# Patient Record
Sex: MALE | Race: WHITE | NOT HISPANIC OR LATINO | URBAN - METROPOLITAN AREA
[De-identification: names, ages, dates, MRNs, and addresses within clinical notes are randomized per-mention and may not be internally consistent; named-entity substitution may affect disease eponyms.]

---

## 2023-11-06 ENCOUNTER — PHARMACY VISIT (OUTPATIENT)
Dept: PHARMACY | Facility: CLINIC | Age: 73
End: 2023-11-06
Payer: COMMERCIAL

## 2023-11-06 ENCOUNTER — HOSPITAL ENCOUNTER (EMERGENCY)
Facility: HOSPITAL | Age: 73
Discharge: HOME | End: 2023-11-06
Attending: EMERGENCY MEDICINE
Payer: MEDICARE

## 2023-11-06 ENCOUNTER — APPOINTMENT (OUTPATIENT)
Dept: RADIOLOGY | Facility: HOSPITAL | Age: 73
End: 2023-11-06
Payer: MEDICARE

## 2023-11-06 VITALS
OXYGEN SATURATION: 98 % | HEIGHT: 66 IN | WEIGHT: 210.76 LBS | SYSTOLIC BLOOD PRESSURE: 147 MMHG | HEART RATE: 65 BPM | BODY MASS INDEX: 33.87 KG/M2 | RESPIRATION RATE: 18 BRPM | TEMPERATURE: 98.4 F | DIASTOLIC BLOOD PRESSURE: 74 MMHG

## 2023-11-06 DIAGNOSIS — R06.02 SHORTNESS OF BREATH: Primary | ICD-10-CM

## 2023-11-06 DIAGNOSIS — I10 HYPERTENSION, UNSPECIFIED TYPE: ICD-10-CM

## 2023-11-06 DIAGNOSIS — I50.9 CONGESTIVE HEART FAILURE, UNSPECIFIED HF CHRONICITY, UNSPECIFIED HEART FAILURE TYPE (MULTI): ICD-10-CM

## 2023-11-06 DIAGNOSIS — M79.672 LEFT FOOT PAIN: ICD-10-CM

## 2023-11-06 DIAGNOSIS — J44.1 COPD EXACERBATION (MULTI): ICD-10-CM

## 2023-11-06 LAB
ALBUMIN SERPL-MCNC: 4.3 G/DL (ref 3.5–5)
ALP BLD-CCNC: 103 U/L (ref 35–125)
ALT SERPL-CCNC: 11 U/L (ref 5–40)
ANION GAP SERPL CALC-SCNC: 11 MMOL/L
AST SERPL-CCNC: 18 U/L (ref 5–40)
BASOPHILS # BLD AUTO: 0.02 X10*3/UL (ref 0–0.1)
BASOPHILS NFR BLD AUTO: 0.3 %
BILIRUB SERPL-MCNC: 1 MG/DL (ref 0.1–1.2)
BUN SERPL-MCNC: 29 MG/DL (ref 8–25)
CALCIUM SERPL-MCNC: 9.4 MG/DL (ref 8.5–10.4)
CHLORIDE SERPL-SCNC: 104 MMOL/L (ref 97–107)
CO2 SERPL-SCNC: 23 MMOL/L (ref 24–31)
CREAT SERPL-MCNC: 1.4 MG/DL (ref 0.4–1.6)
EOSINOPHIL # BLD AUTO: 0.4 X10*3/UL (ref 0–0.4)
EOSINOPHIL NFR BLD AUTO: 6.7 %
ERYTHROCYTE [DISTWIDTH] IN BLOOD BY AUTOMATED COUNT: 13.2 % (ref 11.5–14.5)
GFR SERPL CREATININE-BSD FRML MDRD: 53 ML/MIN/1.73M*2
GLUCOSE SERPL-MCNC: 115 MG/DL (ref 65–99)
HCT VFR BLD AUTO: 42.3 % (ref 41–52)
HGB BLD-MCNC: 14.5 G/DL (ref 13.5–17.5)
IMM GRANULOCYTES # BLD AUTO: 0.03 X10*3/UL (ref 0–0.5)
IMM GRANULOCYTES NFR BLD AUTO: 0.5 % (ref 0–0.9)
LYMPHOCYTES # BLD AUTO: 1.13 X10*3/UL (ref 0.8–3)
LYMPHOCYTES NFR BLD AUTO: 19 %
MCH RBC QN AUTO: 29.5 PG (ref 26–34)
MCHC RBC AUTO-ENTMCNC: 34.3 G/DL (ref 32–36)
MCV RBC AUTO: 86 FL (ref 80–100)
MONOCYTES # BLD AUTO: 0.48 X10*3/UL (ref 0.05–0.8)
MONOCYTES NFR BLD AUTO: 8.1 %
NEUTROPHILS # BLD AUTO: 3.88 X10*3/UL (ref 1.6–5.5)
NEUTROPHILS NFR BLD AUTO: 65.4 %
NRBC BLD-RTO: 0 /100 WBCS (ref 0–0)
NT-PROBNP SERPL-MCNC: 924 PG/ML (ref 0–229)
PLATELET # BLD AUTO: 125 X10*3/UL (ref 150–450)
POTASSIUM SERPL-SCNC: 4.5 MMOL/L (ref 3.4–5.1)
PROT SERPL-MCNC: 7.5 G/DL (ref 5.9–7.9)
RBC # BLD AUTO: 4.91 X10*6/UL (ref 4.5–5.9)
SARS-COV-2 RNA RESP QL NAA+PROBE: NOT DETECTED
SODIUM SERPL-SCNC: 138 MMOL/L (ref 133–145)
TROPONIN T SERPL-MCNC: 28 NG/L
TROPONIN T SERPL-MCNC: 29 NG/L
WBC # BLD AUTO: 5.9 X10*3/UL (ref 4.4–11.3)

## 2023-11-06 PROCEDURE — 99285 EMERGENCY DEPT VISIT HI MDM: CPT | Mod: 25 | Performed by: EMERGENCY MEDICINE

## 2023-11-06 PROCEDURE — 73630 X-RAY EXAM OF FOOT: CPT | Mod: LT,FY

## 2023-11-06 PROCEDURE — 84484 ASSAY OF TROPONIN QUANT: CPT | Performed by: EMERGENCY MEDICINE

## 2023-11-06 PROCEDURE — 71045 X-RAY EXAM CHEST 1 VIEW: CPT | Mod: FY

## 2023-11-06 PROCEDURE — 36415 COLL VENOUS BLD VENIPUNCTURE: CPT | Performed by: EMERGENCY MEDICINE

## 2023-11-06 PROCEDURE — 99284 EMERGENCY DEPT VISIT MOD MDM: CPT | Mod: 25

## 2023-11-06 PROCEDURE — 83880 ASSAY OF NATRIURETIC PEPTIDE: CPT | Performed by: EMERGENCY MEDICINE

## 2023-11-06 PROCEDURE — 85025 COMPLETE CBC W/AUTO DIFF WBC: CPT | Performed by: EMERGENCY MEDICINE

## 2023-11-06 PROCEDURE — 96374 THER/PROPH/DIAG INJ IV PUSH: CPT

## 2023-11-06 PROCEDURE — 2500000002 HC RX 250 W HCPCS SELF ADMINISTERED DRUGS (ALT 637 FOR MEDICARE OP, ALT 636 FOR OP/ED): Performed by: EMERGENCY MEDICINE

## 2023-11-06 PROCEDURE — 84075 ASSAY ALKALINE PHOSPHATASE: CPT | Performed by: EMERGENCY MEDICINE

## 2023-11-06 PROCEDURE — 2500000004 HC RX 250 GENERAL PHARMACY W/ HCPCS (ALT 636 FOR OP/ED): Performed by: EMERGENCY MEDICINE

## 2023-11-06 PROCEDURE — 2500000001 HC RX 250 WO HCPCS SELF ADMINISTERED DRUGS (ALT 637 FOR MEDICARE OP): Performed by: EMERGENCY MEDICINE

## 2023-11-06 PROCEDURE — 87635 SARS-COV-2 COVID-19 AMP PRB: CPT | Performed by: EMERGENCY MEDICINE

## 2023-11-06 PROCEDURE — RXMED WILLOW AMBULATORY MEDICATION CHARGE

## 2023-11-06 PROCEDURE — 84484 ASSAY OF TROPONIN QUANT: CPT | Mod: 59 | Performed by: EMERGENCY MEDICINE

## 2023-11-06 RX ORDER — IPRATROPIUM BROMIDE AND ALBUTEROL SULFATE 2.5; .5 MG/3ML; MG/3ML
3 SOLUTION RESPIRATORY (INHALATION) ONCE
Status: COMPLETED | OUTPATIENT
Start: 2023-11-06 | End: 2023-11-06

## 2023-11-06 RX ORDER — ASPIRIN 325 MG
325 TABLET ORAL ONCE
Status: COMPLETED | OUTPATIENT
Start: 2023-11-06 | End: 2023-11-06

## 2023-11-06 RX ORDER — ALBUTEROL SULFATE 0.83 MG/ML
2.5 SOLUTION RESPIRATORY (INHALATION) EVERY 4 HOURS PRN
Qty: 90 ML | Refills: 0 | Status: SHIPPED | OUTPATIENT
Start: 2023-11-06 | End: 2023-11-14 | Stop reason: WASHOUT

## 2023-11-06 RX ORDER — ALBUTEROL SULFATE 90 UG/1
AEROSOL, METERED RESPIRATORY (INHALATION)
Qty: 6.7 G | Refills: 0 | OUTPATIENT
Start: 2023-11-06 | End: 2023-11-06

## 2023-11-06 RX ORDER — METHYLPREDNISOLONE 4 MG/1
TABLET ORAL
Qty: 21 TABLET | Refills: 0 | Status: SHIPPED | OUTPATIENT
Start: 2023-11-06 | End: 2023-11-14 | Stop reason: WASHOUT

## 2023-11-06 RX ORDER — ALBUTEROL SULFATE 90 UG/1
2 AEROSOL, METERED RESPIRATORY (INHALATION) EVERY 4 HOURS PRN
Qty: 18 G | Refills: 0 | Status: SHIPPED | OUTPATIENT
Start: 2023-11-06 | End: 2023-11-14 | Stop reason: WASHOUT

## 2023-11-06 RX ORDER — ALBUTEROL SULFATE 90 UG/1
2 AEROSOL, METERED RESPIRATORY (INHALATION) EVERY 4 HOURS PRN
Qty: 18 G | Refills: 0 | Status: SHIPPED | OUTPATIENT
Start: 2023-11-06 | End: 2023-11-06 | Stop reason: SDUPTHER

## 2023-11-06 RX ORDER — METHYLPREDNISOLONE 4 MG/1
TABLET ORAL
Qty: 21 TABLET | Refills: 0 | Status: SHIPPED | OUTPATIENT
Start: 2023-11-06 | End: 2023-11-06 | Stop reason: SDUPTHER

## 2023-11-06 RX ADMIN — ASPIRIN 325 MG: 325 TABLET ORAL at 11:09

## 2023-11-06 RX ADMIN — METHYLPREDNISOLONE SODIUM SUCCINATE 125 MG: 125 INJECTION, POWDER, FOR SOLUTION INTRAMUSCULAR; INTRAVENOUS at 11:09

## 2023-11-06 RX ADMIN — IPRATROPIUM BROMIDE AND ALBUTEROL SULFATE 3 ML: 2.5; .5 SOLUTION RESPIRATORY (INHALATION) at 11:09

## 2023-11-06 ASSESSMENT — PAIN DESCRIPTION - FREQUENCY: FREQUENCY: CONSTANT/CONTINUOUS

## 2023-11-06 ASSESSMENT — PAIN DESCRIPTION - DESCRIPTORS: DESCRIPTORS: ACHING

## 2023-11-06 ASSESSMENT — PAIN DESCRIPTION - LOCATION: LOCATION: FOOT

## 2023-11-06 ASSESSMENT — PAIN DESCRIPTION - ORIENTATION: ORIENTATION: RIGHT

## 2023-11-06 ASSESSMENT — COLUMBIA-SUICIDE SEVERITY RATING SCALE - C-SSRS
6. HAVE YOU EVER DONE ANYTHING, STARTED TO DO ANYTHING, OR PREPARED TO DO ANYTHING TO END YOUR LIFE?: NO
1. IN THE PAST MONTH, HAVE YOU WISHED YOU WERE DEAD OR WISHED YOU COULD GO TO SLEEP AND NOT WAKE UP?: NO
2. HAVE YOU ACTUALLY HAD ANY THOUGHTS OF KILLING YOURSELF?: NO

## 2023-11-06 ASSESSMENT — PAIN DESCRIPTION - ONSET: ONSET: GRADUAL

## 2023-11-06 ASSESSMENT — PAIN DESCRIPTION - PROGRESSION: CLINICAL_PROGRESSION: GRADUALLY WORSENING

## 2023-11-06 ASSESSMENT — PAIN DESCRIPTION - PAIN TYPE: TYPE: ACUTE PAIN

## 2023-11-06 ASSESSMENT — PAIN - FUNCTIONAL ASSESSMENT: PAIN_FUNCTIONAL_ASSESSMENT: 0-10

## 2023-11-06 ASSESSMENT — PAIN SCALES - GENERAL: PAINLEVEL_OUTOF10: 7

## 2023-11-06 NOTE — CARE PLAN
Received a call from [facility] ED concerning this pt.  Per ED Physician, pt cannot afford his meds.  This Care Coordinator said that he cannot afford his inhaler and nebulizer med. Informed pt that [facility] can fill his scripts there at a lower cost and he can also have a PCP there. Phoned pts pulmonologist, Dr. Ray Montgomery at [phone] and confirmed that this pt sees Dr. Montgomery. Per Dr. Montgomery's office, this pt has an albuteral inhaler   Phoned back the ED; spoke with the ED physician and told her; she called the pharmacy and the inhaler with Medicaid cost $4.00; ED Doc will also ask the cost of the nebulizer medication for this pt.  Care Coordinator asked the ED Physician to please give him the address and phone number for [facility] in [place]; already confirmed with the pt that he can get to [facility] in [place] for his care.

## 2023-11-06 NOTE — DISCHARGE INSTRUCTIONS
Follow-up with your primary care physician within 1 to 2 days for further management of your current symptoms repeat check of your blood pressure.      Follow-up with podiatry as scheduled for further management of your foot pain      Return to the emergency department sooner with worsening of symptoms or onset of new symptoms

## 2023-11-06 NOTE — PROGRESS NOTES
Attestation note/supervisory note for GABRIEL Pastor      The patient is a 73-year-old male presenting to the emergency department for evaluation of nonproductive cough, shortness of breath, dyspnea on exertion, and pain in his left heel.  He states he stepped on something about a week ago when he feels like he has something in the heel of his left foot since that.  He does not know what he stepped on.  He states he has chronic shortness of breath and dyspnea on exertion but he has not been taking his medications for COPD and CHF because of the cost of the medications.  He states his insurance company has not been willing to work with him to get the medications.  He denies any headache or visual changes.  No chest pain.  No palpitations.  No diaphoresis.  No fever or chills.  No abdominal pain.  No nausea vomiting.  No diarrhea or constipation but no urinary complaints.  No sick contacts or recent travel.  He states he stopped smoking several years ago.  He does not use supplemental oxygen at home.  All pertinent positives and negatives are recorded above.  All other systems reviewed and otherwise negative.  Vital signs with hypertension but otherwise within normal limits.  Physical exam with a well-nourished well-developed male with obesity but no evidence of acute distress.  HEENT exam with dry mucous membranes but otherwise unremarkable.  He does have diffuse wheezing on lung exam but has no other evidence of airway compromise or respiratory distress.  Abdominal exam is benign.  He does have bilateral lower extremity pitting edema.  There is no laceration or evidence of foreign body of the left heel.      EKG with sinus rhythm at 71 bpm, occasional PACs, normal axis, normal voltage, normal ST segment, normal T waves      Oral aspirin, DuoNeb and IV Solu-Medrol ordered.      Diagnostic labs with mild thrombocytopenia, mild electrolyte imbalance, mildly elevated Troponin T, elevated BNP but otherwise  unremarkable.      Initial Troponin T 28. Repeat trop T 29. Delta trop T 1       COVID-19 testing negative      XR chest 1 view   Final Result   No acute cardiopulmonary process.        MACRO:   None        Signed by: Clay Tsering 11/6/2023 12:38 PM   Dictation workstation:   [id number]      XR foot left 3+ views   Final Result   1. No acute fracture or dislocation.   2. Tiny linear radiopaque foreign body measuring 6 mm within the   distal soft tissues of the 5th digit.   3. No radiopaque foreign body plantar heel tissues.        MACRO:   None.        Signed by: Chang Cagle 11/6/2023 12:38 PM   Dictation workstation:   [id number]             The patient does not have any evidence of fracture or dislocation on foot x-ray.  He does have a tiny linear radiopaque foreign body on the x-ray but it does not correlate with the site of his pain.  He does not have any evidence of ischemia on EKG. no events on telemetry.  His Troponin T results were borderline but they were flat.  His symptoms did improve with treatments in the emergency department.  He was able to pass a trial of ambulation with pulse ox of greater than 98% at all times on room air.      The  was contacted to assist the patient with getting his medications refilled.      Critical care time of 31 minutes billed for management of COPD exacerbation with initiation of DuoNebs and IV Solu-Medrol, monitoring of the patient on telemetry, monitoring of the patient's respiratory status, consultation with the patient regarding his results, consultation with case management.  This time excludes time for billable procedures.      critical care time billed for by me is non concurrent with time billed for by GABRIEL Baumann      The patient was released in good condition with a prescription for albuterol inhaler and Medrol Dosepak.  He was instructed to follow-up with his primary care physician within 1 to 2 days for further management of his current  symptoms, repeat check of his blood pressure, and to discuss further refills on his other medications.  He was also given a referral to podiatry for further management of his foot pain.  He will return to the emergency department sooner with worsening of symptoms or onset of new symptoms.        I personally saw the patient and performed a substantive portion of the visit including all aspects of the medical decision making.      I reviewed the results of the diagnostic labs and diagnostic imaging.  Formal radiology reading was completed by the radiologist      Naina Valladares MD

## 2023-11-06 NOTE — ED NOTES
Ambulated the pt.   Did not drop below 97%.  Pt was talking the whole walk without difficultly     Nagi Dick RN  11/06/23 2460

## 2023-11-06 NOTE — ED PROVIDER NOTES
Department of Emergency Medicine   ED  Provider Note  Admit Date/RoomTime: 11/6/2023 10:36 AM  ED Room: AC04/AC04        History of Present Illness:  Chief Complaint   Patient presents with    Shortness of Breath    Foot Injury     SOB x 1 week hx of asthma- wheezing, congestion cough, left heal is painful after stepping on something- walks with limp - , no nausea, vomit or diarrhea          Duane Scott is a 73 y.o. male presenting to the ED for Shortness of breath cough and left heel pain onset of symptoms 1 week ago patient's had cough congestion wheezing has significant history of asthma.  Shortness of breath is worse with exertion.  Patient states he is not on any inhalers because his insurance company is not willing to work with him on his medications.  He denies any fever or chills.  No abdominal pain no nausea no vomiting.  Does complain of increased weight gain but he is unsure of how much.  He is also noted swelling in both legs.  7 days ago did step on something outside and has had left heel pain since then no redness or warmth no drainage no bruising no increase in swelling.  It hurts to ambulate on the left heel.,      Review of Systems:   Pertinent positives and negatives are stated within HPI, all other systems reviewed and are negative.        --------------------------------------------- PAST HISTORY ---------------------------------------------  Past Medical History:  has no past medical history on file.  Past Surgical History:  has no past surgical history on file.  Social History:    Family History: family history is not on file.. Unless otherwise noted, family history is non contributory  The patient’s home medications have been reviewed.  Allergies: Latex and Penicillins        ---------------------------------------------------PHYSICAL EXAM--------------------------------------    GENERAL APPEARANCE: Awake and alert.   VITAL SIGNS: As per the nurses' triage record. Hypertension  HEENT:  "Normocephalic, atraumatic. Extraocular muscles are intact. Pupils equal round and reactive to light. Conjunctiva are pink. Negative scleral icterus. Mucous membranes are moist. Tongue in the midline. Pharynx was without erythema or exudates, uvula midline  NECK: Soft Nontender and supple, full gross ROM, no meningeal signs.  CHEST: Nontender to palpation. Clear to auscultation bilaterally. No rhonchi or rales noted.  Wheezing scattered throughout.  Mild tachypnea noted with talking comes with rest.  HEART: S1, S2. Regular rate and rhythm. No murmurs, gallops or rubs.  Strong and equal pulses in the extremities.   ABDOMEN: Soft, nontender, nondistended, positive bowel sounds, no palpable masses.  MUSCULCSKELETAL: The calves are nontender to palpation. Full gross active range of motion. Ambulating on own with no acute difficulties+2 pitting edema bilateral lower extremities.Left heel no redness or warmth no tenderness to palpation no bruising no edema no obvious foreign body  NEUROLOGICAL: Awake, alert and oriented x 3. Power intact in the upper and lower extremities. Sensation is intact to light touch in the upper and lower extremities.   IMMUNOLOGICAL: No lymphatic streaking noted   DERM: No petechiae, rashes, or ecchymoses.          ------------------------- NURSING NOTES AND VITALS REVIEWED ---------------------------  The nursing notes within the ED encounter and vital signs as below have been reviewed by myself  BP (!) 165/99 (BP Location: Left arm, Patient Position: Lying)   Pulse 68   Temp 36.9 °C (98.4 °F) (Oral)   Resp 18   Ht 1.676 m (5' 6\")   Wt 95.6 kg (210 lb 12.2 oz)   SpO2 95%   BMI 34.02 kg/m²     Oxygen Saturation Interpretation: Normal    The cardiac monitor revealed sinus rhythm with a heart rate in the 60s s as interpreted by me. The cardiac monitor was ordered secondary to the patient's heart rate and to monitor the patient for dysrhythmia.       The patient’s available past medical " records and past encounters were reviewed.          -----------------------DIAGNOSTIC RESULTS------------------------  LABS:    Labs Reviewed   N-TERMINAL PROBNP - Abnormal       Result Value    PROBNP 924 (*)     Narrative:     Reference ranges are based on clinical submission data. These ranges represent the 95th percentile of normal cut-off points. As NT Pro- BNP values approach 1000 pg/ml, clinical symptoms are more likely associated with CHF.   CBC WITH AUTO DIFFERENTIAL - Abnormal    WBC 5.9      nRBC 0.0      RBC 4.91      Hemoglobin 14.5      Hematocrit 42.3      MCV 86      MCH 29.5      MCHC 34.3      RDW 13.2      Platelets 125 (*)     Neutrophils % 65.4      Immature Granulocytes %, Automated 0.5      Lymphocytes % 19.0      Monocytes % 8.1      Eosinophils % 6.7      Basophils % 0.3      Neutrophils Absolute 3.88      Immature Granulocytes Absolute, Automated 0.03      Lymphocytes Absolute 1.13      Monocytes Absolute 0.48      Eosinophils Absolute 0.40      Basophils Absolute 0.02     COMPREHENSIVE METABOLIC PANEL - Abnormal    Glucose 115 (*)     Sodium 138      Potassium 4.5      Chloride 104      Bicarbonate 23 (*)     Urea Nitrogen 29 (*)     Creatinine 1.40      eGFR 53 (*)     Calcium 9.4      Albumin 4.3      Alkaline Phosphatase 103      Total Protein 7.5      AST 18      Bilirubin, Total 1.0      ALT 11      Anion Gap 11     SERIAL TROPONIN, INITIAL ([facility]) - Abnormal    Troponin T, High Sensitivity 28 (*)    SERIAL TROPONIN,  2 HOUR ([facility]) - Abnormal    Troponin T, High Sensitivity 29 (*)    SARS-COV-2 PCR, SYMPTOMATIC - Normal    Coronavirus 2019, PCR Not Detected      Narrative:     This assay has received FDA Emergency Use Authorization (EUA) and is only authorized for the duration of time that circumstances exist to justify the authorization of the emergency use of in vitro diagnostic tests for the detection of SARS-CoV-2 virus and/or diagnosis of COVID-19 infection under section 564(b)(1)  of the Act, 21 U.S.C. 360bbb-3(b)(1). This assay is an in vitro diagnostic nucleic acid amplification test for the qualitative detection of SARS-CoV-2 from nasopharyngeal specimens and has been validated for use at [facility]. Negative results do not preclude COVID-19 infections and should not be used as the sole basis for diagnosis, treatment, or other management decisions.     TROPONIN T SERIES, HIGH SENSITIVITY (0, 2 HR, 6 HR)    Narrative:     The following orders were created for panel order Troponin T Series, High Sensitivity (0, 2HR, 6HR).  Procedure                               Abnormality         Status                     ---------                               -----------         ------                     Serial Troponin, Initial...[[id number]]  Abnormal            Final result               Serial Troponin, 2 Hour ...[[id number]]  Abnormal            Final result               Serial Troponin, 6 Hour ...[[id number]]                                                   Please view results for these tests on the individual orders.   SERIAL TROPONIN, 6 HOUR ([facility])       As interpreted by me, the above displayed labs are abnormal. All other labs obtained during this visit were within normal range or not returned as of this dictation.      EKG Interpretation  EKG with sinus rhythm at 71 bpm, occasional PACs, normal axis, normal voltage, normal ST segment, normal T waves  per attending Note Blaine Medina         XR chest 1 view   Final Result   No acute cardiopulmonary process.        MACRO:   None        Signed by: Chang Cagle 11/6/2023 12:38 PM   Dictation workstation:   [id number]      XR foot left 3+ views   Final Result   1. No acute fracture or dislocation.   2. Tiny linear radiopaque foreign body measuring 6 mm within the   distal soft tissues of the 5th digit.   3. No radiopaque foreign body plantar heel tissues.        MACRO:   None.        Signed by: Chang Cagle 11/6/2023  12:38 PM   Dictation workstation:   [id number]              XR chest 1 view   Final Result   No acute cardiopulmonary process.        MACRO:   None        Signed by: Chang Tsering 11/6/2023 12:38 PM   Dictation workstation:   [id number]      XR foot left 3+ views   Final Result   1. No acute fracture or dislocation.   2. Tiny linear radiopaque foreign body measuring 6 mm within the   distal soft tissues of the 5th digit.   3. No radiopaque foreign body plantar heel tissues.        MACRO:   None.        Signed by: Chang Cagle 11/6/2023 12:38 PM   Dictation workstation:   [id number]              ------------------------------ ED COURSE/MEDICAL DECISION MAKING----------------------  Medical Decision Making:   Exam: A medically appropriate exam performed, outlined above, given the known history and presentation.    History obtained from: patient review of medical record       Social Determinants of Health considered during this visit: lives at home       PAST MEDICAL HISTORY/Chronic Conditions Affecting Care     has no past medical history on file.       CC/HPI Summary, Social Determinants of health, Records Reviewed, DDx, testing done/not done, ED Course, Reassessment, disposition considerations/shared decision making with patient, consults, disposition:   EKG  CBC  CMP  COVID  Chest x-ray-No acute cardiopulmonary process.   Left foot x-ray-1. No acute fracture or dislocation.  2. Tiny linear radiopaque foreign body measuring 6 mm within the  distal soft tissues of the 5th digit.  3. No radiopaque foreign body plantar heel tissues.      Aspirin  DuoNeb  Solu-Medrol  proBNP  Troponin    Medical Decision Making/Differential Diagnosis:  Differentials include but not limited to asthma exacerbation versus COPD versus pneumonia versus COVID versus flu versus electrode abnormality versus acute coronary syndrome versus congestive heart failure versus plantar fasciitis versus foreign body contusion  Review:    Troponin 29 repeat troponin 28  Glucose 115  Bicarb 23  BUN 29  Creatinine 1.4  LFTs within normal limits  Electrolytes within normal limits  proBNP 924  COVID-negative  White blood cell count 5.9  Hemoglobin 14.5  Platelets 125 baseline 133  Thrombocytopenia history of the same no signs of bleeding.  LFTs within  Lites within normal limits negative COVID.  Patient does not have an elevated white blood cell count.  He is not anemic.  Normal renal function.  Troponin was mildly elevated at 29 repeat troponin 28.  No complaints of chest pain.  EKG per attending note Occasional PVCs normal sinus rhythm no ST elevation proBNP is also elevated edema bilateral lower extremities.  Patient has history of asthma.  Having trouble getting his inhalers due to insurance complications.  Spoke with pharmacy here today which will refill his albuterol inhaler as well as Medrol Dosepak.  Also involved case management to help with his other medications.  Patient reports he is feeling better.  Lung sounds reassessed clear ambulatory with no difficulty 100% on room air.  Chest x-ray showed no acute cardiopulmonary process.  Patient also complaining of heel pain no radiopaque foreign body noted at the heel tissues.  However had a tiny foreign body at the distal fifth digit.  He is having no pain at this area.  Signs of infection.  Impression was COPD exacerbation.  Improvement.  Congestive heart failure  Hypertension  Left foot pain  Patient seen evaluated with attending physician Dr. Valladares  Parts of this chart  have been completed using voice recognition software.  Please excuse any errors of transcription.  Despite the medical decision-making time stamp above medical decision making has taken place during the patient's entire visit.  Patient    PROCEDURES  Unless otherwise noted below, none      CONSULTS:   None      ED Course as of 11/06/23 1522   Mon Nov 06, 2023   1521 Patient's lung sounds reassessed no longer wheezing.  Able to  talk in complete sentence.  Is ambulatory with no difficulty.  Patient expressed that he is having concerns he is not able to get his medications.  Discussed case with case management who is working with him on getting his medications I also spoke with pharmacy who will fill his albuterol and steroids here today. [TB]      ED Course User Index  [TB] ABDULAZIZ Giraldo         Diagnoses as of 11/06/23 1522   Shortness of breath   COPD exacerbation (CMS/HCC)   Congestive heart failure, unspecified HF chronicity, unspecified heart failure type (CMS/HCC)   Hypertension, unspecified type   Left foot pain         This patient has remained hemodynamically stable during their ED course.      Critical Care: none        Counseling:  The emergency provider has spoken with the patient   and discussed today’s results, in addition to providing specific details for the plan of care and counseling regarding the diagnosis and prognosis.  Questions are answered at this time and they are agreeable with the plan.         --------------------------------- IMPRESSION AND DISPOSITION ---------------------------------    IMPRESSION  1. Shortness of breath    2. COPD exacerbation (CMS/HCC)    3. Congestive heart failure, unspecified HF chronicity, unspecified heart failure type (CMS/HCC)    4. Hypertension, unspecified type    5. Left foot pain        DISPOSITION  Disposition: Home  Patient condition is stable improved        NOTE: This report was transcribed using voice recognition software. Every effort was made to ensure accuracy; however, inadvertent computerized transcription errors may be present      ABDULAZIZ Giraldo  11/06/23 1526

## 2023-11-14 PROBLEM — G47.9 SLEEP DISORDER: Status: ACTIVE | Noted: 2023-11-14

## 2023-11-14 PROBLEM — I10 ESSENTIAL HYPERTENSION: Status: ACTIVE | Noted: 2018-11-08

## 2023-11-14 PROBLEM — K80.10 CALCULUS OF GALLBLADDER WITH CHOLECYSTITIS: Status: ACTIVE | Noted: 2023-11-14

## 2023-11-14 PROBLEM — E78.2 MIXED HYPERLIPIDEMIA: Status: ACTIVE | Noted: 2018-11-08

## 2023-11-14 PROBLEM — F41.9 ANXIETY: Status: ACTIVE | Noted: 2023-11-14

## 2023-11-14 PROBLEM — N39.0 URINARY TRACT INFECTION WITHOUT HEMATURIA: Status: ACTIVE | Noted: 2023-11-14

## 2023-11-14 PROBLEM — N40.0 ENLARGED PROSTATE: Status: ACTIVE | Noted: 2020-04-02

## 2023-11-14 PROBLEM — Z95.5 STATUS POST CORONARY ARTERY STENT PLACEMENT: Status: ACTIVE | Noted: 2023-11-14

## 2023-11-14 PROBLEM — N17.9 ACUTE RENAL FAILURE SYNDROME (CMS-HCC): Status: ACTIVE | Noted: 2023-11-14

## 2023-11-14 PROBLEM — J45.50 SEVERE PERSISTENT ASTHMA WITHOUT COMPLICATION (MULTI): Status: ACTIVE | Noted: 2023-11-14

## 2023-11-14 PROBLEM — I25.10 CORONARY ARTERY DISEASE INVOLVING NATIVE CORONARY ARTERY OF NATIVE HEART WITHOUT ANGINA PECTORIS: Status: ACTIVE | Noted: 2023-11-14

## 2023-11-14 PROBLEM — K40.30 INCARCERATED RIGHT INGUINAL HERNIA: Status: ACTIVE | Noted: 2023-11-14

## 2023-11-14 RX ORDER — IPRATROPIUM BROMIDE AND ALBUTEROL SULFATE 2.5; .5 MG/3ML; MG/3ML
3 SOLUTION RESPIRATORY (INHALATION) EVERY 4 HOURS PRN
COMMUNITY
Start: 2019-09-24 | End: 2023-12-07 | Stop reason: ALTCHOICE

## 2023-11-14 RX ORDER — ALBUTEROL SULFATE 90 UG/1
1 AEROSOL, METERED RESPIRATORY (INHALATION) EVERY 4 HOURS PRN
COMMUNITY

## 2023-11-14 RX ORDER — CARVEDILOL 25 MG/1
25 TABLET ORAL
COMMUNITY
Start: 2019-09-09 | End: 2024-01-24 | Stop reason: SDUPTHER

## 2023-11-14 RX ORDER — FUROSEMIDE 20 MG/1
20 TABLET ORAL 2 TIMES DAILY
COMMUNITY
Start: 2019-09-24

## 2023-11-14 RX ORDER — ATORVASTATIN CALCIUM 80 MG/1
80 TABLET, FILM COATED ORAL DAILY
COMMUNITY
End: 2024-01-24 | Stop reason: SDUPTHER

## 2023-11-14 RX ORDER — ASPIRIN 81 MG/1
81 TABLET ORAL DAILY
COMMUNITY
Start: 2019-09-24

## 2023-11-14 RX ORDER — ALPRAZOLAM 0.25 MG/1
0.25 TABLET ORAL DAILY PRN
COMMUNITY
Start: 2022-06-28

## 2023-11-15 ENCOUNTER — TELEPHONE (OUTPATIENT)
Dept: PRIMARY CARE | Facility: CLINIC | Age: 73
End: 2023-11-15
Payer: MEDICARE

## 2023-11-15 NOTE — TELEPHONE ENCOUNTER
Phoned Danii Scott/ wife in follow up, confirmed 11/16/23 [facility] visit, COVID screening negative.

## 2023-11-15 NOTE — TELEPHONE ENCOUNTER
"Message left on office voicemail, \"yes, patient available for visit tomorrow\" call if you need any more information [phone], Danii Scott   "

## 2023-11-16 ENCOUNTER — OFFICE VISIT (OUTPATIENT)
Dept: PRIMARY CARE | Facility: CLINIC | Age: 73
End: 2023-11-16
Payer: MEDICARE

## 2023-11-16 VITALS
HEART RATE: 98 BPM | TEMPERATURE: 97.1 F | SYSTOLIC BLOOD PRESSURE: 158 MMHG | DIASTOLIC BLOOD PRESSURE: 84 MMHG | WEIGHT: 210 LBS | OXYGEN SATURATION: 97 % | BODY MASS INDEX: 33.75 KG/M2 | RESPIRATION RATE: 16 BRPM | HEIGHT: 66 IN

## 2023-11-16 DIAGNOSIS — I10 ESSENTIAL HYPERTENSION: Primary | ICD-10-CM

## 2023-11-16 DIAGNOSIS — J45.50 SEVERE PERSISTENT ASTHMA WITHOUT COMPLICATION (MULTI): ICD-10-CM

## 2023-11-16 DIAGNOSIS — M79.672 LEFT FOOT PAIN: ICD-10-CM

## 2023-11-16 DIAGNOSIS — I50.32 CHRONIC DIASTOLIC HEART FAILURE (MULTI): ICD-10-CM

## 2023-11-16 PROCEDURE — 1160F RVW MEDS BY RX/DR IN RCRD: CPT | Performed by: NURSE PRACTITIONER

## 2023-11-16 PROCEDURE — 1036F TOBACCO NON-USER: CPT | Performed by: NURSE PRACTITIONER

## 2023-11-16 PROCEDURE — 1159F MED LIST DOCD IN RCRD: CPT | Performed by: NURSE PRACTITIONER

## 2023-11-16 PROCEDURE — 1125F AMNT PAIN NOTED PAIN PRSNT: CPT | Performed by: NURSE PRACTITIONER

## 2023-11-16 PROCEDURE — 3079F DIAST BP 80-89 MM HG: CPT | Performed by: NURSE PRACTITIONER

## 2023-11-16 PROCEDURE — 99349 HOME/RES VST EST MOD MDM 40: CPT | Performed by: NURSE PRACTITIONER

## 2023-11-16 PROCEDURE — 3077F SYST BP >= 140 MM HG: CPT | Performed by: NURSE PRACTITIONER

## 2023-11-16 ASSESSMENT — ENCOUNTER SYMPTOMS
BACK PAIN: 1
ARTHRALGIAS: 1
DIZZINESS: 0
PALPITATIONS: 0
CONSTIPATION: 0
DIARRHEA: 0
CHILLS: 0
SHORTNESS OF BREATH: 1
LIGHT-HEADEDNESS: 0
NAUSEA: 0
VOMITING: 0
APPETITE CHANGE: 0
DIFFICULTY URINATING: 0
ABDOMINAL PAIN: 0
COUGH: 1
BRUISES/BLEEDS EASILY: 0
TROUBLE SWALLOWING: 0
FEVER: 0
WHEEZING: 1
NERVOUS/ANXIOUS: 1

## 2023-11-16 ASSESSMENT — PAIN SCALES - GENERAL: PAINLEVEL: 4

## 2023-11-16 NOTE — PROGRESS NOTES
"Subjective   Patient ID: Duane Scott is a 73 y.o. male who presents for Follow-up (Chronic medical conditions, recent evaluation in ED for COPD).    Visit for 74 y/o male seen today in private home, alone for ED follow up. Visit took place outside on patients front patio. He will not allow this provider in his home. States \"I have a water leak in my house that I am dealing with\". Patient is alert, oriented, able to answer simple questions regarding his health. He lives with his spouse and family. He does not drive or have a vehicle. He has limited resources making it difficult to get out for medical appointments. His PCP is local to where he lives and he reports that he has walked to her office in the past. He also walks to [facility] which is local. He is able to manage his own medications but declines to allow this provider to see his pill bottles. He is uncertain when he took his medications last and states \"I took the medicine from the emergency room but that's all that's important right now\". He is able to perform all ADLs. Does have chronic back and leg pain which limits his mobility. He is active with [employer]. Receives meals on wheels M-F and is able to prepare simple meals for himself. He denies appetite changes. Admits to weight gain. Denies abdominal pain, nausea, vomiting. Denies bowel or bladder concerns. Denies difficulty sleeping.     Patient presented to the ED on 11/6 with shortness of breath, left foot pain. Per ED H/P: His symptoms started 1 week prior with cough, congestion, wheezing and history of asthma, COPD. Shortness of breath worse with exertion. Pt reported that he was not on any inhalers because his insurance company is not willing to work with him on his medications. He denied any fever or chills. No abdominal pain, nausea or vomiting. He complained of increased weight gain but was unsure of how much. He has noted swelling in both legs. He reported stepping on something " outside and has had left heel pain since then with no redness or warmth. No drainage, no bruising, no increase in swelling. He had CXR that was negative for acute cardiopulmonary process. Left foot x-ray showing 1. no acute fracture or dislocation, 2. Tiny linear radiopaque foreign body measuring 6 mm within the distal soft tissues of the 5th digit. 3. No radiopaque foreign body plantar heel tissues. He was given referral was podiatry and given Rx for Albuterol inhaler and medrol dose vishnu which he reports taking but does have a printed prescription for both medications with his discharge paperwork.     COPD- pulmonologist is Dr. Montgomery. Patient does not have a follow up appt scheduled. He reports getting mad and walking out of his last appt because the doctor was in [place] instead of [place]. He denies fever, chills. Denies chest pain, palpitations. Has IVORY but denies feeling short of breath at rest.     Home Visit:          Medically necessary due to: patient has limited support systems to help the patient attend office visits         Current Outpatient Medications:     albuterol (Ventolin HFA) 90 mcg/actuation inhaler, Inhale 1 puff every 4 hours if needed., Disp: , Rfl:     ALPRAZolam (Xanax) 0.25 mg tablet, Take 1 tablet (0.25 mg) by mouth once daily as needed for anxiety., Disp: , Rfl:     aspirin 81 mg EC tablet, Take 1 tablet (81 mg) by mouth once daily., Disp: , Rfl:     atorvastatin (Lipitor) 80 mg tablet, Take 1 tablet (80 mg) by mouth once daily., Disp: , Rfl:     carvedilol (Coreg) 25 mg tablet, Take 1 tablet (25 mg) by mouth 2 times a day with meals., Disp: , Rfl:     furosemide (Lasix) 20 mg tablet, Take 1 tablet (20 mg) by mouth 2 times a day., Disp: , Rfl:     ipratropium-albuteroL (Duo-Neb) 0.5-2.5 mg/3 mL nebulizer solution, Take 3 mL by nebulization every 4 hours if needed., Disp: , Rfl:     ticagrelor (Brilinta) 90 mg tablet, Take 1 tablet (90 mg) by mouth 2 times a day., Disp: , Rfl:   "    Review of Systems   Constitutional:  Negative for appetite change, chills and fever.   HENT:  Negative for trouble swallowing.    Respiratory:  Positive for cough, shortness of breath (with exertion) and wheezing (intermittent).    Cardiovascular:  Negative for chest pain and palpitations.   Gastrointestinal:  Negative for abdominal pain, constipation, diarrhea, nausea and vomiting.   Genitourinary:  Negative for difficulty urinating.   Musculoskeletal:  Positive for arthralgias, back pain and gait problem.   Neurological:  Negative for dizziness and light-headedness.   Hematological:  Does not bruise/bleed easily.   Psychiatric/Behavioral:  The patient is nervous/anxious.      Objective   /84 (BP Location: Left arm, Patient Position: Sitting, BP Cuff Size: Adult)   Pulse 98   Temp 36.2 °C (97.1 °F) (Temporal)   Resp 16   Ht 1.676 m (5' 6\")   Wt 95.3 kg (210 lb)   SpO2 97%   BMI 33.89 kg/m²     Physical Exam  Constitutional:       General: He is not in acute distress.     Comments: Standing outside on patio   HENT:      Head: Normocephalic and atraumatic.      Nose: Nose normal.      Mouth/Throat:      Mouth: Mucous membranes are moist.      Pharynx: Oropharynx is clear.   Eyes:      Pupils: Pupils are equal, round, and reactive to light.   Cardiovascular:      Rate and Rhythm: Normal rate and regular rhythm.   Pulmonary:      Effort: No respiratory distress.      Breath sounds: Decreased air movement present.      Comments: Mild exp wheezing noted. No rhonchi or rales. No cough on exam. Room air  Abdominal:      General: Bowel sounds are normal. There is no distension.      Palpations: Abdomen is soft.      Tenderness: There is no abdominal tenderness.   Musculoskeletal:      Cervical back: Neck supple.      Right lower le+ Pitting Edema present.      Left lower le+ Pitting Edema present.   Skin:     General: Skin is warm and dry.   Neurological:      Mental Status: He is alert and oriented " to person, place, and time.      Gait: Gait abnormal.   Psychiatric:         Mood and Affect: Mood normal.         Behavior: Behavior normal.       Assessment/Plan   Diagnoses and all orders for this visit:  Essential hypertension  Comments:  chronic, BP elevated on exam. Pt non compliant with his medications.He declines medication adjustments.  Chronic diastolic heart failure (CMS/HCC)  Comments:  chronic, LE edema noted. He should continue Lasix as ordered  Severe persistent asthma without complication  Comments:  s/p evaluation in the ED. CXR negative. Respiratory status stable today.  Left foot pain  Comments:  acute, patient to follow up with podiatry as recommended    Patient stable. Unfortunately I am unable to determine if patient is taking his medications consistently or even has the medications in his home as he declines to allow this provider into his home today or to look at his medication bottles. I advised pt that his BP is high and that he likely needs a medication adjustment or additional medications and he declines. He has worked with [employer] social workers in the past due to financial concerns. Advised pt that if he feels he is in any distress he is to return to the ED for further evaluation but he is currently stable on exam.     Radha Johnson, APRN-CNP

## 2023-11-17 PROBLEM — M10.9 GOUT: Status: ACTIVE | Noted: 2018-06-28

## 2023-11-17 PROBLEM — M54.16 LUMBAR BACK PAIN WITH RADICULOPATHY AFFECTING LOWER EXTREMITY: Status: ACTIVE | Noted: 2023-07-13

## 2023-11-17 PROBLEM — I50.30 DIASTOLIC HEART FAILURE (MULTI): Status: ACTIVE | Noted: 2023-11-17

## 2023-11-17 PROBLEM — M75.120 FULL THICKNESS ROTATOR CUFF TEAR: Status: ACTIVE | Noted: 2020-11-17

## 2023-11-17 PROBLEM — L40.9 PSORIASIS: Status: ACTIVE | Noted: 2023-01-30

## 2023-11-17 PROBLEM — I25.2 STATUS POST MYOCARDIAL INFARCTION: Status: ACTIVE | Noted: 2019-09-24

## 2023-11-17 PROBLEM — R00.2 PALPITATIONS: Status: ACTIVE | Noted: 2023-11-17

## 2023-11-17 PROBLEM — I20.9 ANGINA PECTORIS (CMS-HCC): Status: ACTIVE | Noted: 2023-11-17

## 2023-11-17 PROBLEM — S39.91XA GROIN INJURY: Status: ACTIVE | Noted: 2023-11-17

## 2023-11-17 PROBLEM — N17.9 AKI (ACUTE KIDNEY INJURY) (CMS-HCC): Status: ACTIVE | Noted: 2019-09-24

## 2023-11-17 PROBLEM — M25.552 PAIN IN LEFT HIP: Status: ACTIVE | Noted: 2023-03-30

## 2023-11-17 PROBLEM — I46.9 CARDIAC ARREST (MULTI): Status: ACTIVE | Noted: 2023-11-17

## 2023-11-17 PROBLEM — N20.0 KIDNEY STONE: Status: ACTIVE | Noted: 2023-11-17

## 2023-11-17 PROBLEM — D69.6 DISORDER INVOLVING THROMBOCYTOPENIA (CMS-HCC): Status: ACTIVE | Noted: 2019-11-12

## 2023-11-17 PROBLEM — K21.9 GASTROESOPHAGEAL REFLUX DISEASE: Status: ACTIVE | Noted: 2023-11-17

## 2023-11-17 PROBLEM — J44.9: Status: ACTIVE | Noted: 2023-11-17

## 2023-11-17 PROBLEM — M79.642 LEFT HAND PAIN: Status: ACTIVE | Noted: 2020-09-22

## 2023-11-17 PROBLEM — F41.0 PANIC ATTACKS: Status: ACTIVE | Noted: 2020-03-23

## 2023-11-17 PROBLEM — G89.29 CHRONIC PAIN: Status: ACTIVE | Noted: 2023-11-17

## 2023-11-17 PROBLEM — R73.01 IMPAIRED FASTING GLUCOSE: Status: ACTIVE | Noted: 2019-09-24

## 2023-11-17 PROBLEM — E11.9 DIABETES MELLITUS, TYPE 2 (MULTI): Status: ACTIVE | Noted: 2018-11-08

## 2023-11-17 PROBLEM — R21 RASH: Status: ACTIVE | Noted: 2023-11-17
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
## 2023-11-17 PROBLEM — L03.90 CELLULITIS: Status: ACTIVE | Noted: 2023-01-30

## 2023-11-17 PROBLEM — M54.2 NECK PAIN: Status: ACTIVE | Noted: 2023-07-13

## 2023-11-17 PROBLEM — M79.18 MUSCULOSKELETAL PAIN: Status: ACTIVE | Noted: 2023-11-17

## 2023-11-17 PROBLEM — J10.1 INFLUENZA DUE TO INFLUENZA VIRUS, TYPE B: Status: ACTIVE | Noted: 2023-11-17

## 2023-11-17 PROBLEM — B17.9 ACUTE HEPATITIS: Status: ACTIVE | Noted: 2023-11-17

## 2023-11-17 PROBLEM — R07.89 CHEST DISCOMFORT: Status: ACTIVE | Noted: 2023-11-17

## 2023-11-19 ENCOUNTER — HOSPITAL ENCOUNTER (OUTPATIENT)
Dept: CARDIOLOGY | Facility: HOSPITAL | Age: 73
Discharge: HOME | End: 2023-11-19
Payer: MEDICARE

## 2023-11-19 LAB
ATRIAL RATE: 71 BPM
P AXIS: 55 DEGREES
P OFFSET: 202 MS
P ONSET: 152 MS
PR INTERVAL: 134 MS
Q ONSET: 219 MS
QRS COUNT: 12 BEATS
QRS DURATION: 94 MS
QT INTERVAL: 418 MS
QTC CALCULATION(BAZETT): 454 MS
QTC FREDERICIA: 442 MS
R AXIS: -18 DEGREES
T AXIS: 22 DEGREES
T OFFSET: 428 MS
VENTRICULAR RATE: 71 BPM

## 2023-11-19 PROCEDURE — 93005 ELECTROCARDIOGRAM TRACING: CPT

## 2023-12-07 ENCOUNTER — OFFICE VISIT (OUTPATIENT)
Dept: PRIMARY CARE | Facility: CLINIC | Age: 73
End: 2023-12-07
Payer: MEDICARE

## 2023-12-07 VITALS
BODY MASS INDEX: 33.75 KG/M2 | HEART RATE: 79 BPM | TEMPERATURE: 98.1 F | DIASTOLIC BLOOD PRESSURE: 82 MMHG | SYSTOLIC BLOOD PRESSURE: 160 MMHG | OXYGEN SATURATION: 96 % | HEIGHT: 66 IN | WEIGHT: 210 LBS

## 2023-12-07 DIAGNOSIS — J44.9 CHRONIC OBSTRUCTIVE PULMONARY DISEASE REQUIRING DRUG THERAPY (MULTI): ICD-10-CM

## 2023-12-07 DIAGNOSIS — J40 BRONCHITIS: Primary | ICD-10-CM

## 2023-12-07 DIAGNOSIS — Z23 ENCOUNTER FOR IMMUNIZATION: ICD-10-CM

## 2023-12-07 PROCEDURE — 1036F TOBACCO NON-USER: CPT

## 2023-12-07 PROCEDURE — 90662 IIV NO PRSV INCREASED AG IM: CPT

## 2023-12-07 PROCEDURE — 1125F AMNT PAIN NOTED PAIN PRSNT: CPT

## 2023-12-07 PROCEDURE — G0008 ADMIN INFLUENZA VIRUS VAC: HCPCS

## 2023-12-07 PROCEDURE — 3077F SYST BP >= 140 MM HG: CPT

## 2023-12-07 PROCEDURE — 3079F DIAST BP 80-89 MM HG: CPT

## 2023-12-07 PROCEDURE — 1159F MED LIST DOCD IN RCRD: CPT

## 2023-12-07 PROCEDURE — 1160F RVW MEDS BY RX/DR IN RCRD: CPT

## 2023-12-07 PROCEDURE — 99213 OFFICE O/P EST LOW 20 MIN: CPT

## 2023-12-07 RX ORDER — PREDNISONE 20 MG/1
40 TABLET ORAL DAILY
Qty: 10 TABLET | Refills: 0 | Status: SHIPPED | OUTPATIENT
Start: 2023-12-07 | End: 2023-12-07 | Stop reason: SDUPTHER

## 2023-12-07 RX ORDER — DOXYCYCLINE 100 MG/1
100 CAPSULE ORAL 2 TIMES DAILY
Qty: 14 CAPSULE | Refills: 0 | Status: SHIPPED | OUTPATIENT
Start: 2023-12-07 | End: 2023-12-07 | Stop reason: SDUPTHER

## 2023-12-07 RX ORDER — DOXYCYCLINE 100 MG/1
100 CAPSULE ORAL 2 TIMES DAILY
Qty: 14 CAPSULE | Refills: 0 | Status: SHIPPED | OUTPATIENT
Start: 2023-12-07 | End: 2023-12-14

## 2023-12-07 RX ORDER — QUINAPRIL HYDROCHLORIDE AND HYDROCHLOROTHIAZIDE 20; 25 MG/1; MG/1
1 TABLET, FILM COATED ORAL
COMMUNITY
End: 2024-02-19

## 2023-12-07 RX ORDER — PREDNISONE 20 MG/1
40 TABLET ORAL DAILY
Qty: 10 TABLET | Refills: 0 | Status: SHIPPED | OUTPATIENT
Start: 2023-12-07 | End: 2023-12-12

## 2023-12-07 ASSESSMENT — ENCOUNTER SYMPTOMS
DIZZINESS: 0
COUGH: 1
COLOR CHANGE: 0
LIGHT-HEADEDNESS: 0
FATIGUE: 0
APPETITE CHANGE: 0
WHEEZING: 0
FEVER: 0
HEADACHES: 0
CHILLS: 0
CHEST TIGHTNESS: 0
ACTIVITY CHANGE: 0
SINUS PRESSURE: 0
RHINORRHEA: 0
PALPITATIONS: 0
SHORTNESS OF BREATH: 1

## 2023-12-07 ASSESSMENT — PAIN SCALES - GENERAL: PAINLEVEL: 7

## 2023-12-07 NOTE — PROGRESS NOTES
"Subjective   Patient ID: Duane Scott is a 73 y.o. male who presents for Cough (\"For years\") and Shortness of Breath.    HPI   Duane presents today for complaints of shortness of breath and cough, which he admits has had for many years. He is a new patient to this provider, was previously establish with another provider in this office, Liana Ramos PA-C.  He has a history of asthma and COPD.  Reports he sees Dr. Montgomery, pulmonology at [facility], but reports he has not seen him in some time due to appointment location changes.  He was seen in the ED on 11/6 for these symptoms and did have a follow up visit with a provider, Radha Johnson CNP, on 11/16/23.  He reports that his symptoms have worsened over the last few days, coughing up a large amount of yellow phlegm.  Reports he uses his prescribed albuterol inhaler 3-4 times per day and reports he is taking his oral medications as prescribed.  Denies fever, or chills.  Denies chest pain, or palpitations. Denies SOB while at rest.  Reports dyspnea with exertion. Reports that he walked here today from his home, he stops to take breaks when he feel SOB. He has has a significant history for CHF and hypertension.     Review of Systems   Constitutional:  Negative for activity change, appetite change, chills, fatigue and fever.   HENT:  Negative for congestion, rhinorrhea and sinus pressure.    Respiratory:  Positive for cough and shortness of breath. Negative for chest tightness and wheezing.    Cardiovascular:  Negative for chest pain and palpitations.   Skin:  Negative for color change.   Neurological:  Negative for dizziness, syncope, light-headedness and headaches.           Objective   Blood Pressure 160/82 (BP Location: Right arm)   Pulse 79   Temperature 36.7 °C (98.1 °F) (Temporal)   Height 1.676 m (5' 6\")   Weight 95.3 kg (210 lb)   Oxygen Saturation 96%   Body Mass Index 33.89 kg/m²     Physical Exam  Vitals and nursing note reviewed.   Constitutional:       " General: He is not in acute distress.  HENT:      Right Ear: Tympanic membrane, ear canal and external ear normal.      Left Ear: Tympanic membrane, ear canal and external ear normal.      Nose: Nose normal.      Mouth/Throat:      Lips: Pink.      Mouth: Mucous membranes are moist.      Pharynx: Oropharynx is clear. Uvula midline. No pharyngeal swelling or posterior oropharyngeal erythema.   Neck:      Trachea: Trachea and phonation normal.   Cardiovascular:      Rate and Rhythm: Normal rate and regular rhythm.      Heart sounds: Normal heart sounds, S1 normal and S2 normal.   Pulmonary:      Effort: Pulmonary effort is normal.      Breath sounds: Examination of the right-upper field reveals wheezing. Examination of the left-upper field reveals wheezing. Examination of the right-lower field reveals decreased breath sounds. Examination of the left-lower field reveals decreased breath sounds. Decreased breath sounds and wheezing present. No rhonchi or rales.   Musculoskeletal:      Cervical back: Normal range of motion and neck supple.   Lymphadenopathy:      Cervical: No cervical adenopathy.   Skin:     General: Skin is warm and dry.      Capillary Refill: Capillary refill takes less than 2 seconds.   Neurological:      General: No focal deficit present.      Mental Status: He is alert.         Assessment/Plan   Problem List Items Addressed This Visit             ICD-10-CM    Chronic obstructive pulmonary disease requiring drug therapy (CMS/[facility]) J44.9     Other Visit Diagnoses       Diagnosis Codes    Bronchitis    -  Primary  Acute. Begin antibiotic and prednisone as prescribed.    Suggested a CXR due to diminished bases; however, patient refused stating he just has a CXR in the ER, which was several weeks ago. We discussed signs and symptoms to monitor and when to seek follow up care or to present to the ER.  He is stable at this visit.   Indications, administration, and side effects discussed.    May continue  with OTC medications for symptom relief as discussed.   He was encouraged to follow up with pulmonology as was instructed upon discharge from ER.  Follow up in 1 week if symptoms persist.    J40    Relevant Medications    doxycycline (Vibramycin) 100 mg capsule    predniSONE (Deltasone) 20 mg tablet    Encounter for immunization     Z23    Relevant Orders    Flu vaccine, quadrivalent, high-dose, preservative free, age 65y+ (FLUZONE) (Completed)

## 2024-01-24 ENCOUNTER — OFFICE VISIT (OUTPATIENT)
Dept: CARDIOLOGY | Facility: CLINIC | Age: 74
End: 2024-01-24
Payer: MEDICARE

## 2024-01-24 VITALS
HEART RATE: 66 BPM | HEIGHT: 66 IN | TEMPERATURE: 98.6 F | RESPIRATION RATE: 18 BRPM | WEIGHT: 210 LBS | SYSTOLIC BLOOD PRESSURE: 157 MMHG | BODY MASS INDEX: 33.75 KG/M2 | OXYGEN SATURATION: 95 % | DIASTOLIC BLOOD PRESSURE: 79 MMHG

## 2024-01-24 DIAGNOSIS — I46.9 CARDIAC ARREST (MULTI): ICD-10-CM

## 2024-01-24 DIAGNOSIS — I25.10 CORONARY ARTERY DISEASE INVOLVING NATIVE CORONARY ARTERY OF NATIVE HEART WITHOUT ANGINA PECTORIS: Primary | ICD-10-CM

## 2024-01-24 DIAGNOSIS — E78.00 HYPERCHOLESTEROLEMIA: ICD-10-CM

## 2024-01-24 DIAGNOSIS — I10 PRIMARY HYPERTENSION: ICD-10-CM

## 2024-01-24 PROBLEM — S37.009A INJURY OF KIDNEY: Status: ACTIVE | Noted: 2019-09-24

## 2024-01-24 PROBLEM — M25.561 ARTHRALGIA OF RIGHT KNEE: Status: ACTIVE | Noted: 2021-10-29

## 2024-01-24 PROBLEM — J44.1 ACUTE EXACERBATION OF CHRONIC OBSTRUCTIVE PULMONARY DISEASE (MULTI): Status: ACTIVE | Noted: 2023-11-17

## 2024-01-24 PROBLEM — R06.00 DYSPNEA: Status: ACTIVE | Noted: 2024-01-24

## 2024-01-24 PROBLEM — E66.9 OBESITY: Status: ACTIVE | Noted: 2020-03-23

## 2024-01-24 PROBLEM — M79.672 LEFT FOOT PAIN: Status: ACTIVE | Noted: 2024-01-24

## 2024-01-24 PROBLEM — R05.9 COUGH: Status: ACTIVE | Noted: 2022-01-17

## 2024-01-24 PROBLEM — K40.90 INGUINAL HERNIA: Status: ACTIVE | Noted: 2023-11-14

## 2024-01-24 PROBLEM — R10.9 ABDOMINAL PAIN: Status: ACTIVE | Noted: 2024-01-24

## 2024-01-24 PROBLEM — N17.9 ACUTE RENAL FAILURE (CMS-HCC): Status: ACTIVE | Noted: 2019-09-24

## 2024-01-24 PROBLEM — I50.9 CONGESTIVE HEART FAILURE (MULTI): Status: ACTIVE | Noted: 2023-11-17

## 2024-01-24 PROBLEM — M25.569 KNEE PAIN: Status: ACTIVE | Noted: 2024-01-24

## 2024-01-24 PROBLEM — R33.9 RETENTION OF URINE: Status: ACTIVE | Noted: 2022-01-17

## 2024-01-24 PROBLEM — S00.81XA ABRASION OF FACE: Status: ACTIVE | Noted: 2024-01-24

## 2024-01-24 PROBLEM — M25.519 ARTHRALGIA OF SHOULDER: Status: ACTIVE | Noted: 2020-09-22

## 2024-01-24 PROBLEM — R40.1 CLOUDED CONSCIOUSNESS: Status: ACTIVE | Noted: 2024-01-24

## 2024-01-24 PROBLEM — M54.9 BACKACHE: Status: ACTIVE | Noted: 2023-07-13

## 2024-01-24 PROBLEM — I12.9 HYPERTENSIVE CHRONIC KIDNEY DISEASE W STG 1-4/UNSP CHR KDNY: Status: ACTIVE | Noted: 2022-01-17

## 2024-01-24 PROBLEM — S09.90XA INJURY OF HEAD: Status: ACTIVE | Noted: 2024-01-24

## 2024-01-24 PROBLEM — R42 DIZZINESS: Status: ACTIVE | Noted: 2024-01-24

## 2024-01-24 PROBLEM — J40 BRONCHITIS: Status: ACTIVE | Noted: 2024-01-24

## 2024-01-24 PROBLEM — N39.0 ACUTE URINARY TRACT INFECTION: Status: ACTIVE | Noted: 2020-04-02

## 2024-01-24 PROBLEM — M79.676 PAIN IN TOE: Status: ACTIVE | Noted: 2024-01-24

## 2024-01-24 PROCEDURE — 1125F AMNT PAIN NOTED PAIN PRSNT: CPT | Performed by: INTERNAL MEDICINE

## 2024-01-24 PROCEDURE — 1036F TOBACCO NON-USER: CPT | Performed by: INTERNAL MEDICINE

## 2024-01-24 PROCEDURE — 1160F RVW MEDS BY RX/DR IN RCRD: CPT | Performed by: INTERNAL MEDICINE

## 2024-01-24 PROCEDURE — 4010F ACE/ARB THERAPY RXD/TAKEN: CPT | Performed by: INTERNAL MEDICINE

## 2024-01-24 PROCEDURE — 99214 OFFICE O/P EST MOD 30 MIN: CPT | Performed by: INTERNAL MEDICINE

## 2024-01-24 PROCEDURE — 1159F MED LIST DOCD IN RCRD: CPT | Performed by: INTERNAL MEDICINE

## 2024-01-24 PROCEDURE — 3078F DIAST BP <80 MM HG: CPT | Performed by: INTERNAL MEDICINE

## 2024-01-24 PROCEDURE — 3077F SYST BP >= 140 MM HG: CPT | Performed by: INTERNAL MEDICINE

## 2024-01-24 RX ORDER — ATORVASTATIN CALCIUM 80 MG/1
80 TABLET, FILM COATED ORAL DAILY
Qty: 90 TABLET | Refills: 3 | Status: SHIPPED | OUTPATIENT
Start: 2024-01-24 | End: 2025-01-23

## 2024-01-24 RX ORDER — LISINOPRIL 10 MG/1
10 TABLET ORAL DAILY
Qty: 90 TABLET | Refills: 3 | Status: SHIPPED | OUTPATIENT
Start: 2024-01-24 | End: 2025-01-23

## 2024-01-24 RX ORDER — CARVEDILOL 25 MG/1
12.5 TABLET ORAL
Qty: 90 TABLET | Refills: 3 | Status: SHIPPED | OUTPATIENT
Start: 2024-01-24 | End: 2025-01-23

## 2024-01-24 RX ORDER — REGADENOSON 0.08 MG/ML
0.4 INJECTION, SOLUTION INTRAVENOUS
OUTPATIENT
Start: 2024-01-24

## 2024-01-24 ASSESSMENT — PATIENT HEALTH QUESTIONNAIRE - PHQ9
10. IF YOU CHECKED OFF ANY PROBLEMS, HOW DIFFICULT HAVE THESE PROBLEMS MADE IT FOR YOU TO DO YOUR WORK, TAKE CARE OF THINGS AT HOME, OR GET ALONG WITH OTHER PEOPLE: SOMEWHAT DIFFICULT
SUM OF ALL RESPONSES TO PHQ9 QUESTIONS 1 AND 2: 2
1. LITTLE INTEREST OR PLEASURE IN DOING THINGS: SEVERAL DAYS
2. FEELING DOWN, DEPRESSED OR HOPELESS: SEVERAL DAYS

## 2024-01-24 ASSESSMENT — PAIN SCALES - GENERAL: PAINLEVEL: 0-NO PAIN

## 2024-01-24 NOTE — PROGRESS NOTES
History of present illness:  69 year old male patient here today following up on hx of lateral STEMI in 08/2019, at that time he was presented with cardiac arrest status post PCI to diagonal 1 that had a 99% hazy lesion also he had 100% occluded LCX that was acute and was treated with JOSE to the proximal segment. Also we ballooned the side of the stent he to open the proper left circ after the otitis media 1 which is large vessel. We achieved very good result. Has chronically occluded RCA, LAD has moderate disease of 50% in the mid segment. Patient at that time his EF was reduced to 30-35%. Patient had repeated echo with normalized EF.  Patient has missed several appointments I have not seen him in 2 years.  Has not been taking any medications except aspirin as needed.    Past Medical History:   Diagnosis Date    Acute renal failure syndrome (CMS/[facility]) 11/14/2023    Angina pectoris (CMS/[facility]) 11/17/2023    Arthritis     Asthma     CAD (coronary artery disease)     s/p stent placement    Cardiac arrest (CMS/[facility]) 11/17/2023    Chronic obstructive pulmonary disease requiring drug therapy (CMS/[facility]) 11/17/2023    Coronary artery disease involving native coronary artery of native heart without angina pectoris 11/14/2023    Diabetes mellitus (CMS/[facility])     Diabetes mellitus, type 2 (CMS/[facility]) 11/08/2018    Diastolic heart failure (CMS/[facility]) 11/17/2023    Disorder involving thrombocytopenia (CMS/[facility]) 11/12/2019    Essential hypertension 11/08/2018    Gout     High blood pressure     High cholesterol     Kidney disease     Migraine headache     Mixed hyperlipidemia 11/08/2018    Palpitations 11/17/2023    Status post coronary artery stent placement 11/14/2023    Status post myocardial infarction 09/24/2019       Past Surgical History:   Procedure Laterality Date    BACK SURGERY  2012    HERNIA REPAIR  2015    HERNIA REPAIR  2013       Allergies   Allergen Reactions    Hydromorphone Anaphylaxis    Latex Hives and Rash     Gets ill     Penicillins Hives    Dyphylline-Guaifenesin Swelling    Grass Pollen Other     Makes him sick    Mold Other     Gets sick    Ragweed Other     Affects asthma        reports that he has never smoked. He has never been exposed to tobacco smoke. He has never used smokeless tobacco. He reports that he does not currently use alcohol. He reports that he does not use drugs.    Family History   Problem Relation Name Age of Onset    Heart disease Mother      Stroke Son      Autism Son         Patient's Medications   New Prescriptions    No medications on file   Previous Medications    ALBUTEROL (VENTOLIN HFA) 90 MCG/ACTUATION INHALER    Inhale 1 puff every 4 hours if needed.    ALPRAZOLAM (XANAX) 0.25 MG TABLET    Take 1 tablet (0.25 mg) by mouth once daily as needed for anxiety.    ASPIRIN 81 MG EC TABLET    Take 1 tablet (81 mg) by mouth once daily.    ATORVASTATIN (LIPITOR) 80 MG TABLET    Take 1 tablet (80 mg) by mouth once daily.    CARVEDILOL (COREG) 25 MG TABLET    Take 1 tablet (25 mg) by mouth 2 times a day with meals.    FUROSEMIDE (LASIX) 20 MG TABLET    Take 1 tablet (20 mg) by mouth 2 times a day.    QUINAPRIL-HYDROCHLOROTHIAZIDE (ACCURETIC) 20-25 MG TABLET    Take 1 tablet by mouth once daily.    TICAGRELOR (BRILINTA) 90 MG TABLET    Take 1 tablet (90 mg) by mouth 2 times a day.   Modified Medications    No medications on file   Discontinued Medications    No medications on file       Objective   Physical Exam  General: Patient in no acute distress   HEENT: Atraumatic normocephalic.  Neck: Supple, jugular venous pressure within normal limit.  No bruits  Lungs: Clear to auscultation bilaterally  Cardiovascular: Regular rate and rhythm, normal heart sounds, no murmurs rubs or gallops  Abdomen: Soft nontender nondistended.  Normal bowel sounds.  Extremities: Warm to touch, no edema.        Lab Review   No visits with results within 2 Month(s) from this visit.   Latest known visit with results is:   Admission on  11/06/2023, Discharged on 11/06/2023   Component Date Value    Coronavirus 2019, PCR 11/06/2023 Not Detected     PROBNP 11/06/2023 924 (H)     WBC 11/06/2023 5.9     nRBC 11/06/2023 0.0     RBC 11/06/2023 4.91     Hemoglobin 11/06/2023 14.5     Hematocrit 11/06/2023 42.3     MCV 11/06/2023 86     MCH 11/06/2023 29.5     MCHC 11/06/2023 34.3     RDW 11/06/2023 13.2     Platelets 11/06/2023 125 (L)     Neutrophils % 11/06/2023 65.4     Immature Granulocytes %,* 11/06/2023 0.5     Lymphocytes % 11/06/2023 19.0     Monocytes % 11/06/2023 8.1     Eosinophils % 11/06/2023 6.7     Basophils % 11/06/2023 0.3     Neutrophils Absolute 11/06/2023 3.88     Immature Granulocytes Ab* 11/06/2023 0.03     Lymphocytes Absolute 11/06/2023 1.13     Monocytes Absolute 11/06/2023 0.48     Eosinophils Absolute 11/06/2023 0.40     Basophils Absolute 11/06/2023 0.02     Glucose 11/06/2023 115 (H)     Sodium 11/06/2023 138     Potassium 11/06/2023 4.5     Chloride 11/06/2023 104     Bicarbonate 11/06/2023 23 (L)     Urea Nitrogen 11/06/2023 29 (H)     Creatinine 11/06/2023 1.40     eGFR 11/06/2023 53 (L)     Calcium 11/06/2023 9.4     Albumin 11/06/2023 4.3     Alkaline Phosphatase 11/06/2023 103     Total Protein 11/06/2023 7.5     AST 11/06/2023 18     Bilirubin, Total 11/06/2023 1.0     ALT 11/06/2023 11     Anion Gap 11/06/2023 11     Troponin T, High Sensiti* 11/06/2023 28 (H)     Troponin T, High Sensiti* 11/06/2023 29 (H)     Ventricular Rate 11/19/2023 71     Atrial Rate 11/19/2023 71     WV Interval 11/19/2023 134     QRS Duration 11/19/2023 94     QT Interval 11/19/2023 418     QTC Calculation(Bazett) 11/19/2023 454     P Axis 11/19/2023 55     R [place] 11/19/2023 -18     T [place] 11/19/2023 22     QRS Count 11/19/2023 12     Q Onset 11/19/2023 219     P Onset 11/19/2023 152     P Offset 11/19/2023 202     T Offset 11/19/2023 428     QTC Fredericia 11/19/2023 442         Assessment/Plan   Patient Active Problem List   Diagnosis     Severe persistent asthma without complication    Sleep disorder    Enlarged prostate    Inguinal hernia    Calculus of gallbladder with cholecystitis    Coronary artery disease involving native coronary artery of native heart without angina pectoris    Status post coronary artery stent placement    Hypercholesterolemia    Hypertension    Anxiety    Acute urinary tract infection    Acute renal failure (CMS/HCC)    Asthma    Accidental fall    Acute hepatitis    Injury of kidney    Kidney stone    Cardiac arrest (CMS/HCC)    Cellulitis    Chest discomfort    Angina pectoris (CMS/HCC)    Acute exacerbation of chronic obstructive pulmonary disease (CMS/HCC)    Chronic pain    Type 2 diabetes mellitus (CMS/HCC)    Congestive heart failure (CMS/HCC)    Disorder involving thrombocytopenia (CMS/HCC)    Complete tear of rotator cuff    Gastroesophageal reflux disease    Gout    Groin injury    Impaired fasting glucose    Influenza due to influenza virus, type B    Pain of left hip joint    Lumbar back pain with radiculopathy affecting lower extremity    Pain of left hand    Solitary pulmonary nodule    Musculoskeletal pain    Neck pain    Palpitations    Panic attack    Psoriasis    Rash    History of myocardial infarction    Abrasion of face    Arthralgia of right knee    Bronchitis    Clouded consciousness    Cough    Dizziness    Dyspnea    Injury of head    Retention of urine    Abdominal pain    Backache    Left foot pain    Pain in toe    Knee pain    Arthralgia of shoulder    Obesity    Hypertensive chronic kidney disease w stg 1-4/unsp chr kdny      69 year old male patient here today following up on hx of lateral STEMI in 08/2019, at that time he was presented with cardiac arrest status post PCI to diagonal 1 that had a 99% hazy lesion also he had 100% occluded LCX that was acute and was treated with JOSE to the proximal segment. Also we ballooned the side of the stent he to open the proper left circ after the otitis  media 1 which is large vessel. We achieved very good result. Has chronically occluded RCA, LAD has moderate disease of 50% in the mid segment. Patient at that time his EF was reduced to 30-35%. Patient had repeated echo with normalized EF.  Patient has missed several appointments I have not seen him in 2 years.  Has not been taking any medications except aspirin as needed.  Returns to my office for follow-up.  Denies having any palpitations or dizziness does complain of some shortness of breath with moderate activity and some chest pains.  Patient has not been taking any other medications but he had cardiomyopathy last stress test 2020 showed no ischemia with EF of 41%.  At this point my recommendation is to repeat another stress test for shortness of breath and severe coronary disease history we will also start him on carvedilol and addition of lisinopril and atorvastatin.  Discussed with him in length lifestyle modification.  Will follow-up in 3 months    Jessika Spencer MD

## 2024-01-30 ENCOUNTER — OFFICE VISIT (OUTPATIENT)
Dept: PRIMARY CARE | Facility: CLINIC | Age: 74
End: 2024-01-30
Payer: MEDICARE

## 2024-01-30 ENCOUNTER — LAB (OUTPATIENT)
Dept: LAB | Facility: LAB | Age: 74
End: 2024-01-30
Payer: MEDICARE

## 2024-01-30 VITALS
WEIGHT: 209 LBS | OXYGEN SATURATION: 98 % | DIASTOLIC BLOOD PRESSURE: 72 MMHG | TEMPERATURE: 98.2 F | HEIGHT: 66 IN | HEART RATE: 62 BPM | SYSTOLIC BLOOD PRESSURE: 138 MMHG | BODY MASS INDEX: 33.59 KG/M2

## 2024-01-30 DIAGNOSIS — R41.82 ALTERED MENTAL STATUS, UNSPECIFIED ALTERED MENTAL STATUS TYPE: ICD-10-CM

## 2024-01-30 DIAGNOSIS — R41.82 ALTERED MENTAL STATUS, UNSPECIFIED ALTERED MENTAL STATUS TYPE: Primary | ICD-10-CM

## 2024-01-30 LAB
ALBUMIN SERPL-MCNC: 4.4 G/DL (ref 3.5–5)
ALP BLD-CCNC: 102 U/L (ref 35–125)
ALT SERPL-CCNC: 11 U/L (ref 5–40)
ANION GAP SERPL CALC-SCNC: 11 MMOL/L
AST SERPL-CCNC: 16 U/L (ref 5–40)
BASOPHILS # BLD AUTO: 0.03 X10*3/UL (ref 0–0.1)
BASOPHILS NFR BLD AUTO: 0.5 %
BILIRUB SERPL-MCNC: 0.7 MG/DL (ref 0.1–1.2)
BUN SERPL-MCNC: 30 MG/DL (ref 8–25)
CALCIUM SERPL-MCNC: 9.1 MG/DL (ref 8.5–10.4)
CHLORIDE SERPL-SCNC: 104 MMOL/L (ref 97–107)
CO2 SERPL-SCNC: 25 MMOL/L (ref 24–31)
CREAT SERPL-MCNC: 1.5 MG/DL (ref 0.4–1.6)
EGFRCR SERPLBLD CKD-EPI 2021: 49 ML/MIN/1.73M*2
EOSINOPHIL # BLD AUTO: 0.34 X10*3/UL (ref 0–0.4)
EOSINOPHIL NFR BLD AUTO: 5.4 %
ERYTHROCYTE [DISTWIDTH] IN BLOOD BY AUTOMATED COUNT: 13.3 % (ref 11.5–14.5)
GLUCOSE SERPL-MCNC: 118 MG/DL (ref 65–99)
HCT VFR BLD AUTO: 43.6 % (ref 41–52)
HGB BLD-MCNC: 14.4 G/DL (ref 13.5–17.5)
IMM GRANULOCYTES # BLD AUTO: 0.03 X10*3/UL (ref 0–0.5)
IMM GRANULOCYTES NFR BLD AUTO: 0.5 % (ref 0–0.9)
LYMPHOCYTES # BLD AUTO: 1.33 X10*3/UL (ref 0.8–3)
LYMPHOCYTES NFR BLD AUTO: 21.2 %
MCH RBC QN AUTO: 29.3 PG (ref 26–34)
MCHC RBC AUTO-ENTMCNC: 33 G/DL (ref 32–36)
MCV RBC AUTO: 89 FL (ref 80–100)
MONOCYTES # BLD AUTO: 0.57 X10*3/UL (ref 0.05–0.8)
MONOCYTES NFR BLD AUTO: 9.1 %
NEUTROPHILS # BLD AUTO: 3.97 X10*3/UL (ref 1.6–5.5)
NEUTROPHILS NFR BLD AUTO: 63.3 %
NRBC BLD-RTO: 0 /100 WBCS (ref 0–0)
PLATELET # BLD AUTO: 141 X10*3/UL (ref 150–450)
POC APPEARANCE, URINE: CLEAR
POC BILIRUBIN, URINE: NEGATIVE
POC BLOOD, URINE: ABNORMAL
POC COLOR, URINE: YELLOW
POC GLUCOSE, URINE: NEGATIVE MG/DL
POC KETONES, URINE: NEGATIVE MG/DL
POC LEUKOCYTES, URINE: NEGATIVE
POC NITRITE,URINE: NEGATIVE
POC PH, URINE: 5.5 PH
POC PROTEIN, URINE: ABNORMAL MG/DL
POC SPECIFIC GRAVITY, URINE: 1.02
POC UROBILINOGEN, URINE: 0.2 EU/DL
POTASSIUM SERPL-SCNC: 4.7 MMOL/L (ref 3.4–5.1)
PROT SERPL-MCNC: 7 G/DL (ref 5.9–7.9)
RBC # BLD AUTO: 4.92 X10*6/UL (ref 4.5–5.9)
SODIUM SERPL-SCNC: 140 MMOL/L (ref 133–145)
WBC # BLD AUTO: 6.3 X10*3/UL (ref 4.4–11.3)

## 2024-01-30 PROCEDURE — 3078F DIAST BP <80 MM HG: CPT

## 2024-01-30 PROCEDURE — 85025 COMPLETE CBC W/AUTO DIFF WBC: CPT

## 2024-01-30 PROCEDURE — 1160F RVW MEDS BY RX/DR IN RCRD: CPT

## 2024-01-30 PROCEDURE — 1036F TOBACCO NON-USER: CPT

## 2024-01-30 PROCEDURE — 1125F AMNT PAIN NOTED PAIN PRSNT: CPT

## 2024-01-30 PROCEDURE — 80053 COMPREHEN METABOLIC PANEL: CPT

## 2024-01-30 PROCEDURE — 81003 URINALYSIS AUTO W/O SCOPE: CPT

## 2024-01-30 PROCEDURE — 4010F ACE/ARB THERAPY RXD/TAKEN: CPT

## 2024-01-30 PROCEDURE — 36415 COLL VENOUS BLD VENIPUNCTURE: CPT

## 2024-01-30 PROCEDURE — 3075F SYST BP GE 130 - 139MM HG: CPT

## 2024-01-30 PROCEDURE — 99213 OFFICE O/P EST LOW 20 MIN: CPT

## 2024-01-30 PROCEDURE — 1159F MED LIST DOCD IN RCRD: CPT

## 2024-01-30 ASSESSMENT — ENCOUNTER SYMPTOMS
ACTIVITY CHANGE: 0
LIGHT-HEADEDNESS: 0
EYE DEVIATION: 0
COUGH: 0
DYSURIA: 0
HEADACHES: 0
BEHAVIOR CHANGES: 1
DECREASED APPETITE: 0
FLANK PAIN: 0
DISORIENTATION: 0
FEVER: 0
SLURRED SPEECH: 0
VISUAL CHANGE: 0
FATIGUE: 0
CONFUSION: 0
SHORTNESS OF BREATH: 0
CHEST TIGHTNESS: 0
ABDOMINAL PAIN: 0
CHILLS: 0
HALLUCINATIONS: 0
NAUSEA: 0
DIARRHEA: 0
ALTERED MENTAL STATUS: 1
ABNORMAL MOVEMENT: 0
VOMITING: 0
WHEEZING: 0
PALPITATIONS: 0
MEMORY LOSS: 0
WEAKNESS: 0
APPETITE CHANGE: 0

## 2024-01-30 ASSESSMENT — PATIENT HEALTH QUESTIONNAIRE - PHQ9
1. LITTLE INTEREST OR PLEASURE IN DOING THINGS: NOT AT ALL
2. FEELING DOWN, DEPRESSED OR HOPELESS: NOT AT ALL
SUM OF ALL RESPONSES TO PHQ9 QUESTIONS 1 AND 2: 0

## 2024-01-30 ASSESSMENT — PAIN SCALES - GENERAL: PAINLEVEL: 5

## 2024-01-30 NOTE — PROGRESS NOTES
Subjective   Patient ID: Duane Scott is a 73 y.o. male who presents for Altered Mental Status.    Duane presents with his son in law, Justus, for concerns for mental status change.  His daughter and wife were concerned that he has become more short tempered recently. Duane's wife made the appointment for him, he was unsure of why he presents today.  He denies any changes in his mental status.  He reports that he and his wife do bicker with each other frequently.  His wife did not accompany patient to this visit.     Altered Mental Status  Presenting symptoms: behavior changes and combativeness    Presenting symptoms: no confusion, no disorientation and no memory loss    Context: recent illness (bronchitis 2 months ago)    Context: not alcohol use, not dementia, not drug use, not head injury, not homeless, not nursing home resident, not recent change in medication and not recent infection    Associated symptoms: no abdominal pain, normal movement, no bladder incontinence, no decreased appetite, no eye deviation, no fever, no hallucinations, no headaches, no light-headedness, no nausea, no palpitations, no slurred speech, no suicidal behavior, no visual change, no vomiting and no weakness         Review of Systems   Constitutional:  Negative for activity change, appetite change, chills, decreased appetite, fatigue and fever.   Respiratory:  Negative for cough, chest tightness, shortness of breath and wheezing.    Cardiovascular:  Negative for chest pain and palpitations.   Gastrointestinal:  Negative for abdominal pain, diarrhea, nausea and vomiting.   Genitourinary:  Negative for bladder incontinence, dysuria and flank pain.   Skin: Negative.    Neurological:  Negative for weakness, light-headedness and headaches.   Psychiatric/Behavioral:  Negative for confusion, hallucinations and memory loss.        Objective   /72 (BP Location: Left arm)   Pulse 62   Temp 36.8 °C (98.2 °F) (Temporal)   Ht 1.676 m (5'  "6\")   Wt 94.8 kg (209 lb)   SpO2 98%   BMI 33.73 kg/m²     Physical Exam  Vitals and nursing note reviewed.   Constitutional:       General: He is not in acute distress.     Appearance: Normal appearance.   Eyes:      Extraocular Movements: Extraocular movements intact and EOM normal.      Pupils: Pupils are equal, round, and reactive to light.   Cardiovascular:      Rate and Rhythm: Normal rate and regular rhythm.      Heart sounds: Normal heart sounds.   Pulmonary:      Effort: Pulmonary effort is normal. No respiratory distress.      Breath sounds: Normal breath sounds.   Musculoskeletal:         General: Normal range of motion.   Skin:     General: Skin is warm and dry.   Neurological:      General: No focal deficit present.      Mental Status: He is alert and oriented to person, place, and time. Mental status is at baseline.      Cranial Nerves: Cranial nerves 2-12 are intact. No cranial nerve deficit.      Sensory: No sensory deficit.      Motor: No weakness.      Coordination: Coordination normal.      Gait: Gait normal.   Psychiatric:         Mood and Affect: Mood normal.         Speech: Speech normal.         Behavior: Behavior normal.         Thought Content: Thought content normal.         Judgment: Judgment normal.         Neurologic Exam     Mental Status   Oriented to person, place, and time.   Oriented to country, city, area, street and number.   Oriented to year, month, date, day and season.   Recall at 5 minutes: recalls 3 of 3 objects. Follows 3 step commands.   Attention: normal. Concentration: normal.   Speech: speech is normal (Does have a thick accent, but speech pattern is normal to him)  Level of consciousness: alert  Knowledge: consistent with education and good.     Cranial Nerves   Cranial nerves II through XII intact.     CN II   Visual fields full to confrontation.     CN III, IV, VI   Pupils are equal, round, and reactive to light.  Extraocular motions are normal.     CN V   Facial " sensation intact.     CN VII   Facial expression full, symmetric.     CN VIII   CN VIII normal.     CN IX, X   CN IX normal.   CN X normal.     CN XI   CN XI normal.     CN XII   CN XII normal.     Motor Exam   Muscle bulk: normal  Overall muscle tone: normal  Right arm tone: normal  Left arm tone: normal          Assessment/Plan   Problem List Items Addressed This Visit    None  Visit Diagnoses         Codes    Altered mental status, unspecified altered mental status type    -  Primary  Physical exam unremarkable.  Patient is a baseline from previous visit.  No s/sx CVA or TIA.  Patient denies any mental status changes.  Will obtain labs to r/o physiological changes that may contribute to mental changes.  Patient's wife not present at this visit and she is the person who made the appointment.  Discussed with patients son-in-law to obtain blood work and have wife follow up if symptoms persist as we can arrange for neurology consult.  R41.82    Relevant Orders    CBC and Auto Differential    Comprehensive Metabolic Panel    Urinalysis with Reflex Microscopic    POCT UA Automated manually resulted (Completed)

## 2024-02-12 ENCOUNTER — APPOINTMENT (OUTPATIENT)
Dept: RADIOLOGY | Facility: HOSPITAL | Age: 74
End: 2024-02-12
Payer: MEDICARE

## 2024-02-12 ENCOUNTER — HOSPITAL ENCOUNTER (EMERGENCY)
Facility: HOSPITAL | Age: 74
Discharge: HOME | End: 2024-02-12
Payer: MEDICARE

## 2024-02-12 VITALS
BODY MASS INDEX: 33.4 KG/M2 | SYSTOLIC BLOOD PRESSURE: 157 MMHG | RESPIRATION RATE: 20 BRPM | TEMPERATURE: 99 F | OXYGEN SATURATION: 96 % | DIASTOLIC BLOOD PRESSURE: 70 MMHG | HEIGHT: 66 IN | HEART RATE: 65 BPM | WEIGHT: 207.8 LBS

## 2024-02-12 DIAGNOSIS — R10.9 FLANK PAIN: Primary | ICD-10-CM

## 2024-02-12 DIAGNOSIS — R33.9 URINE RETENTION: ICD-10-CM

## 2024-02-12 DIAGNOSIS — R31.9 HEMATURIA, UNSPECIFIED TYPE: ICD-10-CM

## 2024-02-12 LAB
ANION GAP SERPL CALC-SCNC: 9 MMOL/L
APPEARANCE UR: ABNORMAL
BILIRUB UR STRIP.AUTO-MCNC: NEGATIVE MG/DL
BUN SERPL-MCNC: 23 MG/DL (ref 8–25)
CALCIUM SERPL-MCNC: 9.1 MG/DL (ref 8.5–10.4)
CHLORIDE SERPL-SCNC: 109 MMOL/L (ref 97–107)
CO2 SERPL-SCNC: 24 MMOL/L (ref 24–31)
COLOR UR: ABNORMAL
CREAT SERPL-MCNC: 1.5 MG/DL (ref 0.4–1.6)
EGFRCR SERPLBLD CKD-EPI 2021: 49 ML/MIN/1.73M*2
ERYTHROCYTE [DISTWIDTH] IN BLOOD BY AUTOMATED COUNT: 13.3 % (ref 11.5–14.5)
GLUCOSE SERPL-MCNC: 89 MG/DL (ref 65–99)
GLUCOSE UR STRIP.AUTO-MCNC: ABNORMAL MG/DL
HCT VFR BLD AUTO: 39.6 % (ref 41–52)
HGB BLD-MCNC: 13.8 G/DL (ref 13.5–17.5)
KETONES UR STRIP.AUTO-MCNC: NEGATIVE MG/DL
LEUKOCYTE ESTERASE UR QL STRIP.AUTO: NEGATIVE
MCH RBC QN AUTO: 29.8 PG (ref 26–34)
MCHC RBC AUTO-ENTMCNC: 34.8 G/DL (ref 32–36)
MCV RBC AUTO: 86 FL (ref 80–100)
MUCOUS THREADS #/AREA URNS AUTO: ABNORMAL /LPF
NITRITE UR QL STRIP.AUTO: NEGATIVE
NRBC BLD-RTO: 0 /100 WBCS (ref 0–0)
PH UR STRIP.AUTO: 5.5 [PH]
PLATELET # BLD AUTO: 111 X10*3/UL (ref 150–450)
POTASSIUM SERPL-SCNC: 4.2 MMOL/L (ref 3.4–5.1)
PROT UR STRIP.AUTO-MCNC: ABNORMAL MG/DL
RBC # BLD AUTO: 4.63 X10*6/UL (ref 4.5–5.9)
RBC # UR STRIP.AUTO: ABNORMAL /UL
RBC #/AREA URNS AUTO: >20 /HPF
SODIUM SERPL-SCNC: 142 MMOL/L (ref 133–145)
SP GR UR STRIP.AUTO: 1.02
UROBILINOGEN UR STRIP.AUTO-MCNC: NORMAL MG/DL
WBC # BLD AUTO: 5.9 X10*3/UL (ref 4.4–11.3)
WBC #/AREA URNS AUTO: ABNORMAL /HPF

## 2024-02-12 PROCEDURE — 80048 BASIC METABOLIC PNL TOTAL CA: CPT | Performed by: EMERGENCY MEDICINE

## 2024-02-12 PROCEDURE — 85027 COMPLETE CBC AUTOMATED: CPT | Performed by: EMERGENCY MEDICINE

## 2024-02-12 PROCEDURE — 51702 INSERT TEMP BLADDER CATH: CPT

## 2024-02-12 PROCEDURE — 74177 CT ABD & PELVIS W/CONTRAST: CPT

## 2024-02-12 PROCEDURE — 74177 CT ABD & PELVIS W/CONTRAST: CPT | Mod: FOREIGN READ | Performed by: RADIOLOGY

## 2024-02-12 PROCEDURE — 99284 EMERGENCY DEPT VISIT MOD MDM: CPT | Mod: 25

## 2024-02-12 PROCEDURE — 36415 COLL VENOUS BLD VENIPUNCTURE: CPT | Performed by: EMERGENCY MEDICINE

## 2024-02-12 PROCEDURE — 2550000001 HC RX 255 CONTRASTS: Performed by: PHYSICIAN ASSISTANT

## 2024-02-12 PROCEDURE — 81001 URINALYSIS AUTO W/SCOPE: CPT | Performed by: EMERGENCY MEDICINE

## 2024-02-12 RX ORDER — TAMSULOSIN HYDROCHLORIDE 0.4 MG/1
0.4 CAPSULE ORAL DAILY
Qty: 14 CAPSULE | Refills: 0 | Status: SHIPPED | OUTPATIENT
Start: 2024-02-12 | End: 2024-02-26

## 2024-02-12 RX ADMIN — IOHEXOL 75 ML: 350 INJECTION, SOLUTION INTRAVENOUS at 21:29

## 2024-02-12 ASSESSMENT — PAIN SCALES - GENERAL: PAINLEVEL_OUTOF10: 6

## 2024-02-12 ASSESSMENT — PAIN - FUNCTIONAL ASSESSMENT: PAIN_FUNCTIONAL_ASSESSMENT: 0-10

## 2024-02-13 NOTE — ED PROVIDER NOTES
HPI   Chief Complaint   Patient presents with    Blood in Urine     Pt comes in for complaint of hematuria. Pt reports he was doing work out in the yard today and had a few episodes of bloody urine. Pt reports pain when urinating and having a bowel movement.        73-year-old male presented emergency department with a chief complaint of hematuria.  He also complains of some pain in the right flank.  He is well-appearing nontoxic afebrile.  He denies anticoagulant.  He believes he is on Plavix..  Denies lightheadedness dizziness numbness weakness.  Denies fall or injury or trauma.  Denies chest pain shortness of breath.  No other complaint.                          No data recorded                   Patient History   Past Medical History:   Diagnosis Date    Acute renal failure syndrome (CMS/[facility]) 11/14/2023    Angina pectoris (CMS/[facility]) 11/17/2023    Arthritis     Asthma     CAD (coronary artery disease)     s/p stent placement    Cardiac arrest (CMS/[facility]) 11/17/2023    Chronic obstructive pulmonary disease requiring drug therapy (CMS/[facility]) 11/17/2023    Coronary artery disease involving native coronary artery of native heart without angina pectoris 11/14/2023    Diabetes mellitus (CMS/[facility])     Diabetes mellitus, type 2 (CMS/[facility]) 11/08/2018    Diastolic heart failure (CMS/[facility]) 11/17/2023    Disorder involving thrombocytopenia (CMS/[facility]) 11/12/2019    Essential hypertension 11/08/2018    Gout     High blood pressure     High cholesterol     Kidney disease     Migraine headache     Mixed hyperlipidemia 11/08/2018    Palpitations 11/17/2023    Status post coronary artery stent placement 11/14/2023    Status post myocardial infarction 09/24/2019     Past Surgical History:   Procedure Laterality Date    BACK SURGERY  2012    HERNIA REPAIR  2015    HERNIA REPAIR  2013     Family History   Problem Relation Name Age of Onset    Heart disease Mother      Stroke Son      Autism Son       Social History     Tobacco Use    Smoking  status: Never     Passive exposure: Never    Smokeless tobacco: Never   Substance Use Topics    Alcohol use: Not Currently    Drug use: Never       Physical Exam   ED Triage Vitals [02/12/24 2013]   Temperature Heart Rate Respirations BP   37.2 °C (99 °F) 66 18 169/85      Pulse Ox Temp src Heart Rate Source Patient Position   98 % -- Monitor --      BP Location FiO2 (%)     Right arm --       Physical Exam  Vitals and nursing note reviewed.   Constitutional:       Appearance: Normal appearance.   HENT:      Head: Normocephalic.      Nose: Nose normal.      Mouth/Throat:      Mouth: Mucous membranes are moist.   Cardiovascular:      Rate and Rhythm: Normal rate and regular rhythm.   Pulmonary:      Effort: Pulmonary effort is normal.      Breath sounds: Normal breath sounds.   Abdominal:      General: Abdomen is flat.      Palpations: Abdomen is soft.   Musculoskeletal:         General: Normal range of motion.      Cervical back: Normal range of motion.   Skin:     General: Skin is warm and dry.   Neurological:      General: No focal deficit present.      Mental Status: He is alert and oriented to person, place, and time.   Psychiatric:         Mood and Affect: Mood normal.         ED Course & MDM   Diagnoses as of 02/12/24 2252   Flank pain   Hematuria, unspecified type   Urine retention       Medical Decision Making  I have seen and evaluated this patient.  Physician available for consultation.  Vital signs have been reviewed.  All laboratory and diagnostic imaging is reviewed by myself and interpreted by myself unless otherwise stated.  Additionally imaging is interpreted by radiologist.    CBC without significant leukocytosis or anemia, metabolic panel without significant renal impairment or electrolyte abnormality.  Urinalysis not infected evidence of hematuria.  CT shows extensive changes to the kidney, there is no ureteral obstruction.  There are no identified lesions.  Patient does have evidence of urinary  retention with 540 mL placed over 2 residual.  Recommended Hull catheter, patient will be discharged with outpatient urology follow-up for cystoscopy.  Released in good condition.      Labs Reviewed   CBC - Abnormal       Result Value    WBC 5.9      nRBC 0.0      RBC 4.63      Hemoglobin 13.8      Hematocrit 39.6 (*)     MCV 86      MCH 29.8      MCHC 34.8      RDW 13.3      Platelets 111 (*)    BASIC METABOLIC PANEL - Abnormal    Glucose 89      Sodium 142      Potassium 4.2      Chloride 109 (*)     Bicarbonate 24      Urea Nitrogen 23      Creatinine 1.50      eGFR 49 (*)     Calcium 9.1      Anion Gap 9     URINALYSIS WITH REFLEX MICROSCOPIC - Abnormal    Color, Urine Light-Orange (*)     Appearance, Urine Ex.Turbid (*)     Specific Gravity, Urine 1.020      pH, Urine 5.5      Protein, Urine 50 (1+) (*)     Glucose, Urine 30 (TRACE) (*)     Blood, Urine OVER (3+) (*)     Ketones, Urine NEGATIVE      Bilirubin, Urine NEGATIVE      Urobilinogen, Urine Normal      Nitrite, Urine NEGATIVE      Leukocyte Esterase, Urine NEGATIVE     MICROSCOPIC ONLY, URINE - Abnormal    WBC, Urine NONE      RBC, Urine >20 (*)     Mucus, Urine FEW       CT abdomen pelvis w IV contrast   Final Result   1. No acute inflammatory process or bowel obstruction.   2. Prostatomegaly.   3. Large bladder diverticulum.   4. Extensive cystic changes of the kidneys.   Signed by James Pollock MD        Medications   iohexol (OMNIPaque) 350 mg iodine/mL solution 75 mL (75 mL intravenous Given 2/12/24 2129)     New Prescriptions    TAMSULOSIN (FLOMAX) 0.4 MG 24 HR CAPSULE    Take 1 capsule (0.4 mg) by mouth once daily for 14 days.         Procedure  Procedures     Gareth Ellington PA-C  02/12/24 2243       Gareth Ellington PA-C  02/12/24 1134

## 2024-02-14 ENCOUNTER — TELEPHONE (OUTPATIENT)
Dept: PRIMARY CARE | Facility: CLINIC | Age: 74
End: 2024-02-14
Payer: MEDICARE

## 2024-02-15 ENCOUNTER — OFFICE VISIT (OUTPATIENT)
Dept: PRIMARY CARE | Facility: CLINIC | Age: 74
End: 2024-02-15
Payer: MEDICARE

## 2024-02-15 VITALS
RESPIRATION RATE: 16 BRPM | HEART RATE: 67 BPM | TEMPERATURE: 97.5 F | HEIGHT: 66 IN | DIASTOLIC BLOOD PRESSURE: 80 MMHG | SYSTOLIC BLOOD PRESSURE: 150 MMHG | WEIGHT: 210 LBS | BODY MASS INDEX: 33.75 KG/M2 | OXYGEN SATURATION: 97 %

## 2024-02-15 DIAGNOSIS — I20.9 ANGINA PECTORIS (CMS-HCC): ICD-10-CM

## 2024-02-15 DIAGNOSIS — R33.9 URINARY RETENTION: Primary | ICD-10-CM

## 2024-02-15 DIAGNOSIS — R31.9 HEMATURIA, UNSPECIFIED TYPE: ICD-10-CM

## 2024-02-15 DIAGNOSIS — J45.50 SEVERE PERSISTENT ASTHMA WITHOUT COMPLICATION (MULTI): ICD-10-CM

## 2024-02-15 PROCEDURE — 99349 HOME/RES VST EST MOD MDM 40: CPT | Performed by: NURSE PRACTITIONER

## 2024-02-15 PROCEDURE — 1160F RVW MEDS BY RX/DR IN RCRD: CPT | Performed by: NURSE PRACTITIONER

## 2024-02-15 PROCEDURE — 3079F DIAST BP 80-89 MM HG: CPT | Performed by: NURSE PRACTITIONER

## 2024-02-15 PROCEDURE — 1125F AMNT PAIN NOTED PAIN PRSNT: CPT | Performed by: NURSE PRACTITIONER

## 2024-02-15 PROCEDURE — 1159F MED LIST DOCD IN RCRD: CPT | Performed by: NURSE PRACTITIONER

## 2024-02-15 PROCEDURE — 1036F TOBACCO NON-USER: CPT | Performed by: NURSE PRACTITIONER

## 2024-02-15 PROCEDURE — 3077F SYST BP >= 140 MM HG: CPT | Performed by: NURSE PRACTITIONER

## 2024-02-15 PROCEDURE — 4010F ACE/ARB THERAPY RXD/TAKEN: CPT | Performed by: NURSE PRACTITIONER

## 2024-02-15 ASSESSMENT — PAIN SCALES - GENERAL: PAINLEVEL: 5

## 2024-02-15 NOTE — PROGRESS NOTES
Subjective   Patient ID: Duane Scott is a 73 y.o. male who presents for Follow-up (Hematuria, flank pain).    Visit for 72 y/o male seen today in private home, alone for ED follow up. Patient presented to [facility] ED on 2/12 for evaluation of hematuria.     Per ED H/P: 73-year-old male presented emergency department with a chief complaint of hematuria.  He also complains of some pain in the right flank.  He is well-appearing nontoxic afebrile.  He denies anticoagulant.  He believes he is on Plavix..  Denies lightheadedness dizziness numbness weakness.  Denies fall or injury or trauma.  Denies chest pain shortness of breath.  No other complaint.    ED Course: CBC without significant leukocytosis or anemia, metabolic panel without significant renal impairment or electrolyte abnormality.  Urinalysis not infected evidence of hematuria.  CT shows extensive changes to the kidney, there is no ureteral obstruction.  There are no identified lesions.  Patient does have evidence of urinary retention with 540 mL placed over 2 residual.  Recommended Hull catheter, patient will be discharged with outpatient urology follow-up for cystoscopy.  Released in good condition.    Patient is standing in his bedroom today. He is alert, oriented, answering all questions without difficulty. He lives with his family. Does not drive and will typically walk to any medical appointments. He reports that he does not have any money to take [place]. He is involved in [employer] and they have evaluated his financial concerns In the past. Patient manages his own medications but only takes some of the medications that have previously been prescribed. He has not started the Flomax that the emergency room prescribed. He denies any fever, chills or hematuria. He reports that he is urinating fine now. He does not have a urologist and is uncertain if he is going to establish care. His appetite is at baseline. He denies any abdominal pain, nausea, vomiting.  Does admit to occasional diarrhea. He has COPD/asthma. Admits to shortness of breath with exertion and intermittent wheezing and cough. He has Albuterol inhaler and Albuterol nebs in the home and will use on occasion, mostly 1-2 times per week. He has intermittent palpitation, chest pain. His cardiologist is Dr. Spencer and he is scheduled for a stress test next week.     Home Visit: Medically necessary due to: patient has limited support systems to help the patient attend office visits         Current Outpatient Medications:     albuterol (Ventolin HFA) 90 mcg/actuation inhaler, Inhale 1 puff every 4 hours if needed., Disp: , Rfl:     ALPRAZolam (Xanax) 0.25 mg tablet, Take 1 tablet (0.25 mg) by mouth once daily as needed for anxiety., Disp: , Rfl:     aspirin 81 mg EC tablet, Take 1 tablet (81 mg) by mouth once daily., Disp: , Rfl:     atorvastatin (Lipitor) 80 mg tablet, Take 1 tablet (80 mg) by mouth once daily., Disp: 90 tablet, Rfl: 3    carvedilol (Coreg) 25 mg tablet, Take 0.5 tablets (12.5 mg) by mouth 2 times a day with meals., Disp: 90 tablet, Rfl: 3    furosemide (Lasix) 20 mg tablet, Take 1 tablet (20 mg) by mouth 2 times a day., Disp: , Rfl:     lisinopril 10 mg tablet, Take 1 tablet (10 mg) by mouth once daily., Disp: 90 tablet, Rfl: 3    tamsulosin (Flomax) 0.4 mg 24 hr capsule, Take 1 capsule (0.4 mg) by mouth once daily for 14 days. (Patient not taking: Reported on 2/19/2024), Disp: 14 capsule, Rfl: 0    ticagrelor (Brilinta) 90 mg tablet, Take 1 tablet (90 mg) by mouth 2 times a day., Disp: , Rfl:      Review of Systems  Constitutional:  Negative for appetite change, chills and fever.   HENT:  Negative for trouble swallowing.    Respiratory:  Positive for shortness of breath with exertion, cough, wheezing.  Cardiovascular: Positive for intermittent chest pain, palpitations  Gastrointestinal:  Positive for occasional diarrhea. Negative for abdominal pain, constipation, nausea and vomiting.  "  Genitourinary:  Negative for hematuria   Musculoskeletal:  Positive for arthralgias, back pain, unsteady gait    Neurological:  Negative for dizziness and light-headedness.   Hematological:  Does not bruise/bleed easily.   Psychiatric/Behavioral: Positive for anxiety       Objective   /80 (BP Location: Right arm, Patient Position: Sitting, BP Cuff Size: Adult)   Pulse 67   Temp 36.4 °C (97.5 °F) (Temporal)   Resp 16   Ht 1.676 m (5' 6\")   Wt 95.3 kg (210 lb)   SpO2 97%   BMI 33.89 kg/m²     Physical Exam  Constitutional:       Appearance: Alert, standing in bedroom. Normal appearance.   HENT:      Head: Normocephalic.      Nose: Nose normal.      Mouth/Throat:      Mouth: Mucous membranes are moist.   Cardiovascular:      Rate and Rhythm: Normal rate and regular rhythm.   Pulmonary:      Effort: Pulmonary effort is normal.      Breath sounds: Normal breath sounds.   Abdominal:      General: Abdomen is flat.      Palpations: Abdomen is soft. No tenderness.   Musculoskeletal:         General: Normal range of motion.      Cervical back: Normal range of motion.   Skin:     General: Skin is warm and dry.   Neurological:      General: No focal deficit present.      Mental Status: He is alert and oriented to person, place, and time.   Psychiatric:         Mood and Affect: Mood normal.     Assessment/Plan   Diagnoses and all orders for this visit:  Urinary retention  Comments:  acute, recommended to start Tamsulosin and follow up with urology. Pt reports that he will get the medication today and schedule follow up appt  Hematuria, unspecified type  Comments:  Resolved. pt denies any further bleeding  Angina pectoris (CMS/[facility])  Comments:  acute, intermittent. Scheduled for stress test next week  Severe persistent asthma without complication  Comments:  chronic, managed with Ventolin inhaler as needed         Radha Johnson, APRN-CNP   "

## 2024-02-19 ENCOUNTER — APPOINTMENT (OUTPATIENT)
Dept: RADIOLOGY | Facility: HOSPITAL | Age: 74
DRG: 698 | End: 2024-02-19
Payer: MEDICARE

## 2024-02-19 ENCOUNTER — APPOINTMENT (OUTPATIENT)
Dept: CARDIOLOGY | Facility: HOSPITAL | Age: 74
DRG: 698 | End: 2024-02-19
Payer: MEDICARE

## 2024-02-19 ENCOUNTER — HOSPITAL ENCOUNTER (INPATIENT)
Facility: HOSPITAL | Age: 74
LOS: 2 days | Discharge: HOME | DRG: 698 | End: 2024-02-21
Attending: EMERGENCY MEDICINE | Admitting: INTERNAL MEDICINE
Payer: MEDICARE

## 2024-02-19 DIAGNOSIS — I50.20 SYSTOLIC CONGESTIVE HEART FAILURE, UNSPECIFIED HF CHRONICITY (MULTI): ICD-10-CM

## 2024-02-19 DIAGNOSIS — I11.0: ICD-10-CM

## 2024-02-19 DIAGNOSIS — I50.9 ACUTE ON CHRONIC CONGESTIVE HEART FAILURE, UNSPECIFIED HEART FAILURE TYPE (MULTI): ICD-10-CM

## 2024-02-19 DIAGNOSIS — N39.0 URINARY TRACT INFECTION ASSOCIATED WITH INDWELLING URETHRAL CATHETER, INITIAL ENCOUNTER (CMS-HCC): Primary | ICD-10-CM

## 2024-02-19 DIAGNOSIS — I25.10 CORONARY ARTERY DISEASE INVOLVING NATIVE CORONARY ARTERY OF NATIVE HEART WITHOUT ANGINA PECTORIS: ICD-10-CM

## 2024-02-19 DIAGNOSIS — I12.9 HYPERTENSIVE CHRONIC KIDNEY DISEASE W STG 1-4/UNSP CHR KDNY: ICD-10-CM

## 2024-02-19 DIAGNOSIS — T83.511A URINARY TRACT INFECTION ASSOCIATED WITH INDWELLING URETHRAL CATHETER, INITIAL ENCOUNTER (CMS-HCC): Primary | ICD-10-CM

## 2024-02-19 DIAGNOSIS — I50.20: ICD-10-CM

## 2024-02-19 DIAGNOSIS — R07.2 PRECORDIAL PAIN: ICD-10-CM

## 2024-02-19 DIAGNOSIS — I50.21 ACUTE SYSTOLIC (CONGESTIVE) HEART FAILURE (MULTI): ICD-10-CM

## 2024-02-19 DIAGNOSIS — R06.02 SHORTNESS OF BREATH: ICD-10-CM

## 2024-02-19 DIAGNOSIS — J40 BRONCHITIS: ICD-10-CM

## 2024-02-19 LAB
ALBUMIN SERPL-MCNC: 4.1 G/DL (ref 3.5–5)
ALP BLD-CCNC: 123 U/L (ref 35–125)
ALT SERPL-CCNC: 15 U/L (ref 5–40)
AMORPH CRY #/AREA UR COMP ASSIST: ABNORMAL /HPF
ANION GAP SERPL CALC-SCNC: 12 MMOL/L
APPEARANCE UR: ABNORMAL
AST SERPL-CCNC: 20 U/L (ref 5–40)
BASOPHILS # BLD AUTO: 0.01 X10*3/UL (ref 0–0.1)
BASOPHILS NFR BLD AUTO: 0.1 %
BILIRUB SERPL-MCNC: 2 MG/DL (ref 0.1–1.2)
BILIRUB UR STRIP.AUTO-MCNC: NEGATIVE MG/DL
BUN SERPL-MCNC: 25 MG/DL (ref 8–25)
CALCIUM SERPL-MCNC: 9.2 MG/DL (ref 8.5–10.4)
CHLORIDE SERPL-SCNC: 103 MMOL/L (ref 97–107)
CO2 SERPL-SCNC: 23 MMOL/L (ref 24–31)
COLOR UR: YELLOW
CREAT SERPL-MCNC: 1.5 MG/DL (ref 0.4–1.6)
EGFRCR SERPLBLD CKD-EPI 2021: 49 ML/MIN/1.73M*2
EOSINOPHIL # BLD AUTO: 0.19 X10*3/UL (ref 0–0.4)
EOSINOPHIL NFR BLD AUTO: 2.1 %
ERYTHROCYTE [DISTWIDTH] IN BLOOD BY AUTOMATED COUNT: 13.4 % (ref 11.5–14.5)
FLUAV RNA RESP QL NAA+PROBE: NOT DETECTED
FLUBV RNA RESP QL NAA+PROBE: NOT DETECTED
GLUCOSE BLD MANUAL STRIP-MCNC: 408 MG/DL (ref 74–99)
GLUCOSE SERPL-MCNC: 151 MG/DL (ref 65–99)
GLUCOSE UR STRIP.AUTO-MCNC: NORMAL MG/DL
HCT VFR BLD AUTO: 39.2 % (ref 41–52)
HGB BLD-MCNC: 13.7 G/DL (ref 13.5–17.5)
IMM GRANULOCYTES # BLD AUTO: 0.02 X10*3/UL (ref 0–0.5)
IMM GRANULOCYTES NFR BLD AUTO: 0.2 % (ref 0–0.9)
KETONES UR STRIP.AUTO-MCNC: NEGATIVE MG/DL
LEUKOCYTE ESTERASE UR QL STRIP.AUTO: ABNORMAL
LYMPHOCYTES # BLD AUTO: 0.83 X10*3/UL (ref 0.8–3)
LYMPHOCYTES NFR BLD AUTO: 9.3 %
MCH RBC QN AUTO: 29.6 PG (ref 26–34)
MCHC RBC AUTO-ENTMCNC: 34.9 G/DL (ref 32–36)
MCV RBC AUTO: 85 FL (ref 80–100)
MONOCYTES # BLD AUTO: 0.77 X10*3/UL (ref 0.05–0.8)
MONOCYTES NFR BLD AUTO: 8.6 %
MUCOUS THREADS #/AREA URNS AUTO: ABNORMAL /LPF
NEUTROPHILS # BLD AUTO: 7.12 X10*3/UL (ref 1.6–5.5)
NEUTROPHILS NFR BLD AUTO: 79.7 %
NITRITE UR QL STRIP.AUTO: ABNORMAL
NRBC BLD-RTO: 0 /100 WBCS (ref 0–0)
NT-PROBNP SERPL-MCNC: 1212 PG/ML (ref 0–229)
PH UR STRIP.AUTO: 8.5 [PH]
PLATELET # BLD AUTO: 101 X10*3/UL (ref 150–450)
POTASSIUM SERPL-SCNC: 4.2 MMOL/L (ref 3.4–5.1)
PROT SERPL-MCNC: 7.5 G/DL (ref 5.9–7.9)
PROT UR STRIP.AUTO-MCNC: ABNORMAL MG/DL
RBC # BLD AUTO: 4.63 X10*6/UL (ref 4.5–5.9)
RBC # UR STRIP.AUTO: ABNORMAL /UL
RBC #/AREA URNS AUTO: >20 /HPF
RBC MORPH BLD: NORMAL
SARS-COV-2 RNA RESP QL NAA+PROBE: NOT DETECTED
SODIUM SERPL-SCNC: 138 MMOL/L (ref 133–145)
SP GR UR STRIP.AUTO: 1.02
TROPONIN T SERPL-MCNC: 35 NG/L
TROPONIN T SERPL-MCNC: 36 NG/L
UROBILINOGEN UR STRIP.AUTO-MCNC: NORMAL MG/DL
WBC # BLD AUTO: 8.9 X10*3/UL (ref 4.4–11.3)
WBC #/AREA URNS AUTO: >50 /HPF

## 2024-02-19 PROCEDURE — 36415 COLL VENOUS BLD VENIPUNCTURE: CPT

## 2024-02-19 PROCEDURE — 96365 THER/PROPH/DIAG IV INF INIT: CPT

## 2024-02-19 PROCEDURE — 94640 AIRWAY INHALATION TREATMENT: CPT

## 2024-02-19 PROCEDURE — 2500000004 HC RX 250 GENERAL PHARMACY W/ HCPCS (ALT 636 FOR OP/ED): Performed by: INTERNAL MEDICINE

## 2024-02-19 PROCEDURE — 36415 COLL VENOUS BLD VENIPUNCTURE: CPT | Performed by: EMERGENCY MEDICINE

## 2024-02-19 PROCEDURE — 2500000001 HC RX 250 WO HCPCS SELF ADMINISTERED DRUGS (ALT 637 FOR MEDICARE OP): Performed by: INTERNAL MEDICINE

## 2024-02-19 PROCEDURE — 2500000002 HC RX 250 W HCPCS SELF ADMINISTERED DRUGS (ALT 637 FOR MEDICARE OP, ALT 636 FOR OP/ED): Performed by: EMERGENCY MEDICINE

## 2024-02-19 PROCEDURE — 99223 1ST HOSP IP/OBS HIGH 75: CPT | Performed by: INTERNAL MEDICINE

## 2024-02-19 PROCEDURE — 84075 ASSAY ALKALINE PHOSPHATASE: CPT

## 2024-02-19 PROCEDURE — 83880 ASSAY OF NATRIURETIC PEPTIDE: CPT

## 2024-02-19 PROCEDURE — 84484 ASSAY OF TROPONIN QUANT: CPT | Performed by: EMERGENCY MEDICINE

## 2024-02-19 PROCEDURE — 2500000004 HC RX 250 GENERAL PHARMACY W/ HCPCS (ALT 636 FOR OP/ED)

## 2024-02-19 PROCEDURE — 51702 INSERT TEMP BLADDER CATH: CPT

## 2024-02-19 PROCEDURE — 84484 ASSAY OF TROPONIN QUANT: CPT

## 2024-02-19 PROCEDURE — 93005 ELECTROCARDIOGRAM TRACING: CPT

## 2024-02-19 PROCEDURE — 2500000002 HC RX 250 W HCPCS SELF ADMINISTERED DRUGS (ALT 637 FOR MEDICARE OP, ALT 636 FOR OP/ED): Performed by: INTERNAL MEDICINE

## 2024-02-19 PROCEDURE — 87086 URINE CULTURE/COLONY COUNT: CPT | Mod: TRILAB,WESLAB

## 2024-02-19 PROCEDURE — 96366 THER/PROPH/DIAG IV INF ADDON: CPT

## 2024-02-19 PROCEDURE — 99285 EMERGENCY DEPT VISIT HI MDM: CPT | Mod: 25

## 2024-02-19 PROCEDURE — 1200000002 HC GENERAL ROOM WITH TELEMETRY DAILY

## 2024-02-19 PROCEDURE — 71046 X-RAY EXAM CHEST 2 VIEWS: CPT

## 2024-02-19 PROCEDURE — 85025 COMPLETE CBC W/AUTO DIFF WBC: CPT

## 2024-02-19 PROCEDURE — 96375 TX/PRO/DX INJ NEW DRUG ADDON: CPT

## 2024-02-19 PROCEDURE — 81001 URINALYSIS AUTO W/SCOPE: CPT

## 2024-02-19 PROCEDURE — 82947 ASSAY GLUCOSE BLOOD QUANT: CPT

## 2024-02-19 PROCEDURE — 87636 SARSCOV2 & INF A&B AMP PRB: CPT

## 2024-02-19 PROCEDURE — 2500000004 HC RX 250 GENERAL PHARMACY W/ HCPCS (ALT 636 FOR OP/ED): Performed by: EMERGENCY MEDICINE

## 2024-02-19 RX ORDER — ATORVASTATIN CALCIUM 80 MG/1
80 TABLET, FILM COATED ORAL DAILY
Status: DISCONTINUED | OUTPATIENT
Start: 2024-02-19 | End: 2024-02-21 | Stop reason: HOSPADM

## 2024-02-19 RX ORDER — ASPIRIN 81 MG/1
81 TABLET ORAL DAILY
Status: DISCONTINUED | OUTPATIENT
Start: 2024-02-19 | End: 2024-02-21 | Stop reason: HOSPADM

## 2024-02-19 RX ORDER — LEVOFLOXACIN 5 MG/ML
750 INJECTION, SOLUTION INTRAVENOUS ONCE
Status: COMPLETED | OUTPATIENT
Start: 2024-02-19 | End: 2024-02-19

## 2024-02-19 RX ORDER — CARVEDILOL 12.5 MG/1
12.5 TABLET ORAL
Status: DISCONTINUED | OUTPATIENT
Start: 2024-02-19 | End: 2024-02-21 | Stop reason: HOSPADM

## 2024-02-19 RX ORDER — IPRATROPIUM BROMIDE AND ALBUTEROL SULFATE 2.5; .5 MG/3ML; MG/3ML
3 SOLUTION RESPIRATORY (INHALATION)
Status: DISCONTINUED | OUTPATIENT
Start: 2024-02-19 | End: 2024-02-19

## 2024-02-19 RX ORDER — FUROSEMIDE 10 MG/ML
40 INJECTION INTRAMUSCULAR; INTRAVENOUS ONCE
Status: COMPLETED | OUTPATIENT
Start: 2024-02-19 | End: 2024-02-19

## 2024-02-19 RX ORDER — ALPRAZOLAM 0.25 MG/1
0.25 TABLET ORAL DAILY PRN
Status: DISCONTINUED | OUTPATIENT
Start: 2024-02-19 | End: 2024-02-21 | Stop reason: HOSPADM

## 2024-02-19 RX ORDER — FUROSEMIDE 20 MG/1
20 TABLET ORAL 2 TIMES DAILY
Status: DISCONTINUED | OUTPATIENT
Start: 2024-02-20 | End: 2024-02-21

## 2024-02-19 RX ORDER — CEFTRIAXONE 1 G/50ML
1 INJECTION, SOLUTION INTRAVENOUS EVERY 24 HOURS
Status: DISCONTINUED | OUTPATIENT
Start: 2024-02-19 | End: 2024-02-21 | Stop reason: HOSPADM

## 2024-02-19 RX ORDER — IPRATROPIUM BROMIDE AND ALBUTEROL SULFATE 2.5; .5 MG/3ML; MG/3ML
3 SOLUTION RESPIRATORY (INHALATION) ONCE
Status: COMPLETED | OUTPATIENT
Start: 2024-02-19 | End: 2024-02-19

## 2024-02-19 RX ORDER — IPRATROPIUM BROMIDE AND ALBUTEROL SULFATE 2.5; .5 MG/3ML; MG/3ML
3 SOLUTION RESPIRATORY (INHALATION) EVERY 2 HOUR PRN
Status: DISCONTINUED | OUTPATIENT
Start: 2024-02-19 | End: 2024-02-21 | Stop reason: HOSPADM

## 2024-02-19 RX ORDER — TAMSULOSIN HYDROCHLORIDE 0.4 MG/1
0.4 CAPSULE ORAL DAILY
Status: DISCONTINUED | OUTPATIENT
Start: 2024-02-19 | End: 2024-02-21 | Stop reason: HOSPADM

## 2024-02-19 RX ORDER — LISINOPRIL 10 MG/1
10 TABLET ORAL DAILY
Status: DISCONTINUED | OUTPATIENT
Start: 2024-02-19 | End: 2024-02-21 | Stop reason: HOSPADM

## 2024-02-19 RX ORDER — BUDESONIDE 0.5 MG/2ML
0.5 INHALANT ORAL
Status: DISCONTINUED | OUTPATIENT
Start: 2024-02-19 | End: 2024-02-21 | Stop reason: HOSPADM

## 2024-02-19 RX ADMIN — METHYLPREDNISOLONE SODIUM SUCCINATE 125 MG: 125 INJECTION, POWDER, FOR SOLUTION INTRAMUSCULAR; INTRAVENOUS at 10:53

## 2024-02-19 RX ADMIN — ATORVASTATIN CALCIUM 80 MG: 80 TABLET, FILM COATED ORAL at 18:15

## 2024-02-19 RX ADMIN — LEVOFLOXACIN 750 MG: 750 INJECTION, SOLUTION INTRAVENOUS at 12:24

## 2024-02-19 RX ADMIN — SODIUM CHLORIDE 1000 ML: 900 INJECTION, SOLUTION INTRAVENOUS at 13:16

## 2024-02-19 RX ADMIN — TICAGRELOR 90 MG: 90 TABLET ORAL at 20:44

## 2024-02-19 RX ADMIN — IPRATROPIUM BROMIDE AND ALBUTEROL SULFATE 3 ML: 2.5; .5 SOLUTION RESPIRATORY (INHALATION) at 21:22

## 2024-02-19 RX ADMIN — CARVEDILOL 12.5 MG: 12.5 TABLET, FILM COATED ORAL at 18:15

## 2024-02-19 RX ADMIN — FUROSEMIDE 40 MG: 10 INJECTION, SOLUTION INTRAMUSCULAR; INTRAVENOUS at 13:59

## 2024-02-19 RX ADMIN — ASPIRIN 81 MG: 81 TABLET, COATED ORAL at 18:15

## 2024-02-19 RX ADMIN — LISINOPRIL 10 MG: 10 TABLET ORAL at 18:15

## 2024-02-19 RX ADMIN — IPRATROPIUM BROMIDE AND ALBUTEROL SULFATE 3 ML: 2.5; .5 SOLUTION RESPIRATORY (INHALATION) at 11:35

## 2024-02-19 RX ADMIN — FUROSEMIDE 40 MG: 10 INJECTION, SOLUTION INTRAMUSCULAR; INTRAVENOUS at 18:14

## 2024-02-19 SDOH — SOCIAL STABILITY: SOCIAL INSECURITY: ABUSE: ADULT

## 2024-02-19 SDOH — SOCIAL STABILITY: SOCIAL INSECURITY: ARE THERE ANY APPARENT SIGNS OF INJURIES/BEHAVIORS THAT COULD BE RELATED TO ABUSE/NEGLECT?: NO

## 2024-02-19 SDOH — SOCIAL STABILITY: SOCIAL INSECURITY: WERE YOU ABLE TO COMPLETE ALL THE BEHAVIORAL HEALTH SCREENINGS?: YES

## 2024-02-19 SDOH — SOCIAL STABILITY: SOCIAL INSECURITY: HAS ANYONE EVER THREATENED TO HURT YOUR FAMILY OR YOUR PETS?: NO

## 2024-02-19 SDOH — SOCIAL STABILITY: SOCIAL INSECURITY: HAVE YOU HAD THOUGHTS OF HARMING ANYONE ELSE?: NO

## 2024-02-19 SDOH — SOCIAL STABILITY: SOCIAL INSECURITY: DO YOU FEEL ANYONE HAS EXPLOITED OR TAKEN ADVANTAGE OF YOU FINANCIALLY OR OF YOUR PERSONAL PROPERTY?: NO

## 2024-02-19 SDOH — SOCIAL STABILITY: SOCIAL INSECURITY: DOES ANYONE TRY TO KEEP YOU FROM HAVING/CONTACTING OTHER FRIENDS OR DOING THINGS OUTSIDE YOUR HOME?: NO

## 2024-02-19 SDOH — SOCIAL STABILITY: SOCIAL INSECURITY: ARE YOU OR HAVE YOU BEEN THREATENED OR ABUSED PHYSICALLY, EMOTIONALLY, OR SEXUALLY BY ANYONE?: NO

## 2024-02-19 SDOH — SOCIAL STABILITY: SOCIAL INSECURITY: DO YOU FEEL UNSAFE GOING BACK TO THE PLACE WHERE YOU ARE LIVING?: NO

## 2024-02-19 ASSESSMENT — COGNITIVE AND FUNCTIONAL STATUS - GENERAL
MOBILITY SCORE: 24
DAILY ACTIVITIY SCORE: 24
DAILY ACTIVITIY SCORE: 24
MOBILITY SCORE: 24
PATIENT BASELINE BEDBOUND: NO

## 2024-02-19 ASSESSMENT — ACTIVITIES OF DAILY LIVING (ADL)
HEARING - LEFT EAR: FUNCTIONAL
WALKS IN HOME: INDEPENDENT
GROOMING: INDEPENDENT
BATHING: INDEPENDENT
HEARING - RIGHT EAR: FUNCTIONAL
FEEDING YOURSELF: INDEPENDENT
JUDGMENT_ADEQUATE_SAFELY_COMPLETE_DAILY_ACTIVITIES: YES
DRESSING YOURSELF: INDEPENDENT
PATIENT'S MEMORY ADEQUATE TO SAFELY COMPLETE DAILY ACTIVITIES?: YES
ADEQUATE_TO_COMPLETE_ADL: YES
TOILETING: INDEPENDENT

## 2024-02-19 ASSESSMENT — PATIENT HEALTH QUESTIONNAIRE - PHQ9
1. LITTLE INTEREST OR PLEASURE IN DOING THINGS: NOT AT ALL
SUM OF ALL RESPONSES TO PHQ9 QUESTIONS 1 & 2: 0
2. FEELING DOWN, DEPRESSED OR HOPELESS: NOT AT ALL

## 2024-02-19 ASSESSMENT — COLUMBIA-SUICIDE SEVERITY RATING SCALE - C-SSRS
6. HAVE YOU EVER DONE ANYTHING, STARTED TO DO ANYTHING, OR PREPARED TO DO ANYTHING TO END YOUR LIFE?: NO
2. HAVE YOU ACTUALLY HAD ANY THOUGHTS OF KILLING YOURSELF?: NO
1. IN THE PAST MONTH, HAVE YOU WISHED YOU WERE DEAD OR WISHED YOU COULD GO TO SLEEP AND NOT WAKE UP?: NO

## 2024-02-19 ASSESSMENT — LIFESTYLE VARIABLES
HOW OFTEN DO YOU HAVE 6 OR MORE DRINKS ON ONE OCCASION: NEVER
AUDIT-C TOTAL SCORE: 0
HOW MANY STANDARD DRINKS CONTAINING ALCOHOL DO YOU HAVE ON A TYPICAL DAY: PATIENT DOES NOT DRINK
AUDIT-C TOTAL SCORE: 0
HOW OFTEN DO YOU HAVE A DRINK CONTAINING ALCOHOL: NEVER
SKIP TO QUESTIONS 9-10: 1

## 2024-02-19 ASSESSMENT — PAIN SCALES - GENERAL
PAINLEVEL_OUTOF10: 0 - NO PAIN
PAINLEVEL_OUTOF10: 5 - MODERATE PAIN

## 2024-02-19 ASSESSMENT — PAIN - FUNCTIONAL ASSESSMENT
PAIN_FUNCTIONAL_ASSESSMENT: 0-10
PAIN_FUNCTIONAL_ASSESSMENT: 0-10

## 2024-02-19 NOTE — CARE PLAN
The patient's goals for the shift include      The clinical goals for the shift include maintain safety    Over the shift, the patient did not make progress toward the following goals. Barriers to progression include   Problem: Pain  Goal: My pain/discomfort is manageable  Outcome: Progressing     Problem: Safety  Goal: Patient will be injury free during hospitalization  Outcome: Progressing  Goal: I will remain free of falls  Outcome: Progressing     Problem: Daily Care  Goal: Daily care needs are met  Outcome: Progressing     Problem: Psychosocial Needs  Goal: Demonstrates ability to cope with hospitalization/illness  Outcome: Progressing  Goal: Collaborate with me, my family, and caregiver to identify my specific goals  Outcome: Progressing  Flowsheets (Taken 2/19/2024 1310)  Cultural Requests During Hospitalization: na  Spiritual Requests During Hospitalization: [place] only. No sacraments     Problem: Discharge Barriers  Goal: My discharge needs are met  Outcome: Progressing   . Recommendations to address these barriers include .

## 2024-02-19 NOTE — ED PROVIDER NOTES
HPI   No chief complaint on file.      HPI  Patient is a 73-year-old male with history of DM2, MI, acute renal failure, and asthma who presents to ED via EMS for worsening shortness of breath x 1 week.  Patient reports cough and congestion over the course of week which has been worsening, patient medicated with albuterol at home this morning, medication did not provide relief of symptoms which prompted patient to call EMS.  Patient denies fever or chills, chest pain, abdominal pain, headache or dizziness.  Patient was evaluated in ER on 2/12 for hematuria.  Patient was found to be retaining urine, indwelling Hull catheter was placed and patient was discharged with outpatient urology follow-up.  Patient states he does not smoke.  He is able to speak in full sentences               [place] Coma Scale Score: 15                     Patient History   Past Medical History:   Diagnosis Date    Acute renal failure syndrome (CMS/[facility]) 11/14/2023    Angina pectoris (CMS/[facility]) 11/17/2023    Arthritis     Asthma     CAD (coronary artery disease)     s/p stent placement    Cardiac arrest (CMS/[facility]) 11/17/2023    Chronic obstructive pulmonary disease requiring drug therapy (CMS/[facility]) 11/17/2023    Coronary artery disease involving native coronary artery of native heart without angina pectoris 11/14/2023    Diabetes mellitus (CMS/[facility])     Diabetes mellitus, type 2 (CMS/[facility]) 11/08/2018    Diastolic heart failure (CMS/[facility]) 11/17/2023    Disorder involving thrombocytopenia (CMS/[facility]) 11/12/2019    Essential hypertension 11/08/2018    Gout     High blood pressure     High cholesterol     Kidney disease     Migraine headache     Mixed hyperlipidemia 11/08/2018    Palpitations 11/17/2023    Status post coronary artery stent placement 11/14/2023    Status post myocardial infarction 09/24/2019     Past Surgical History:   Procedure Laterality Date    BACK SURGERY  2012    HERNIA REPAIR  2015    HERNIA REPAIR  2013     Family History   Problem  Relation Name Age of Onset    Heart disease Mother      Stroke Son      Autism Son       Social History     Tobacco Use    Smoking status: Never     Passive exposure: Never    Smokeless tobacco: Never   Substance Use Topics    Alcohol use: Not Currently    Drug use: Never       Physical Exam   ED Triage Vitals [02/19/24 1045]   Temperature Heart Rate Respirations BP   36.8 °C (98.2 °F) 95 (!) 23 152/76      Pulse Ox Temp Source Heart Rate Source Patient Position   94 % Temporal -- --      BP Location FiO2 (%)     -- --       Physical Exam  Vitals reviewed.   Constitutional:       Appearance: Normal appearance.   HENT:      Head: Normocephalic and atraumatic.   Eyes:      Extraocular Movements: Extraocular movements intact.   Cardiovascular:      Rate and Rhythm: Normal rate and regular rhythm.   Pulmonary:      Effort: Tachypnea present. No respiratory distress.      Breath sounds: Decreased air movement present. Wheezing present.   Abdominal:      Palpations: Abdomen is soft.   Musculoskeletal:         General: Normal range of motion.      Cervical back: Normal range of motion and neck supple.   Skin:     General: Skin is dry.   Neurological:      Mental Status: He is alert and oriented to person, place, and time.   Psychiatric:         Mood and Affect: Mood normal.         Behavior: Behavior normal.         ED Course & MDM   ED Course as of 02/19/24 1442   Mon Feb 19, 2024   1051 EKG on my independent interpretation: Sinus rhythm 89 bpm, normal axis, normal intervals, no acute ST or T wave abnormalities, occasional PVC [BAHMAN]      ED Course User Index  [BAHMAN] Krysta Temple MD         Diagnoses as of 02/19/24 1442   Urinary tract infection associated with indwelling urethral catheter, initial encounter (CMS/[facility])   Acute on chronic congestive heart failure, unspecified heart failure type (CMS/[facility])       Medical Decision Making  Parts of this chart have been completed using voice recognition software. Please excuse  any errors of transcription.  My thought process and reason for plan has been formulated from the time that I saw the patient until the time of disposition and is not specific to one specific moment during their visit and furthermore my MDM encompasses this entire chart and not only this text box.      HPI: Detailed above.    Exam: A medically appropriate exam performed, outlined above, given the known history and presentation.    History obtained from: Patient, EMR    EKG: Sinus rhythm 89 bpm, normal axis, normal intervals, no acute ST or T wave abnormalities, occasional PVC    Social Determinants of Health considered during this visit: None    Medications given during visit:  Medications   ipratropium-albuteroL (Duo-Neb) 0.5-2.5 mg/3 mL nebulizer solution 3 mL (3 mL nebulization Not Given 2/19/24 1300)   methylPREDNISolone sod succinate (SOLU-Medrol) injection 125 mg (125 mg intravenous Given 2/19/24 1053)   ipratropium-albuteroL (Duo-Neb) 0.5-2.5 mg/3 mL nebulizer solution 3 mL (3 mL nebulization Given 2/19/24 1135)   levoFLOXacin (Levaquin)  mg (0 mg intravenous Stopped 2/19/24 1356)   sodium chloride 0.9 % bolus 1,000 mL (0 mL intravenous Stopped 2/19/24 1356)   furosemide (Lasix) injection 40 mg (40 mg intravenous Given 2/19/24 1359)        Diagnostic/tests  Labs Reviewed   CBC WITH AUTO DIFFERENTIAL - Abnormal       Result Value    WBC 8.9      nRBC 0.0      RBC 4.63      Hemoglobin 13.7      Hematocrit 39.2 (*)     MCV 85      MCH 29.6      MCHC 34.9      RDW 13.4      Platelets 101 (*)     Neutrophils % 79.7      Immature Granulocytes %, Automated 0.2      Lymphocytes % 9.3      Monocytes % 8.6      Eosinophils % 2.1      Basophils % 0.1      Neutrophils Absolute 7.12 (*)     Immature Granulocytes Absolute, Automated 0.02      Lymphocytes Absolute 0.83      Monocytes Absolute 0.77      Eosinophils Absolute 0.19      Basophils Absolute 0.01     COMPREHENSIVE METABOLIC PANEL - Abnormal    Glucose 151  (*)     Sodium 138      Potassium 4.2      Chloride 103      Bicarbonate 23 (*)     Urea Nitrogen 25      Creatinine 1.50      eGFR 49 (*)     Calcium 9.2      Albumin 4.1      Alkaline Phosphatase 123      Total Protein 7.5      AST 20      Bilirubin, Total 2.0 (*)     ALT 15      Anion Gap 12     URINALYSIS WITH REFLEX CULTURE AND MICROSCOPIC - Abnormal    Color, Urine Yellow      Appearance, Urine Turbid (*)     Specific Gravity, Urine 1.017      pH, Urine 8.5 (*)     Protein, Urine 600 (3+) (*)     Glucose, Urine Normal      Blood, Urine 0.2 (2+) (*)     Ketones, Urine NEGATIVE      Bilirubin, Urine NEGATIVE      Urobilinogen, Urine Normal      Nitrite, Urine 2+ (*)     Leukocyte Esterase, Urine 500 Emily/µL (*)    N-TERMINAL PROBNP - Abnormal    PROBNP 1,212 (*)     Narrative:     Reference ranges are based on clinical submission data. These ranges represent the 95th percentile of normal cut-off points. As NT Pro- BNP values approach 1000 pg/ml, clinical symptoms are more likely associated with CHF.   SERIAL TROPONIN, INITIAL ([place]) - Abnormal    Troponin T, High Sensitivity 35 (*)    MICROSCOPIC ONLY, URINE - Abnormal    WBC, Urine >50 (*)     RBC, Urine >20 (*)     Mucus, Urine FEW      Amorphous Crystals, Urine 1+     TROPONIN T, HIGH SENSITIVITY - Abnormal    Troponin T, High Sensitivity 36 (*)    SARS-COV-2 AND INFLUENZA A/B PCR - Normal    Flu A Result Not Detected      Flu B Result Not Detected      Coronavirus 2019, PCR Not Detected      Narrative:     This assay has received FDA Emergency Use Authorization (EUA) and  is only authorized for the duration of time that circumstances exist to justify the authorization of the emergency use of in vitro diagnostic tests for the detection of SARS-CoV-2 virus and/or diagnosis of COVID-19 infection under section 564(b)(1) of the Act, 21 U.S.C. 360bbb-3(b)(1). Testing for SARS-CoV-2 is only recommended for patients who meet current clinical and/or epidemiological  criteria as defined by federal, state, or local public health directives. This assay is an in vitro diagnostic nucleic acid amplification test for the qualitative detection of SARS-CoV-2, Influenza A, and Influenza B from nasopharyngeal specimens and has been validated for use at [facility]. Negative results do not preclude COVID-19 infections or Influenza A/B infections, and should not be used as the sole basis for diagnosis, treatment, or other management decisions. If Influenza A/B and RSV PCR results are negative, testing for Parainfluenza virus, Adenovirus and Metapneumovirus is routinely performed for [facility] and intensive care inpatients, and is available on other patients by placing an add-on request.    URINE CULTURE   TROPONIN T SERIES, HIGH SENSITIVITY (0, 2 HR, 6 HR)    Narrative:     The following orders were created for panel order Troponin T Series, High Sensitivity (0, 2HR, 6HR).  Procedure                               Abnormality         Status                     ---------                               -----------         ------                     Serial Troponin, Initial...[[id number]]  Abnormal            Final result                 Please view results for these tests on the individual orders.   URINALYSIS WITH REFLEX CULTURE AND MICROSCOPIC    Narrative:     The following orders were created for panel order Urinalysis with Reflex Culture and Microscopic.  Procedure                               Abnormality         Status                     ---------                               -----------         ------                     Urinalysis with Reflex C...[[id number]]  Abnormal            Final result               Extra Urine Gray Tube[[id number]]                                                         Please view results for these tests on the individual orders.   EXTRA URINE GRAY TUBE   MORPHOLOGY    RBC Morphology No significant RBC morphology present         XR chest 2 views   Final Result   No acute cardiopulmonary process.             Signed by: Wade Wade 2/19/2024 2:20 PM   Dictation workstation:   [id number]           Considerations/further MDM:  Patient is hemodynamically stable.  Rales and wheezes heard bilaterally on exam.  Patient is still acutely short of breath after breathing treatments and dose of Solu-Medrol.  Patient recently had urinary catheter placed for urinary retention, patient has acute UTI today, catheter was replaced and patient was given IV Levaquin 750 mg.  Patient was also given 40 mg Lasix for CHF exacerbation, BNP is 1212 today.  Troponin is mildly elevated at 36.  CBC and CMP show no acute abnormality.  Chest x-ray shows cardiomegaly on my interpretation, report states no acute findings.  I spoke with Dr. Zheng. We thoroughly discussed the history, physical exam, laboratory and imaging studies, as well as, emergency department course. Based upon that discussion, we've decided to admit for further observation and evaluation of their SOB.    As I have deemed necessary from their history, physical, and studies, I have considered and evaluated for the following diagnoses:     ACUTE CORONARY SYNDROME  PULMONARY EMBOLISM  CHRONIC OBSTRUCTIVE PULMONARY DISEASE  CONGESTIVE HEART FAILURE  PERICARDIAL TAMPONADE  PNEUMONIA  PNEUMOTHORAX  SEPSIS  AORTIC DISSECTION    Procedure  Procedures     Ru Gramajo PA-C  02/19/24 8799

## 2024-02-19 NOTE — PROGRESS NOTES
"TCSW met FTF with pt.  TCSW introduced self and discussed role.  Pt currently denies having any skilled needs upon discharge stating \"I have my cane and get around fine at home.\"  "

## 2024-02-19 NOTE — H&P
History Of Present Illness  Duane Scott is a 73 y.o. male presenting with cough.    He has known systolic heart failure.  He is on an appropriate cardiac regimen.  In the last few days he has had increasing cough and exertional dyspnea and chest discomfort.  He is bringing up scant amounts of clear sputum.  Not requiring oxygen.  He recently was diagnosed with urinary retention and had a Hull placed which she has had at home.  He sees Dr. Spencer cardiology and is actually supposed to have a nuclear stress test tomorrow.     Past Medical History  Past Medical History:   Diagnosis Date    Acute renal failure syndrome (CMS/[facility]) 11/14/2023    Angina pectoris (CMS/[facility]) 11/17/2023    Arthritis     Asthma     CAD (coronary artery disease)     s/p stent placement    Cardiac arrest (CMS/[facility]) 11/17/2023    Chronic obstructive pulmonary disease requiring drug therapy (CMS/[facility]) 11/17/2023    Coronary artery disease involving native coronary artery of native heart without angina pectoris 11/14/2023    Diabetes mellitus (CMS/[facility])     Diabetes mellitus, type 2 (CMS/[facility]) 11/08/2018    Diastolic heart failure (CMS/[facility]) 11/17/2023    Disorder involving thrombocytopenia (CMS/[facility]) 11/12/2019    Essential hypertension 11/08/2018    Gout     High blood pressure     High cholesterol     Kidney disease     Migraine headache     Mixed hyperlipidemia 11/08/2018    Palpitations 11/17/2023    Status post coronary artery stent placement 11/14/2023    Status post myocardial infarction 09/24/2019       Surgical History  Past Surgical History:   Procedure Laterality Date    BACK SURGERY  2012    HERNIA REPAIR  2015    HERNIA REPAIR  2013        Social History  He reports that he has never smoked. He has never been exposed to tobacco smoke. He has never used smokeless tobacco. He reports that he does not currently use alcohol. He reports that he does not use drugs.    Family History  Family History   Problem Relation Name Age of Onset    Heart  "disease Mother      Stroke Son      Autism Son          Allergies  Hydromorphone, Latex, Penicillins, Dyphylline-guaifenesin, Grass pollen, Mold, and Ragweed    Review of Systems see HPI for pertinent positives negatives otherwise 10 point review of systems negative     Physical Exam  Alert oriented undistressed  Head atraumatic normocephalic  Pupils equal round reactive light extraocular motion intact  Mouth normal-appearing tongue and oropharynx  Neck supple without thyromegaly  Lymph nodes no cervical or axillary lymphadenopathy  Heart regular rate and rhythm without murmurs rubs or gallops  Lungs diffuse faint expiratory wheezing and bibasilar crackles  Abdomen soft nontender nondistended  Extremities 2+ pitting bilateral leg edema  Neuro cranial nerves intact with good tone and strength in arms and legs  Musculoskeletal normal passive range of motion shoulders elbows hips and knees  Skin no rashes or ulcerations.  Last Recorded Vitals  Blood pressure 150/67, pulse 73, temperature 36.8 °C (98.2 °F), temperature source Temporal, resp. rate 17, height 1.676 m (5' 6\"), weight 95.3 kg (210 lb), SpO2 95 %.    Relevant Results  Chest x-ray clear.  Echocardiogram from few years ago shows an EF of 30%.  Urine from today loaded with white cells.  Assessment/Plan   #1-CHF-he is known to have systolic heart failure and he is grossly fluid overloaded.  ER gave him 1 L of IV fluids and then gave him 40 mg IV Lasix.  I will redosed the Lasix for 40 mg again in about 6 hours.  We do not have a recent echo in the system.  I am ordering 1.  I will consult his cardiologist Dr. Spencer.  Of note this patient is scheduled for an outpatient stress test tomorrow.  I will discuss with Dr. Spencer what he wants to do about that.  #2 asthma.  He is wheezing a bit and the ER gave him 125 mg of Solu-Medrol.  I think Martha to focus on treating heart failure.  I will order DuoNebs and Pulmicort but no more steroids.  #3 urinary retention.  " Continue Flomax.  Continue Hull catheter.  Consult urology.  Starting ceftriaxone for UTI pending urine culture.      Titus Zheng MD

## 2024-02-20 ENCOUNTER — APPOINTMENT (OUTPATIENT)
Dept: RADIOLOGY | Facility: HOSPITAL | Age: 74
DRG: 698 | End: 2024-02-20
Payer: MEDICARE

## 2024-02-20 ENCOUNTER — APPOINTMENT (OUTPATIENT)
Dept: CARDIOLOGY | Facility: HOSPITAL | Age: 74
DRG: 698 | End: 2024-02-20
Payer: MEDICARE

## 2024-02-20 ENCOUNTER — APPOINTMENT (OUTPATIENT)
Dept: CARDIOLOGY | Facility: HOSPITAL | Age: 74
End: 2024-02-20
Payer: MEDICARE

## 2024-02-20 ENCOUNTER — APPOINTMENT (OUTPATIENT)
Dept: RADIOLOGY | Facility: HOSPITAL | Age: 74
End: 2024-02-20
Payer: MEDICARE

## 2024-02-20 LAB
ANION GAP SERPL CALC-SCNC: 13 MMOL/L
AORTIC VALVE MEAN GRADIENT: 5 MMHG
AORTIC VALVE PEAK VELOCITY: 1.42 M/S
AV PEAK GRADIENT: 8.1 MMHG
AVA (PEAK VEL): 2.46 CM2
AVA (VTI): 2.51 CM2
BACTERIA UR CULT: ABNORMAL
BUN SERPL-MCNC: 36 MG/DL (ref 8–25)
CALCIUM SERPL-MCNC: 9.2 MG/DL (ref 8.5–10.4)
CHLORIDE SERPL-SCNC: 99 MMOL/L (ref 97–107)
CO2 SERPL-SCNC: 23 MMOL/L (ref 24–31)
CREAT SERPL-MCNC: 1.6 MG/DL (ref 0.4–1.6)
EGFRCR SERPLBLD CKD-EPI 2021: 45 ML/MIN/1.73M*2
EJECTION FRACTION APICAL 4 CHAMBER: 60.2
GLUCOSE BLD MANUAL STRIP-MCNC: 156 MG/DL (ref 74–99)
GLUCOSE BLD MANUAL STRIP-MCNC: 189 MG/DL (ref 74–99)
GLUCOSE BLD MANUAL STRIP-MCNC: 326 MG/DL (ref 74–99)
GLUCOSE SERPL-MCNC: 339 MG/DL (ref 65–99)
LEFT ATRIUM VOLUME AREA LENGTH INDEX BSA: 28.1 ML/M2
LEFT VENTRICLE INTERNAL DIMENSION DIASTOLE: 5.34 CM (ref 3.5–6)
LEFT VENTRICULAR OUTFLOW TRACT DIAMETER: 2 CM
MITRAL VALVE E/A RATIO: 0.84
MITRAL VALVE E/E' RATIO: 9.95
POTASSIUM SERPL-SCNC: 3.9 MMOL/L (ref 3.4–5.1)
RIGHT VENTRICLE FREE WALL PEAK S': 13.4 CM/S
RIGHT VENTRICLE PEAK SYSTOLIC PRESSURE: 24.5 MMHG
SODIUM SERPL-SCNC: 135 MMOL/L (ref 133–145)
TRICUSPID ANNULAR PLANE SYSTOLIC EXCURSION: 2.1 CM

## 2024-02-20 PROCEDURE — 2500000002 HC RX 250 W HCPCS SELF ADMINISTERED DRUGS (ALT 637 FOR MEDICARE OP, ALT 636 FOR OP/ED): Performed by: INTERNAL MEDICINE

## 2024-02-20 PROCEDURE — 93306 TTE W/DOPPLER COMPLETE: CPT

## 2024-02-20 PROCEDURE — 94640 AIRWAY INHALATION TREATMENT: CPT

## 2024-02-20 PROCEDURE — 2500000001 HC RX 250 WO HCPCS SELF ADMINISTERED DRUGS (ALT 637 FOR MEDICARE OP): Performed by: INTERNAL MEDICINE

## 2024-02-20 PROCEDURE — A9502 TC99M TETROFOSMIN: HCPCS | Performed by: INTERNAL MEDICINE

## 2024-02-20 PROCEDURE — 3430000001 HC RX 343 DIAGNOSTIC RADIOPHARMACEUTICALS: Performed by: INTERNAL MEDICINE

## 2024-02-20 PROCEDURE — 94760 N-INVAS EAR/PLS OXIMETRY 1: CPT

## 2024-02-20 PROCEDURE — 93018 CV STRESS TEST I&R ONLY: CPT | Performed by: INTERNAL MEDICINE

## 2024-02-20 PROCEDURE — 99233 SBSQ HOSP IP/OBS HIGH 50: CPT | Performed by: INTERNAL MEDICINE

## 2024-02-20 PROCEDURE — 9420000001 HC RT PATIENT EDUCATION 5 MIN

## 2024-02-20 PROCEDURE — 97161 PT EVAL LOW COMPLEX 20 MIN: CPT | Mod: GP

## 2024-02-20 PROCEDURE — 97165 OT EVAL LOW COMPLEX 30 MIN: CPT | Mod: GO

## 2024-02-20 PROCEDURE — 2500000004 HC RX 250 GENERAL PHARMACY W/ HCPCS (ALT 636 FOR OP/ED): Performed by: INTERNAL MEDICINE

## 2024-02-20 PROCEDURE — 93017 CV STRESS TEST TRACING ONLY: CPT

## 2024-02-20 PROCEDURE — 93016 CV STRESS TEST SUPVJ ONLY: CPT | Performed by: INTERNAL MEDICINE

## 2024-02-20 PROCEDURE — 1200000002 HC GENERAL ROOM WITH TELEMETRY DAILY

## 2024-02-20 PROCEDURE — 36415 COLL VENOUS BLD VENIPUNCTURE: CPT | Performed by: INTERNAL MEDICINE

## 2024-02-20 PROCEDURE — 78452 HT MUSCLE IMAGE SPECT MULT: CPT

## 2024-02-20 PROCEDURE — 80048 BASIC METABOLIC PNL TOTAL CA: CPT | Performed by: INTERNAL MEDICINE

## 2024-02-20 PROCEDURE — 82947 ASSAY GLUCOSE BLOOD QUANT: CPT

## 2024-02-20 RX ORDER — INSULIN LISPRO 100 [IU]/ML
0-5 INJECTION, SOLUTION INTRAVENOUS; SUBCUTANEOUS
Status: DISCONTINUED | OUTPATIENT
Start: 2024-02-20 | End: 2024-02-21 | Stop reason: HOSPADM

## 2024-02-20 RX ORDER — AMINOPHYLLINE 25 MG/ML
125 INJECTION, SOLUTION INTRAVENOUS AS NEEDED
Status: CANCELLED | OUTPATIENT
Start: 2024-02-20

## 2024-02-20 RX ORDER — LISINOPRIL 10 MG/1
10 TABLET ORAL DAILY
Status: DISCONTINUED | OUTPATIENT
Start: 2024-02-20 | End: 2024-02-20

## 2024-02-20 RX ORDER — ACETAMINOPHEN 325 MG/1
650 TABLET ORAL EVERY 6 HOURS PRN
Status: DISCONTINUED | OUTPATIENT
Start: 2024-02-20 | End: 2024-02-21 | Stop reason: HOSPADM

## 2024-02-20 RX ORDER — PREDNISONE 20 MG/1
60 TABLET ORAL DAILY
Status: DISCONTINUED | OUTPATIENT
Start: 2024-02-20 | End: 2024-02-21 | Stop reason: HOSPADM

## 2024-02-20 RX ORDER — DEXTROSE 50 % IN WATER (D50W) INTRAVENOUS SYRINGE
25
Status: DISCONTINUED | OUTPATIENT
Start: 2024-02-20 | End: 2024-02-21 | Stop reason: HOSPADM

## 2024-02-20 RX ORDER — REGADENOSON 0.08 MG/ML
0.4 INJECTION, SOLUTION INTRAVENOUS
Status: COMPLETED | OUTPATIENT
Start: 2024-02-20 | End: 2024-02-20

## 2024-02-20 RX ORDER — DEXTROSE MONOHYDRATE 100 MG/ML
0.3 INJECTION, SOLUTION INTRAVENOUS ONCE AS NEEDED
Status: DISCONTINUED | OUTPATIENT
Start: 2024-02-20 | End: 2024-02-21 | Stop reason: HOSPADM

## 2024-02-20 RX ADMIN — BUDESONIDE INHALATION 0.5 MG: 0.5 SUSPENSION RESPIRATORY (INHALATION) at 19:41

## 2024-02-20 RX ADMIN — ATORVASTATIN CALCIUM 80 MG: 80 TABLET, FILM COATED ORAL at 10:03

## 2024-02-20 RX ADMIN — IPRATROPIUM BROMIDE AND ALBUTEROL SULFATE 3 ML: 2.5; .5 SOLUTION RESPIRATORY (INHALATION) at 19:41

## 2024-02-20 RX ADMIN — CARVEDILOL 12.5 MG: 12.5 TABLET, FILM COATED ORAL at 10:03

## 2024-02-20 RX ADMIN — ASPIRIN 81 MG: 81 TABLET, COATED ORAL at 10:03

## 2024-02-20 RX ADMIN — TETROFOSMIN 9.7 MILLICURIE: 0.23 INJECTION, POWDER, LYOPHILIZED, FOR SOLUTION INTRAVENOUS at 08:10

## 2024-02-20 RX ADMIN — CEFTRIAXONE SODIUM 1 G: 1 INJECTION, SOLUTION INTRAVENOUS at 14:38

## 2024-02-20 RX ADMIN — CARVEDILOL 12.5 MG: 12.5 TABLET, FILM COATED ORAL at 15:38

## 2024-02-20 RX ADMIN — LISINOPRIL 10 MG: 10 TABLET ORAL at 10:03

## 2024-02-20 RX ADMIN — IPRATROPIUM BROMIDE AND ALBUTEROL SULFATE 3 ML: 2.5; .5 SOLUTION RESPIRATORY (INHALATION) at 12:42

## 2024-02-20 RX ADMIN — INSULIN LISPRO 3 UNITS: 100 INJECTION, SOLUTION INTRAVENOUS; SUBCUTANEOUS at 12:53

## 2024-02-20 RX ADMIN — TAMSULOSIN HYDROCHLORIDE 0.4 MG: 0.4 CAPSULE ORAL at 10:03

## 2024-02-20 RX ADMIN — FUROSEMIDE 20 MG: 20 TABLET ORAL at 22:25

## 2024-02-20 RX ADMIN — PREDNISONE 60 MG: 20 TABLET ORAL at 12:56

## 2024-02-20 RX ADMIN — REGADENOSON 0.4 MG: 0.08 INJECTION, SOLUTION INTRAVENOUS at 10:02

## 2024-02-20 RX ADMIN — TETROFOSMIN 30 MILLICURIE: 0.23 INJECTION, POWDER, LYOPHILIZED, FOR SOLUTION INTRAVENOUS at 09:33

## 2024-02-20 RX ADMIN — INSULIN LISPRO 1 UNITS: 100 INJECTION, SOLUTION INTRAVENOUS; SUBCUTANEOUS at 16:30

## 2024-02-20 RX ADMIN — FUROSEMIDE 20 MG: 20 TABLET ORAL at 05:10

## 2024-02-20 RX ADMIN — ACETAMINOPHEN 650 MG: 325 TABLET ORAL at 15:38

## 2024-02-20 ASSESSMENT — COGNITIVE AND FUNCTIONAL STATUS - GENERAL
MOBILITY SCORE: 24
DAILY ACTIVITIY SCORE: 24
DAILY ACTIVITIY SCORE: 24

## 2024-02-20 ASSESSMENT — PAIN SCALES - GENERAL
PAINLEVEL_OUTOF10: 0 - NO PAIN

## 2024-02-20 ASSESSMENT — PAIN - FUNCTIONAL ASSESSMENT
PAIN_FUNCTIONAL_ASSESSMENT: 0-10
PAIN_FUNCTIONAL_ASSESSMENT: 0-10

## 2024-02-20 ASSESSMENT — ACTIVITIES OF DAILY LIVING (ADL)
BATHING_ASSISTANCE: INDEPENDENT
ADL_ASSISTANCE: INDEPENDENT
ADL_ASSISTANCE: INDEPENDENT

## 2024-02-20 NOTE — PROGRESS NOTES
Occupational Therapy    Evaluation    Patient Name: Duane Scott  MRN: [id number]  Today's Date: 2/20/2024  Time Calculation  Start Time: 1201  Stop Time: 1213  Time Calculation (min): 12 min    Assessment  IP OT Assessment  OT Assessment: Patient is a 73 year old male admitted with UTI. Patient is presenting at baseline level for ADL/IADL participation. Patient does not demonstrate acute OT needs at this time.  Prognosis: Good  Evaluation/Treatment Tolerance: Patient tolerated treatment well  End of Session Communication: Bedside nurse  End of Session Patient Position: Up in chair, Alarm off, not on at start of session  Plan:  No Skilled OT: At baseline function  OT Frequency: OT eval only  OT Discharge Recommendations: No further acute OT  Equipment Recommended upon Discharge: Straight cane  OT Recommended Transfer Status: Assistive equipment (Comment) (Cane)  OT - OK to Discharge: Yes    Subjective   Current Problem:  1. Urinary tract infection associated with indwelling urethral catheter, initial encounter (CMS/HCC)        2. Acute on chronic congestive heart failure, unspecified heart failure type (CMS/HCC)        3. Systolic congestive heart failure, unspecified HF chronicity (CMS/HCC)  Transthoracic Echo (TTE) Complete    Transthoracic Echo (TTE) Complete      4. Systolic heart failure secondary to hypertension (CMS/HCC)  Transthoracic Echo (TTE) Complete    Transthoracic Echo (TTE) Complete      5. Precordial pain  Nuclear Stress Test    Nuclear Stress Test    Cardiology Interpretation Of Nuclear Stress - See Other Report For Nuclear Portion    Cardiology Interpretation Of Nuclear Stress - See Other Report For Nuclear Portion        General:  General  Reason for Referral: decline in ADLs  Referred By: Dr. Zheng  Past Medical History Relevant to Rehab: CHD  Prior to Session Communication: Bedside nurse  Patient Position Received: Up in chair, Alarm off, not on at start of session  Preferred Learning Style:  verbal  General Comment: Patient is a 73 year old male admitted with CHF, Asthma, Urine Retention, SOB, CP, HTN, and HLD. Patient is seated in the chair upon arrival and agreeable to participate  Pain:  Pain Assessment  Pain Score: 0 - No pain    Objective   Cognition:  Overall Cognitive Status: Within Functional Limits     Home Living:  Type of Home: House  Lives With: Spouse, Adult children  Home Adaptive Equipment: Cane, Long-handled shoehorn, Sock aid, Reacher  Home Layout: One level  Home Access: Stairs to enter without rails  Entrance Stairs-Rails: None  Entrance Stairs-Number of Steps: 1  Bathroom Shower/Tub: Tub/shower unit   Prior Function:  Level of [place]: Independent with ADLs and functional transfers, Independent with homemaking with ambulation  Receives Help From: Family  ADL Assistance: Independent (with AE)  Homemaking Assistance: Independent  Ambulatory Assistance: Independent  Prior Function Comments: denies fall hx  IADL History:  Homemaking Responsibilities: Yes  IADL Comments: patient completes IADL at baseline  ADL:  Eating Assistance: Independent  Grooming Assistance: Independent  Bathing Assistance: Independent  UE Dressing Assistance: Independent  LE Dressing Assistance: Independent  Toileting Assistance with Device: Independent  Activity Tolerance:  Endurance: Endurance does not limit participation in activity  Bed Mobility/Transfers: Bed Mobility  Bed Mobility: No (patient OOB and remains OOB)    Transfers  Transfer: Yes  Transfer 1  Transfer From 1: Chair with arms to  Transfer to 1: Stand  Technique 1: Sit to stand  Transfer Device 1: Cane  Transfer Level of Assistance 1: Modified independent  Trials/Comments 1: patient then completes functional mobility > household distances with Mod I    IADL's:   Homemaking Responsibilities: Yes  IADL Comments: patient completes IADL at baseline    Sensation:  Light Touch: No apparent deficits  Strength:  Strength Comments: B UE 4-/5  grossly  Extremities: RUE   RUE : Within Functional Limits and LUE   LUE: Within Functional Limits    Outcome Measures: [facility] Daily Activity  Putting on and taking off regular lower body clothing: None  Bathing (including washing, rinsing, drying): None  Putting on and taking off regular upper body clothing: None  Toileting, which includes using toilet, bedpan or urinal: None  Taking care of personal grooming such as brushing teeth: None  Eating Meals: None  Daily Activity - Total Score: 24      Education Documentation  Body Mechanics, taught by Luisa Armstrong OT at 2/20/2024  1:02 PM.  Learner: Patient  Readiness: Acceptance  Method: Explanation, Demonstration  Response: Verbalizes Understanding    Precautions, taught by Luisa Armstrong OT at 2/20/2024  1:02 PM.  Learner: Patient  Readiness: Acceptance  Method: Explanation, Demonstration  Response: Verbalizes Understanding    Education Comments  No comments found.      Goals:   No acute OT needs identified

## 2024-02-20 NOTE — PROGRESS NOTES
"Duane Scott is a 73 y.o. male on day 1 of admission presenting with Urinary tract infection associated with indwelling urethral catheter, initial encounter (CMS/[facility]).    Subjective   Came in yesterday coughing and wheezing with some evidence of gross fluid overload.  Has known systolic heart failure.  Given Lasix but not any better.  Still coughing and wheezing.  Had echocardiogram and stress test today.  No readings yet.       Objective   Alert oriented undistressed pulse oxing in the 90s on room air.  Diffuse expiratory wheeze  Heart regular rate and rhythm  Extremities 1+ pitting bilateral leg edema      Last Recorded Vitals  Blood pressure 118/59, pulse 74, temperature 36.5 °C (97.7 °F), temperature source Oral, resp. rate 16, height 1.676 m (5' 6\"), weight 92 kg (202 lb 13.2 oz), SpO2 96 %.  Intake/Output last 3 Shifts:  I/O last 3 completed shifts:  In: 350 (3.8 mL/kg) [IV Piggyback:350]  Out: 1800 (19.6 mL/kg) [Urine:1800 (0.5 mL/kg/hr)]  Weight: 92 kg           Assessment/Plan   #1-asthma.-I had hoped that diuresing him would help with the cough and wheezing.  It is not.  I am putting him on prednisone 60 mg a day  2-CHF..  Last echo showed EF of the percent.  Echo done today and pending.  Stress test done as well.  That is pending.  He is on low-dose oral Lasix.  He is back on his carvedilol.  I am resuming lisinopril which is one of his home meds.  3 coronary disease-stress test pending.  I discussed with Dr. Spencer uses his last stent was 3 years ago and he does not need to be on Brilinta.  #4 urinary retention and UTI-continue antibiotics pending urine culture.  Dr. Waddell recommends trial of void before discharge.  I think maybe tomorrow night would be a good time to discontinue the Hull.  Continue Flomax    Titus Zheng MD      "

## 2024-02-20 NOTE — CONSULTS
"Nutrition Assessement Note    Nutrition Assessment    Reason for Assessment: Dietitian discretion (CHF dx)    Reason for Hospital Admission:  Duane Scott is a 73 y.o. male who is admitted for CHF. Not in room at this time. Plan for echo and stress test.     Nutrition History:  Food and Nutrient History: ate 100% of breakfast today  Energy Intake: Good > 75 %    Anthropometrics:  Ht: 167.6 cm (5' 6\"), Wt: 92 kg (202 lb 13.2 oz), BMI: 32.75  IBW/kg (Dietitian Calculated): 64.55 kg  Percent of IBW: 142 %  Adjusted Body Weight (kg): 71.36 kg    Weight Change:  Daily Weight  02/19/24 : 92 kg (202 lb 13.2 oz)  02/15/24 : 95.3 kg (210 lb)  02/12/24 : 94.3 kg (207 lb 12.8 oz)  01/30/24 : 94.8 kg (209 lb)  01/24/24 : 95.3 kg (210 lb)  12/07/23 : 95.3 kg (210 lb)  11/16/23 : 95.3 kg (210 lb)  11/06/23 : 95.6 kg (210 lb 12.2 oz)  08/17/23 : 90.7 kg (200 lb)  08/02/23 : 92.1 kg (203 lb)     Weight History / % Weight Change: wt ranges 200-210# over the past 6 months    Nutrition Focused Physical Exam Findings: deferred - Pt not available at this time.     Nutrition Significant Labs:  Lab Results   Component Value Date    WBC 8.9 02/19/2024    HGB 13.7 02/19/2024    HCT 39.2 (L) 02/19/2024     (L) 02/19/2024    CHOL 181 05/18/2023    TRIG 55 05/18/2023    HDL 57 05/18/2023    ALT 15 02/19/2024    AST 20 02/19/2024     02/20/2024    K 3.9 02/20/2024    CL 99 02/20/2024    CREATININE 1.60 02/20/2024    BUN 36 (H) 02/20/2024    CO2 23 (L) 02/20/2024    TSH 2.36 11/05/2019    PSA 1.5 11/05/2019    INR 1.1 09/17/2020    HGBA1C 5.2 11/05/2019       Current Facility-Administered Medications:     ALPRAZolam (Xanax) tablet 0.25 mg, 0.25 mg, oral, Daily PRN, Titus Zheng MD    aspirin EC tablet 81 mg, 81 mg, oral, Daily, Titus Zheng MD, 81 mg at 02/20/24 1003    atorvastatin (Lipitor) tablet 80 mg, 80 mg, oral, Daily, Titus Zheng MD, 80 mg at 02/20/24 1003    budesonide (Pulmicort) 0.5 mg/2 mL nebulizer solution 0.5 " mg, 0.5 mg, nebulization, BID, Titus Zheng MD    carvedilol (Coreg) tablet 12.5 mg, 12.5 mg, oral, BID with meals, Titus Zheng MD, 12.5 mg at 02/20/24 1003    cefTRIAXone (Rocephin) IVPB 1 g, 1 g, intravenous, q24h, Titus Zheng MD    furosemide (Lasix) tablet 20 mg, 20 mg, oral, BID, Titus Zheng MD, 20 mg at 02/20/24 0510    ipratropium-albuteroL (Duo-Neb) 0.5-2.5 mg/3 mL nebulizer solution 3 mL, 3 mL, nebulization, q2h PRN, Titus Zheng MD, 3 mL at 02/19/24 2122    lisinopril tablet 10 mg, 10 mg, oral, Daily, Titus Zheng MD, 10 mg at 02/20/24 1003    tamsulosin (Flomax) 24 hr capsule 0.4 mg, 0.4 mg, oral, Daily, Titus Zheng MD, 0.4 mg at 02/20/24 1003    Dietary Orders (From admission, onward)       Start     Ordered    02/19/24 1504  Adult diet Regular  Diet effective now        Question:  Diet type  Answer:  Regular    02/19/24 1507                  Estimated Needs:   Estimated Energy Needs  Total Energy Estimated Needs (kCal): 1784 kCal  Total Estimated Energy Need per Day (kCal/kg): 25 kCal/kg  Method for Estimating Needs: ABW    Estimated Protein Needs  Total Protein Estimated Needs (g): 86 g  Total Protein Estimated Needs (g/kg): 1.2 g/kg  Method for Estimating Needs: ABW    Estimated Fluid Needs  Total Fluid Estimated Needs (mL):  (<2000)        Nutrition Diagnosis   Nutrition Diagnosis:  Nutrition Diagnosis  Patient has Nutrition Diagnosis: Yes  Diagnosis Status (1): New  Nutrition Diagnosis 1: Increased nutrient needs  Related to (1): increased demand for nutrients  As Evidenced by (1): conditions associated with diagnosis       Nutrition Interventions/Recommendations   Nutrition Interventions and Recommendations:    Nutrition Prescription:  Individualized Nutrition Prescription Provided for : 1784 kcals and 86g protein to be provided via diet    Nutrition Interventions:   Food and/or Nutrient Delivery Interventions  Interventions: Meals and snacks  Meals and Snacks: Mineral-modified  diet  Goal: provide low Na+ diet at this time    Education Documentation  No documentation found.           Nutrition Monitoring and Evaluation   Monitoring/Evaluation:   Food/Nutrient Related History Monitoring  Monitoring and Evaluation Plan: Energy intake  Energy Intake: Estimated energy intake  Criteria: pt to consume >/= 75% estimated needs       Time Spent/Follow-up:   Follow Up  Time Spent (min): 15 minutes  Last Date of Nutrition Visit: 02/20/24  Nutrition Follow-Up Needed?: 3-5 days  Follow up Comment: 2/23/24

## 2024-02-20 NOTE — CARE PLAN
The patient's goals for the shift include  safety    The clinical goals for the shift include safety

## 2024-02-20 NOTE — CONSULTS
Reason For Consult  Urinary retention    History Of Present Illness  Duane Scott is a 73 y.o. male presenting with shortness of breath.  He had a catheter placed 5 days ago for hematuria.  He reports a history of a prostate procedure approximately a year ago and had matt voiding well.   His creatinine appears at baseline at 1.5.  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       Past Medical History  He has a past medical history of Acute renal failure syndrome (CMS/[facility]) (11/14/2023), Angina pectoris (CMS/[facility]) (11/17/2023), Arthritis, Asthma, CAD (coronary artery disease), Cardiac arrest (CMS/[facility]) (11/17/2023), Chronic obstructive pulmonary disease requiring drug therapy (CMS/[facility]) (11/17/2023), Coronary artery disease involving native coronary artery of native heart without angina pectoris (11/14/2023), Diabetes mellitus (CMS/HCC), Diabetes mellitus, type 2 (CMS/HCC) (11/08/2018), Diastolic heart failure (CMS/HCC) (11/17/2023), Disorder involving thrombocytopenia (CMS/[facility]) (11/12/2019), Essential hypertension (11/08/2018), Gout, High blood pressure, High cholesterol, Kidney disease, Migraine headache, Mixed hyperlipidemia (11/08/2018), Palpitations (11/17/2023), Status post coronary artery stent placement (11/14/2023), and Status post myocardial infarction (09/24/2019).    Surgical History  He has a past surgical history that includes Back surgery (2012); Hernia repair (2015); and Hernia repair (2013).     Social History  He reports that he has never smoked. He has never been exposed to tobacco smoke. He has never used smokeless tobacco. He reports that he does not currently use alcohol. He reports that he does not use drugs.    Family History  Family History   Problem Relation Name Age of Onset    Heart disease Mother      Stroke Son      Autism Son          Allergies  Hydromorphone, Latex, Penicillins, Dyphylline-guaifenesin, Grass pollen, Mold, and Ragweed    Review of Systems  Recent  "hematuria     Physical Exam  Alert, oriented  Normal resp effort  Abdomen soft, nontender  Hull catheter draining clear.     Last Recorded Vitals  Blood pressure 129/66, pulse 70, temperature 37 °C (98.6 °F), temperature source Oral, resp. rate 20, height 1.676 m (5' 6\"), weight 92 kg (202 lb 13.2 oz), SpO2 92 %.    Relevant Results      Labs and CT scan      Assessment/Plan     73 year old had recent urinary retention and hematuria.  He has an indwelling Hull catheter.  He has CKD an polycystic kidneys.    The large bladder diverticulum may contribute to his retention.  Voiding trial recommended prior to discharge.  Check post void residual.  Follow up for cystoscopy after discharge with his urologist.      Cullen Waddell MD    "

## 2024-02-20 NOTE — PROGRESS NOTES
"Subjective Data:  Patient still complain of shortness of breath.  Denies any chest pain.  No palpitations.    Overnight Events:    Telemetry reviewed no events     Objective Data:  Last Recorded Vitals:  Vitals:    02/19/24 1514 02/19/24 2016 02/19/24 2300 02/20/24 0300   BP:  129/66 129/68 136/70   BP Location:  Left arm Left arm Left arm   Patient Position:  Sitting Lying Lying   Pulse:  70 59 77   Resp:  20 19 18   Temp:  37 °C (98.6 °F) 36.4 °C (97.5 °F) 36 °C (96.8 °F)   TempSrc:  Oral Temporal Temporal   SpO2:  92% 94% 95%   Weight: 92 kg (202 lb 13.2 oz)      Height: 1.676 m (5' 6\")          Last Labs:  CBC - 2/19/2024: 10:54 AM  8.9 13.7 101    39.2      CMP - 2/20/2024:  4:29 AM  9.2 7.5 20 --- 2.0   _ 4.1 15 123      PTT - No results in last year.  _   _ _     HGBA1C   Date/Time Value Ref Range Status   11/05/2019 10:36 AM 5.2 4.0 - 6.0 % Final     Comment:     Hemoglobin A1C levels are related to mean blood glucose during the   preceding 2-3 months. The relationship table below may be used as a   general guide. Each 1% increase in HGB A1C is a reflection of an   increase in mean glucose of approximately 30 mg/dl.   Reference: Diabetes Care, volume 29, supplement 1 Jan. 2006                        HGB A1C ................. Approx. Mean Glucose   _______________________________________________   6%   ...............................  120 mg/dl   7%   ...............................  150 mg/dl   8%   ...............................  180 mg/dl   9%   ...............................  210 mg/dl   10%  ...............................  240 mg/dl  Performed at [address] [address]     08/01/2019 02:30 PM 5.4 4.0 - 6.0 % Final     Comment:     Hemoglobin A1C levels are related to mean blood glucose during the   preceding 2-3 months. The relationship table below may be used as a   general guide. Each 1% increase in HGB A1C is a reflection of an   increase in mean glucose of approximately 30 " "mg/dl.   Reference: Diabetes Care, volume 29, supplement 1 Jan. 2006                        HGB A1C ................. Approx. Mean Glucose   _______________________________________________   6%   ...............................  120 mg/dl   7%   ...............................  150 mg/dl   8%   ...............................  180 mg/dl   9%   ...............................  210 mg/dl   10%  ...............................  240 mg/dl  Performed at [address] [address]       LDLCALC   Date/Time Value Ref Range Status   05/18/2023 03:45  65 - 130 MG/DL Final   05/03/2022 01:34 PM 96 65 - 130 MG/DL Final   11/05/2019 10:36 AM 41 65 - 130 MG/DL Final      Last I/O:  I/O last 3 completed shifts:  In: 350 (3.8 mL/kg) [IV Piggyback:350]  Out: 1800 (19.6 mL/kg) [Urine:1800 (0.5 mL/kg/hr)]  Weight: 92 kg     Past Cardiology Tests (Last 3 Years):  EKG:  ECG 12 lead 02/19/2024 (Preliminary)      ECG 12 lead 11/19/2023    Echo:  No results found for this or any previous visit from the past 1095 days.    Ejection Fractions:  No results found for: \"EF\"  Cath:  No results found for this or any previous visit from the past 1095 days.    Stress Test:  No results found for this or any previous visit from the past 1095 days.    Cardiac Imaging:  No results found for this or any previous visit from the past 1095 days.      Inpatient Medications:  Scheduled medications   Medication Dose Route Frequency    aspirin  81 mg oral Daily    atorvastatin  80 mg oral Daily    budesonide  0.5 mg nebulization BID    carvedilol  12.5 mg oral BID with meals    cefTRIAXone  1 g intravenous q24h    furosemide  20 mg oral BID    lisinopril  10 mg oral Daily    regadenoson  0.4 mg intravenous Once in imaging    tamsulosin  0.4 mg oral Daily     PRN medications   Medication    ALPRAZolam    ipratropium-albuteroL     Continuous Medications   Medication Dose Last Rate       Physical Exam:  General: Patient is in no acute " distress.  HEENT: atraumatic normocephalic.  Neck: is supple jugular venous pressure within normal limits no thyromegaly.  Cardiovascular regular rate and rhythm normal heart sounds no murmurs rubs or gallops.  Lungs: Diffuse inspiratory and expiratory rhonchi  Abdomen: is soft nontender.  Extremities warm to touch no edema.  Neurologic examination: patient is awake alert oriented to person, place, date/time.  Psychiatric examination: patient has good insight denies feeling suicidal and depressed.  Pulses 2+ intact bilaterally        Assessment/Plan   Shortness of breath.  Patient has been having chronic shortness of breath.  On physical examination he has expiratory and expiratory rhonchi.  Most likely this is a COPD exacerbation and addition of acute decompensated heart failure.  Await 2D echo to assess ejection fraction.  He did have a history of reduced ejection fraction.  Will optimize his treatment for heart failure.  Await 2D echo for final recommendation.  2.  Chest pain.  Patient has been complaining of chest pain and shortness of breath with exertion.  He did not have any ischemic workup in 4 years.  Plan is for Lexiscan nuclear stress test to assess for ischemia.  EKG at baseline.  Troponin within normal limits slightly elevated but flat.      3.  Hypertension.  Will restart home medication reassess     4.  Hyperlipidemia.  Continue high intensity statin     Thank you for allowing to participate in his care  Peripheral IV 02/19/24 20 G Distal;Left;Upper Arm (Active)   Site Assessment Clean;Dry;Intact 02/19/24 2045   Dressing Status Clean;Dry 02/19/24 2045   Number of days: 1       Urethral Catheter 16 Fr. (Active)   Site Assessment Clean;Skin intact 02/20/24 0332   Number of days: 1       Code Status:  No Order      Jessika Spencer MD

## 2024-02-20 NOTE — PROGRESS NOTES
Physical Therapy    Physical Therapy Evaluation    Patient Name: Duane Scott  MRN: [id number]  Today's Date: 2/20/2024   Time Calculation  Start Time: 1040  Stop Time: 1100  Time Calculation (min): 20 min    Assessment/Plan   PT Assessment  Evaluation/Treatment Tolerance: Patient tolerated treatment well  Medical Staff Made Aware: Yes  End of Session Communication: Bedside nurse  End of Session Patient Position: Up in chair, Alarm off, not on at start of session  IP OR SWING BED PT PLAN  Inpatient or Swing Bed: Inpatient  PT Plan  PT Plan: PT Eval only  PT Eval Only Reason: At baseline function  PT Discharge Recommendations: No further acute PT  PT Recommended Transfer Status: Independent  PT - OK to Discharge: Yes      Subjective   General Visit Information:  General  Reason for Referral: Impaired Mobility  Referred By: Dr. Zheng  Past Medical History Relevant to Rehab: CHD  Family/Caregiver Present: No  Prior to Session Communication: Bedside nurse  Patient Position Received:  (In bathroom)  Preferred Learning Style: verbal  General Comment: 73 year old male admits with CHF, Asthma, Urine Retention, SOB, CP, HTN, and HLD  Home Living:  Home Living  Type of Home: House  Lives With: Spouse, Adult children  Home Adaptive Equipment: Cane  Home Layout: One level  Home Access: Stairs to enter without rails  Entrance Stairs-Rails: None  Entrance Stairs-Number of Steps: 1  Bathroom Shower/Tub: Tub/shower unit  Prior Level of Function:  Prior Function Per Pt/Caregiver Report  Level of Evang[place]: Independent with ADLs and functional transfers, Independent with homemaking with ambulation  Receives Help From: Family  ADL Assistance: Independent  Homemaking Assistance: Independent  Ambulatory Assistance: Independent  Prior Function Comments: denies fall hx    Vital Signs:   RA--SPO2 WNLs throughout session    Objective   Pain:  Pain Assessment  Pain Assessment: 0-10  Pain Score: 0 - No pain  Cognition:  Cognition  Overall  Cognitive Status: Within Functional Limits    General Assessments:  Activity Tolerance  Endurance: Endurance does not limit participation in activity    Sensation  Light Touch: No apparent deficits    Strength  Strength Comments: 5/5 BLEs on MMT  Functional Assessments:  Transfers  Transfer: Yes  Transfer 1  Transfer From 1: Bed to  Transfer to 1: Stand  Technique 1: Sit to stand  Transfer Device 1: Cane  Transfer Level of Assistance 1: Modified independent  Transfers 2  Transfer From 2: Bed to  Transfer to 2: Chair with arms  Technique 2: Stand pivot  Transfer Device 2: Cane  Transfer Level of Assistance 2: Modified independent    Ambulation/Gait Training  Ambulation/Gait Training Performed: Yes  Ambulation/Gait Training 1  Surface 1: Level tile  Device 1: Single point cane  Assistance 1: Modified independent  Quality of Gait 1:  (denies fatigue, no unsteadiness observed)  Comments/Distance (ft) 1: 200    Outcome Measures:  [facility] Basic Mobility  Turning from your back to your side while in a flat bed without using bedrails: None  Moving from lying on your back to sitting on the side of a flat bed without using bedrails: None  Moving to and from bed to chair (including a wheelchair): None  Standing up from a chair using your arms (e.g. wheelchair or bedside chair): None  To walk in hospital room: None  Climbing 3-5 steps with railing: None  Basic Mobility - Total Score: 24        Education Documentation  No documentation found.  Education Comments  No comments found.

## 2024-02-20 NOTE — PROGRESS NOTES
"Pt tolerating watson, denies pain  Blood pressure 117/63, pulse 67, temperature 36.8 °C (98.2 °F), temperature source Oral, resp. rate 18, height 1.676 m (5' 6\"), weight 92 kg (202 lb 13.2 oz), SpO2 98 %.   Results for orders placed or performed during the hospital encounter of 02/19/24 (from the past 24 hour(s))   POCT GLUCOSE   Result Value Ref Range    POCT Glucose 408 (H) 74 - 99 mg/dL   Basic Metabolic Panel   Result Value Ref Range    Glucose 339 (H) 65 - 99 mg/dL    Sodium 135 133 - 145 mmol/L    Potassium 3.9 3.4 - 5.1 mmol/L    Chloride 99 97 - 107 mmol/L    Bicarbonate 23 (L) 24 - 31 mmol/L    Urea Nitrogen 36 (H) 8 - 25 mg/dL    Creatinine 1.60 0.40 - 1.60 mg/dL    eGFR 45 (L) >60 mL/min/1.73m*2    Calcium 9.2 8.5 - 10.4 mg/dL    Anion Gap 13 <=19 mmol/L   Transthoracic Echo (TTE) Complete   Result Value Ref Range    BSA 2.07 m2   POCT GLUCOSE   Result Value Ref Range    POCT Glucose 156 (H) 74 - 99 mg/dL        PE:  up in chair, nad  No sob nor wheeze, urine is clear in tubing    A/P:  recommend trial of void tomorrow  "

## 2024-02-20 NOTE — CONSULTS
"Inpatient consult to Cardiology  Consult performed by: Jessika Spencer MD  Consult ordered by: Titus Zheng MD  Reason for consult: Heart failure, elevated troponin        History Of Present Illness:    Duane Scott is a 73 y.o. male presenting with shortness of breath and chest tightness.  The patient is well-known to my service.  Had coronary intervention of the diagonal 1 as well as left circumflex in the setting of STEMI in 2019.  At that time his ejection fraction was reduced.  Ejection fraction recovered several months after.  Patient was lost for follow-up for several years.  Return to my office in January 24, 2023.  Patient was complaining of shortness of breath.  I optimize his treatment for hypertension and heart failure.  Patient presented to the hospital with shortness of breath mild productive cough.  Patient is variably compliant with his medications.  EKG showed normal sinus rhythm with interventricular conduction delay.  Troponin mildly elevated but flat.    Review of systems.  10 point review of systems otherwise negative    Last Recorded Vitals:  Vitals:    02/19/24 1514 02/19/24 2016 02/19/24 2300 02/20/24 0300   BP:  129/66 129/68 136/70   BP Location:  Left arm Left arm Left arm   Patient Position:  Sitting Lying Lying   Pulse:  70 59 77   Resp:  20 19 18   Temp:  37 °C (98.6 °F) 36.4 °C (97.5 °F) 36 °C (96.8 °F)   TempSrc:  Oral Temporal Temporal   SpO2:  92% 94% 95%   Weight: 92 kg (202 lb 13.2 oz)      Height: 1.676 m (5' 6\")          Last Labs:  CBC - 2/19/2024: 10:54 AM  8.9 13.7 101    39.2      CMP - 2/20/2024:  4:29 AM  9.2 7.5 20 --- 2.0   _ 4.1 15 123      PTT - No results in last year.  _   _ _     Hemoglobin A1C   Date/Time Value Ref Range Status   11/05/2019 10:36 AM 5.2 4.0 - 6.0 % Final     Comment:     Hemoglobin A1C levels are related to mean blood glucose during the   preceding 2-3 months. The relationship table below may be used as a   general guide. Each 1% increase in HGB " A1C is a reflection of an   increase in mean glucose of approximately 30 mg/dl.   Reference: Diabetes Care, volume 29, supplement 1 Jan. 2006                        HGB A1C ................. Approx. Mean Glucose   _______________________________________________   6%   ...............................  120 mg/dl   7%   ...............................  150 mg/dl   8%   ...............................  180 mg/dl   9%   ...............................  210 mg/dl   10%  ...............................  240 mg/dl  Performed at [address] [address]     08/01/2019 02:30 PM 5.4 4.0 - 6.0 % Final     Comment:     Hemoglobin A1C levels are related to mean blood glucose during the   preceding 2-3 months. The relationship table below may be used as a   general guide. Each 1% increase in HGB A1C is a reflection of an   increase in mean glucose of approximately 30 mg/dl.   Reference: Diabetes Care, volume 29, supplement 1 Jan. 2006                        HGB A1C ................. Approx. Mean Glucose   _______________________________________________   6%   ...............................  120 mg/dl   7%   ...............................  150 mg/dl   8%   ...............................  180 mg/dl   9%   ...............................  210 mg/dl   10%  ...............................  240 mg/dl  Performed at [address] [address]       LDL Calculated   Date/Time Value Ref Range Status   05/18/2023 03:45  65 - 130 MG/DL Final   05/03/2022 01:34 PM 96 65 - 130 MG/DL Final   11/05/2019 10:36 AM 41 (L) 65 - 130 MG/DL Final      Last I/O:  I/O last 3 completed shifts:  In: 350 (3.8 mL/kg) [IV Piggyback:350]  Out: 1800 (19.6 mL/kg) [Urine:1800 (0.5 mL/kg/hr)]  Weight: 92 kg     Past Cardiology Tests (Last 3 Years):  EKG:  ECG 12 lead 02/19/2024 (Preliminary)      ECG 12 lead 11/19/2023    Echo:  No results found for this or any previous visit from the past 1095 days.    Ejection  "Fractions:  No results found for: \"EF\"  Cath:  No results found for this or any previous visit from the past 1095 days.    Stress Test:  No results found for this or any previous visit from the past 1095 days.    Cardiac Imaging:  No results found for this or any previous visit from the past 1095 days.      Past Medical History:  He has a past medical history of Acute renal failure syndrome (CMS/HCC) (11/14/2023), Angina pectoris (CMS/HCC) (11/17/2023), Arthritis, Asthma, CAD (coronary artery disease), Cardiac arrest (CMS/HCC) (11/17/2023), Chronic obstructive pulmonary disease requiring drug therapy ([facility]) (11/17/2023), Coronary artery disease involving native coronary artery of native heart without angina pectoris (11/14/2023), Diabetes mellitus (CMS/HCC), Diabetes mellitus, type 2 (CMS/HCC) (11/08/2018), Diastolic heart failure (CMS/HCC) (11/17/2023), Disorder involving thrombocytopenia (CMS/HCC) (11/12/2019), Essential hypertension (11/08/2018), Gout, High blood pressure, High cholesterol, Kidney disease, Migraine headache, Mixed hyperlipidemia (11/08/2018), Palpitations (11/17/2023), Status post coronary artery stent placement (11/14/2023), and Status post myocardial infarction (09/24/2019).    Past Surgical History:  He has a past surgical history that includes Back surgery (2012); Hernia repair (2015); and Hernia repair (2013).      Social History:  He reports that he has never smoked. He has never been exposed to tobacco smoke. He has never used smokeless tobacco. He reports that he does not currently use alcohol. He reports that he does not use drugs.    Family History:  Family History   Problem Relation Name Age of Onset    Heart disease Mother      Stroke Son      Autism Son          Allergies:  Hydromorphone, Latex, Penicillins, Dyphylline-guaifenesin, Grass pollen, Mold, and Ragweed    Inpatient Medications:  Scheduled medications   Medication Dose Route Frequency    aspirin  81 mg oral Daily    " atorvastatin  80 mg oral Daily    budesonide  0.5 mg nebulization BID    carvedilol  12.5 mg oral BID with meals    cefTRIAXone  1 g intravenous q24h    furosemide  20 mg oral BID    lisinopril  10 mg oral Daily    tamsulosin  0.4 mg oral Daily     PRN medications   Medication    ALPRAZolam    ipratropium-albuteroL     Continuous Medications   Medication Dose Last Rate     Outpatient Medications:  Current Outpatient Medications   Medication Instructions    albuterol (Ventolin HFA) 90 mcg/actuation inhaler 1 puff, inhalation, Every 4 hours PRN    ALPRAZolam (XANAX) 0.25 mg, oral, Daily PRN    aspirin 81 mg, oral, Daily    atorvastatin (LIPITOR) 80 mg, oral, Daily    carvedilol (COREG) 12.5 mg, oral, 2 times daily with meals    furosemide (LASIX) 20 mg, oral, 2 times daily    lisinopril 10 mg, oral, Daily    tamsulosin (FLOMAX) 0.4 mg, oral, Daily    ticagrelor (BRILINTA) 90 mg, oral, 2 times daily       Physical Exam:  General: Patient is in no acute distress.  HEENT: atraumatic normocephalic.  Neck: is supple jugular venous pressure within normal limits no thyromegaly.  Cardiovascular regular rate and rhythm normal heart sounds no murmurs rubs or gallops.  Lungs: Diffuse inspiratory and expiratory rhonchi  Abdomen: is soft nontender.  Extremities warm to touch no edema.  Neurologic examination: patient is awake alert oriented to person, place, date/time.  Psychiatric examination: patient has good insight denies feeling suicidal and depressed.  Pulses 2+ intact bilaterally     Assessment/Plan   Shortness of breath.  Patient has been having chronic shortness of breath.  On physical examination he has expiratory and expiratory rhonchi.  Most likely this is a COPD exacerbation and addition of acute decompensated heart failure.  Await 2D echo to assess ejection fraction.  He did have a history of reduced ejection fraction.  Will optimize his treatment for heart failure.  Await 2D echo for final recommendation.  2.  Chest  pain.  Patient has been complaining of chest pain and shortness of breath with exertion.  He did not have any ischemic workup in 4 years.  Plan is for Lexiscan nuclear stress test to assess for ischemia.  EKG at baseline.  Troponin within normal limits slightly elevated but flat.     3.  Hypertension.  Will restart home medication reassess    4.  Hyperlipidemia.  Continue high intensity statin    Thank you for allowing to participate in his care  Peripheral IV 02/19/24 20 G Distal;Left;Upper Arm (Active)   Site Assessment Clean;Dry;Intact 02/19/24 2045   Dressing Status Clean;Dry 02/19/24 2045   Number of days: 1       Urethral Catheter 16 Fr. (Active)   Site Assessment Clean;Skin intact 02/20/24 0332   Number of days: 1       Code Status:  No Order    Jessika Spencer MD

## 2024-02-21 VITALS
OXYGEN SATURATION: 99 % | WEIGHT: 202.82 LBS | SYSTOLIC BLOOD PRESSURE: 157 MMHG | HEART RATE: 55 BPM | HEIGHT: 66 IN | DIASTOLIC BLOOD PRESSURE: 69 MMHG | TEMPERATURE: 98.2 F | RESPIRATION RATE: 20 BRPM | BODY MASS INDEX: 32.6 KG/M2

## 2024-02-21 LAB
ANION GAP SERPL CALC-SCNC: 12 MMOL/L
BUN SERPL-MCNC: 50 MG/DL (ref 8–25)
CALCIUM SERPL-MCNC: 9.2 MG/DL (ref 8.5–10.4)
CHLORIDE SERPL-SCNC: 102 MMOL/L (ref 97–107)
CO2 SERPL-SCNC: 24 MMOL/L (ref 24–31)
CREAT SERPL-MCNC: 1.7 MG/DL (ref 0.4–1.6)
EGFRCR SERPLBLD CKD-EPI 2021: 42 ML/MIN/1.73M*2
GLUCOSE BLD MANUAL STRIP-MCNC: 138 MG/DL (ref 74–99)
GLUCOSE BLD MANUAL STRIP-MCNC: 179 MG/DL (ref 74–99)
GLUCOSE SERPL-MCNC: 234 MG/DL (ref 65–99)
POTASSIUM SERPL-SCNC: 3.9 MMOL/L (ref 3.4–5.1)
SODIUM SERPL-SCNC: 138 MMOL/L (ref 133–145)

## 2024-02-21 PROCEDURE — 2500000001 HC RX 250 WO HCPCS SELF ADMINISTERED DRUGS (ALT 637 FOR MEDICARE OP): Performed by: INTERNAL MEDICINE

## 2024-02-21 PROCEDURE — 99233 SBSQ HOSP IP/OBS HIGH 50: CPT | Performed by: INTERNAL MEDICINE

## 2024-02-21 PROCEDURE — 2500000004 HC RX 250 GENERAL PHARMACY W/ HCPCS (ALT 636 FOR OP/ED): Performed by: INTERNAL MEDICINE

## 2024-02-21 PROCEDURE — 9420000001 HC RT PATIENT EDUCATION 5 MIN

## 2024-02-21 PROCEDURE — 36415 COLL VENOUS BLD VENIPUNCTURE: CPT | Performed by: INTERNAL MEDICINE

## 2024-02-21 PROCEDURE — 94760 N-INVAS EAR/PLS OXIMETRY 1: CPT

## 2024-02-21 PROCEDURE — 80048 BASIC METABOLIC PNL TOTAL CA: CPT | Performed by: INTERNAL MEDICINE

## 2024-02-21 PROCEDURE — 2500000002 HC RX 250 W HCPCS SELF ADMINISTERED DRUGS (ALT 637 FOR MEDICARE OP, ALT 636 FOR OP/ED): Performed by: INTERNAL MEDICINE

## 2024-02-21 PROCEDURE — 94640 AIRWAY INHALATION TREATMENT: CPT

## 2024-02-21 PROCEDURE — 82947 ASSAY GLUCOSE BLOOD QUANT: CPT

## 2024-02-21 RX ORDER — HYDROCHLOROTHIAZIDE 25 MG/1
25 TABLET ORAL DAILY
Status: DISCONTINUED | OUTPATIENT
Start: 2024-02-21 | End: 2024-02-21 | Stop reason: HOSPADM

## 2024-02-21 RX ORDER — HYDROCHLOROTHIAZIDE 25 MG/1
25 TABLET ORAL DAILY
Qty: 30 TABLET | Refills: 0 | Status: SHIPPED | OUTPATIENT
Start: 2024-02-21 | End: 2024-05-16

## 2024-02-21 RX ORDER — PREDNISONE 5 MG/1
TABLET ORAL
Qty: 78 TABLET | Refills: 0 | Status: SHIPPED | OUTPATIENT
Start: 2024-02-21 | End: 2024-03-13 | Stop reason: ALTCHOICE

## 2024-02-21 RX ADMIN — TAMSULOSIN HYDROCHLORIDE 0.4 MG: 0.4 CAPSULE ORAL at 09:08

## 2024-02-21 RX ADMIN — CARVEDILOL 12.5 MG: 12.5 TABLET, FILM COATED ORAL at 09:08

## 2024-02-21 RX ADMIN — PREDNISONE 60 MG: 20 TABLET ORAL at 09:08

## 2024-02-21 RX ADMIN — HYDROCHLOROTHIAZIDE 25 MG: 25 TABLET ORAL at 12:24

## 2024-02-21 RX ADMIN — BUDESONIDE INHALATION 0.5 MG: 0.5 SUSPENSION RESPIRATORY (INHALATION) at 07:50

## 2024-02-21 RX ADMIN — INSULIN LISPRO 1 UNITS: 100 INJECTION, SOLUTION INTRAVENOUS; SUBCUTANEOUS at 09:10

## 2024-02-21 RX ADMIN — ASPIRIN 81 MG: 81 TABLET, COATED ORAL at 09:08

## 2024-02-21 RX ADMIN — LISINOPRIL 10 MG: 10 TABLET ORAL at 09:08

## 2024-02-21 RX ADMIN — ATORVASTATIN CALCIUM 80 MG: 80 TABLET, FILM COATED ORAL at 09:08

## 2024-02-21 ASSESSMENT — COGNITIVE AND FUNCTIONAL STATUS - GENERAL
DAILY ACTIVITIY SCORE: 24
MOBILITY SCORE: 23
CLIMB 3 TO 5 STEPS WITH RAILING: A LITTLE

## 2024-02-21 ASSESSMENT — ACTIVITIES OF DAILY LIVING (ADL): LACK_OF_TRANSPORTATION: NO

## 2024-02-21 NOTE — PROGRESS NOTES
Subjective Data:  Patient reports improvement in his chest tightness and shortness of breath.  Denies having any dizziness or lightheadedness.    Overnight Events:    Telemetry reviewed overnight no events     Objective Data:  Last Recorded Vitals:  Vitals:    02/21/24 0018 02/21/24 0347 02/21/24 0652 02/21/24 0750   BP: 140/79 149/69 157/69    BP Location: Left arm Left arm Left arm    Patient Position: Lying Lying Lying    Pulse: 52 53 55    Resp: 19 18 20    Temp: 36.4 °C (97.5 °F) 36.7 °C (98.1 °F) 36.8 °C (98.2 °F)    TempSrc: Oral Oral Oral    SpO2: 96% 98% 97% 99%   Weight:       Height:           Last Labs:  CBC - 2/19/2024: 10:54 AM  8.9 13.7 101    39.2      CMP - 2/21/2024:  4:24 AM  9.2 7.5 20 --- 2.0   _ 4.1 15 123      PTT - No results in last year.  _   _ _     HGBA1C   Date/Time Value Ref Range Status   11/05/2019 10:36 AM 5.2 4.0 - 6.0 % Final     Comment:     Hemoglobin A1C levels are related to mean blood glucose during the   preceding 2-3 months. The relationship table below may be used as a   general guide. Each 1% increase in HGB A1C is a reflection of an   increase in mean glucose of approximately 30 mg/dl.   Reference: Diabetes Care, volume 29, supplement 1 Jan. 2006                        HGB A1C ................. Approx. Mean Glucose   _______________________________________________   6%   ...............................  120 mg/dl   7%   ...............................  150 mg/dl   8%   ...............................  180 mg/dl   9%   ...............................  210 mg/dl   10%  ...............................  240 mg/dl  Performed at [address] [address]     08/01/2019 02:30 PM 5.4 4.0 - 6.0 % Final     Comment:     Hemoglobin A1C levels are related to mean blood glucose during the   preceding 2-3 months. The relationship table below may be used as a   general guide. Each 1% increase in HGB A1C is a reflection of an   increase in mean glucose of  "approximately 30 mg/dl.   Reference: Diabetes Care, volume 29, supplement 1 Jan. 2006                        HGB A1C ................. Approx. Mean Glucose   _______________________________________________   6%   ...............................  120 mg/dl   7%   ...............................  150 mg/dl   8%   ...............................  180 mg/dl   9%   ...............................  210 mg/dl   10%  ...............................  240 mg/dl  Performed at [address] [address]       LDLCALC   Date/Time Value Ref Range Status   05/18/2023 03:45  65 - 130 MG/DL Final   05/03/2022 01:34 PM 96 65 - 130 MG/DL Final   11/05/2019 10:36 AM 41 65 - 130 MG/DL Final      Last I/O:  I/O last 3 completed shifts:  In: 300 (3.3 mL/kg) [P.O.:250; IV Piggyback:50]  Out: 3050 (33.2 mL/kg) [Urine:3050 (0.9 mL/kg/hr)]  Weight: 92 kg     Past Cardiology Tests (Last 3 Years):  EKG:  ECG 12 lead 02/19/2024 (Preliminary)      ECG 12 lead 11/19/2023    Echo:  Transthoracic Echo (TTE) Complete 02/20/2024    Ejection Fractions:  No results found for: \"EF\"  Cath:  No results found for this or any previous visit from the past 1095 days.    Stress Test:  Nuclear Stress Test 02/20/2024    Cardiac Imaging:  No results found for this or any previous visit from the past 1095 days.      Inpatient Medications:  Scheduled medications   Medication Dose Route Frequency    aspirin  81 mg oral Daily    atorvastatin  80 mg oral Daily    budesonide  0.5 mg nebulization BID    carvedilol  12.5 mg oral BID with meals    cefTRIAXone  1 g intravenous q24h    furosemide  20 mg oral BID    insulin lispro  0-5 Units subcutaneous TID with meals    lisinopril  10 mg oral Daily    predniSONE  60 mg oral Daily    tamsulosin  0.4 mg oral Daily     PRN medications   Medication    acetaminophen    ALPRAZolam    dextrose 10 % in water (D10W)    dextrose    glucagon    ipratropium-albuteroL     Continuous Medications   Medication " Dose Last Rate       Physical Exam:  General: Patient is in no acute distress.  HEENT: atraumatic normocephalic.  Neck: is supple jugular venous pressure within normal limits no thyromegaly.  Cardiovascular regular rate and rhythm normal heart sounds no murmurs rubs or gallops.  Lungs: Scattered respiratory rhonchi.  Abdomen: is soft nontender.  Extremities warm to touch no edema.    Assessment/Plan   Shortness of breath.  Patient has been having chronic shortness of breath.  On physical examination he has expiratory and expiratory rhonchi.  Most likely this is a COPD exacerbation and addition of acute decompensated heart failure.  2D echo showed normal ejection fraction.  Acute on chronic diastolic heart failure exacerbation.  I will switch patient to hydrochlorothiazide for better blood pressure control in addition of diuretic effect and addition of carvedilol.  Will reassess blood pressure in AM.  Otherwise he stable from my standpoint.  2.  Chest pain.  Patient has been complaining of chest pain and shortness of breath with exertion.  He did not have any ischemic workup in 4 years.  CT scan nuclear stress test showed ejection fraction of 49% with no ischemia.  There is mild inferior wall scar seen likely secondary to his chronically occluded right coronary artery.  But no clear evidence of ischemia recommend outpatient follow-up.  Continue aspirin statin and risk factor modification.     3.  Hypertension.  Hydrochlorothiazide 25 mg oral daily and addition of carvedilol will reassess if he needs any additional treatment like amlodipine.     4.  Hyperlipidemia.  Continue high intensity statin     Thank you for allowing to participate in his care  Peripheral IV 02/19/24 20 G Distal;Left;Upper Arm (Active)   Site Assessment Clean;Dry;Intact 02/21/24 0823   Dressing Status Clean;Dry 02/21/24 0823   Number of days: 2       Code Status:  Full Code    Jessika Spencer MD

## 2024-02-21 NOTE — NURSING NOTE
Hull discontinued per MD order, pt instructed to use urinal and ring for bathroom to monitor urine output.

## 2024-02-21 NOTE — DISCHARGE SUMMARY
Discharge Diagnosis  Urinary tract infection associated with indwelling urethral catheter, initial encounter (CMS/[facility])    Issues Requiring Follow-Up  Follow-up with urology as an outpatient for outpatient cystoscopy    Discharge Meds     Your medication list        START taking these medications        Instructions Last Dose Given Next Dose Due   predniSONE 5 mg tablet  Commonly known as: Deltasone      Take 12 tabs (60 mg) by mouth on day 1, then 11 tabs (55 mg) by mouth on day 2. Continue to decrease by 1 tab (5 mg)  every day until gone (12 days).       tamsulosin 0.4 mg 24 hr capsule  Commonly known as: Flomax      Take 1 capsule (0.4 mg) by mouth once daily for 14 days.              CONTINUE taking these medications        Instructions Last Dose Given Next Dose Due   aspirin 81 mg EC tablet           atorvastatin 80 mg tablet  Commonly known as: Lipitor      Take 1 tablet (80 mg) by mouth once daily.       Brilinta 90 mg tablet  Generic drug: ticagrelor           carvedilol 25 mg tablet  Commonly known as: Coreg      Take 0.5 tablets (12.5 mg) by mouth 2 times a day with meals.       furosemide 20 mg tablet  Commonly known as: Lasix           lisinopril 10 mg tablet      Take 1 tablet (10 mg) by mouth once daily.       Ventolin HFA 90 mcg/actuation inhaler  Generic drug: albuterol           Xanax 0.25 mg tablet  Generic drug: ALPRAZolam                     Where to Get Your Medications        These medications were sent to [facility]  [address], [address], [facility] [address]      Hours: 9 AM to 6 PM Mon-Fri, 9 AM to 1 PM Sat Phone: [phone]   predniSONE 5 mg tablet         Test Results Pending At Discharge  Pending Labs       Order Current Status    Extra Urine Caba Tube Collected (02/19/24 1137)    Urinalysis with Reflex Culture and Microscopic In process            Hospital Course   On admission:    Duane Scott is a 73 y.o. male presenting with cough.     He has known systolic  heart failure.  He is on an appropriate cardiac regimen.  In the last few days he has had increasing cough and exertional dyspnea and chest discomfort.  He is bringing up scant amounts of clear sputum.  Not requiring oxygen.  He recently was diagnosed with urinary retention and had a Hull placed which she has had at home.  He sees Dr. Spencer cardiology and is actually supposed to have a nuclear stress test tomorrow.         Generalization: Patient brought by cardiology as well as urology.  Had stress test evaluation.  Follow-up cardiology evaluation revealed:.  Patient also was advised to continue current thiazide.  Patient advised by urology follow-up outpatient for cystoscopy.  CT scan nuclear stress test showed ejection fraction of 49% with no ischemia.  There is mild inferior wall scar seen likely secondary to his chronically occluded right coronary artery.  But no clear evidence of ischemia recommend outpatient follow-up.  Continue aspirin statin and risk factor modification.        Peripheral IV 02/19/24 20 G Distal;Left;Upper Arm (Active)   Site Assessment Clean;Dry;Intact 02/21/24 0823   Dressing Status Clean;Dry 02/21/24 0823   Number of days: 2         Code Status:  Full Code     Jessika Spencer MD                      Pertinent Physical Exam At Time of Discharge  Physical Exam  Lungs: Clear to auscultation bilateral no wheezing rales or rhonchi  Cardiovascular: S1 and S2 noted.  Condition on discharge stable.  Outpatient Follow-Up  Future Appointments   Date Time Provider Department Center   5/16/2024  2:30 PM ROBIN Clark-CNP [id number]         Kulwinder Mai MD

## 2024-02-21 NOTE — PROGRESS NOTES
02/21/24 1435   Discharge Planning   Living Arrangements Spouse/significant other   Support Systems Spouse/significant other   Assistance Needed no   Type of Residence Private residence   Home or Post Acute Services None   Financial Resource Strain   How hard is it for you to pay for the very basics like food, housing, medical care, and heating? Not hard   Housing Stability   In the last 12 months, was there a time when you were not able to pay the mortgage or rent on time? N   In the last 12 months, how many places have you lived? 1   In the last 12 months, was there a time when you did not have a steady place to sleep or slept in a shelter (including now)? N   Transportation Needs   In the past 12 months, has lack of transportation kept you from medical appointments or from getting medications? no   In the past 12 months, has lack of transportation kept you from meetings, work, or from getting things needed for daily living? No

## 2024-02-21 NOTE — NURSING NOTE
This Rn spoke to Dr. Gupta and informed that watson cath was removed at 652am, novoid since then and bladder scan 299ml of urine. Per Dr. Gupta, place watson cath and discharge home, patient to follow up with him or Dr. Tolbert. This Rn placed 16 Mongolian watson cath without difficulty for return of light yellow urine, no blood noted, patient tolerated well.

## 2024-02-21 NOTE — CARE PLAN
Problem: Respiratory  Goal: No signs of respiratory distress (eg. Use of accessory muscles. Peds grunting)  Outcome: Progressing   Patient has I/E wheezing which improves with aerosol tx but does not completely resolve

## 2024-02-21 NOTE — PROGRESS NOTES
"Duane Scott is a 73 y.o. male on day 2 of admission presenting with Urinary tract infection associated with indwelling urethral catheter, initial encounter (CMS/[facility]).    Subjective   Pt is medically cleared for discharge home today.   TCSW arranged the shuttle for pt via [employer]. Pt will be picked up in the ED lobby at as soon as possible     Objective     Physical Exam    Last Recorded Vitals  Blood pressure 157/69, pulse 55, temperature 36.8 °C (98.2 °F), temperature source Oral, resp. rate 20, height 1.676 m (5' 6\"), weight 92 kg (202 lb 13.2 oz), SpO2 99 %.  Intake/Output last 3 Shifts:  I/O last 3 completed shifts:  In: 300 (3.3 mL/kg) [P.O.:250; IV Piggyback:50]  Out: 3050 (33.2 mL/kg) [Urine:3050 (0.9 mL/kg/hr)]  Weight: 92 kg     Relevant Results                             Assessment/Plan   Principal Problem:    Urinary tract infection associated with indwelling urethral catheter, initial encounter (CMS/[facility])               CASEY Dyson      "

## 2024-02-21 NOTE — NURSING NOTE
Patient has not voided since watson removal at 6:52am today. Bladder scan shows 299ml of urine. This RN  to notify Dr. Tolbert.

## 2024-02-22 ENCOUNTER — TELEPHONE (OUTPATIENT)
Dept: PRIMARY CARE | Facility: CLINIC | Age: 74
End: 2024-02-22

## 2024-02-22 LAB
ATRIAL RATE: 89 BPM
P AXIS: 75 DEGREES
P OFFSET: 201 MS
P ONSET: 146 MS
PR INTERVAL: 150 MS
Q ONSET: 221 MS
QRS COUNT: 14 BEATS
QRS DURATION: 92 MS
QT INTERVAL: 384 MS
QTC CALCULATION(BAZETT): 467 MS
QTC FREDERICIA: 437 MS
R AXIS: 2 DEGREES
T AXIS: 62 DEGREES
T OFFSET: 413 MS
VENTRICULAR RATE: 89 BPM

## 2024-02-22 PROCEDURE — 93016 CV STRESS TEST SUPVJ ONLY: CPT | Performed by: INTERNAL MEDICINE

## 2024-02-22 NOTE — TELEPHONE ENCOUNTER
"Phoned patient post hospitalization to schedule a [facility] Calls visit, spoke with wife, visit scheduled with Radha Johnson NP 3/13/24 at 2:30 pm. Offered visits on 2/27/24 and 3/4/24 and wife declined. She stated they have podiatry appointments on 2/27/24 and \"it's the twins birthday on the the 4th\". Wife stated, \"He wants you to be aware that he has a cough with yellow phlegm\". Wife reported this started last night. No temperature. She reported he is \"irritable and always SOB\". She reported he feels he needs antibiotics. She reported he was given IV antibotics while hospital but no home going prescription for antibiotics.   "

## 2024-02-22 NOTE — TELEPHONE ENCOUNTER
Phoned wife and notified of provider's response. Advised to contact the [facility] office as needed with any change in condition/worsening symptoms and she verbalized an understanding.

## 2024-02-23 ENCOUNTER — TELEPHONE (OUTPATIENT)
Dept: INPATIENT UNIT | Facility: HOSPITAL | Age: 74
End: 2024-02-23
Payer: MEDICARE

## 2024-02-23 ENCOUNTER — PHARMACY VISIT (OUTPATIENT)
Dept: PHARMACY | Facility: CLINIC | Age: 74
End: 2024-02-23
Payer: COMMERCIAL

## 2024-02-23 PROCEDURE — RXMED WILLOW AMBULATORY MEDICATION CHARGE

## 2024-02-26 ENCOUNTER — TELEPHONE (OUTPATIENT)
Dept: PRIMARY CARE | Facility: CLINIC | Age: 74
End: 2024-02-26
Payer: MEDICARE

## 2024-02-26 DIAGNOSIS — R09.89 CHEST CONGESTION: Primary | ICD-10-CM

## 2024-02-26 RX ORDER — DOXYCYCLINE 100 MG/1
100 CAPSULE ORAL 2 TIMES DAILY
Qty: 20 CAPSULE | Refills: 0 | Status: SHIPPED | OUTPATIENT
Start: 2024-02-26 | End: 2024-03-13 | Stop reason: ALTCHOICE

## 2024-02-26 NOTE — TELEPHONE ENCOUNTER
Per wife pt has productive cough with green sputum. Per wife he has had cough for sometime but newly has green sputum. Denies fever or chills.

## 2024-03-12 ENCOUNTER — TELEPHONE (OUTPATIENT)
Dept: PRIMARY CARE | Facility: CLINIC | Age: 74
End: 2024-03-12
Payer: MEDICARE

## 2024-03-13 ENCOUNTER — OFFICE VISIT (OUTPATIENT)
Dept: PRIMARY CARE | Facility: CLINIC | Age: 74
End: 2024-03-13
Payer: MEDICARE

## 2024-03-13 ENCOUNTER — HOSPITAL ENCOUNTER (EMERGENCY)
Facility: HOSPITAL | Age: 74
Discharge: HOME | End: 2024-03-13
Attending: EMERGENCY MEDICINE
Payer: MEDICARE

## 2024-03-13 ENCOUNTER — APPOINTMENT (OUTPATIENT)
Dept: RADIOLOGY | Facility: HOSPITAL | Age: 74
End: 2024-03-13
Payer: MEDICARE

## 2024-03-13 ENCOUNTER — APPOINTMENT (OUTPATIENT)
Dept: CARDIOLOGY | Facility: HOSPITAL | Age: 74
End: 2024-03-13
Payer: MEDICARE

## 2024-03-13 VITALS
HEIGHT: 66 IN | RESPIRATION RATE: 16 BRPM | BODY MASS INDEX: 32.14 KG/M2 | OXYGEN SATURATION: 97 % | WEIGHT: 200 LBS | HEART RATE: 71 BPM | TEMPERATURE: 97.3 F | SYSTOLIC BLOOD PRESSURE: 116 MMHG | DIASTOLIC BLOOD PRESSURE: 64 MMHG

## 2024-03-13 VITALS
WEIGHT: 209.88 LBS | DIASTOLIC BLOOD PRESSURE: 66 MMHG | HEIGHT: 66 IN | SYSTOLIC BLOOD PRESSURE: 125 MMHG | BODY MASS INDEX: 33.73 KG/M2 | HEART RATE: 83 BPM | RESPIRATION RATE: 24 BRPM | TEMPERATURE: 98.2 F | OXYGEN SATURATION: 99 %

## 2024-03-13 DIAGNOSIS — T83.511D URINARY TRACT INFECTION ASSOCIATED WITH INDWELLING URETHRAL CATHETER, SUBSEQUENT ENCOUNTER: ICD-10-CM

## 2024-03-13 DIAGNOSIS — N39.0 URINARY TRACT INFECTION ASSOCIATED WITH INDWELLING URETHRAL CATHETER, SUBSEQUENT ENCOUNTER: ICD-10-CM

## 2024-03-13 DIAGNOSIS — R55 NEAR SYNCOPE: ICD-10-CM

## 2024-03-13 DIAGNOSIS — I25.10 CORONARY ARTERY DISEASE INVOLVING NATIVE CORONARY ARTERY OF NATIVE HEART WITHOUT ANGINA PECTORIS: ICD-10-CM

## 2024-03-13 DIAGNOSIS — I50.9 CHRONIC CONGESTIVE HEART FAILURE, UNSPECIFIED HEART FAILURE TYPE (MULTI): ICD-10-CM

## 2024-03-13 DIAGNOSIS — N39.0 LOWER URINARY TRACT INFECTION: ICD-10-CM

## 2024-03-13 DIAGNOSIS — R07.9 CHEST PAIN, UNSPECIFIED TYPE: Primary | ICD-10-CM

## 2024-03-13 DIAGNOSIS — R55 NEAR SYNCOPE: Primary | ICD-10-CM

## 2024-03-13 DIAGNOSIS — N17.9 ACUTE KIDNEY INJURY (CMS-HCC): ICD-10-CM

## 2024-03-13 LAB
ALBUMIN SERPL-MCNC: 3.9 G/DL (ref 3.5–5)
ALP BLD-CCNC: 104 U/L (ref 35–125)
ALT SERPL-CCNC: 22 U/L (ref 5–40)
ANION GAP SERPL CALC-SCNC: 15 MMOL/L
APPEARANCE UR: ABNORMAL
AST SERPL-CCNC: 20 U/L (ref 5–40)
BACTERIA #/AREA URNS AUTO: ABNORMAL /HPF
BASOPHILS # BLD AUTO: 0.01 X10*3/UL (ref 0–0.1)
BASOPHILS NFR BLD AUTO: 0.1 %
BILIRUB SERPL-MCNC: 1.6 MG/DL (ref 0.1–1.2)
BILIRUB UR STRIP.AUTO-MCNC: NEGATIVE MG/DL
BUN SERPL-MCNC: 56 MG/DL (ref 8–25)
CALCIUM SERPL-MCNC: 9.1 MG/DL (ref 8.5–10.4)
CHLORIDE SERPL-SCNC: 103 MMOL/L (ref 97–107)
CO2 SERPL-SCNC: 20 MMOL/L (ref 24–31)
COLOR UR: ABNORMAL
CREAT SERPL-MCNC: 2 MG/DL (ref 0.4–1.6)
EGFRCR SERPLBLD CKD-EPI 2021: 35 ML/MIN/1.73M*2
EOSINOPHIL # BLD AUTO: 0.21 X10*3/UL (ref 0–0.4)
EOSINOPHIL NFR BLD AUTO: 2.7 %
ERYTHROCYTE [DISTWIDTH] IN BLOOD BY AUTOMATED COUNT: 12.8 % (ref 11.5–14.5)
FLUAV RNA RESP QL NAA+PROBE: NOT DETECTED
FLUBV RNA RESP QL NAA+PROBE: NOT DETECTED
GLUCOSE SERPL-MCNC: 138 MG/DL (ref 65–99)
GLUCOSE UR STRIP.AUTO-MCNC: NORMAL MG/DL
HCT VFR BLD AUTO: 36.7 % (ref 41–52)
HGB BLD-MCNC: 12.7 G/DL (ref 13.5–17.5)
IMM GRANULOCYTES # BLD AUTO: 0.04 X10*3/UL (ref 0–0.5)
IMM GRANULOCYTES NFR BLD AUTO: 0.5 % (ref 0–0.9)
KETONES UR STRIP.AUTO-MCNC: NEGATIVE MG/DL
LACTATE BLDV-SCNC: 0.9 MMOL/L (ref 0.4–2)
LEUKOCYTE ESTERASE UR QL STRIP.AUTO: ABNORMAL
LYMPHOCYTES # BLD AUTO: 1.34 X10*3/UL (ref 0.8–3)
LYMPHOCYTES NFR BLD AUTO: 16.9 %
MAGNESIUM SERPL-MCNC: 2 MG/DL (ref 1.6–3.1)
MCH RBC QN AUTO: 29.5 PG (ref 26–34)
MCHC RBC AUTO-ENTMCNC: 34.6 G/DL (ref 32–36)
MCV RBC AUTO: 85 FL (ref 80–100)
MONOCYTES # BLD AUTO: 0.59 X10*3/UL (ref 0.05–0.8)
MONOCYTES NFR BLD AUTO: 7.5 %
NEUTROPHILS # BLD AUTO: 5.72 X10*3/UL (ref 1.6–5.5)
NEUTROPHILS NFR BLD AUTO: 72.3 %
NITRITE UR QL STRIP.AUTO: ABNORMAL
NRBC BLD-RTO: 0 /100 WBCS (ref 0–0)
PH UR STRIP.AUTO: 6 [PH]
PLATELET # BLD AUTO: 116 X10*3/UL (ref 150–450)
POTASSIUM SERPL-SCNC: 4 MMOL/L (ref 3.4–5.1)
PROT SERPL-MCNC: 6.8 G/DL (ref 5.9–7.9)
PROT UR STRIP.AUTO-MCNC: ABNORMAL MG/DL
RBC # BLD AUTO: 4.31 X10*6/UL (ref 4.5–5.9)
RBC # UR STRIP.AUTO: ABNORMAL /UL
RBC #/AREA URNS AUTO: >20 /HPF
SARS-COV-2 RNA RESP QL NAA+PROBE: NOT DETECTED
SODIUM SERPL-SCNC: 138 MMOL/L (ref 133–145)
SP GR UR STRIP.AUTO: 1.01
TROPONIN T SERPL-MCNC: 37 NG/L
TROPONIN T SERPL-MCNC: 42 NG/L
UROBILINOGEN UR STRIP.AUTO-MCNC: NORMAL MG/DL
WBC # BLD AUTO: 7.9 X10*3/UL (ref 4.4–11.3)
WBC #/AREA URNS AUTO: >50 /HPF
WBC CLUMPS #/AREA URNS AUTO: ABNORMAL /HPF

## 2024-03-13 PROCEDURE — 96365 THER/PROPH/DIAG IV INF INIT: CPT | Mod: 59

## 2024-03-13 PROCEDURE — 93005 ELECTROCARDIOGRAM TRACING: CPT

## 2024-03-13 PROCEDURE — 83605 ASSAY OF LACTIC ACID: CPT

## 2024-03-13 PROCEDURE — 71275 CT ANGIOGRAPHY CHEST: CPT

## 2024-03-13 PROCEDURE — 83735 ASSAY OF MAGNESIUM: CPT | Performed by: STUDENT IN AN ORGANIZED HEALTH CARE EDUCATION/TRAINING PROGRAM

## 2024-03-13 PROCEDURE — 1126F AMNT PAIN NOTED NONE PRSNT: CPT | Performed by: NURSE PRACTITIONER

## 2024-03-13 PROCEDURE — 71046 X-RAY EXAM CHEST 2 VIEWS: CPT

## 2024-03-13 PROCEDURE — 96361 HYDRATE IV INFUSION ADD-ON: CPT

## 2024-03-13 PROCEDURE — 85025 COMPLETE CBC W/AUTO DIFF WBC: CPT | Performed by: EMERGENCY MEDICINE

## 2024-03-13 PROCEDURE — 1111F DSCHRG MED/CURRENT MED MERGE: CPT | Performed by: NURSE PRACTITIONER

## 2024-03-13 PROCEDURE — 87086 URINE CULTURE/COLONY COUNT: CPT | Mod: TRILAB

## 2024-03-13 PROCEDURE — 2550000001 HC RX 255 CONTRASTS: Performed by: EMERGENCY MEDICINE

## 2024-03-13 PROCEDURE — 36415 COLL VENOUS BLD VENIPUNCTURE: CPT | Performed by: STUDENT IN AN ORGANIZED HEALTH CARE EDUCATION/TRAINING PROGRAM

## 2024-03-13 PROCEDURE — 80053 COMPREHEN METABOLIC PANEL: CPT | Performed by: EMERGENCY MEDICINE

## 2024-03-13 PROCEDURE — 84484 ASSAY OF TROPONIN QUANT: CPT | Performed by: STUDENT IN AN ORGANIZED HEALTH CARE EDUCATION/TRAINING PROGRAM

## 2024-03-13 PROCEDURE — 80053 COMPREHEN METABOLIC PANEL: CPT | Performed by: STUDENT IN AN ORGANIZED HEALTH CARE EDUCATION/TRAINING PROGRAM

## 2024-03-13 PROCEDURE — 2500000004 HC RX 250 GENERAL PHARMACY W/ HCPCS (ALT 636 FOR OP/ED)

## 2024-03-13 PROCEDURE — 1159F MED LIST DOCD IN RCRD: CPT | Performed by: NURSE PRACTITIONER

## 2024-03-13 PROCEDURE — 99285 EMERGENCY DEPT VISIT HI MDM: CPT | Mod: 25

## 2024-03-13 PROCEDURE — 1160F RVW MEDS BY RX/DR IN RCRD: CPT | Performed by: NURSE PRACTITIONER

## 2024-03-13 PROCEDURE — 3074F SYST BP LT 130 MM HG: CPT | Performed by: NURSE PRACTITIONER

## 2024-03-13 PROCEDURE — 96366 THER/PROPH/DIAG IV INF ADDON: CPT

## 2024-03-13 PROCEDURE — 87636 SARSCOV2 & INF A&B AMP PRB: CPT

## 2024-03-13 PROCEDURE — 83735 ASSAY OF MAGNESIUM: CPT | Performed by: EMERGENCY MEDICINE

## 2024-03-13 PROCEDURE — 4010F ACE/ARB THERAPY RXD/TAKEN: CPT | Performed by: NURSE PRACTITIONER

## 2024-03-13 PROCEDURE — 84484 ASSAY OF TROPONIN QUANT: CPT | Performed by: EMERGENCY MEDICINE

## 2024-03-13 PROCEDURE — 36415 COLL VENOUS BLD VENIPUNCTURE: CPT

## 2024-03-13 PROCEDURE — 1036F TOBACCO NON-USER: CPT | Performed by: NURSE PRACTITIONER

## 2024-03-13 PROCEDURE — 81001 URINALYSIS AUTO W/SCOPE: CPT

## 2024-03-13 PROCEDURE — 99349 HOME/RES VST EST MOD MDM 40: CPT | Performed by: NURSE PRACTITIONER

## 2024-03-13 PROCEDURE — 3078F DIAST BP <80 MM HG: CPT | Performed by: NURSE PRACTITIONER

## 2024-03-13 RX ORDER — CEFUROXIME AXETIL 500 MG/1
500 TABLET ORAL 2 TIMES DAILY
Qty: 20 TABLET | Refills: 0 | Status: SHIPPED | OUTPATIENT
Start: 2024-03-13 | End: 2024-03-21 | Stop reason: ALTCHOICE

## 2024-03-13 RX ORDER — CEFTRIAXONE 1 G/50ML
1 INJECTION, SOLUTION INTRAVENOUS ONCE
Status: COMPLETED | OUTPATIENT
Start: 2024-03-13 | End: 2024-03-13

## 2024-03-13 RX ORDER — CEFUROXIME AXETIL 500 MG/1
500 TABLET ORAL 2 TIMES DAILY
Qty: 20 TABLET | Refills: 0 | Status: SHIPPED | OUTPATIENT
Start: 2024-03-13 | End: 2024-03-13 | Stop reason: SDUPTHER

## 2024-03-13 RX ADMIN — IOHEXOL 75 ML: 350 INJECTION, SOLUTION INTRAVENOUS at 19:11

## 2024-03-13 RX ADMIN — SODIUM CHLORIDE 1000 ML: 900 INJECTION, SOLUTION INTRAVENOUS at 18:21

## 2024-03-13 RX ADMIN — CEFTRIAXONE SODIUM 1 G: 1 INJECTION, SOLUTION INTRAVENOUS at 19:24

## 2024-03-13 ASSESSMENT — PAIN SCALES - GENERAL
PAINLEVEL: 0-NO PAIN
PAINLEVEL_OUTOF10: 0 - NO PAIN
PAINLEVEL_OUTOF10: 0 - NO PAIN

## 2024-03-13 ASSESSMENT — COLUMBIA-SUICIDE SEVERITY RATING SCALE - C-SSRS
1. IN THE PAST MONTH, HAVE YOU WISHED YOU WERE DEAD OR WISHED YOU COULD GO TO SLEEP AND NOT WAKE UP?: NO
6. HAVE YOU EVER DONE ANYTHING, STARTED TO DO ANYTHING, OR PREPARED TO DO ANYTHING TO END YOUR LIFE?: NO
2. HAVE YOU ACTUALLY HAD ANY THOUGHTS OF KILLING YOURSELF?: NO

## 2024-03-13 ASSESSMENT — PAIN - FUNCTIONAL ASSESSMENT: PAIN_FUNCTIONAL_ASSESSMENT: 0-10

## 2024-03-13 NOTE — PROGRESS NOTES
"Subjective   Patient ID: Duane Scott is a 73 y.o. male who presents for Hospital Follow-up (UTI).    Visit for 72 y/o male seen today in private home, alone for Hospital follow up.     Patient was hospitalized at [facility] from 2/19-2/21 with a UTI, cough and shortness of breath. He has known systolic heart failure. He has had increasing cough and exertional dyspnea and chest discomfort. He is bringing up scant amounts of clear sputum. Not requiring oxygen. He recently was diagnosed with urinary retention and had a Hull placed which he has had at home. He sees Dr. Spencer cardiology and is actually supposed to have a nuclear stress test tomorrow. CT scan nuclear stress test showed ejection fraction of 49% with no ischemia.  There is mild inferior wall scar seen likely secondary to his chronically occluded right coronary artery.  But no clear evidence of ischemia recommend outpatient follow-up.  Continue aspirin statin and risk factor modification. Patient advised by urology follow-up outpatient for cystoscopy.    Patient is seen today outside on his porch. He is alert, oriented, able to answer all questions regarding his health. Patient lives with his spouse and children. He is able to manage his own medications but is very inconsistent taking his medications. He is able to perform all ADLs. Patient was initially standing for the exam today. He does not always let provider in the home, especially if it is nice outside and prefers to do all visits on the porch. He began holding his chest stating \"I dont feel right, my chest, something is wrong with my chest\". Patient became pale and slowly sat down onto the ground. He reported that he felt he was going to pass out and asked for this provider to call 911.     Home Visit: Medically necessary due to: patient has limited support systems to help the patient attend office visits          Current Outpatient Medications:     albuterol (Ventolin HFA) 90 mcg/actuation " "inhaler, Inhale 1 puff every 4 hours if needed., Disp: , Rfl:     ALPRAZolam (Xanax) 0.25 mg tablet, Take 1 tablet (0.25 mg) by mouth once daily as needed for anxiety., Disp: , Rfl:     aspirin 81 mg EC tablet, Take 1 tablet (81 mg) by mouth once daily., Disp: , Rfl:     atorvastatin (Lipitor) 80 mg tablet, Take 1 tablet (80 mg) by mouth once daily., Disp: 90 tablet, Rfl: 3    carvedilol (Coreg) 25 mg tablet, Take 0.5 tablets (12.5 mg) by mouth 2 times a day with meals., Disp: 90 tablet, Rfl: 3    furosemide (Lasix) 20 mg tablet, Take 1 tablet (20 mg) by mouth 2 times a day., Disp: , Rfl:     hydroCHLOROthiazide (HYDRODiuril) 25 mg tablet, Take 1 tablet (25 mg) by mouth once daily., Disp: 30 tablet, Rfl: 0    lisinopril 10 mg tablet, Take 1 tablet (10 mg) by mouth once daily., Disp: 90 tablet, Rfl: 3    ticagrelor (Brilinta) 90 mg tablet, Take 1 tablet (90 mg) by mouth 2 times a day., Disp: , Rfl:      Review of Systems  Constitutional:  Negative for appetite change, chills and fever.   HENT:  Negative for trouble swallowing.    Respiratory: Positive for shortness of breath with exertion, cough, wheezing.  Cardiovascular: Positive for chest pain. Negative for palpitations.   Gastrointestinal: Positive for occasional diarrhea. Negative for abdominal pain, constipation, nausea and vomiting.   Genitourinary: Positive for catheter. Negative for hematuria  Musculoskeletal: Positive for arthralgias, back pain, unsteady gait    Neurological: Negative for dizziness and light-headedness.   Hematological: Does not bruise/bleed easily.   Psychiatric/Behavioral: Positive for anxiety       Objective   /64 (BP Location: Left arm, Patient Position: Sitting, BP Cuff Size: Adult)   Pulse 71   Temp 36.3 °C (97.3 °F) (Temporal)   Resp 16   Ht 1.676 m (5' 6\")   Wt 90.7 kg (200 lb)   SpO2 97%   BMI 32.28 kg/m²     Physical Exam  Constitutional:       Appearance: Alert, standing outside on porch. Anxious. Holding chest. "   HENT:      Head: Normocephalic.      Nose: Nose normal.      Mouth/Throat:      Mouth: Mucous membranes are moist.   Cardiovascular:      Rate and Rhythm: Normal rate and regular rhythm.   Pulmonary:      Effort: Pulmonary effort is normal.      Breath sounds: Normal breath sounds.   Abdominal:      General: Abdomen is flat.      Palpations: Abdomen is soft. No tenderness.     Hull catheter- leg bag   Musculoskeletal:         General: Normal range of motion.      Cervical back: Normal range of motion.   Skin:     General: Skin is warm and dry.   Neurological:      General: No focal deficit present.      Mental Status: He is alert and oriented to person, place, and time.   Psychiatric:         Mood and Affect: Mood normal.     Assessment/Plan   Diagnoses and all orders for this visit:  Chest pain, unspecified type  -Acute, 911 called  Near syncope  -Acute, patient lowered self to ground, became pale, 911 called   Urinary tract infection associated with indwelling urethral catheter, subsequent encounter  -s/p hospitalization. Treated with course of Cefuroxime  Coronary artery disease involving native coronary artery of native heart without angina pectoris  -chronic, managed with aspirin, Brilinta, Lipitor   Chronic congestive heart failure, unspecified heart failure type (CMS/HCC)  -chronic, LE edema stable. Continue Furosemide     Patient developed chest pain, lightheadedness during evaluation. 911 called and patient transported to [facility] ED for evaluation. Will follow up with pt when he returns home.        ROBIN Clark-CNP

## 2024-03-13 NOTE — ED PROVIDER NOTES
"HPI   Chief Complaint   Patient presents with    Syncope     Patient states he was outside at home and felt like he was going to pass out. Did not pass out. Accompanied by nausea and diarrhea.     Chest Pain     Patient states he had 4/10 chest pain that felt \"warm in the center\" of his chest prior to arrival. Has a cardiac history. Per EMS patient had a run of sinus tach in the 130s-140s prior to arrival.        Patient is a 73-year-old male with past medical history of diabetes, renal failure, MI, and asthma presenting with dizziness/lightheadedness and near syncope.  States that he was at home and called his home health nurse over to the house.  While they were together, he began to feel lightheaded and dizzy.  Says that he had several episodes of nausea, vomiting as well as signs of accidental diarrhea.  Says that he was able to sit down and did not have a true syncopal episode.  Does endorse fluttering in his chest at that time.  Also endorses having a dry cough.  Denies fevers, chills, sore throat, runny nose, chest pain, shortness of breath, abdominal pain, urinary complaints.                          Bishop Coma Scale Score: 15                     Patient History   Past Medical History:   Diagnosis Date    Acute renal failure syndrome (CMS/[facility]) 11/14/2023    Angina pectoris (CMS/[facility]) 11/17/2023    Arthritis     Asthma     CAD (coronary artery disease)     s/p stent placement    Cardiac arrest (CMS/HCC) 11/17/2023    Chronic obstructive pulmonary disease requiring drug therapy (CMS/[facility]) 11/17/2023    Coronary artery disease involving native coronary artery of native heart without angina pectoris 11/14/2023    Diabetes mellitus (CMS/[facility])     Diabetes mellitus, type 2 (CMS/[facility]) 11/08/2018    Diastolic heart failure (CMS/[facility]) 11/17/2023    Disorder involving thrombocytopenia (CMS/[facility]) 11/12/2019    Essential hypertension 11/08/2018    Gout     High blood pressure     High cholesterol     Kidney disease     " Migraine headache     Mixed hyperlipidemia 11/08/2018    Palpitations 11/17/2023    Status post coronary artery stent placement 11/14/2023    Status post myocardial infarction 09/24/2019     Past Surgical History:   Procedure Laterality Date    BACK SURGERY  2012    HERNIA REPAIR  2015    HERNIA REPAIR  2013     Family History   Problem Relation Name Age of Onset    Heart disease Mother      Stroke Son      Autism Son       Social History     Tobacco Use    Smoking status: Never     Passive exposure: Never    Smokeless tobacco: Never   Substance Use Topics    Alcohol use: Not Currently    Drug use: Never       Physical Exam   ED Triage Vitals [03/13/24 1636]   Temperature Heart Rate Respirations BP   36.8 °C (98.2 °F) 73 19 (!) 143/131      Pulse Ox Temp Source Heart Rate Source Patient Position   97 % Oral -- --      BP Location FiO2 (%)     -- --       Physical Exam  Constitutional:       Appearance: He is well-developed.      Comments: Awake, laying in examination bed, no acute distress   HENT:      Head: Normocephalic and atraumatic.   Eyes:      Extraocular Movements: Extraocular movements intact.      Pupils: Pupils are equal, round, and reactive to light.   Cardiovascular:      Rate and Rhythm: Normal rate and regular rhythm.      Heart sounds: Normal heart sounds.   Pulmonary:      Effort: Pulmonary effort is normal.      Breath sounds: Normal breath sounds.   Abdominal:      General: Bowel sounds are normal.      Palpations: Abdomen is soft.   Musculoskeletal:         General: Normal range of motion.      Cervical back: Normal range of motion and neck supple.   Skin:     General: Skin is warm and dry.      Capillary Refill: Capillary refill takes less than 2 seconds.   Neurological:      General: No focal deficit present.      Mental Status: He is alert and oriented to person, place, and time.   Psychiatric:         Mood and Affect: Mood normal.         Behavior: Behavior normal.         ED Course & MDM    ED Course as of 03/13/24 2132   Wed Mar 13, 2024   1720 EKG presented to me at 5:20 PM     EKG as interpreted by me shows sinus rhythm with sinus arrhythmia at a rate of 69 bpm, normal axis, normal intervals, no STEMI.   [DH]      ED Course User Index  [DH] Titus Dye MD         Diagnoses as of 03/13/24 2132   Near syncope   Lower urinary tract infection   Acute kidney injury (CMS/HCC)       Medical Decision Making  Patient is a 73-year-old male with past medical history of MI, renal failure, asthma, type 2 diabetes presenting with dizziness, lightheadedness, near syncope.  Cardiac workup, lactate, CT angio ordered.  Conditions considered include but not limited to: ACS, cardiac arrhythmia, electrolyte abnormality, UTI.    CBC is without leukocytosis but does show mild anemia hemoglobin 12.7.  When compared to prior this does appear stable.  CMP does show mild FANTA with creatinine at 2.0 and GFR 35.  Last reading was creatinine of 1.7 and GFR of 42.  Troponin is elevated at 42.  Lactate within normal limits.  Viral swabs are negative.  UA with micro does show positive for UTI.  IV Rocephin ordered.  Chest x-ray is without acute cardiopulmonary process.  CT angio PE is without pulmonary embolism.  Does remark on ankylosing spondylitis and severe calcification coronary arteries.  Repeat troponin is decreased to 37.    I spoke with Dr. Carrillo, hospitalist.  We thoroughly discussed the patient's history, physical, laboratory and imaging findings as well as ED course.  After discussion, Dr. Carrillo believes that this is likely related to his UTI and he will commonly have infected urine due to a chronic indwelling catheter.  He says that there is nothing that needs to be done from the inpatient side, particularly due to patient having just been admitted.  His recommendation is to have this patient discharged home with cefuroxime.  I did discuss with the patient the opinion of Dr. Carrillo.  Patient is happy to go home.  He  states that he does live with his wife and 2 sons and says that someone is always home and would be able to keep an eye on him.  I strongly recommended that patient continue to drink lots of fluids as well as recommend that he follow-up with primary care in the next several days.    I believe this patient is at low risk for complication, and a disoposition of discharge is acceptable.  Return to the Emergency Department if new or worsening symptoms including headache, fever, chills, chest pain, shortness of breath, syncope, near syncope, abdominal pain, nausea, vomiting,  diarrhea, or worsening pain.  She is agreeable to disposition of discharge with follow-up with primary care and to increase fluids.  As well as to take antibiotics until completion.        Medications   sodium chloride 0.9 % bolus 1,000 mL (1,000 mL intravenous New Bag 3/13/24 1821)   cefTRIAXone (Rocephin) IVPB 1 g (1 g intravenous New Bag 3/13/24 1924)   iohexol (OMNIPaque) 350 mg iodine/mL solution 75 mL (75 mL intravenous Given 3/13/24 1911)         Labs Reviewed   CBC WITH AUTO DIFFERENTIAL - Abnormal       Result Value    WBC 7.9      nRBC 0.0      RBC 4.31 (*)     Hemoglobin 12.7 (*)     Hematocrit 36.7 (*)     MCV 85      MCH 29.5      MCHC 34.6      RDW 12.8      Platelets 116 (*)     Neutrophils % 72.3      Immature Granulocytes %, Automated 0.5      Lymphocytes % 16.9      Monocytes % 7.5      Eosinophils % 2.7      Basophils % 0.1      Neutrophils Absolute 5.72 (*)     Immature Granulocytes Absolute, Automated 0.04      Lymphocytes Absolute 1.34      Monocytes Absolute 0.59      Eosinophils Absolute 0.21      Basophils Absolute 0.01     COMPREHENSIVE METABOLIC PANEL - Abnormal    Glucose 138 (*)     Sodium 138      Potassium 4.0      Chloride 103      Bicarbonate 20 (*)     Urea Nitrogen 56 (*)     Creatinine 2.00 (*)     eGFR 35 (*)     Calcium 9.1      Albumin 3.9      Alkaline Phosphatase 104      Total Protein 6.8      AST 20       Bilirubin, Total 1.6 (*)     ALT 22      Anion Gap 15     SERIAL TROPONIN, INITIAL ([facility]) - Abnormal    Troponin T, High Sensitivity 42 (*)    SERIAL TROPONIN,  2 HOUR ([facility]) - Abnormal    Troponin T, High Sensitivity 37 (*)    URINALYSIS WITH REFLEX CULTURE AND MICROSCOPIC - Abnormal    Color, Urine Light-Orange (*)     Appearance, Urine Ex.Turbid (*)     Specific Gravity, Urine 1.014      pH, Urine 6.0      Protein, Urine 100 (2+) (*)     Glucose, Urine Normal      Blood, Urine 1.0 (3+) (*)     Ketones, Urine NEGATIVE      Bilirubin, Urine NEGATIVE      Urobilinogen, Urine Normal      Nitrite, Urine 1+ (*)     Leukocyte Esterase, Urine 500 Emily/µL (*)    MICROSCOPIC ONLY, URINE - Abnormal    WBC, Urine >50 (*)     WBC Clumps, Urine MANY      RBC, Urine >20 (*)     Bacteria, Urine 3+ (*)    MAGNESIUM - Normal    Magnesium 2.00     SARS-COV-2 AND INFLUENZA A/B PCR - Normal    Flu A Result Not Detected      Flu B Result Not Detected      Coronavirus 2019, PCR Not Detected      Narrative:     This assay has received FDA Emergency Use Authorization (EUA) and  is only authorized for the duration of time that circumstances exist to justify the authorization of the emergency use of in vitro diagnostic tests for the detection of SARS-CoV-2 virus and/or diagnosis of COVID-19 infection under section 564(b)(1) of the Act, 21 U.S.C. 360bbb-3(b)(1). Testing for SARS-CoV-2 is only recommended for patients who meet current clinical and/or epidemiological criteria as defined by federal, state, or local public health directives. This assay is an in vitro diagnostic nucleic acid amplification test for the qualitative detection of SARS-CoV-2, Influenza A, and Influenza B from nasopharyngeal specimens and has been validated for use at [facility]. Negative results do not preclude COVID-19 infections or Influenza A/B infections, and should not be used as the sole basis for diagnosis, treatment, or other management  decisions. If Influenza A/B and RSV PCR results are negative, testing for Parainfluenza virus, Adenovirus and Metapneumovirus is routinely performed for [facility] pediatric oncology and intensive care inpatients, and is available on other patients by placing an add-on request.    BLOOD GAS LACTIC ACID, VENOUS - Normal    POCT Lactate, Venous 0.9     URINE CULTURE   TROPONIN T SERIES, HIGH SENSITIVITY (0, 2 HR, 6 HR)    Narrative:     The following orders were created for panel order Troponin T Series, High Sensitivity (0, 2HR, 6HR).  Procedure                               Abnormality         Status                     ---------                               -----------         ------                     Serial Troponin, Initial...[[id number]]  Abnormal            Final result               Serial Troponin, 2 Hour ...[[id number]]  Abnormal            Final result               Serial Troponin, 6 Hour ...[[id number]]                                                   Please view results for these tests on the individual orders.   URINALYSIS WITH REFLEX CULTURE AND MICROSCOPIC    Narrative:     The following orders were created for panel order Urinalysis with Reflex Culture and Microscopic.  Procedure                               Abnormality         Status                     ---------                               -----------         ------                     Urinalysis with Reflex C...[[id number]]  Abnormal            Final result               Extra Urine Gray Tube[[id number]]                                                         Please view results for these tests on the individual orders.   EXTRA URINE GRAY TUBE   SERIAL TROPONIN, 6 HOUR ([facility])         CT angio chest for pulmonary embolism   Final Result   1. No CT signs of pulmonary embolism.   2. Severe atherosclerotic coronary artery calcifications   3. Large cysts are noted in both kidneys, cholecystectomy, and   splenomegaly noted in the abdomen these findings are  similar to the   prior exam   4. Signs of ankylosing spondylitis of the thoracic spine and   degenerative changes of the shoulders.             MACRO:   None        Signed by: Christi Goldstein 3/13/2024 7:54 PM   Dictation workstation:   [id number]      XR chest 2 views   Final Result   No acute cardiopulmonary process.             Signed by: Wade Wade 3/13/2024 5:48 PM   Dictation workstation:   [id number]            Procedure  Procedures     Cristóbal Musa PA-C  03/13/24 2132

## 2024-03-14 LAB
ATRIAL RATE: 69 BPM
P AXIS: -8 DEGREES
P OFFSET: 202 MS
P ONSET: 162 MS
PR INTERVAL: 116 MS
Q ONSET: 220 MS
QRS COUNT: 11 BEATS
QRS DURATION: 92 MS
QT INTERVAL: 406 MS
QTC CALCULATION(BAZETT): 435 MS
QTC FREDERICIA: 425 MS
R AXIS: -20 DEGREES
T AXIS: 23 DEGREES
T OFFSET: 423 MS
VENTRICULAR RATE: 69 BPM

## 2024-03-15 LAB — BACTERIA UR CULT: ABNORMAL

## 2024-03-20 ENCOUNTER — TELEPHONE (OUTPATIENT)
Dept: PRIMARY CARE | Facility: CLINIC | Age: 74
End: 2024-03-20
Payer: MEDICARE

## 2024-03-21 ENCOUNTER — OFFICE VISIT (OUTPATIENT)
Dept: PRIMARY CARE | Facility: CLINIC | Age: 74
End: 2024-03-21
Payer: MEDICARE

## 2024-03-21 VITALS
HEIGHT: 66 IN | HEART RATE: 77 BPM | WEIGHT: 210 LBS | SYSTOLIC BLOOD PRESSURE: 118 MMHG | DIASTOLIC BLOOD PRESSURE: 64 MMHG | BODY MASS INDEX: 33.75 KG/M2 | OXYGEN SATURATION: 100 % | TEMPERATURE: 97.5 F | RESPIRATION RATE: 18 BRPM

## 2024-03-21 DIAGNOSIS — T83.511D URINARY TRACT INFECTION ASSOCIATED WITH INDWELLING URETHRAL CATHETER, SUBSEQUENT ENCOUNTER: ICD-10-CM

## 2024-03-21 DIAGNOSIS — R55 NEAR SYNCOPE: Primary | ICD-10-CM

## 2024-03-21 DIAGNOSIS — R07.9 CHEST PAIN, UNSPECIFIED TYPE: ICD-10-CM

## 2024-03-21 DIAGNOSIS — N39.0 URINARY TRACT INFECTION ASSOCIATED WITH INDWELLING URETHRAL CATHETER, SUBSEQUENT ENCOUNTER: ICD-10-CM

## 2024-03-21 PROCEDURE — 1159F MED LIST DOCD IN RCRD: CPT | Performed by: NURSE PRACTITIONER

## 2024-03-21 PROCEDURE — 1125F AMNT PAIN NOTED PAIN PRSNT: CPT | Performed by: NURSE PRACTITIONER

## 2024-03-21 PROCEDURE — 1111F DSCHRG MED/CURRENT MED MERGE: CPT | Performed by: NURSE PRACTITIONER

## 2024-03-21 PROCEDURE — 3078F DIAST BP <80 MM HG: CPT | Performed by: NURSE PRACTITIONER

## 2024-03-21 PROCEDURE — 1160F RVW MEDS BY RX/DR IN RCRD: CPT | Performed by: NURSE PRACTITIONER

## 2024-03-21 PROCEDURE — 4010F ACE/ARB THERAPY RXD/TAKEN: CPT | Performed by: NURSE PRACTITIONER

## 2024-03-21 PROCEDURE — 99349 HOME/RES VST EST MOD MDM 40: CPT | Performed by: NURSE PRACTITIONER

## 2024-03-21 PROCEDURE — 3074F SYST BP LT 130 MM HG: CPT | Performed by: NURSE PRACTITIONER

## 2024-03-21 PROCEDURE — 1036F TOBACCO NON-USER: CPT | Performed by: NURSE PRACTITIONER

## 2024-03-21 ASSESSMENT — PAIN SCALES - GENERAL: PAINLEVEL: 7

## 2024-03-21 NOTE — PROGRESS NOTES
Subjective   Patient ID: Duane Scott is a 73 y.o. male who presents for Follow-up (ED follow up, near syncope, chest pain).    Visit for 74 y/o male seen today in private home, alone for ED follow up.     Per ED H/P: Patient is a 73-year-old male with past medical history of diabetes, renal failure, MI, and asthma presenting with dizziness/lightheadedness and near syncope.  States that he was at home and called his home health nurse over to the house.  While they were together, he began to feel lightheaded and dizzy.  Says that he had several episodes of nausea, vomiting as well as signs of accidental diarrhea.  Says that he was able to sit down and did not have a true syncopal episode.  Does endorse fluttering in his chest at that time.  Also endorses having a dry cough.  Denies fevers, chills, sore throat, runny nose, chest pain, shortness of breath, abdominal pain, urinary complaints.    ED Clinical course: CBC is without leukocytosis but does show mild anemia hemoglobin 12.7.  When compared to prior this does appear stable.  CMP does show mild FANTA with creatinine at 2.0 and GFR 35.  Last reading was creatinine of 1.7 and GFR of 42.  Troponin is elevated at 42.  Lactate within normal limits.  Viral swabs are negative.  UA with micro does show positive for UTI.  IV Rocephin ordered.  Chest x-ray is without acute cardiopulmonary process.  CT angio PE is without pulmonary embolism.  Does remark on ankylosing spondylitis and severe calcification coronary arteries.  Repeat troponin is decreased to 37. ED physician consulted Dr. Carrillo  who believes that this is likely related to his UTI and he will commonly have infected urine due to a chronic indwelling catheter. He says that there is nothing that needs to be done from the inpatient side, particularly due to patient having just been admitted.  His recommendation is to have this patient discharged home with cefuroxime.     Patient reports that he completed course of  "antibiotic. Has watson catheter, states \"it clogs every now and then\". He does not have a follow up with urology scheduled. He reports that his family helps with scheduling any appointments as he requires assistance with transportation. He denies fever, chills, appetite changes. Denies any further chest pain or palpitations. Endorses shortness of breath with exertion but denies feeling short of breath at rest. Lucas cough or wheezing. Denies abdominal pain, nausea, vomiting. Denies diarrhea, constipation. Remains ambulatory without assistive device. Denies recent fall or injury. Pt endorses generalized body pain \"all over\". He takes Tylenol as needed.     Home Visit: Medically necessary due to: patient has limited support systems to help the patient attend office visits          Current Outpatient Medications:     albuterol (Ventolin HFA) 90 mcg/actuation inhaler, Inhale 1 puff every 4 hours if needed., Disp: , Rfl:     ALPRAZolam (Xanax) 0.25 mg tablet, Take 1 tablet (0.25 mg) by mouth once daily as needed for anxiety., Disp: , Rfl:     aspirin 81 mg EC tablet, Take 1 tablet (81 mg) by mouth once daily., Disp: , Rfl:     atorvastatin (Lipitor) 80 mg tablet, Take 1 tablet (80 mg) by mouth once daily., Disp: 90 tablet, Rfl: 3    carvedilol (Coreg) 25 mg tablet, Take 0.5 tablets (12.5 mg) by mouth 2 times a day with meals., Disp: 90 tablet, Rfl: 3    furosemide (Lasix) 20 mg tablet, Take 1 tablet (20 mg) by mouth 2 times a day., Disp: , Rfl:     hydroCHLOROthiazide (HYDRODiuril) 25 mg tablet, Take 1 tablet (25 mg) by mouth once daily., Disp: 30 tablet, Rfl: 0    lisinopril 10 mg tablet, Take 1 tablet (10 mg) by mouth once daily., Disp: 90 tablet, Rfl: 3    ticagrelor (Brilinta) 90 mg tablet, Take 1 tablet (90 mg) by mouth 2 times a day., Disp: , Rfl:      Review of Systems  Constitutional:  Negative for appetite change, chills and fever.   HENT:  Negative for trouble swallowing.    Respiratory: Positive for shortness " "of breath with exertion. Negative for cough, wheezing.  Cardiovascular: Negative for chest pain, palpitations.   Gastrointestinal: Negative for abdominal pain, diarrhea, constipation, nausea and vomiting.   Genitourinary: Positive for catheter. Negative for hematuria  Musculoskeletal: Positive for arthralgias, back pain, unsteady gait    Neurological: Negative for dizziness and light-headedness.   Hematological: Does not bruise/bleed easily.   Psychiatric/Behavioral: Positive for anxiety       Objective   /64 (BP Location: Left arm, Patient Position: Standing, BP Cuff Size: Adult)   Pulse 77   Temp 36.4 °C (97.5 °F) (Temporal)   Resp 18   Ht 1.676 m (5' 6\")   Wt 95.3 kg (210 lb)   SpO2 100%   BMI 33.89 kg/m²     Physical Exam  Constitutional:       Appearance: Alert, standing in hallway. No acute distress.   HENT:      Head: Normocephalic.      Nose: Nose normal.      Mouth/Throat:      Mouth: Mucous membranes are moist.   Cardiovascular:      Rate and Rhythm: Normal rate and regular rhythm.      No edema  Pulmonary:      Effort: Pulmonary effort is normal.      Breath sounds: Normal breath sounds. No wheezing, rales or rhonchi.   Abdominal:      General: Abdomen is flat.      Palpations: Abdomen is soft. No tenderness.     Hull catheter present  Musculoskeletal:         General: Normal range of motion.      Cervical back: Normal range of motion.   Skin:     General: Skin is warm and dry.   Neurological:      General: No focal deficit present.      Mental Status: He is alert and oriented to person, place, and time.   Psychiatric:         Mood and Affect: Mood normal.     Assessment/Plan   Diagnoses and all orders for this visit:  Near syncope  -status post evaluation in the ED  -Labs/CXR/CT results reviewed with pt  -no further reports of dizziness/lightheadedness    Chest pain, unspecified type  -Resolved  -recommend follow up with cardiologist Dr. Spencer    Urinary tract infection associated with " indwelling urethral catheter, subsequent encounter  -acute, treated with course of cefuroxime  -recommend urology follow up       Radha Johnson, APRN-CNP

## 2024-04-05 ENCOUNTER — TELEPHONE (OUTPATIENT)
Dept: PRIMARY CARE | Facility: CLINIC | Age: 74
End: 2024-04-05
Payer: MEDICARE

## 2024-04-05 DIAGNOSIS — R30.0 DYSURIA: Primary | ICD-10-CM

## 2024-04-05 RX ORDER — CEFUROXIME AXETIL 500 MG/1
500 TABLET ORAL 2 TIMES DAILY
Qty: 14 TABLET | Refills: 0 | Status: SHIPPED | OUTPATIENT
Start: 2024-04-05 | End: 2024-04-12

## 2024-04-05 NOTE — TELEPHONE ENCOUNTER
"Patient wife calling office, states patient having discomfort with urination, puts phone on speaker for this nurse to speak with patient.  Patient reports he has had discomfort with urination since 3/22/24, two days after completing his last antibiotics.  Patient complains of pain in kidneys now on both sides, urine cloudy and \"off\", increased frequency and denies any hematuria.  Patient denies temp, denies nausea.  Please Advise.  "

## 2024-04-05 NOTE — TELEPHONE ENCOUNTER
Call placed to patient number as listed, this nurse relays information regarding antibiotic ordered and encourages wife that patient needs to follow up with Urology ASAP.  Danii states she will pass the message to Duane.

## 2024-05-05 ENCOUNTER — HOSPITAL ENCOUNTER (EMERGENCY)
Facility: HOSPITAL | Age: 74
Discharge: HOME | End: 2024-05-05
Attending: EMERGENCY MEDICINE
Payer: MEDICARE

## 2024-05-05 VITALS
BODY MASS INDEX: 31.84 KG/M2 | RESPIRATION RATE: 16 BRPM | OXYGEN SATURATION: 99 % | SYSTOLIC BLOOD PRESSURE: 156 MMHG | HEART RATE: 75 BPM | HEIGHT: 68 IN | DIASTOLIC BLOOD PRESSURE: 68 MMHG | TEMPERATURE: 98.2 F | WEIGHT: 210.1 LBS

## 2024-05-05 DIAGNOSIS — N39.0 UTI (URINARY TRACT INFECTION), UNCOMPLICATED: Primary | ICD-10-CM

## 2024-05-05 LAB
APPEARANCE UR: ABNORMAL
BACTERIA #/AREA URNS AUTO: ABNORMAL /HPF
BILIRUB UR STRIP.AUTO-MCNC: NEGATIVE MG/DL
COLOR UR: ABNORMAL
GLUCOSE UR STRIP.AUTO-MCNC: NORMAL MG/DL
KETONES UR STRIP.AUTO-MCNC: NEGATIVE MG/DL
LEUKOCYTE ESTERASE UR QL STRIP.AUTO: ABNORMAL
MUCOUS THREADS #/AREA URNS AUTO: ABNORMAL /LPF
NITRITE UR QL STRIP.AUTO: ABNORMAL
PH UR STRIP.AUTO: 6 [PH]
PROT UR STRIP.AUTO-MCNC: ABNORMAL MG/DL
RBC # UR STRIP.AUTO: ABNORMAL /UL
RBC #/AREA URNS AUTO: >20 /HPF
SP GR UR STRIP.AUTO: 1.01
UROBILINOGEN UR STRIP.AUTO-MCNC: NORMAL MG/DL
WBC #/AREA URNS AUTO: >50 /HPF
WBC CLUMPS #/AREA URNS AUTO: ABNORMAL /HPF

## 2024-05-05 PROCEDURE — 81001 URINALYSIS AUTO W/SCOPE: CPT | Performed by: EMERGENCY MEDICINE

## 2024-05-05 PROCEDURE — 87086 URINE CULTURE/COLONY COUNT: CPT | Mod: TRILAB,WESLAB | Performed by: EMERGENCY MEDICINE

## 2024-05-05 PROCEDURE — 51702 INSERT TEMP BLADDER CATH: CPT

## 2024-05-05 PROCEDURE — 2500000006 HC RX 250 W HCPCS SELF ADMINISTERED DRUGS (ALT 637 FOR ALL PAYERS): Performed by: EMERGENCY MEDICINE

## 2024-05-05 PROCEDURE — 99283 EMERGENCY DEPT VISIT LOW MDM: CPT | Mod: 25

## 2024-05-05 RX ORDER — NITROFURANTOIN 25; 75 MG/1; MG/1
100 CAPSULE ORAL 2 TIMES DAILY
Qty: 10 CAPSULE | Refills: 0 | Status: SHIPPED | OUTPATIENT
Start: 2024-05-05 | End: 2024-05-16 | Stop reason: ALTCHOICE

## 2024-05-05 RX ORDER — NITROFURANTOIN 25; 75 MG/1; MG/1
100 CAPSULE ORAL EVERY 12 HOURS SCHEDULED
Status: DISCONTINUED | OUTPATIENT
Start: 2024-05-05 | End: 2024-05-05 | Stop reason: HOSPADM

## 2024-05-05 RX ORDER — NITROFURANTOIN 25; 75 MG/1; MG/1
100 CAPSULE ORAL 2 TIMES DAILY
Qty: 10 CAPSULE | Refills: 0 | Status: SHIPPED | OUTPATIENT
Start: 2024-05-05 | End: 2024-05-05

## 2024-05-05 RX ADMIN — NITROFURANTOIN MONOHYDRATE/MACROCRYSTALS 100 MG: 75; 25 CAPSULE ORAL at 20:40

## 2024-05-05 ASSESSMENT — COLUMBIA-SUICIDE SEVERITY RATING SCALE - C-SSRS
2. HAVE YOU ACTUALLY HAD ANY THOUGHTS OF KILLING YOURSELF?: NO
1. IN THE PAST MONTH, HAVE YOU WISHED YOU WERE DEAD OR WISHED YOU COULD GO TO SLEEP AND NOT WAKE UP?: NO
6. HAVE YOU EVER DONE ANYTHING, STARTED TO DO ANYTHING, OR PREPARED TO DO ANYTHING TO END YOUR LIFE?: NO

## 2024-05-05 ASSESSMENT — PAIN SCALES - GENERAL: PAINLEVEL_OUTOF10: 0 - NO PAIN

## 2024-05-05 ASSESSMENT — PAIN DESCRIPTION - PAIN TYPE: TYPE: ACUTE PAIN

## 2024-05-05 ASSESSMENT — PAIN - FUNCTIONAL ASSESSMENT: PAIN_FUNCTIONAL_ASSESSMENT: 0-10

## 2024-05-06 NOTE — ED PROVIDER NOTES
HPI   Chief Complaint   Patient presents with    Bladder Problem     Pt has been having issues with bladder infections for a couple months. Pt having blood in his urine again.       HPI  74-year-old male presents with complaint of difficulty urinating and blood in his urine.  Patient is prone to urinary tract infections and urinary retention over the last day he has had similar symptoms.  No fevers or chills.  No abdominal pain.  No nausea or vomiting.  He states that he just goes a little bit at a time when he urinates.  Nothing seems to make it better or worse.  No other complaints                  No data recorded                   Patient History   Past Medical History:   Diagnosis Date    Acute renal failure syndrome (CMS-HCC) 11/14/2023    Angina pectoris (CMS-HCC) 11/17/2023    Arthritis     Asthma ([facility])     CAD (coronary artery disease)     s/p stent placement    Cardiac arrest (Multi) 11/17/2023    Chronic obstructive pulmonary disease requiring drug therapy (Multi) 11/17/2023    Coronary artery disease involving native coronary artery of native heart without angina pectoris 11/14/2023    Diabetes mellitus (Multi)     Diabetes mellitus, type 2 (Multi) 11/08/2018    Diastolic heart failure (Multi) 11/17/2023    Disorder involving thrombocytopenia (CMS-HCC) 11/12/2019    Essential hypertension 11/08/2018    Gout     High blood pressure     High cholesterol     Kidney disease     Migraine headache     Mixed hyperlipidemia 11/08/2018    Palpitations 11/17/2023    Status post coronary artery stent placement 11/14/2023    Status post myocardial infarction 09/24/2019     Past Surgical History:   Procedure Laterality Date    BACK SURGERY  2012    HERNIA REPAIR  2015    HERNIA REPAIR  2013     Family History   Problem Relation Name Age of Onset    Heart disease Mother      Stroke Son      Autism Son       Social History     Tobacco Use    Smoking status: Never     Passive exposure: Never    Smokeless tobacco:  Never   Substance Use Topics    Alcohol use: Not Currently    Drug use: Never       Physical Exam   ED Triage Vitals [05/05/24 1916]   Temperature Heart Rate Respirations BP   36.8 °C (98.2 °F) 75 16 156/68      Pulse Ox Temp Source Heart Rate Source Patient Position   99 % Temporal Monitor Sitting      BP Location FiO2 (%)     Right arm --       Physical Exam  General:  Awake, alert, no acute distress.  Head: Normocephalic, Atraumatic  Neck: Supple, trachea midline, no stridor  Skin: Warm and dry, no rashes   Lungs: Clear to auscultation bilaterally no acute respiratory distress, speaking in full sentences without difficulty  CV: Regular Rate Rhythm with no obvious murmurs gallops rubs noted, no jugular venous distention, no pedal edema   Abdomen: Soft, nontender, nondistended, positive bowel sounds, no peritoneal signs  Neuro:  No gross focal neurologic deficits, NIH is 0  Musculoskeletal:  Full range of motion in all 4 extremities  Psychiatric:  Alert oriented x 3, Good insight into condition.  ED Course & MDM   Diagnoses as of 05/06/24 0009   UTI (urinary tract infection), uncomplicated       Medical Decision Making  Patient's urine is concerning for urinary tract infection.  He was treated with Macrobid emerged part.  Prescription for Macrobid for home.  A Hull catheter was placed and he was discharged to home with a leg bag.  Referred back to urology who is seen in the past.  Return if his condition worsens.  He is stable for discharge.    Procedure  Procedures     Tariq Camara,   05/06/24 0010

## 2024-05-06 NOTE — DISCHARGE INSTRUCTIONS
Thank you for choosing [facility] Emergency Department. It was my pleasure to be involved in your care today.         As of today's visit, based on reasonable likelihood, that it is safe for you to be discharged back to your residence to follow-up as an outpatient for ongoing management of your medical problem. You should follow-up with any referrals / primary provider as soon as possible. The contacts (number, addresses) are listed below.         *Return to us immediately, if you feel you are getting worse, not getting better, or any new symptoms develop.         Make sure your pharmacy and primary doctor is aware of any new medications prescribed today.          It is your responsibility to contact as soon as possible, and follow through with, any referrals you were given today. We do recommend you inform them you are a [facility] follow-up patient, as often they can better accommodate your need to be seen, provided their schedules allow. We will, and have, made every effort to ensure you have access to adequate follow-up specialists available.          All problems may not be able to be fixed in one ER visit. This is why timely ongoing care is important, and this is a responsibility you share in. Further, you are free to follow up with any provider you choose, and this is not limited to our suggestion.          If cultures were obtained today, you will be contacted should anything result that would require further treatment. Please contact the ED at the number provided with questions.          Having trouble affording medications? Try [email or web address]! (This is not a hospital endorsed website, merely a recommendation based on my own personal experiences with [employer])

## 2024-05-08 LAB — BACTERIA UR CULT: ABNORMAL

## 2024-05-10 ENCOUNTER — TELEPHONE (OUTPATIENT)
Dept: PHARMACY | Facility: HOSPITAL | Age: 74
End: 2024-05-10
Payer: MEDICARE

## 2024-05-10 NOTE — PROGRESS NOTES
EDPD Note: Antibiotics Reviewed and Warranted    Contacted Mr. Duane Scott regarding a positive urine culture/result that was taken during their recent emergency room visit. I completed education with spouse . The patient is being treated appropriately with nitrofurantoin 100 mg BID x 5 days.     Patient presented to the ED for difficulty urinating and hematuria. Did not endorse  systemic symptoms in the ED. Patient was discharged with nitrofurantoin 100 mg BID x 5 days.   His wife reports that he seems to have improved with antibiotics and has not been needing to use the restroom as much. Dosing for males is typically nitrofurantoin 100 mg BID x 7 days, and nitrofurantoin is typically not recommended for older patients and impaired renal function. His eGFR is 35 and CrCl is 36.9 ml/min. Given his symptom improvement and completion of antibiotics, counseled for patient to follow-up if his symptoms persist/worsen/return.     Susceptibility data from last 90 days.  Collected Specimen Info Organism Amoxicillin/Clavulanate Ampicillin Ampicillin/Sulbactam Cefazolin Cefepime Ciprofloxacin Gentamicin Nitrofurantoin Piperacillin/Tazobactam Trimethoprim/Sulfamethoxazole   05/05/24 Urine from Clean Catch/Voided Citrobacter freundii complex R R R R S S S S S S        No further follow up needed from EDPD Team.     Naomy Callejas, MelindaD

## 2024-05-15 ENCOUNTER — TELEPHONE (OUTPATIENT)
Dept: PRIMARY CARE | Facility: CLINIC | Age: 74
End: 2024-05-15
Payer: MEDICARE

## 2024-05-16 ENCOUNTER — APPOINTMENT (OUTPATIENT)
Dept: PRIMARY CARE | Facility: CLINIC | Age: 74
End: 2024-05-16
Payer: MEDICARE

## 2024-05-16 ENCOUNTER — OFFICE VISIT (OUTPATIENT)
Dept: PRIMARY CARE | Facility: CLINIC | Age: 74
End: 2024-05-16
Payer: MEDICARE

## 2024-05-16 VITALS
HEART RATE: 78 BPM | DIASTOLIC BLOOD PRESSURE: 58 MMHG | TEMPERATURE: 97.7 F | OXYGEN SATURATION: 97 % | SYSTOLIC BLOOD PRESSURE: 130 MMHG | RESPIRATION RATE: 18 BRPM

## 2024-05-16 DIAGNOSIS — E78.00 HYPERCHOLESTEROLEMIA: ICD-10-CM

## 2024-05-16 DIAGNOSIS — Z95.5 STATUS POST CORONARY ARTERY STENT PLACEMENT: ICD-10-CM

## 2024-05-16 DIAGNOSIS — R10.11 RIGHT UPPER QUADRANT ABDOMINAL PAIN: Primary | ICD-10-CM

## 2024-05-16 DIAGNOSIS — J45.909 ASTHMA, UNSPECIFIED ASTHMA SEVERITY, UNSPECIFIED WHETHER COMPLICATED, UNSPECIFIED WHETHER PERSISTENT (HHS-HCC): ICD-10-CM

## 2024-05-16 DIAGNOSIS — I50.9 CHRONIC CONGESTIVE HEART FAILURE, UNSPECIFIED HEART FAILURE TYPE (MULTI): ICD-10-CM

## 2024-05-16 DIAGNOSIS — I10 PRIMARY HYPERTENSION: ICD-10-CM

## 2024-05-16 PROCEDURE — 3078F DIAST BP <80 MM HG: CPT | Performed by: NURSE PRACTITIONER

## 2024-05-16 PROCEDURE — 99349 HOME/RES VST EST MOD MDM 40: CPT | Performed by: NURSE PRACTITIONER

## 2024-05-16 PROCEDURE — 4010F ACE/ARB THERAPY RXD/TAKEN: CPT | Performed by: NURSE PRACTITIONER

## 2024-05-16 PROCEDURE — 1159F MED LIST DOCD IN RCRD: CPT | Performed by: NURSE PRACTITIONER

## 2024-05-16 PROCEDURE — 3075F SYST BP GE 130 - 139MM HG: CPT | Performed by: NURSE PRACTITIONER

## 2024-05-16 PROCEDURE — 1160F RVW MEDS BY RX/DR IN RCRD: CPT | Performed by: NURSE PRACTITIONER

## 2024-05-16 PROCEDURE — 1125F AMNT PAIN NOTED PAIN PRSNT: CPT | Performed by: NURSE PRACTITIONER

## 2024-05-16 ASSESSMENT — ENCOUNTER SYMPTOMS
NAUSEA: 1
HEMATOLOGIC/LYMPHATIC NEGATIVE: 1
NECK PAIN: 1
WEAKNESS: 1
FATIGUE: 1
COUGH: 1
DIARRHEA: 1
DIFFICULTY URINATING: 1
PSYCHIATRIC NEGATIVE: 1
ACTIVITY CHANGE: 1
BACK PAIN: 1
NECK STIFFNESS: 1
HEADACHES: 1
ARTHRALGIAS: 1
ABDOMINAL PAIN: 1
SHORTNESS OF BREATH: 1

## 2024-05-16 ASSESSMENT — PAIN SCALES - GENERAL: PAINLEVEL: 5

## 2024-05-16 NOTE — TELEPHONE ENCOUNTER
Phoned patient in follow up and spoke with Danii/ wife to confirm today's [facility] visit. Wife reported she was notified earlier by the [facility] office that provider was changed to Taisha Correa NP and she is in agreement.
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
## 2024-05-17 ENCOUNTER — HOSPITAL ENCOUNTER (OUTPATIENT)
Dept: RADIOLOGY | Facility: CLINIC | Age: 74
Discharge: HOME | End: 2024-05-17
Payer: MEDICARE

## 2024-05-17 DIAGNOSIS — R10.11 RIGHT UPPER QUADRANT ABDOMINAL PAIN: ICD-10-CM

## 2024-05-17 PROCEDURE — 74019 RADEX ABDOMEN 2 VIEWS: CPT | Performed by: STUDENT IN AN ORGANIZED HEALTH CARE EDUCATION/TRAINING PROGRAM

## 2024-05-17 PROCEDURE — 74019 RADEX ABDOMEN 2 VIEWS: CPT

## 2024-05-20 ENCOUNTER — TELEPHONE (OUTPATIENT)
Dept: PRIMARY CARE | Facility: CLINIC | Age: 74
End: 2024-05-20
Payer: MEDICARE

## 2024-05-20 NOTE — TELEPHONE ENCOUNTER
Result Communication    Resulted Orders   XR abdomen 2 views supine and erect or decub    Narrative    Interpreted By:  Familia Bhat,   STUDY:  XR ABDOMEN 2 VIEWS SUPINE AND ERECT OR DECUB;  5/17/2024 11:05 am      INDICATION:  Signs/Symptoms:abdominal pain.      COMPARISON:  08/04/2019      ACCESSION NUMBER(S):  [id number]      ORDERING CLINICIAN:  MITESH SCHILLING      FINDINGS:  Nonobstructive bowel gas pattern. No evidence of pneumoperitoneum.      Visualized lungs are clear.      Osseous structures demonstrate no acute bony changes. Postsurgical  changes of the lower lumbar spine. Multilevel lumbar spondylosis.        Impression    1.  Nonobstructive bowel gas pattern.      MACRO:  None      Signed by: Familia Bhat 5/19/2024 1:52 PM  Dictation workstation:   [id number]       9:27 AM      Results were successfully communicated with the patient and they acknowledged their understanding.  Pt no longer continues with abdominal pain and has no complaints today.

## 2024-06-12 ENCOUNTER — APPOINTMENT (OUTPATIENT)
Dept: PRIMARY CARE | Facility: CLINIC | Age: 74
End: 2024-06-12
Payer: MEDICARE

## 2024-06-13 ENCOUNTER — OFFICE VISIT (OUTPATIENT)
Dept: PRIMARY CARE | Facility: CLINIC | Age: 74
End: 2024-06-13
Payer: MEDICARE

## 2024-06-13 VITALS
OXYGEN SATURATION: 96 % | TEMPERATURE: 97.2 F | BODY MASS INDEX: 29.04 KG/M2 | WEIGHT: 191 LBS | HEART RATE: 64 BPM | SYSTOLIC BLOOD PRESSURE: 110 MMHG | DIASTOLIC BLOOD PRESSURE: 62 MMHG

## 2024-06-13 DIAGNOSIS — N39.0 URINARY TRACT INFECTION WITHOUT HEMATURIA, SITE UNSPECIFIED: ICD-10-CM

## 2024-06-13 DIAGNOSIS — N30.01 ACUTE CYSTITIS WITH HEMATURIA: Primary | ICD-10-CM

## 2024-06-13 DIAGNOSIS — R82.90 ABNORMAL URINALYSIS: ICD-10-CM

## 2024-06-13 LAB
POC APPEARANCE, URINE: CLEAR
POC BILIRUBIN, URINE: NEGATIVE
POC BLOOD, URINE: ABNORMAL
POC COLOR, URINE: YELLOW
POC GLUCOSE, URINE: NEGATIVE MG/DL
POC KETONES, URINE: NEGATIVE MG/DL
POC LEUKOCYTES, URINE: ABNORMAL
POC NITRITE,URINE: POSITIVE
POC PH, URINE: 6 PH
POC PROTEIN, URINE: ABNORMAL MG/DL
POC SPECIFIC GRAVITY, URINE: 1.02
POC UROBILINOGEN, URINE: 0.2 EU/DL

## 2024-06-13 PROCEDURE — 99213 OFFICE O/P EST LOW 20 MIN: CPT

## 2024-06-13 PROCEDURE — 87086 URINE CULTURE/COLONY COUNT: CPT

## 2024-06-13 PROCEDURE — 3074F SYST BP LT 130 MM HG: CPT

## 2024-06-13 PROCEDURE — 4010F ACE/ARB THERAPY RXD/TAKEN: CPT

## 2024-06-13 PROCEDURE — 1160F RVW MEDS BY RX/DR IN RCRD: CPT

## 2024-06-13 PROCEDURE — 1159F MED LIST DOCD IN RCRD: CPT

## 2024-06-13 PROCEDURE — 81003 URINALYSIS AUTO W/O SCOPE: CPT

## 2024-06-13 PROCEDURE — 1125F AMNT PAIN NOTED PAIN PRSNT: CPT

## 2024-06-13 PROCEDURE — 87186 SC STD MICRODIL/AGAR DIL: CPT

## 2024-06-13 PROCEDURE — 3078F DIAST BP <80 MM HG: CPT

## 2024-06-13 RX ORDER — NITROFURANTOIN 25; 75 MG/1; MG/1
100 CAPSULE ORAL 2 TIMES DAILY
Qty: 14 CAPSULE | Refills: 0 | Status: SHIPPED | OUTPATIENT
Start: 2024-06-13 | End: 2024-06-17 | Stop reason: ALTCHOICE

## 2024-06-13 ASSESSMENT — PAIN SCALES - GENERAL: PAINLEVEL: 6

## 2024-06-13 NOTE — PROGRESS NOTES
Subjective   Patient ID: Duane Scott is a 74 y.o. male who presents for Right side pain.    HPI     Duane presents today for concerns of right lower abdominal pain which he reports is similar to pain that he experienced with his last bladder infection.  He report multiple bladder infection over the last few months. Symptoms began over the last few days.  Denies flank pain.  Denies fever/chills. Denies hematuria.  He is a poor historian, unable to give details of recent medical care received by his home health NP or during his most recent ER visit which was 5/5/2024 for UTI. Most recent ER visit, labs, imaging and subsequent home health visits reviewed for pertinence to this visit.  Appears as though he was to follow up with urology but reports he missed that appointment due to transportation issues. There is some question regard watson catheter as last ER visit he was discharged with watson to leg bag.  Patient state he removed his own catheter when her was unable to make it to urology appointment. Reports he is urinating several times per day without difficulty starting/stopping his stream.     Review of Systems   Constitutional:  Negative for activity change, appetite change, chills, fatigue and fever.   Respiratory: Negative.     Cardiovascular:  Negative for chest pain and palpitations.   Gastrointestinal:  Positive for abdominal pain (patient points to suprapubic area when indicating pain). Negative for abdominal distention, blood in stool, constipation, diarrhea and vomiting.   Genitourinary:  Positive for dysuria and urgency. Negative for difficulty urinating, flank pain, frequency and hematuria.   Skin: Negative.          Objective   /62 (BP Location: Left arm)   Pulse 64   Temp 36.2 °C (97.2 °F) (Temporal)   Wt 86.6 kg (191 lb)   SpO2 96%   BMI 29.04 kg/m²     Physical Exam  Vitals and nursing note reviewed.   Constitutional:       General: He is not in acute distress.     Appearance: He is not  ill-appearing or toxic-appearing.      Comments: Disheveled appearance, clothing dirty with strong odor of body odor and slight urine odor. Reports he walks to all of his appointment and walks to the pharmacy- [facility]- for his medication.  Several miles of walking every day.    Cardiovascular:      Rate and Rhythm: Normal rate and regular rhythm.      Heart sounds: Normal heart sounds.   Pulmonary:      Effort: Pulmonary effort is normal. No respiratory distress.      Breath sounds: Normal breath sounds and air entry. No decreased breath sounds, wheezing, rhonchi or rales.   Abdominal:      General: Abdomen is protuberant. Bowel sounds are normal.      Palpations: Abdomen is soft. There is no hepatomegaly or splenomegaly.      Tenderness: There is abdominal tenderness in the suprapubic area. There is no right CVA tenderness, left CVA tenderness, guarding or rebound.      Comments: Limited exam as patient refuses to undress and is sitting up in chair, unable/unwilling to get up on exam table   Neurological:      Mental Status: He is alert.         Assessment/Plan   Problem List Items Addressed This Visit    None  Visit Diagnoses         Codes    Acute cystitis with hematuria    -  Primary  Acute.  Urine dipstick: +leukocytes, +nitrates  Urine sent for C & S, will call with results and adjust treatment accordingly.  Start ATB - please complete full course unless you hear otherwise from our office.  Discussed indication for antibiotic use, administration instructions, and adverse effects with patient.  Increase your fluid intake, Tylenol or Motrin as needed for pain or fever.  May use OTC AZO/UROSTAT/Cystex for symptoms relief if desired.   Discussed hygiene for prevention.  Call our office to report and new fever/chills or back pain.  Follow up in 2-3 week to ensure resolution of symptoms and repeat UA.   New referral placed for urology, we discussed that the nearest urologist would be too far of a walk for him  that her would need to arrange transportation.  Offered to set up care coordination to help arrange transport once appointment is made but he declined.  N30.01    Relevant Medications    sulfamethoxazole-trimethoprim (Bactrim DS) 800-160 mg tablet    Other Relevant Orders    POCT UA Automated manually resulted (Completed)    Referral to Urology    Abnormal urinalysis     R82.90    Relevant Orders    Urine Culture (Completed)

## 2024-06-16 LAB
BACTERIA UR CULT: ABNORMAL
BACTERIA UR CULT: ABNORMAL

## 2024-06-17 RX ORDER — SULFAMETHOXAZOLE AND TRIMETHOPRIM 800; 160 MG/1; MG/1
1 TABLET ORAL 2 TIMES DAILY
Qty: 14 TABLET | Refills: 0 | Status: SHIPPED | OUTPATIENT
Start: 2024-06-17 | End: 2024-06-24

## 2024-06-18 LAB
BACTERIA UR CULT: ABNORMAL
BACTERIA UR CULT: ABNORMAL

## 2024-06-23 ASSESSMENT — ENCOUNTER SYMPTOMS
RESPIRATORY NEGATIVE: 1
DYSURIA: 1
ACTIVITY CHANGE: 0
DIFFICULTY URINATING: 0
ABDOMINAL PAIN: 1
BLOOD IN STOOL: 0
ABDOMINAL DISTENTION: 0
APPETITE CHANGE: 0
CONSTIPATION: 0
FREQUENCY: 0
DIARRHEA: 0
FATIGUE: 0
CHILLS: 0
FEVER: 0
HEMATURIA: 0
PALPITATIONS: 0
VOMITING: 0
FLANK PAIN: 0

## 2024-07-09 ENCOUNTER — OFFICE VISIT (OUTPATIENT)
Dept: PRIMARY CARE | Facility: CLINIC | Age: 74
End: 2024-07-09
Payer: MEDICARE

## 2024-07-09 VITALS
DIASTOLIC BLOOD PRESSURE: 74 MMHG | WEIGHT: 188 LBS | OXYGEN SATURATION: 97 % | HEART RATE: 74 BPM | SYSTOLIC BLOOD PRESSURE: 122 MMHG | BODY MASS INDEX: 28.59 KG/M2 | TEMPERATURE: 98.7 F

## 2024-07-09 DIAGNOSIS — J44.1 ACUTE EXACERBATION OF CHRONIC OBSTRUCTIVE PULMONARY DISEASE (MULTI): Primary | ICD-10-CM

## 2024-07-09 DIAGNOSIS — J06.9 VIRAL UPPER RESPIRATORY TRACT INFECTION: ICD-10-CM

## 2024-07-09 PROCEDURE — 3078F DIAST BP <80 MM HG: CPT

## 2024-07-09 PROCEDURE — 1159F MED LIST DOCD IN RCRD: CPT

## 2024-07-09 PROCEDURE — 1125F AMNT PAIN NOTED PAIN PRSNT: CPT

## 2024-07-09 PROCEDURE — 99214 OFFICE O/P EST MOD 30 MIN: CPT

## 2024-07-09 PROCEDURE — 4010F ACE/ARB THERAPY RXD/TAKEN: CPT

## 2024-07-09 PROCEDURE — 1160F RVW MEDS BY RX/DR IN RCRD: CPT

## 2024-07-09 PROCEDURE — 3074F SYST BP LT 130 MM HG: CPT

## 2024-07-09 RX ORDER — ALBUTEROL SULFATE 90 UG/1
1 AEROSOL, METERED RESPIRATORY (INHALATION) EVERY 4 HOURS PRN
Qty: 18 G | Refills: 1 | Status: SHIPPED | OUTPATIENT
Start: 2024-07-09

## 2024-07-09 RX ORDER — DOXYCYCLINE 100 MG/1
100 CAPSULE ORAL 2 TIMES DAILY
Qty: 14 CAPSULE | Refills: 0 | Status: SHIPPED | OUTPATIENT
Start: 2024-07-09 | End: 2024-07-16

## 2024-07-09 RX ORDER — PREDNISONE 20 MG/1
20 TABLET ORAL 2 TIMES DAILY
Qty: 10 TABLET | Refills: 0 | Status: SHIPPED | OUTPATIENT
Start: 2024-07-09 | End: 2024-07-14

## 2024-07-09 ASSESSMENT — PAIN SCALES - GENERAL: PAINLEVEL: 6

## 2024-07-09 NOTE — PROGRESS NOTES
Subjective   Patient ID: Duane Scott is a 74 y.o. male who presents for Cough, Shortness of Breath, and Generalized Body Aches.    HPI   Duane presents for complaints of sinus congestion, runny nose, productive cough with thick yellow phlegm x 5 days.  He also reports generalized muscle aching, fatigue. Denies any fevers/chills. Denies chest pain, but admits dyspnea with exertion.  Unsure of any ill exposures.   He also reports that he walked a mile to get here for his appointment today. He typically walks to local places, such as doctors appointments, pharmacy, and grocery store, as he no longer has a car to drive.      Review of Systems  All other systems have been reviewed and are negative except as noted in the HPI.       Objective   /74 (BP Location: Left arm)   Pulse 74   Temp 37.1 °C (98.7 °F) (Temporal)   Wt 85.3 kg (188 lb)   SpO2 97%   BMI 28.59 kg/m²     Physical Exam  Vitals and nursing note reviewed.   Constitutional:       General: He is not in acute distress.     Appearance: Normal appearance. He is normal weight.   HENT:      Right Ear: Hearing, tympanic membrane, ear canal and external ear normal.      Left Ear: Hearing, tympanic membrane, ear canal and external ear normal.      Nose: Nasal tenderness, mucosal edema, congestion and rhinorrhea present. Rhinorrhea is clear.      Right Sinus: No maxillary sinus tenderness or frontal sinus tenderness.      Left Sinus: No maxillary sinus tenderness or frontal sinus tenderness.      Mouth/Throat:      Lips: Pink.      Mouth: Mucous membranes are dry.      Pharynx: Oropharynx is clear. Uvula midline. Posterior oropharyngeal erythema present. No pharyngeal swelling, oropharyngeal exudate or uvula swelling.   Cardiovascular:      Rate and Rhythm: Normal rate and regular rhythm.      Heart sounds: Normal heart sounds, S1 normal and S2 normal.   Pulmonary:      Effort: Pulmonary effort is normal. No accessory muscle usage or respiratory distress.       Breath sounds: Normal air entry. Examination of the right-upper field reveals rhonchi. Examination of the left-upper field reveals rhonchi. Rhonchi present. No decreased breath sounds, wheezing or rales.   Musculoskeletal:      Cervical back: Normal range of motion and neck supple.   Lymphadenopathy:      Cervical: No cervical adenopathy.   Skin:     General: Skin is warm and dry.   Neurological:      General: No focal deficit present.      Mental Status: He is alert and oriented to person, place, and time. Mental status is at baseline.   Psychiatric:         Behavior: Behavior is cooperative.         Assessment/Plan   Problem List Items Addressed This Visit             ICD-10-CM    COPD Exacerbation  Chronic condition, not well controlled, exacerbation likely due to viral URI.   Begin prednisone 20 mg BID x 5 days, doxycycline 100 mg BID x 7 days and albuterol inhaler as discussed as prescribed.  Indications, administration, and side effects discussed.    Deferred CXR at this time, Pox stable, bases clear with good air movement.   Discussed  OTC medications for symptom relief as discussed.   Follow up in 1 week.   J44.1    Relevant Medications    predniSONE (Deltasone) 20 mg tablet    doxycycline (Vibramycin) 100 mg capsule    albuterol (Ventolin HFA) 90 mcg/actuation inhaler     Other Visit Diagnoses         Codes    Viral upper respiratory tract infection      Acute.   Discussed supportive care interventions as included in patient instructions.   -maintain rest/hydration  -Discussed OTC medication for symptoms management including nasal decongestant, sinus decongestants, fever/pain relief interventions  -Discussed non-pharmacological interventions such as humidification, nasal lavage, salt water gargles  Follow up if symptoms persist greater than 7 days.  Seek emergency medical care if fever above 104F, difficulty swallowing, or difficulty breathing occurs.  Patient states understanding.    J06.9

## 2024-07-16 ENCOUNTER — TELEPHONE (OUTPATIENT)
Dept: PRIMARY CARE | Facility: CLINIC | Age: 74
End: 2024-07-16

## 2024-07-16 ENCOUNTER — APPOINTMENT (OUTPATIENT)
Dept: PRIMARY CARE | Facility: CLINIC | Age: 74
End: 2024-07-16
Payer: MEDICARE

## 2024-07-16 VITALS
DIASTOLIC BLOOD PRESSURE: 70 MMHG | WEIGHT: 188 LBS | OXYGEN SATURATION: 97 % | BODY MASS INDEX: 28.59 KG/M2 | HEART RATE: 67 BPM | SYSTOLIC BLOOD PRESSURE: 112 MMHG

## 2024-07-16 DIAGNOSIS — J44.1 ACUTE EXACERBATION OF CHRONIC OBSTRUCTIVE PULMONARY DISEASE (MULTI): Primary | ICD-10-CM

## 2024-07-16 DIAGNOSIS — I10 PRIMARY HYPERTENSION: ICD-10-CM

## 2024-07-16 DIAGNOSIS — Z00.00 ANNUAL PHYSICAL EXAM: Primary | ICD-10-CM

## 2024-07-16 DIAGNOSIS — E78.00 HYPERCHOLESTEROLEMIA: ICD-10-CM

## 2024-07-16 PROCEDURE — 1159F MED LIST DOCD IN RCRD: CPT

## 2024-07-16 PROCEDURE — 3074F SYST BP LT 130 MM HG: CPT

## 2024-07-16 PROCEDURE — 1126F AMNT PAIN NOTED NONE PRSNT: CPT

## 2024-07-16 PROCEDURE — 1160F RVW MEDS BY RX/DR IN RCRD: CPT

## 2024-07-16 PROCEDURE — 3078F DIAST BP <80 MM HG: CPT

## 2024-07-16 PROCEDURE — 4010F ACE/ARB THERAPY RXD/TAKEN: CPT

## 2024-07-16 PROCEDURE — 99214 OFFICE O/P EST MOD 30 MIN: CPT

## 2024-07-16 RX ORDER — FLUTICASONE FUROATE AND VILANTEROL 200; 25 UG/1; UG/1
1 POWDER RESPIRATORY (INHALATION) DAILY
Qty: 28 EACH | Refills: 1 | Status: SHIPPED | OUTPATIENT
Start: 2024-07-16

## 2024-07-16 RX ORDER — PREDNISONE 10 MG/1
TABLET ORAL
Qty: 30 TABLET | Refills: 0 | Status: SHIPPED | OUTPATIENT
Start: 2024-07-16 | End: 2024-07-26 | Stop reason: WASHOUT

## 2024-07-16 ASSESSMENT — PATIENT HEALTH QUESTIONNAIRE - PHQ9
2. FEELING DOWN, DEPRESSED OR HOPELESS: NOT AT ALL
SUM OF ALL RESPONSES TO PHQ9 QUESTIONS 1 AND 2: 0
1. LITTLE INTEREST OR PLEASURE IN DOING THINGS: NOT AT ALL

## 2024-07-16 ASSESSMENT — PAIN SCALES - GENERAL: PAINLEVEL: 0-NO PAIN

## 2024-07-16 NOTE — ASSESSMENT & PLAN NOTE
Chronic condition, not well-controlled  Stable enough for management in an outpatient setting.  Oxygen level stable, patient is able to speak in full sentences without needing to take breaths, respiratory exam fairly unremarkable aside from wheezing noted throughout.  Begin a second prednisone taper as discussed.- Explained intended effects, potential side effects, and schedule of dosages of the medication.  Continue last dose of antibiotics as prescribed.  Begin Breo Ellipta inhaler 1 puff once daily.  Follow-up with your pulmonologist, Dr. Montgomery, as soon as possible.    Orders:    predniSONE (Deltasone) 10 mg tablet; Take 5 tablets (50 mg) by mouth once daily for 2 days, THEN 4 tablets (40 mg) once daily for 2 days, THEN 3 tablets (30 mg) once daily for 2 days, THEN 2 tablets (20 mg) once daily for 2 days, THEN 1 tablet (10 mg) once daily for 2 days.    Referral to Pulmonology; Future    fluticasone furoate-vilanteroL (Breo Ellipta) 200-25 mcg/dose inhaler; Inhale 1 puff once daily.

## 2024-07-16 NOTE — PROGRESS NOTES
Subjective     Patient ID: Duane Scott is a 74 y.o. male who presents for COPD (Recheck lungs. Reports trouble breathing and coughing up phlegm ).    HPI  Duane presents for COPD exacerbation follow up. Reports he completed his burst of prednisone and has one more day of doxycycline as prescribed at his last visit. He reports he uses his albuterol inhaler up to 3 times per day.  He is still experiencing shortness of breath, dyspnea, wheezing.  Symptoms of COPD not well-controlled.  Although he was instructed to follow-up with his pulmonologist, he has not yet done so.      Current Outpatient Medications:     albuterol (Ventolin HFA) 90 mcg/actuation inhaler, Inhale 1 puff every 4 hours if needed for wheezing or shortness of breath., Disp: 18 g, Rfl: 1    ALPRAZolam (Xanax) 0.25 mg tablet, Take 1 tablet (0.25 mg) by mouth once daily as needed for anxiety., Disp: , Rfl:     aspirin 81 mg EC tablet, Take 1 tablet (81 mg) by mouth once daily., Disp: , Rfl:     atorvastatin (Lipitor) 80 mg tablet, Take 1 tablet (80 mg) by mouth once daily., Disp: 90 tablet, Rfl: 3    carvedilol (Coreg) 25 mg tablet, Take 0.5 tablets (12.5 mg) by mouth 2 times a day with meals., Disp: 90 tablet, Rfl: 3    doxycycline (Vibramycin) 100 mg capsule, Take 1 capsule (100 mg) by mouth 2 times a day for 7 days. Take with at least 8 ounces (large glass) of water, do not lie down for 30 minutes after, Disp: 14 capsule, Rfl: 0    furosemide (Lasix) 20 mg tablet, Take 1 tablet (20 mg) by mouth 2 times a day., Disp: , Rfl:     lisinopril 10 mg tablet, Take 1 tablet (10 mg) by mouth once daily., Disp: 90 tablet, Rfl: 3    ticagrelor (Brilinta) 90 mg tablet, Take 1 tablet (90 mg) by mouth 2 times a day., Disp: , Rfl:     hydroCHLOROthiazide (HYDRODiuril) 25 mg tablet, Take 1 tablet (25 mg) by mouth once daily., Disp: 30 tablet, Rfl: 0      Review of Systems  All other systems have been reviewed and are negative except as noted in the HPI.          Objective   /70 (BP Location: Left arm)   Pulse 67   Wt 85.3 kg (188 lb)   SpO2 97%   BMI 28.59 kg/m²     Physical Exam  Vitals and nursing note reviewed.   Constitutional:       General: He is not in acute distress.     Comments: Not well groomed, clothes are durty and in disarray.  Once again he has walked to this visit despite high heat and humidity.  His glasses are broken, using rubber bands as straps.  Shoe soles split from shoes, held together with duct tape.    HENT:      Head: Normocephalic.      Nose: Congestion and rhinorrhea present. Rhinorrhea is clear.      Right Sinus: No maxillary sinus tenderness or frontal sinus tenderness.      Left Sinus: No maxillary sinus tenderness or frontal sinus tenderness.      Mouth/Throat:      Lips: Pink.      Mouth: Mucous membranes are moist.      Pharynx: Oropharynx is clear. Uvula midline.   Cardiovascular:      Rate and Rhythm: Normal rate and regular rhythm.      Heart sounds: Normal heart sounds, S1 normal and S2 normal.   Pulmonary:      Effort: Pulmonary effort is normal. No accessory muscle usage or respiratory distress.      Breath sounds: Normal air entry. Examination of the right-upper field reveals wheezing. Examination of the left-upper field reveals wheezing. Examination of the right-lower field reveals wheezing. Examination of the left-lower field reveals wheezing. Wheezing present. No decreased breath sounds, rhonchi or rales.   Musculoskeletal:      Right lower leg: No edema.      Left lower leg: No edema.   Skin:     General: Skin is warm and dry.   Neurological:      Mental Status: He is alert. Mental status is at baseline.   Psychiatric:         Behavior: Behavior is cooperative.             Assessment & Plan  Acute exacerbation of chronic obstructive pulmonary disease (Multi)  Chronic condition, not well-controlled  Stable enough for management in an outpatient setting.  Oxygen level stable, patient is able to speak in full  sentences without needing to take breaths, respiratory exam fairly unremarkable aside from wheezing noted throughout.  Begin a second prednisone taper as discussed.- Explained intended effects, potential side effects, and schedule of dosages of the medication.  Continue last dose of antibiotics as prescribed.  Begin Breo Ellipta inhaler 1 puff once daily.  Follow-up with your pulmonologist, Dr. Montgomery, as soon as possible.    Orders:    predniSONE (Deltasone) 10 mg tablet; Take 5 tablets (50 mg) by mouth once daily for 2 days, THEN 4 tablets (40 mg) once daily for 2 days, THEN 3 tablets (30 mg) once daily for 2 days, THEN 2 tablets (20 mg) once daily for 2 days, THEN 1 tablet (10 mg) once daily for 2 days.    Referral to Pulmonology; Future    fluticasone furoate-vilanteroL (Breo Ellipta) 200-25 mcg/dose inhaler; Inhale 1 puff once daily.        Patient understands and agrees with treatment plan.    Brianne Del Rio, ROBIN-CNP

## 2024-07-25 ENCOUNTER — LAB (OUTPATIENT)
Dept: LAB | Facility: LAB | Age: 74
End: 2024-07-25
Payer: MEDICARE

## 2024-07-25 DIAGNOSIS — I10 PRIMARY HYPERTENSION: ICD-10-CM

## 2024-07-25 DIAGNOSIS — E78.00 HYPERCHOLESTEROLEMIA: ICD-10-CM

## 2024-07-25 LAB
ALBUMIN SERPL-MCNC: 4.1 G/DL (ref 3.5–5)
ALP BLD-CCNC: 92 U/L (ref 35–125)
ALT SERPL-CCNC: 22 U/L (ref 5–40)
ANION GAP SERPL CALC-SCNC: 12 MMOL/L
AST SERPL-CCNC: 18 U/L (ref 5–40)
BASOPHILS # BLD AUTO: 0.01 X10*3/UL (ref 0–0.1)
BASOPHILS NFR BLD AUTO: 0.2 %
BILIRUB SERPL-MCNC: 0.5 MG/DL (ref 0.1–1.2)
BUN SERPL-MCNC: 38 MG/DL (ref 8–25)
CALCIUM SERPL-MCNC: 9 MG/DL (ref 8.5–10.4)
CHLORIDE SERPL-SCNC: 107 MMOL/L (ref 97–107)
CHOLEST SERPL-MCNC: 120 MG/DL (ref 133–200)
CHOLEST/HDLC SERPL: 2 {RATIO}
CO2 SERPL-SCNC: 22 MMOL/L (ref 24–31)
CREAT SERPL-MCNC: 1.4 MG/DL (ref 0.4–1.6)
EGFRCR SERPLBLD CKD-EPI 2021: 53 ML/MIN/1.73M*2
EOSINOPHIL # BLD AUTO: 0.2 X10*3/UL (ref 0–0.4)
EOSINOPHIL NFR BLD AUTO: 3.9 %
ERYTHROCYTE [DISTWIDTH] IN BLOOD BY AUTOMATED COUNT: 12.7 % (ref 11.5–14.5)
GLUCOSE SERPL-MCNC: 118 MG/DL (ref 65–99)
HCT VFR BLD AUTO: 36.5 % (ref 41–52)
HDLC SERPL-MCNC: 61 MG/DL
HGB BLD-MCNC: 12.1 G/DL (ref 13.5–17.5)
IMM GRANULOCYTES # BLD AUTO: 0.02 X10*3/UL (ref 0–0.5)
IMM GRANULOCYTES NFR BLD AUTO: 0.4 % (ref 0–0.9)
LDLC SERPL CALC-MCNC: 51 MG/DL (ref 65–130)
LYMPHOCYTES # BLD AUTO: 1.11 X10*3/UL (ref 0.8–3)
LYMPHOCYTES NFR BLD AUTO: 21.7 %
MCH RBC QN AUTO: 29.9 PG (ref 26–34)
MCHC RBC AUTO-ENTMCNC: 33.2 G/DL (ref 32–36)
MCV RBC AUTO: 90 FL (ref 80–100)
MONOCYTES # BLD AUTO: 0.41 X10*3/UL (ref 0.05–0.8)
MONOCYTES NFR BLD AUTO: 8 %
NEUTROPHILS # BLD AUTO: 3.36 X10*3/UL (ref 1.6–5.5)
NEUTROPHILS NFR BLD AUTO: 65.8 %
NRBC BLD-RTO: 0 /100 WBCS (ref 0–0)
PLATELET # BLD AUTO: 104 X10*3/UL (ref 150–450)
POTASSIUM SERPL-SCNC: 4.3 MMOL/L (ref 3.4–5.1)
PROT SERPL-MCNC: 6.5 G/DL (ref 5.9–7.9)
RBC # BLD AUTO: 4.05 X10*6/UL (ref 4.5–5.9)
SODIUM SERPL-SCNC: 141 MMOL/L (ref 133–145)
TRIGL SERPL-MCNC: 39 MG/DL (ref 40–150)
WBC # BLD AUTO: 5.1 X10*3/UL (ref 4.4–11.3)

## 2024-07-25 PROCEDURE — 36415 COLL VENOUS BLD VENIPUNCTURE: CPT

## 2024-07-25 PROCEDURE — 80053 COMPREHEN METABOLIC PANEL: CPT

## 2024-07-25 PROCEDURE — 80061 LIPID PANEL: CPT

## 2024-07-25 PROCEDURE — 85025 COMPLETE CBC W/AUTO DIFF WBC: CPT

## 2024-07-26 ENCOUNTER — APPOINTMENT (OUTPATIENT)
Dept: CARDIOLOGY | Facility: CLINIC | Age: 74
End: 2024-07-26
Payer: MEDICARE

## 2024-07-26 VITALS
DIASTOLIC BLOOD PRESSURE: 68 MMHG | HEART RATE: 60 BPM | TEMPERATURE: 98.6 F | BODY MASS INDEX: 28.49 KG/M2 | RESPIRATION RATE: 18 BRPM | WEIGHT: 188 LBS | HEIGHT: 68 IN | OXYGEN SATURATION: 98 % | SYSTOLIC BLOOD PRESSURE: 141 MMHG

## 2024-07-26 DIAGNOSIS — R06.02 SOB (SHORTNESS OF BREATH): Primary | ICD-10-CM

## 2024-07-26 PROCEDURE — 4010F ACE/ARB THERAPY RXD/TAKEN: CPT | Performed by: INTERNAL MEDICINE

## 2024-07-26 PROCEDURE — 3077F SYST BP >= 140 MM HG: CPT | Performed by: INTERNAL MEDICINE

## 2024-07-26 PROCEDURE — 3078F DIAST BP <80 MM HG: CPT | Performed by: INTERNAL MEDICINE

## 2024-07-26 PROCEDURE — 99214 OFFICE O/P EST MOD 30 MIN: CPT | Performed by: INTERNAL MEDICINE

## 2024-07-26 PROCEDURE — 1126F AMNT PAIN NOTED NONE PRSNT: CPT | Performed by: INTERNAL MEDICINE

## 2024-07-26 PROCEDURE — 1159F MED LIST DOCD IN RCRD: CPT | Performed by: INTERNAL MEDICINE

## 2024-07-26 PROCEDURE — 3008F BODY MASS INDEX DOCD: CPT | Performed by: INTERNAL MEDICINE

## 2024-07-26 PROCEDURE — 3048F LDL-C <100 MG/DL: CPT | Performed by: INTERNAL MEDICINE

## 2024-07-26 ASSESSMENT — PAIN SCALES - GENERAL: PAINLEVEL: 0-NO PAIN

## 2024-07-26 NOTE — PROGRESS NOTES
History of present illness:  74 year old male patient here today following up on hx of lateral STEMI in 08/2019, at that time he was presented with cardiac arrest status post PCI to diagonal 1 that had a 99% hazy lesion also he had 100% occluded LCX that was acute and was treated with JOSE to the proximal segment. Also we ballooned the side of the stent he to open the proper left circ after the OM 1 which is large vessel. We achieved very good result. Has chronically occluded RCA, LAD has moderate disease of 50% in the mid segment. Patient at that time his EF was reduced to 30-35%. Patient had repeated echo with normalized EF.  Patient did not follow-up in my office in more than 4 years now.  Returns to my office has been complaining shortness of breath.  Had frequent ER evaluation for chronic shortness of breath.  Patient denies any chest pain palpitations dizziness lightheadedness or syncope.      Past Medical History:   Diagnosis Date    Acute renal failure syndrome (CMS-HCC) 11/14/2023    Angina pectoris (CMS-HCC) 11/17/2023    Arthritis     Asthma ([facility])     CAD (coronary artery disease)     s/p stent placement    Cardiac arrest (Multi) 11/17/2023    Chronic obstructive pulmonary disease requiring drug therapy ([facility]) 11/17/2023    Coronary artery disease involving native coronary artery of native heart without angina pectoris 11/14/2023    Diabetes mellitus (Multi)     Diabetes mellitus, type 2 (Multi) 11/08/2018    Diastolic heart failure (Multi) 11/17/2023    Disorder involving thrombocytopenia (CMS-HCC) 11/12/2019    Essential hypertension 11/08/2018    Gout     High blood pressure     High cholesterol     Kidney disease     Migraine headache     Mixed hyperlipidemia 11/08/2018    Palpitations 11/17/2023    Status post coronary artery stent placement 11/14/2023    Status post myocardial infarction 09/24/2019     Review of systems.  10 point review of systems otherwise is negative  Past Surgical History:    Procedure Laterality Date    BACK SURGERY  2012    HERNIA REPAIR  2015    HERNIA REPAIR  2013       Allergies   Allergen Reactions    Hydromorphone Anaphylaxis    Latex Hives and Rash     Gets ill    Penicillins Hives    Dyphylline-Guaifenesin Swelling    Grass Pollen Other     Makes him sick    Mold Other     Gets sick    Ragweed Other     Affects asthma        reports that he has never smoked. He has never been exposed to tobacco smoke. He has never used smokeless tobacco. He reports that he does not currently use alcohol. He reports that he does not use drugs.    Family History   Problem Relation Name Age of Onset    Heart disease Mother      Stroke Son      Autism Son         Patient's Medications   New Prescriptions    No medications on file   Previous Medications    ALBUTEROL (VENTOLIN HFA) 90 MCG/ACTUATION INHALER    Inhale 1 puff every 4 hours if needed for wheezing or shortness of breath.    ALPRAZOLAM (XANAX) 0.25 MG TABLET    Take 1 tablet (0.25 mg) by mouth once daily as needed for anxiety.    ASPIRIN 81 MG EC TABLET    Take 1 tablet (81 mg) by mouth once daily.    ATORVASTATIN (LIPITOR) 80 MG TABLET    Take 1 tablet (80 mg) by mouth once daily.    CARVEDILOL (COREG) 25 MG TABLET    Take 0.5 tablets (12.5 mg) by mouth 2 times a day with meals.    FLUTICASONE FUROATE-VILANTEROL (BREO ELLIPTA) 200-25 MCG/DOSE INHALER    Inhale 1 puff once daily.    FUROSEMIDE (LASIX) 20 MG TABLET    Take 1 tablet (20 mg) by mouth 2 times a day.    HYDROCHLOROTHIAZIDE (HYDRODIURIL) 25 MG TABLET    Take 1 tablet (25 mg) by mouth once daily.    LISINOPRIL 10 MG TABLET    Take 1 tablet (10 mg) by mouth once daily.    PREDNISONE (DELTASONE) 10 MG TABLET    Take 5 tablets (50 mg) by mouth once daily for 2 days, THEN 4 tablets (40 mg) once daily for 2 days, THEN 3 tablets (30 mg) once daily for 2 days, THEN 2 tablets (20 mg) once daily for 2 days, THEN 1 tablet (10 mg) once daily for 2 days.    TICAGRELOR (BRILINTA) 90 MG  TABLET    Take 1 tablet (90 mg) by mouth 2 times a day.   Modified Medications    No medications on file   Discontinued Medications    No medications on file       Objective   Physical Exam  General: Patient in no acute distress   HEENT: Atraumatic normocephalic.  Neck: Supple, jugular venous pressure within normal limit.  No bruits  Lungs: Clear to auscultation bilaterally  Cardiovascular: Regular rate and rhythm, normal heart sounds, no murmurs rubs or gallops  Abdomen: Soft nontender nondistended.  Normal bowel sounds.  Extremities: Warm to touch, no edema.      Lab Review   Lab on 07/25/2024   Component Date Value    WBC 07/25/2024 5.1     nRBC 07/25/2024 0.0     RBC 07/25/2024 4.05 (L)     Hemoglobin 07/25/2024 12.1 (L)     Hematocrit 07/25/2024 36.5 (L)     MCV 07/25/2024 90     MCH 07/25/2024 29.9     MCHC 07/25/2024 33.2     RDW 07/25/2024 12.7     Platelets 07/25/2024 104 (L)     Neutrophils % 07/25/2024 65.8     Immature Granulocytes %,* 07/25/2024 0.4     Lymphocytes % 07/25/2024 21.7     Monocytes % 07/25/2024 8.0     Eosinophils % 07/25/2024 3.9     Basophils % 07/25/2024 0.2     Neutrophils Absolute 07/25/2024 3.36     Immature Granulocytes Ab* 07/25/2024 0.02     Lymphocytes Absolute 07/25/2024 1.11     Monocytes Absolute 07/25/2024 0.41     Eosinophils Absolute 07/25/2024 0.20     Basophils Absolute 07/25/2024 0.01     Glucose 07/25/2024 118 (H)     Sodium 07/25/2024 141     Potassium 07/25/2024 4.3     Chloride 07/25/2024 107     Bicarbonate 07/25/2024 22 (L)     Urea Nitrogen 07/25/2024 38 (H)     Creatinine 07/25/2024 1.40     eGFR 07/25/2024 53 (L)     Calcium 07/25/2024 9.0     Albumin 07/25/2024 4.1     Alkaline Phosphatase 07/25/2024 92     Total Protein 07/25/2024 6.5     AST 07/25/2024 18     Bilirubin, Total 07/25/2024 0.5     ALT 07/25/2024 22     Anion Gap 07/25/2024 12     Cholesterol 07/25/2024 120 (L)     HDL-Cholesterol 07/25/2024 61.0     Cholesterol/HDL Ratio 07/25/2024 2.0     LDL  Calculated 07/25/2024 51 (L)     Triglycerides 07/25/2024 39 (L)    Office Visit on 06/13/2024   Component Date Value    POC Color, Urine 06/13/2024 Yellow     POC Appearance, Urine 06/13/2024 Clear     POC Glucose, Urine 06/13/2024 NEGATIVE     POC Bilirubin, Urine 06/13/2024 NEGATIVE     POC Ketones, Urine 06/13/2024 NEGATIVE     POC Specific Gravity, Ur* 06/13/2024 1.020     POC Blood, Urine 06/13/2024 LARGE (3+) (A)     POC PH, Urine 06/13/2024 6.0     POC Protein, Urine 06/13/2024 100 (2+) (A)     POC Urobilinogen, Urine 06/13/2024 0.2     Poc Nitrite, Urine 06/13/2024 POSITIVE (A)     POC Leukocytes, Urine 06/13/2024 LARGE (3+) (A)     Urine Culture 06/13/2024 >100,000 Citrobacter freundii complex (A)     Urine Culture 06/13/2024 >100,000 Methicillin Susceptible Staphylococcus aureus (MSSA) (A)         Assessment/Plan   Patient Active Problem List   Diagnosis    Severe persistent asthma without complication (Multi)    Sleep disorder    Enlarged prostate    Inguinal hernia    Calculus of gallbladder with cholecystitis    Coronary artery disease involving native coronary artery of native heart without angina pectoris    Status post coronary artery stent placement    Hypercholesterolemia    Hypertension    Anxiety    Acute urinary tract infection    Acute renal failure (CMS-HCC)    Asthma ([facility])    Accidental fall    Acute hepatitis    Injury of kidney    Kidney stone    Cardiac arrest (Multi)    Cellulitis    Chest discomfort    Angina pectoris (CMS-[facility])    Acute exacerbation of chronic obstructive pulmonary disease (Multi)    Chronic pain    Type 2 diabetes mellitus (Multi)    Congestive heart failure (Multi)    Disorder involving thrombocytopenia (CMS-[facility])    Complete tear of rotator cuff    Gastroesophageal reflux disease    Gout    Groin injury    Impaired fasting glucose    Influenza due to influenza virus, type B    Pain of left hip joint    Lumbar back pain with radiculopathy affecting lower extremity     Pain of left hand    Solitary pulmonary nodule    Musculoskeletal pain    Neck pain    Palpitations    Panic attack    Psoriasis    Rash    History of myocardial infarction    Abrasion of face    Arthralgia of right knee    Bronchitis    Clouded consciousness    Cough    Dizziness    Dyspnea    Injury of head    Retention of urine    Abdominal pain    Backache    Left foot pain    Pain in toe    Knee pain    Arthralgia of shoulder    Obesity    Hypertensive chronic kidney disease w stg 1-4/unsp chr kdny    Urinary tract infection associated with indwelling urethral catheter, initial encounter (CMS-[facility])        74 year old male patient here today following up on hx of lateral STEMI in 08/2019, at that time he was presented with cardiac arrest status post PCI to diagonal 1 that had a 99% hazy lesion also he had 100% occluded LCX that was acute and was treated with JOSE to the proximal segment. Also we ballooned the side of the stent he to open the proper left circ after the OM 1 which is large vessel. We achieved very good result. Has chronically occluded RCA, LAD has moderate disease of 50% in the mid segment. Patient at that time his EF was reduced to 30-35%. Patient had repeated echo with normalized EF.  Patient did not follow-up in my office in more than 4 years now.  Returns to my office has been complaining shortness of breath.  Had frequent ER evaluation for chronic shortness of breath.  Patient denies any chest pain palpitations dizziness lightheadedness or syncope.  Blood workup was unremarkable.  Kidney function stable.  At this point my recommendation is to obtain 2D echo as a workup for shortness of breath history of ischemic cardiomyopathy.  Continue current medications he is on optimal medical therapy repeat blood pressure 124/78.  Recommend patient to track his blood pressure we will follow-up in 3 months.  Continue current medications his LDL at target.      Jessika Spencer MD

## 2024-07-31 ENCOUNTER — APPOINTMENT (OUTPATIENT)
Dept: PRIMARY CARE | Facility: CLINIC | Age: 74
End: 2024-07-31
Payer: MEDICARE

## 2024-08-07 ENCOUNTER — APPOINTMENT (OUTPATIENT)
Dept: PRIMARY CARE | Facility: CLINIC | Age: 74
End: 2024-08-07
Payer: MEDICARE

## 2024-08-07 ENCOUNTER — LAB (OUTPATIENT)
Dept: LAB | Facility: LAB | Age: 74
End: 2024-08-07
Payer: MEDICARE

## 2024-08-07 VITALS
SYSTOLIC BLOOD PRESSURE: 150 MMHG | DIASTOLIC BLOOD PRESSURE: 68 MMHG | OXYGEN SATURATION: 97 % | WEIGHT: 184 LBS | HEART RATE: 73 BPM | BODY MASS INDEX: 29.57 KG/M2 | HEIGHT: 66 IN | RESPIRATION RATE: 20 BRPM

## 2024-08-07 DIAGNOSIS — R33.9 RETENTION OF URINE: ICD-10-CM

## 2024-08-07 DIAGNOSIS — R35.1 BENIGN PROSTATIC HYPERPLASIA WITH NOCTURIA: ICD-10-CM

## 2024-08-07 DIAGNOSIS — E08.22 DIABETES MELLITUS DUE TO UNDERLYING CONDITION WITH STAGE 3A CHRONIC KIDNEY DISEASE, WITHOUT LONG-TERM CURRENT USE OF INSULIN (MULTI): ICD-10-CM

## 2024-08-07 DIAGNOSIS — I10 PRIMARY HYPERTENSION: ICD-10-CM

## 2024-08-07 DIAGNOSIS — N40.1 BENIGN PROSTATIC HYPERPLASIA WITH NOCTURIA: Primary | ICD-10-CM

## 2024-08-07 DIAGNOSIS — Z00.00 ROUTINE GENERAL MEDICAL EXAMINATION AT HEALTH CARE FACILITY: Primary | ICD-10-CM

## 2024-08-07 DIAGNOSIS — N18.31 DIABETES MELLITUS DUE TO UNDERLYING CONDITION WITH STAGE 3A CHRONIC KIDNEY DISEASE, WITHOUT LONG-TERM CURRENT USE OF INSULIN (MULTI): ICD-10-CM

## 2024-08-07 DIAGNOSIS — N40.1 BENIGN PROSTATIC HYPERPLASIA WITH NOCTURIA: ICD-10-CM

## 2024-08-07 DIAGNOSIS — R31.9 HEMATURIA, UNSPECIFIED TYPE: ICD-10-CM

## 2024-08-07 DIAGNOSIS — R35.1 BENIGN PROSTATIC HYPERPLASIA WITH NOCTURIA: Primary | ICD-10-CM

## 2024-08-07 LAB
CREAT UR-MCNC: 68.1 MG/DL
EST. AVERAGE GLUCOSE BLD GHB EST-MCNC: 126 MG/DL
HBA1C MFR BLD: 6 %
MICROALBUMIN UR-MCNC: 35 MG/L (ref 0–23)
MICROALBUMIN/CREAT UR: 51.4 UG/MG CREAT
POC APPEARANCE, URINE: CLEAR
POC BILIRUBIN, URINE: NEGATIVE
POC BLOOD, URINE: ABNORMAL
POC COLOR, URINE: YELLOW
POC GLUCOSE, URINE: NEGATIVE MG/DL
POC KETONES, URINE: NEGATIVE MG/DL
POC LEUKOCYTES, URINE: NEGATIVE
POC NITRITE,URINE: NEGATIVE
POC PH, URINE: 5.5 PH
POC PROTEIN, URINE: NEGATIVE MG/DL
POC SPECIFIC GRAVITY, URINE: 1.01
POC UROBILINOGEN, URINE: 0.2 EU/DL

## 2024-08-07 PROCEDURE — 1125F AMNT PAIN NOTED PAIN PRSNT: CPT

## 2024-08-07 PROCEDURE — 84154 ASSAY OF PSA FREE: CPT

## 2024-08-07 PROCEDURE — 1036F TOBACCO NON-USER: CPT

## 2024-08-07 PROCEDURE — 1159F MED LIST DOCD IN RCRD: CPT

## 2024-08-07 PROCEDURE — G0009 ADMIN PNEUMOCOCCAL VACCINE: HCPCS

## 2024-08-07 PROCEDURE — 90677 PCV20 VACCINE IM: CPT

## 2024-08-07 PROCEDURE — 82570 ASSAY OF URINE CREATININE: CPT

## 2024-08-07 PROCEDURE — 36415 COLL VENOUS BLD VENIPUNCTURE: CPT

## 2024-08-07 PROCEDURE — 3048F LDL-C <100 MG/DL: CPT

## 2024-08-07 PROCEDURE — 99213 OFFICE O/P EST LOW 20 MIN: CPT

## 2024-08-07 PROCEDURE — 83036 HEMOGLOBIN GLYCOSYLATED A1C: CPT

## 2024-08-07 PROCEDURE — 3008F BODY MASS INDEX DOCD: CPT

## 2024-08-07 PROCEDURE — 84153 ASSAY OF PSA TOTAL: CPT

## 2024-08-07 PROCEDURE — 3077F SYST BP >= 140 MM HG: CPT

## 2024-08-07 PROCEDURE — 3060F POS MICROALBUMINURIA REV: CPT

## 2024-08-07 PROCEDURE — 3078F DIAST BP <80 MM HG: CPT

## 2024-08-07 PROCEDURE — 3044F HG A1C LEVEL LT 7.0%: CPT

## 2024-08-07 PROCEDURE — 81003 URINALYSIS AUTO W/O SCOPE: CPT

## 2024-08-07 PROCEDURE — 1160F RVW MEDS BY RX/DR IN RCRD: CPT

## 2024-08-07 PROCEDURE — G0439 PPPS, SUBSEQ VISIT: HCPCS

## 2024-08-07 PROCEDURE — 4010F ACE/ARB THERAPY RXD/TAKEN: CPT

## 2024-08-07 PROCEDURE — 82043 UR ALBUMIN QUANTITATIVE: CPT

## 2024-08-07 RX ORDER — TAMSULOSIN HYDROCHLORIDE 0.4 MG/1
0.4 CAPSULE ORAL DAILY
Qty: 30 CAPSULE | Refills: 11 | Status: SHIPPED | OUTPATIENT
Start: 2024-08-07 | End: 2025-08-07

## 2024-08-07 RX ORDER — IPRATROPIUM BROMIDE AND ALBUTEROL SULFATE 2.5; .5 MG/3ML; MG/3ML
3 SOLUTION RESPIRATORY (INHALATION) EVERY 6 HOURS PRN
COMMUNITY
Start: 2024-07-19

## 2024-08-07 ASSESSMENT — PATIENT HEALTH QUESTIONNAIRE - PHQ9
2. FEELING DOWN, DEPRESSED OR HOPELESS: NOT AT ALL
1. LITTLE INTEREST OR PLEASURE IN DOING THINGS: NOT AT ALL
SUM OF ALL RESPONSES TO PHQ9 QUESTIONS 1 AND 2: 0

## 2024-08-07 ASSESSMENT — ENCOUNTER SYMPTOMS
OCCASIONAL FEELINGS OF UNSTEADINESS: 0
DEPRESSION: 0
LOSS OF SENSATION IN FEET: 0

## 2024-08-07 ASSESSMENT — PAIN SCALES - GENERAL: PAINLEVEL: 7

## 2024-08-07 NOTE — PROGRESS NOTES
Subjective   Reason for Visit: Duane Scott is an 74 y.o. male here for a Medicare Wellness visit.     Past Medical, Surgical, and Family History reviewed and updated in chart.    Reviewed all medications by prescribing practitioner or clinical pharmacist (such as prescriptions, OTCs, herbal therapies and supplements) and documented in the medical record.    VENKATESH Zepeda presents for his Medicare Wellness Exam. He has multiple chronic condition, unclear how well managed as he is a poor historian.  He typically walks to his medical appointments.  He lives approximately 1 mile from this office.  Reports he walks to the pharmacy for his medication and to the grocery store most days.  Live at home with his wife, Danii.  His son recently passed away. His daughter, Angie, is helpful to drive him to appointments that are further than he is able to walk; however, her availability is limited.     He denies any new concerns today.     PMH:   #Asthma- followed by Dr. Montgomery, recently seen on 7/19/2024. Began John terrell, admits that is helpful for his SOB.  #DMII- diet controlled  #CKD- referred to nephrology but has not made appointment  #Hx of MI- Follows with cardiology, Dr. Spencer, last visit 7/26/2024, next visit 10/21/2024.  #BPH/urinary retention/hematuria- He was to follow up with urology, multiple attempts to arrange this appointment, he still has been unable to schedule. He had previously been a patient of Dr. Enciso in this building, who has recently moved locations.         Patient Care Team:  ABDULAZIZ Wilson as PCP - General (Family Medicine)  ABDULAZIZ Clark as Nurse Practitioner (Family Medicine)     Review of Systems  GENERAL - Denies fever/chills, recent illness, unexplained weight changes  HEENT- Denies change in vision, double vision, blurred.   Denies hearing changes, ear pain. Denies nose bleeds. Denies sore throat, difficulty swallowing.    RESP - Denies SOB or cough  CVS - Denies  "CP, palpitations  GI - Denies nausea or abdominal pain, hematochezia/melena  - Denies urinary frequency, urgency or incontinence.  Positive for nocturia.   NEURO - Denies headache, dizziness  MSK - Denies joint, neck or back pain  Skin - Denies abnormal lesions, rash  PSYCH-Denies anxiety, depression, changes in mood      Objective   Vitals:  /68 (BP Location: Left arm, Patient Position: Sitting, BP Cuff Size: Large adult)   Pulse 73   Resp 20   Ht 1.676 m (5' 6\")   Wt 83.5 kg (184 lb)   SpO2 97%   BMI 29.70 kg/m²       Physical Exam  Pt is A and O x3, NAD  Head- normocephalic and atraumatic,   EYES- conjunctiva- normal, KIMBERLY, lids- normal.  Wears glasses for vision, glasses in disrepair- held together with rubber bands.   EARS/NOSE- TM's normal, nasopharynx- normal and atraumatic  OROPHARYNX- normal  NECK- supple, FROM  THYROID- NT, normal size, no nodule noted  LYMPH- no cervical lymph nodes palpated   CV- RRR without murmur  PULM- CTA bilaterally, normal respiratory effort  RESPIRATORY EFFORT- normal , no retractions or nasal flaring   ABD- normoactive BS's , soft , NT, no hepatosplenomegaly palpated  EXT- no edema, NT  SKIN- no abnormal skin lesions noted  NEURO- no focal deficits  PSYCH- pleasant, cooperative for today's visit      Assessment/Plan   Problem List Items Addressed This Visit             ICD-10-CM    Hypertension  BP elevated today, reports he is taking medication as prescribed by cardiology.  Discussed importance of medication regimen. He will bring medications to our next appointment for review and to update EMR.  Appears as though he may not be taking his hydrochlorothiazide. Follow up in the next few weeks with medication bottles as discussed.    I10    Relevant Orders    Albumin-Creatinine Ratio, Urine Random (Completed)    POCT UA Automated manually resulted (Completed)    Retention of urine R33.9    Relevant Orders    Referral to Urology     Other Visit Diagnoses         Codes "    Routine general medical examination at health care facility    -  Primary  Well adult exam.  1. Age appropriate preventative measures reviewed. Declines any further colorectal cancer screenings at tis time.  Encourage him to follow up with optometry for new glasses and retinopathy exam but he was not particularly interested at this time.   2. Encouraged healthy diet and exercise.  3. Immunizations- Reviewed, Prevnar 20 vaccine updated.   4. Labs- ordered at this visit  5. Medications- Reviewed    *Follow-up in 1 year for repeat annual physical exam. Patient verbalizes understanding  regarding plan of care and all questions answered.   Z00.00    Diabetes mellitus due to underlying condition with stage 3a chronic kidney disease, without long-term current use of insulin (Multi)      Last A1C within goal.  Diet controlled DM, looking back at most recent A1C levels, they have been stable for many years without medication intervention.  E08.22, N18.31    Relevant Orders    Hemoglobin A1C (Completed)    Benign prostatic hyperplasia with nocturia      Discussed again the importance of follow up urology, particularly after several ER visits for urinary retention.    Discussed beginning Flomax, which her admits he thinks he may have been taking at one point.  He is unsure why or when he stopped medication.  He is a poor historian.   He is agreeable to restart medication as discussed. Labs ordered, will review upon result and follow up.  N40.1, R35.1    Relevant Medications    tamsulosin (Flomax) 0.4 mg 24 hr capsule    Other Relevant Orders    Referral to Urology    PSA, total and free (Completed)    Hematuria, unspecified type     R31.9    Relevant Orders    Referral to Urology

## 2024-08-10 LAB
PSA FREE MFR SERPL: 23 %
PSA FREE SERPL-MCNC: 0.5 NG/ML
PSA SERPL IA-MCNC: 2.2 NG/ML (ref 0–4)

## 2024-08-12 ENCOUNTER — TELEPHONE (OUTPATIENT)
Dept: PRIMARY CARE | Facility: CLINIC | Age: 74
End: 2024-08-12
Payer: MEDICARE

## 2024-08-12 ENCOUNTER — APPOINTMENT (OUTPATIENT)
Dept: CARDIOLOGY | Facility: HOSPITAL | Age: 74
End: 2024-08-12
Payer: MEDICARE

## 2024-08-12 NOTE — TELEPHONE ENCOUNTER
Marie patient wife calling House Calls office to inquire regarding patient appointment scheduled 8/13/24.  Appointment confirmed with patient wife. COVID Screening negative, verbal consent obtained during phone conversation with patient wife Marie.

## 2024-08-13 ENCOUNTER — OFFICE VISIT (OUTPATIENT)
Dept: PRIMARY CARE | Facility: CLINIC | Age: 74
End: 2024-08-13
Payer: MEDICARE

## 2024-08-13 VITALS
HEART RATE: 62 BPM | WEIGHT: 184 LBS | RESPIRATION RATE: 16 BRPM | DIASTOLIC BLOOD PRESSURE: 72 MMHG | SYSTOLIC BLOOD PRESSURE: 142 MMHG | BODY MASS INDEX: 29.57 KG/M2 | HEIGHT: 66 IN | OXYGEN SATURATION: 93 % | TEMPERATURE: 97.5 F

## 2024-08-13 DIAGNOSIS — R33.9 RETENTION OF URINE: ICD-10-CM

## 2024-08-13 DIAGNOSIS — I25.10 CORONARY ARTERY DISEASE INVOLVING NATIVE CORONARY ARTERY OF NATIVE HEART WITHOUT ANGINA PECTORIS: ICD-10-CM

## 2024-08-13 DIAGNOSIS — I25.2 HISTORY OF MYOCARDIAL INFARCTION: ICD-10-CM

## 2024-08-13 DIAGNOSIS — I10 PRIMARY HYPERTENSION: Primary | ICD-10-CM

## 2024-08-13 DIAGNOSIS — J45.50 SEVERE PERSISTENT ASTHMA WITHOUT COMPLICATION (MULTI): ICD-10-CM

## 2024-08-13 PROCEDURE — 4010F ACE/ARB THERAPY RXD/TAKEN: CPT | Performed by: NURSE PRACTITIONER

## 2024-08-13 PROCEDURE — 3060F POS MICROALBUMINURIA REV: CPT | Performed by: NURSE PRACTITIONER

## 2024-08-13 PROCEDURE — 3078F DIAST BP <80 MM HG: CPT | Performed by: NURSE PRACTITIONER

## 2024-08-13 PROCEDURE — 1160F RVW MEDS BY RX/DR IN RCRD: CPT | Performed by: NURSE PRACTITIONER

## 2024-08-13 PROCEDURE — 3044F HG A1C LEVEL LT 7.0%: CPT | Performed by: NURSE PRACTITIONER

## 2024-08-13 PROCEDURE — 1125F AMNT PAIN NOTED PAIN PRSNT: CPT | Performed by: NURSE PRACTITIONER

## 2024-08-13 PROCEDURE — 3008F BODY MASS INDEX DOCD: CPT | Performed by: NURSE PRACTITIONER

## 2024-08-13 PROCEDURE — 3077F SYST BP >= 140 MM HG: CPT | Performed by: NURSE PRACTITIONER

## 2024-08-13 PROCEDURE — 99349 HOME/RES VST EST MOD MDM 40: CPT | Performed by: NURSE PRACTITIONER

## 2024-08-13 PROCEDURE — 1159F MED LIST DOCD IN RCRD: CPT | Performed by: NURSE PRACTITIONER

## 2024-08-13 PROCEDURE — 3048F LDL-C <100 MG/DL: CPT | Performed by: NURSE PRACTITIONER

## 2024-08-13 ASSESSMENT — PAIN SCALES - GENERAL: PAINLEVEL: 8

## 2024-08-13 NOTE — PROGRESS NOTES
Subjective   Patient ID: Duane Scott is a 74 y.o. male who presents for Follow-up (Routine follow up, multiple medical conditions).    Visit for 75 y/o male seen today in private home, alone for routine follow up of multiple medical conditions. Visit took place outside on patients porch as he does not like provider to enter his home. Pt is alert, able to answer most questions regarding his health. He lives with his spouse in a single family home. Pt does not drive or have a vehicle making it difficult to get out for medical appointments. His PCP is Brianne Del Rio CNP and he will walk approximately 1 mile to the office for follow up care. Pt is able to manage his medications but he continues to be very non compliant. All pill bottles have old dates on them and he does not consistently  his medication refills. It does not appear that he is taking his Hydrochlorothiazide at all. He declines Brilinta due to the cost of the medication. Pt reports that he is very stressed. His son passed away in May and this has been difficult for him. He does not want to see psychiatry. Pt denies appetite changes or signs of weight loss. He is able to prepare simple meals for himself. Denies abdominal pain, nausea, vomiting, or bowel concerns. Pt had a watson catheter. He reports taking it out on his own. He tells me that he is urinating without difficulty. Urologist is Dr. Martinez. Pt was walking to his appointments but the urology office moved locations and pt is uncertain how he is going to get there now. Pt with history of Asthma, COPD. Pulmonologist is Dr. Montgomery. He was seen in July and started on Breo Ellipta. Pt with history of MI, HTN,CAD. Cardiologist is Dr. Spencer. Next follow up in October. Pt denies headaches, chest pain, palpitations.     Home Visit: Medically necessary due to: patient has limited support systems to help the patient attend office visits          Current Outpatient Medications:     albuterol  "(Ventolin HFA) 90 mcg/actuation inhaler, Inhale 1 puff every 4 hours if needed for wheezing or shortness of breath., Disp: 18 g, Rfl: 1    aspirin 81 mg EC tablet, Take 1 tablet (81 mg) by mouth once daily., Disp: , Rfl:     atorvastatin (Lipitor) 80 mg tablet, Take 1 tablet (80 mg) by mouth once daily., Disp: 90 tablet, Rfl: 3    carvedilol (Coreg) 25 mg tablet, Take 0.5 tablets (12.5 mg) by mouth 2 times a day with meals., Disp: 90 tablet, Rfl: 3    fluticasone furoate-vilanteroL (Breo Ellipta) 200-25 mcg/dose inhaler, Inhale 1 puff once daily., Disp: 28 each, Rfl: 1    hydroCHLOROthiazide (HYDRODiuril) 25 mg tablet, Take 1 tablet (25 mg) by mouth once daily., Disp: 30 tablet, Rfl: 0    ipratropium-albuteroL (Duo-Neb) 0.5-2.5 mg/3 mL nebulizer solution, Inhale 3 mL every 6 hours if needed., Disp: , Rfl:     lisinopril 10 mg tablet, Take 1 tablet (10 mg) by mouth once daily., Disp: 90 tablet, Rfl: 3    tamsulosin (Flomax) 0.4 mg 24 hr capsule, Take 1 capsule (0.4 mg) by mouth once daily., Disp: 30 capsule, Rfl: 11     Review of Systems  Constitutional:  Negative for appetite change, chills and fever, unexplained weight loss.   HENT:  Negative for trouble swallowing.    Respiratory: Positive for shortness of breath with exertion. Negative for cough, wheezing.  Cardiovascular: Positive for intermittent edema. Negative for chest pain, palpitations.   Gastrointestinal: Negative for abdominal pain, diarrhea, constipation, nausea and vomiting.   Genitourinary: Negative for hematuria, dysuria.   Musculoskeletal: Positive for arthralgias, back pain, unsteady gait    Neurological: Negative for dizziness and light-headedness.   Hematological: Does not bruise/bleed easily.   Psychiatric/Behavioral: Positive for anxiety       Objective   /72 (BP Location: Left arm, Patient Position: Standing, BP Cuff Size: Adult)   Pulse 62   Temp 36.4 °C (97.5 °F) (Temporal)   Resp 16   Ht 1.676 m (5' 6\")   Wt 83.5 kg (184 lb)   " SpO2 93%   BMI 29.70 kg/m²     Physical Exam  Constitutional:       Appearance: Alert, standing outside on porch. Very talkative. No acute distress.   HENT:      Head: Normocephalic.      Nose: Nose normal.      Mouth/Throat:      Mouth: Mucous membranes are moist.   Cardiovascular:      Rate and Rhythm: Normal rate and regular rhythm.      Trace LE edema bilaterally   Pulmonary:      Effort: Pulmonary effort is normal.      Breath sounds: Lungs diminished. Mild expiratory wheezing. No rales, rhonchi. No cough. On room air.   Abdominal:      General: Abdomen is flat.      Palpations: Abdomen is soft. No tenderness.   Musculoskeletal:         General: Normal range of motion.      Cervical back: Normal range of motion.   Skin:     General: Skin is warm and dry.   Neurological:      General: No focal deficit present.      Mental Status: He is alert and oriented to person, place, and time.   Psychiatric:         Mood and Affect: Mood normal.     Assessment/Plan   Diagnoses and all orders for this visit:  Primary hypertension  -chronic, BP slightly elevated on exam  -uncertain how consistent pt is with taking his medications  -encouraged compliance of Lisinopril, Aspirin, hydrochlorothiazide     Coronary artery disease involving native coronary artery of native heart without angina pectoris  History of myocardial infarction  -chronic, not taking Brilinta due to cost   -continue carvedilol, atorvastatin   -follow up with cardiology as scheduled     Retention of urine  -chronic, followed by urology  -recommend follow up if able  -does not currently have catheter- denies issues urinating   -continue tamsulosin     Severe persistent asthma without complication (Multi)  -chronic, followed by pulmonology  -continue Breo-Ellipta     Patient overall stable. Will see patient on as needed basis as he is seeing his PCP in November and this provider will be out on medical leave 11/2024. There will be covering house calls providers  that can see patient in home on as needed basis if any acute needs arise.        Radha Johnson, APRN-CNP

## 2024-08-29 ENCOUNTER — APPOINTMENT (OUTPATIENT)
Dept: CARDIOLOGY | Facility: HOSPITAL | Age: 74
End: 2024-08-29
Payer: MEDICARE

## 2024-09-04 ENCOUNTER — HOSPITAL ENCOUNTER (OUTPATIENT)
Dept: CARDIOLOGY | Facility: HOSPITAL | Age: 74
Discharge: HOME | End: 2024-09-04
Payer: MEDICARE

## 2024-09-04 DIAGNOSIS — R06.02 SOB (SHORTNESS OF BREATH): ICD-10-CM

## 2024-09-04 LAB
AORTIC VALVE MEAN GRADIENT: 4 MMHG
AORTIC VALVE PEAK VELOCITY: 1.47 M/S
AV PEAK GRADIENT: 8.6 MMHG
AVA (PEAK VEL): 2.22 CM2
AVA (VTI): 2.53 CM2
EJECTION FRACTION APICAL 4 CHAMBER: 52.2
EJECTION FRACTION: 58 %
LEFT ATRIUM VOLUME AREA LENGTH INDEX BSA: 40.8 ML/M2
LEFT VENTRICULAR OUTFLOW TRACT DIAMETER: 2 CM
LV EJECTION FRACTION BIPLANE: 55 %
MITRAL VALVE E/A RATIO: 0.69
RIGHT VENTRICLE FREE WALL PEAK S': 12.5 CM/S
RIGHT VENTRICLE PEAK SYSTOLIC PRESSURE: 24.2 MMHG
TRICUSPID ANNULAR PLANE SYSTOLIC EXCURSION: 2.4 CM

## 2024-09-04 PROCEDURE — 93306 TTE W/DOPPLER COMPLETE: CPT

## 2024-09-04 PROCEDURE — 93306 TTE W/DOPPLER COMPLETE: CPT | Performed by: INTERNAL MEDICINE

## 2024-09-16 ENCOUNTER — TELEPHONE (OUTPATIENT)
Dept: CARDIOLOGY | Facility: CLINIC | Age: 74
End: 2024-09-16
Payer: COMMERCIAL

## 2024-09-16 NOTE — TELEPHONE ENCOUNTER
----- Message from Jessika Spencer sent at 9/6/2024 10:17 AM EDT -----  Tell patient that his stress test was okay.  Let me see him as scheduled  ----- Message -----  From: Carole Syngo - Cardiology Results In  Sent: 9/4/2024  12:15 PM EDT  To: Jessika Spencer MD

## 2024-09-26 ENCOUNTER — OFFICE VISIT (OUTPATIENT)
Dept: PRIMARY CARE | Facility: CLINIC | Age: 74
End: 2024-09-26
Payer: MEDICARE

## 2024-09-26 VITALS
WEIGHT: 195 LBS | OXYGEN SATURATION: 98 % | HEART RATE: 70 BPM | SYSTOLIC BLOOD PRESSURE: 120 MMHG | TEMPERATURE: 98 F | DIASTOLIC BLOOD PRESSURE: 78 MMHG | BODY MASS INDEX: 31.47 KG/M2

## 2024-09-26 DIAGNOSIS — R10.31 RIGHT LOWER QUADRANT PAIN: Primary | ICD-10-CM

## 2024-09-26 DIAGNOSIS — R33.9 URINARY RETENTION: ICD-10-CM

## 2024-09-26 LAB
POC APPEARANCE, URINE: CLEAR
POC BILIRUBIN, URINE: NEGATIVE
POC BLOOD, URINE: ABNORMAL
POC COLOR, URINE: YELLOW
POC GLUCOSE, URINE: NEGATIVE MG/DL
POC KETONES, URINE: NEGATIVE MG/DL
POC LEUKOCYTES, URINE: NEGATIVE
POC NITRITE,URINE: NEGATIVE
POC PH, URINE: 6 PH
POC PROTEIN, URINE: ABNORMAL MG/DL
POC SPECIFIC GRAVITY, URINE: 1.02
POC UROBILINOGEN, URINE: 0.2 EU/DL

## 2024-09-26 PROCEDURE — 1160F RVW MEDS BY RX/DR IN RCRD: CPT

## 2024-09-26 PROCEDURE — 3074F SYST BP LT 130 MM HG: CPT

## 2024-09-26 PROCEDURE — 1126F AMNT PAIN NOTED NONE PRSNT: CPT

## 2024-09-26 PROCEDURE — 3044F HG A1C LEVEL LT 7.0%: CPT

## 2024-09-26 PROCEDURE — 3060F POS MICROALBUMINURIA REV: CPT

## 2024-09-26 PROCEDURE — 1159F MED LIST DOCD IN RCRD: CPT

## 2024-09-26 PROCEDURE — 90662 IIV NO PRSV INCREASED AG IM: CPT

## 2024-09-26 PROCEDURE — 4010F ACE/ARB THERAPY RXD/TAKEN: CPT

## 2024-09-26 PROCEDURE — 3078F DIAST BP <80 MM HG: CPT

## 2024-09-26 PROCEDURE — 81002 URINALYSIS NONAUTO W/O SCOPE: CPT

## 2024-09-26 PROCEDURE — G0008 ADMIN INFLUENZA VIRUS VAC: HCPCS

## 2024-09-26 PROCEDURE — 99213 OFFICE O/P EST LOW 20 MIN: CPT

## 2024-09-26 PROCEDURE — 3048F LDL-C <100 MG/DL: CPT

## 2024-09-26 RX ORDER — OMEPRAZOLE 20 MG/1
20 CAPSULE, DELAYED RELEASE ORAL
COMMUNITY
Start: 2024-07-19

## 2024-09-26 ASSESSMENT — PAIN SCALES - GENERAL: PAINLEVEL: 0-NO PAIN

## 2024-09-26 NOTE — PROGRESS NOTES
Subjective     Patient ID: Duane Scott is a 74 y.o. male who presents for Back Pain (X 6 months/Hx of kidney infection /Right lower ) and Abdominal Pain (X 6 days /Lower ).      HPI  Duane presents with concerns of chronic right inguinal pain, which he reports has been persistent since hernia repair in 2017.  He reports the pain seems worse lately.  He also has concerns for decreased urination and increased difficulty with his stream. He has been referred to urology on multiple occasions, has not yet made appointment.  Reports he is taking tamsulosin.  He has multiple chronic conditions, unclear how well managed they are and if he is taking medications as prescribed as he is a poor historian.     Patient's recent visit notes, medication and allergy lists, past medical surgical social hx, immunization, vitals, problem list, recent tests were reviewed by me for pertinence to this visit.    Current Outpatient Medications:     albuterol (Ventolin HFA) 90 mcg/actuation inhaler, Inhale 1 puff every 4 hours if needed for wheezing or shortness of breath., Disp: 18 g, Rfl: 1    aspirin 81 mg EC tablet, Take 1 tablet (81 mg) by mouth once daily., Disp: , Rfl:     fluticasone furoate-vilanteroL (Breo Ellipta) 200-25 mcg/dose inhaler, Inhale 1 puff once daily., Disp: 28 each, Rfl: 1    ipratropium-albuteroL (Duo-Neb) 0.5-2.5 mg/3 mL nebulizer solution, Inhale 3 mL every 6 hours if needed., Disp: , Rfl:     lisinopril 10 mg tablet, Take 1 tablet (10 mg) by mouth once daily., Disp: 90 tablet, Rfl: 3    omeprazole (PriLOSEC) 20 mg DR capsule, Take 1 capsule (20 mg) by mouth once daily in the morning. Take before meals., Disp: , Rfl:     tamsulosin (Flomax) 0.4 mg 24 hr capsule, Take 1 capsule (0.4 mg) by mouth once daily., Disp: 30 capsule, Rfl: 11    atorvastatin (Lipitor) 80 mg tablet, Take 1 tablet (80 mg) by mouth once daily. (Patient not taking: Reported on 9/26/2024), Disp: 90 tablet, Rfl: 3    carvedilol (Coreg) 25 mg  tablet, Take 0.5 tablets (12.5 mg) by mouth 2 times a day with meals. (Patient not taking: Reported on 9/26/2024), Disp: 90 tablet, Rfl: 3    hydroCHLOROthiazide (HYDRODiuril) 25 mg tablet, Take 1 tablet (25 mg) by mouth once daily., Disp: 30 tablet, Rfl: 0      Review of Systems  All other systems have been reviewed and are negative except as noted in the HPI.         Objective   /78 (BP Location: Left arm, Patient Position: Sitting)   Pulse 70   Temp 36.7 °C (98 °F) (Temporal)   Wt 88.5 kg (195 lb)   SpO2 98%   BMI 31.47 kg/m²       Physical Exam  Vitals and nursing note reviewed.   Constitutional:       General: He is not in acute distress.  Cardiovascular:      Rate and Rhythm: Normal rate and regular rhythm.      Heart sounds: Normal heart sounds, S1 normal and S2 normal.   Pulmonary:      Effort: Pulmonary effort is normal.      Breath sounds: Normal breath sounds and air entry.   Abdominal:      General: Bowel sounds are normal. There is no distension.      Palpations: Abdomen is soft. There is no hepatomegaly, splenomegaly or mass.      Tenderness: There is abdominal tenderness in the right lower quadrant and suprapubic area. There is no right CVA tenderness, left CVA tenderness, guarding or rebound. Negative signs include Cardoso's sign.   Neurological:      Mental Status: He is alert.   Psychiatric:         Behavior: Behavior is cooperative.             Assessment & Plan  Right lower quadrant pain  Appears to be a chronic concern for him, not well controlled  No red flags on exam  Obtain CT abdomen as discussed without contrast due to declining renal function  Will follow up with results for further plan  Orders:    CT abdomen pelvis wo IV contrast; Future    POCT UA (nonautomated) manually resulted    Urinary retention  Continue Flomax as prescribed  Follow up with urology as referred on multiple occasions  Orders:    Referral to Urology; Future          Patient understands and agrees with  treatment plan.    Brianne Del Rio, ROBIN-CNP

## 2024-09-30 ENCOUNTER — HOSPITAL ENCOUNTER (OUTPATIENT)
Dept: RADIOLOGY | Facility: CLINIC | Age: 74
Discharge: HOME | End: 2024-09-30
Payer: MEDICARE

## 2024-09-30 DIAGNOSIS — R10.31 RIGHT LOWER QUADRANT PAIN: ICD-10-CM

## 2024-09-30 PROCEDURE — 74176 CT ABD & PELVIS W/O CONTRAST: CPT

## 2024-09-30 PROCEDURE — 74176 CT ABD & PELVIS W/O CONTRAST: CPT | Performed by: RADIOLOGY

## 2024-10-21 ENCOUNTER — APPOINTMENT (OUTPATIENT)
Dept: CARDIOLOGY | Facility: CLINIC | Age: 74
End: 2024-10-21
Payer: MEDICARE

## 2024-10-21 VITALS
HEART RATE: 70 BPM | WEIGHT: 191.2 LBS | DIASTOLIC BLOOD PRESSURE: 72 MMHG | HEIGHT: 66 IN | BODY MASS INDEX: 30.73 KG/M2 | SYSTOLIC BLOOD PRESSURE: 150 MMHG | RESPIRATION RATE: 18 BRPM

## 2024-10-21 DIAGNOSIS — R00.2 PALPITATIONS: ICD-10-CM

## 2024-10-21 DIAGNOSIS — Z95.5 STATUS POST CORONARY ARTERY STENT PLACEMENT: Primary | ICD-10-CM

## 2024-10-21 DIAGNOSIS — I10 PRIMARY HYPERTENSION: ICD-10-CM

## 2024-10-21 DIAGNOSIS — I25.2 HISTORY OF MYOCARDIAL INFARCTION: ICD-10-CM

## 2024-10-21 DIAGNOSIS — I25.10 CORONARY ARTERY DISEASE INVOLVING NATIVE CORONARY ARTERY OF NATIVE HEART WITHOUT ANGINA PECTORIS: ICD-10-CM

## 2024-10-21 DIAGNOSIS — R06.02 SOB (SHORTNESS OF BREATH): ICD-10-CM

## 2024-10-21 DIAGNOSIS — E78.00 HYPERCHOLESTEROLEMIA: ICD-10-CM

## 2024-10-21 PROCEDURE — 3044F HG A1C LEVEL LT 7.0%: CPT | Performed by: INTERNAL MEDICINE

## 2024-10-21 PROCEDURE — 3008F BODY MASS INDEX DOCD: CPT | Performed by: INTERNAL MEDICINE

## 2024-10-21 PROCEDURE — 1159F MED LIST DOCD IN RCRD: CPT | Performed by: INTERNAL MEDICINE

## 2024-10-21 PROCEDURE — 3078F DIAST BP <80 MM HG: CPT | Performed by: INTERNAL MEDICINE

## 2024-10-21 PROCEDURE — 99214 OFFICE O/P EST MOD 30 MIN: CPT | Performed by: INTERNAL MEDICINE

## 2024-10-21 PROCEDURE — 1160F RVW MEDS BY RX/DR IN RCRD: CPT | Performed by: INTERNAL MEDICINE

## 2024-10-21 PROCEDURE — 1036F TOBACCO NON-USER: CPT | Performed by: INTERNAL MEDICINE

## 2024-10-21 PROCEDURE — 3060F POS MICROALBUMINURIA REV: CPT | Performed by: INTERNAL MEDICINE

## 2024-10-21 PROCEDURE — 1126F AMNT PAIN NOTED NONE PRSNT: CPT | Performed by: INTERNAL MEDICINE

## 2024-10-21 PROCEDURE — 4010F ACE/ARB THERAPY RXD/TAKEN: CPT | Performed by: INTERNAL MEDICINE

## 2024-10-21 PROCEDURE — 3048F LDL-C <100 MG/DL: CPT | Performed by: INTERNAL MEDICINE

## 2024-10-21 PROCEDURE — 3077F SYST BP >= 140 MM HG: CPT | Performed by: INTERNAL MEDICINE

## 2024-10-21 RX ORDER — LISINOPRIL 20 MG/1
20 TABLET ORAL DAILY
Qty: 90 TABLET | Refills: 3 | Status: SHIPPED | OUTPATIENT
Start: 2024-10-21 | End: 2025-10-21

## 2024-10-21 ASSESSMENT — LIFESTYLE VARIABLES
AUDIT-C TOTAL SCORE: 0
AUDIT TOTAL SCORE: 0
HAS A RELATIVE, FRIEND, DOCTOR, OR ANOTHER HEALTH PROFESSIONAL EXPRESSED CONCERN ABOUT YOUR DRINKING OR SUGGESTED YOU CUT DOWN: NO
HOW OFTEN DO YOU HAVE SIX OR MORE DRINKS ON ONE OCCASION: NEVER
SKIP TO QUESTIONS 9-10: 1
HOW MANY STANDARD DRINKS CONTAINING ALCOHOL DO YOU HAVE ON A TYPICAL DAY: PATIENT DOES NOT DRINK
HOW OFTEN DO YOU HAVE A DRINK CONTAINING ALCOHOL: NEVER
HAVE YOU OR SOMEONE ELSE BEEN INJURED AS A RESULT OF YOUR DRINKING: NO

## 2024-10-21 ASSESSMENT — PATIENT HEALTH QUESTIONNAIRE - PHQ9
1. LITTLE INTEREST OR PLEASURE IN DOING THINGS: NOT AT ALL
SUM OF ALL RESPONSES TO PHQ9 QUESTIONS 1 AND 2: 0
2. FEELING DOWN, DEPRESSED OR HOPELESS: NOT AT ALL

## 2024-10-21 ASSESSMENT — PAIN SCALES - GENERAL: PAINLEVEL_OUTOF10: 0-NO PAIN

## 2024-10-21 ASSESSMENT — ENCOUNTER SYMPTOMS
DEPRESSION: 0
LOSS OF SENSATION IN FEET: 0
OCCASIONAL FEELINGS OF UNSTEADINESS: 0

## 2024-10-21 NOTE — PATIENT INSTRUCTIONS
1.  Increase lisinopril to 20 mg oral daily.  2.  Will keep same other medications.  I will see you in 6 months

## 2024-10-21 NOTE — PROGRESS NOTES
History of present illness:  74 year old male patient here today following up on hx of lateral STEMI in 08/2019, at that time he was presented with cardiac arrest status post PCI to diagonal 1 that had a 99% hazy lesion also he had 100% occluded LCX that was acute and was treated with JOSE to the proximal segment. Also we ballooned the side of the stent he to open the proper left circ after the OM 1 which is large vessel. We achieved very good result. Has chronically occluded RCA, LAD has moderate disease of 50% in the mid segment. Patient at that time his EF was reduced to 30-35%.  Subsequently patient had recovered ejection fraction.  He can return to my office at the beginning of the year after missing several appointments.  Underwent stress test and echocardiogram this year showed normal ejection fraction no major valve abnormalities and no ischemia.  Patient feeling overall okay.  Reports shortness of breath with moderate to high intensity activity likely secondary to COPD.  Denies having palpitations dizziness lightheadedness or syncope.    Past Medical History:   Diagnosis Date    Acute renal failure syndrome (CMS-HCC) 11/14/2023    Angina pectoris 11/17/2023    Arthritis     Asthma     CAD (coronary artery disease)     s/p stent placement    Cardiac arrest 11/17/2023    Chronic obstructive pulmonary disease requiring drug therapy (Multi) 11/17/2023    Coronary artery disease involving native coronary artery of native heart without angina pectoris 11/14/2023    Diabetes mellitus (Multi)     Diabetes mellitus, type 2 (Multi) 11/08/2018    Diastolic heart failure 11/17/2023    Disorder involving thrombocytopenia (CMS-HCC) 11/12/2019    Essential hypertension 11/08/2018    Gout     High blood pressure     High cholesterol     Kidney disease     Migraine headache     Mixed hyperlipidemia 11/08/2018    Palpitations 11/17/2023    Status post coronary artery stent placement 11/14/2023    Status post myocardial  infarction 09/24/2019       Past Surgical History:   Procedure Laterality Date    BACK SURGERY  2012    HERNIA REPAIR  2015    HERNIA REPAIR  2013       Allergies   Allergen Reactions    Hydromorphone Anaphylaxis    Latex Hives and Rash     Gets ill    Penicillins Hives    Dyphylline-Guaifenesin Swelling    Grass Pollen Other     Makes him sick    Mold Other     Gets sick    Ragweed Other     Affects asthma        reports that he has never smoked. He has never been exposed to tobacco smoke. He has never used smokeless tobacco. He reports that he does not drink alcohol and does not use drugs.    Family History   Problem Relation Name Age of Onset    Heart disease Mother      Stroke Son      Autism Son         Patient's Medications   New Prescriptions    No medications on file   Previous Medications    ALBUTEROL (VENTOLIN HFA) 90 MCG/ACTUATION INHALER    Inhale 1 puff every 4 hours if needed for wheezing or shortness of breath.    ASPIRIN 81 MG EC TABLET    Take 1 tablet (81 mg) by mouth once daily.    ATORVASTATIN (LIPITOR) 80 MG TABLET    Take 1 tablet (80 mg) by mouth once daily.    CARVEDILOL (COREG) 25 MG TABLET    Take 0.5 tablets (12.5 mg) by mouth 2 times a day with meals.    FLUTICASONE FUROATE-VILANTEROL (BREO ELLIPTA) 200-25 MCG/DOSE INHALER    Inhale 1 puff once daily.    HYDROCHLOROTHIAZIDE (HYDRODIURIL) 25 MG TABLET    Take 1 tablet (25 mg) by mouth once daily.    IPRATROPIUM-ALBUTEROL (DUO-NEB) 0.5-2.5 MG/3 ML NEBULIZER SOLUTION    Inhale 3 mL every 6 hours if needed.    LISINOPRIL 10 MG TABLET    Take 1 tablet (10 mg) by mouth once daily.    OMEPRAZOLE (PRILOSEC) 20 MG DR CAPSULE    Take 1 capsule (20 mg) by mouth once daily in the morning. Take before meals.    TAMSULOSIN (FLOMAX) 0.4 MG 24 HR CAPSULE    Take 1 capsule (0.4 mg) by mouth once daily.   Modified Medications    No medications on file   Discontinued Medications    No medications on file       Objective   Physical Exam  General: Patient in  no acute distress   HEENT: Atraumatic normocephalic.  Neck: Supple, jugular venous pressure within normal limit.  No bruits  Lungs: Clear to auscultation bilaterally  Cardiovascular: Regular rate and rhythm, normal heart sounds, no murmurs rubs or gallops  Abdomen: Soft nontender nondistended.  Normal bowel sounds.  Extremities: Warm to touch, no edema.      Lab Review   Office Visit on 09/26/2024   Component Date Value    POC Color, Urine 09/26/2024 Yellow     POC Appearance, Urine 09/26/2024 Clear     POC Glucose, Urine 09/26/2024 NEGATIVE     POC Bilirubin, Urine 09/26/2024 NEGATIVE     POC Ketones, Urine 09/26/2024 NEGATIVE     POC Specific Gravity, Ur* 09/26/2024 1.020     POC Blood, Urine 09/26/2024 SMALL (1+) (A)     POC PH, Urine 09/26/2024 6.0     POC Protein, Urine 09/26/2024 TRACE (A)     POC Urobilinogen, Urine 09/26/2024 0.2     Poc Nitrite, Urine 09/26/2024 NEGATIVE     POC Leukocytes, Urine 09/26/2024 NEGATIVE    Hospital Outpatient Visit on 09/04/2024   Component Date Value    AV pk suzy 09/04/2024 1.47     LVOT diam 09/04/2024 2.00     AV mn grad 09/04/2024 4.0     MV E/A ratio 09/04/2024 0.69     Tricuspid annular plane * 09/04/2024 2.4     LV Biplane EF 09/04/2024 55     LA vol index A/L 09/04/2024 40.8     LV EF 09/04/2024 58     RV free wall pk S' 09/04/2024 12.50     RVSP 09/04/2024 24.2     AV pk grad 09/04/2024 8.6     Aortic Valve Area by Con* 09/04/2024 2.53     Aortic Valve Area by Con* 09/04/2024 2.22     LV A4C EF 09/04/2024 52.2         Assessment/Plan   Patient Active Problem List   Diagnosis    Severe persistent asthma without complication (Multi)    Sleep disorder    Enlarged prostate    Inguinal hernia    Calculus of gallbladder with cholecystitis    Coronary artery disease involving native coronary artery of native heart without angina pectoris    Status post coronary artery stent placement    Hypercholesterolemia    Hypertension    Anxiety    Acute urinary tract infection    Acute  renal failure (CMS-HCC)    Asthma    Accidental fall    Acute hepatitis    Injury of kidney    Kidney stone    Cardiac arrest    Cellulitis    Chest discomfort    Angina pectoris    Acute exacerbation of chronic obstructive pulmonary disease (Multi)    Chronic pain    Type 2 diabetes mellitus    Congestive heart failure    Disorder involving thrombocytopenia (CMS-HCC)    Complete tear of rotator cuff    Gastroesophageal reflux disease    Gout    Groin injury    Impaired fasting glucose    Influenza due to influenza virus, type B    Pain of left hip joint    Lumbar back pain with radiculopathy affecting lower extremity    Pain of left hand    Solitary pulmonary nodule    Musculoskeletal pain    Neck pain    Palpitations    Panic attack    Psoriasis    Rash    History of myocardial infarction    Abrasion of face    Arthralgia of right knee    Bronchitis    Clouded consciousness    Cough    Dizziness    Dyspnea    Injury of head    Retention of urine    Abdominal pain    Backache    Left foot pain    Pain in toe    Knee pain    Arthralgia of shoulder    Obesity    Hypertensive chronic kidney disease w stg 1-4/unsp chr kdny    Urinary tract infection associated with indwelling urethral catheter, initial encounter (CMS-HCC)      74 year old male patient here today following up on hx of lateral STEMI in 08/2019, at that time he was presented with cardiac arrest status post PCI to diagonal 1 that had a 99% hazy lesion also he had 100% occluded LCX that was acute and was treated with JOSE to the proximal segment. Also we ballooned the side of the stent he to open the proper left circ after the OM 1 which is large vessel. We achieved very good result. Has chronically occluded RCA, LAD has moderate disease of 50% in the mid segment. Patient at that time his EF was reduced to 30-35%.  Subsequently patient had recovered ejection fraction.  He can return to my office at the beginning of the year after missing several  appointments.  Underwent stress test and echocardiogram this year showed normal ejection fraction no major valve abnormalities and no ischemia.  Patient feeling overall okay.  Reports shortness of breath with moderate to high intensity activity likely secondary to COPD.  Denies having palpitations dizziness lightheadedness or syncope.  Blood pressure is elevated today heart rate well-controlled.  This point my recommendation is to increase lisinopril to 20 mg oral daily for better blood pressure control.  Continue other medications.  Will follow-up in 4 months.  May consider PCI  intervention of the RCA if he remains symptomatic and we have documentation of that based on his symptoms and exam.  LDL at target.      Jessika Spencer MD

## 2024-11-07 ENCOUNTER — TELEPHONE (OUTPATIENT)
Dept: PRIMARY CARE | Facility: CLINIC | Age: 74
End: 2024-11-07

## 2024-11-07 ENCOUNTER — APPOINTMENT (OUTPATIENT)
Dept: PRIMARY CARE | Facility: CLINIC | Age: 74
End: 2024-11-07
Payer: MEDICARE

## 2024-11-07 ENCOUNTER — LAB (OUTPATIENT)
Dept: LAB | Facility: LAB | Age: 74
End: 2024-11-07
Payer: MEDICARE

## 2024-11-07 VITALS
TEMPERATURE: 97.1 F | OXYGEN SATURATION: 97 % | HEIGHT: 66 IN | SYSTOLIC BLOOD PRESSURE: 140 MMHG | WEIGHT: 156 LBS | DIASTOLIC BLOOD PRESSURE: 84 MMHG | HEART RATE: 73 BPM | BODY MASS INDEX: 25.07 KG/M2

## 2024-11-07 DIAGNOSIS — N18.31 DIABETES MELLITUS DUE TO UNDERLYING CONDITION WITH STAGE 3A CHRONIC KIDNEY DISEASE, WITHOUT LONG-TERM CURRENT USE OF INSULIN (MULTI): ICD-10-CM

## 2024-11-07 DIAGNOSIS — R35.1 BENIGN PROSTATIC HYPERPLASIA WITH NOCTURIA: ICD-10-CM

## 2024-11-07 DIAGNOSIS — I10 PRIMARY HYPERTENSION: ICD-10-CM

## 2024-11-07 DIAGNOSIS — R33.9 URINARY RETENTION: ICD-10-CM

## 2024-11-07 DIAGNOSIS — Z12.11 ENCOUNTER FOR SCREENING FOR MALIGNANT NEOPLASM OF COLON: ICD-10-CM

## 2024-11-07 DIAGNOSIS — I10 PRIMARY HYPERTENSION: Primary | ICD-10-CM

## 2024-11-07 DIAGNOSIS — E08.22 DIABETES MELLITUS DUE TO UNDERLYING CONDITION WITH STAGE 3A CHRONIC KIDNEY DISEASE, WITHOUT LONG-TERM CURRENT USE OF INSULIN (MULTI): ICD-10-CM

## 2024-11-07 DIAGNOSIS — Q61.3 POLYCYSTIC KIDNEY DISEASE: ICD-10-CM

## 2024-11-07 DIAGNOSIS — N18.31 STAGE 3A CHRONIC KIDNEY DISEASE (MULTI): ICD-10-CM

## 2024-11-07 DIAGNOSIS — N40.1 BENIGN PROSTATIC HYPERPLASIA WITH NOCTURIA: ICD-10-CM

## 2024-11-07 LAB
ANION GAP SERPL CALCULATED.3IONS-SCNC: 10 MMOL/L (ref 10–20)
APPEARANCE UR: CLEAR
BASOPHILS # BLD AUTO: 0.01 X10*3/UL (ref 0–0.1)
BASOPHILS NFR BLD AUTO: 0.2 %
BILIRUB UR STRIP.AUTO-MCNC: NEGATIVE MG/DL
BUN SERPL-MCNC: 34 MG/DL (ref 6–23)
CALCIUM SERPL-MCNC: 9.1 MG/DL (ref 8.6–10.3)
CHLORIDE SERPL-SCNC: 109 MMOL/L (ref 98–107)
CO2 SERPL-SCNC: 26 MMOL/L (ref 21–32)
COLOR UR: ABNORMAL
CREAT SERPL-MCNC: 1.34 MG/DL (ref 0.5–1.3)
EGFRCR SERPLBLD CKD-EPI 2021: 56 ML/MIN/1.73M*2
EOSINOPHIL # BLD AUTO: 0.25 X10*3/UL (ref 0–0.4)
EOSINOPHIL NFR BLD AUTO: 4.1 %
ERYTHROCYTE [DISTWIDTH] IN BLOOD BY AUTOMATED COUNT: 12.8 % (ref 11.5–14.5)
GLUCOSE SERPL-MCNC: 107 MG/DL (ref 74–99)
GLUCOSE UR STRIP.AUTO-MCNC: NORMAL MG/DL
HCT VFR BLD AUTO: 40 % (ref 41–52)
HGB BLD-MCNC: 13 G/DL (ref 13.5–17.5)
IMM GRANULOCYTES # BLD AUTO: 0.01 X10*3/UL (ref 0–0.5)
IMM GRANULOCYTES NFR BLD AUTO: 0.2 % (ref 0–0.9)
KETONES UR STRIP.AUTO-MCNC: NEGATIVE MG/DL
LEUKOCYTE ESTERASE UR QL STRIP.AUTO: NEGATIVE
LYMPHOCYTES # BLD AUTO: 1.27 X10*3/UL (ref 0.8–3)
LYMPHOCYTES NFR BLD AUTO: 20.9 %
MCH RBC QN AUTO: 30.4 PG (ref 26–34)
MCHC RBC AUTO-ENTMCNC: 32.5 G/DL (ref 32–36)
MCV RBC AUTO: 94 FL (ref 80–100)
MONOCYTES # BLD AUTO: 0.49 X10*3/UL (ref 0.05–0.8)
MONOCYTES NFR BLD AUTO: 8 %
MUCOUS THREADS #/AREA URNS AUTO: ABNORMAL /LPF
NEUTROPHILS # BLD AUTO: 4.06 X10*3/UL (ref 1.6–5.5)
NEUTROPHILS NFR BLD AUTO: 66.6 %
NITRITE UR QL STRIP.AUTO: NEGATIVE
NRBC BLD-RTO: 0 /100 WBCS (ref 0–0)
PH UR STRIP.AUTO: 5.5 [PH]
PLATELET # BLD AUTO: 143 X10*3/UL (ref 150–450)
POTASSIUM SERPL-SCNC: 4.5 MMOL/L (ref 3.5–5.3)
PROT UR STRIP.AUTO-MCNC: ABNORMAL MG/DL
RBC # BLD AUTO: 4.27 X10*6/UL (ref 4.5–5.9)
RBC # UR STRIP.AUTO: ABNORMAL /UL
RBC #/AREA URNS AUTO: ABNORMAL /HPF
SODIUM SERPL-SCNC: 140 MMOL/L (ref 136–145)
SP GR UR STRIP.AUTO: 1.02
UROBILINOGEN UR STRIP.AUTO-MCNC: NORMAL MG/DL
WBC # BLD AUTO: 6.1 X10*3/UL (ref 4.4–11.3)
WBC #/AREA URNS AUTO: ABNORMAL /HPF

## 2024-11-07 PROCEDURE — 36415 COLL VENOUS BLD VENIPUNCTURE: CPT

## 2024-11-07 PROCEDURE — 4010F ACE/ARB THERAPY RXD/TAKEN: CPT

## 2024-11-07 PROCEDURE — 1160F RVW MEDS BY RX/DR IN RCRD: CPT

## 2024-11-07 PROCEDURE — 3060F POS MICROALBUMINURIA REV: CPT

## 2024-11-07 PROCEDURE — 85025 COMPLETE CBC W/AUTO DIFF WBC: CPT

## 2024-11-07 PROCEDURE — 3079F DIAST BP 80-89 MM HG: CPT

## 2024-11-07 PROCEDURE — G2211 COMPLEX E/M VISIT ADD ON: HCPCS

## 2024-11-07 PROCEDURE — 81001 URINALYSIS AUTO W/SCOPE: CPT

## 2024-11-07 PROCEDURE — 80048 BASIC METABOLIC PNL TOTAL CA: CPT

## 2024-11-07 PROCEDURE — 1125F AMNT PAIN NOTED PAIN PRSNT: CPT

## 2024-11-07 PROCEDURE — 3077F SYST BP >= 140 MM HG: CPT

## 2024-11-07 PROCEDURE — 3044F HG A1C LEVEL LT 7.0%: CPT

## 2024-11-07 PROCEDURE — 99214 OFFICE O/P EST MOD 30 MIN: CPT

## 2024-11-07 PROCEDURE — 1159F MED LIST DOCD IN RCRD: CPT

## 2024-11-07 PROCEDURE — 3048F LDL-C <100 MG/DL: CPT

## 2024-11-07 PROCEDURE — 3008F BODY MASS INDEX DOCD: CPT

## 2024-11-07 ASSESSMENT — PAIN SCALES - GENERAL: PAINLEVEL_OUTOF10: 6

## 2024-11-07 NOTE — ASSESSMENT & PLAN NOTE
Chronic condition, stable today  Continue coreg 12.5 mg BID, hydrochlorothiazide 25 mg daily, lisinopril 20 mg daily as prescribed  Orders:    CBC and Auto Differential; Future

## 2024-11-07 NOTE — PROGRESS NOTES
Subjective     Patient ID: Duane Scott is a 74 y.o. male who presents for chronic condition.      HPI  Duane presents for follow up of CT scan results and follow up of CKD.   He has previously been referred to urology due to urinary retention and nephrology for declining renal function. Duane has not made either appointment as he reports a lack of transportation and needs to follow with providers within close proximity to his home as he walks to his appointments. He declines offers to arrange [facility] for transport.     Duane also presents for follow up of essential hypertension.   Reports he is taking coreg 12.5 mg BID, hydrochlorothiazide 25 mg daily, lisinopril 20 mg daily  BP slightly elevated today 140/84   Tolerating medications well.  Denies change in vision, headache, or dizziness.   No complaints of chest pain, palpitations, or shortness of breath.  Follows regularly with cardiology, Dr. Spencer. Last visit 10/21/2024- BP elevated at that visit, lisinopril increased to 20 mg daily at that visit. Bp was 150/72. Does not check home blood pressures.         Patient's recent visit notes, medication and allergy lists, past medical surgical social hx, immunization, vitals, problem list, recent tests were reviewed by me for pertinence to this visit.    Current Outpatient Medications:     albuterol (Ventolin HFA) 90 mcg/actuation inhaler, Inhale 1 puff every 4 hours if needed for wheezing or shortness of breath., Disp: 18 g, Rfl: 1    aspirin 81 mg EC tablet, Take 1 tablet (81 mg) by mouth once daily., Disp: , Rfl:     atorvastatin (Lipitor) 80 mg tablet, Take 1 tablet (80 mg) by mouth once daily., Disp: 90 tablet, Rfl: 3    carvedilol (Coreg) 25 mg tablet, Take 0.5 tablets (12.5 mg) by mouth 2 times a day with meals., Disp: 90 tablet, Rfl: 3    fluticasone furoate-vilanteroL (Breo Ellipta) 200-25 mcg/dose inhaler, Inhale 1 puff once daily., Disp: 28 each, Rfl: 1    hydroCHLOROthiazide (HYDRODiuril) 25 mg tablet,  "Take 1 tablet (25 mg) by mouth once daily., Disp: 30 tablet, Rfl: 0    ipratropium-albuteroL (Duo-Neb) 0.5-2.5 mg/3 mL nebulizer solution, Inhale 3 mL every 6 hours if needed., Disp: , Rfl:     lisinopril 20 mg tablet, Take 1 tablet (20 mg) by mouth once daily., Disp: 90 tablet, Rfl: 3    omeprazole (PriLOSEC) 20 mg DR capsule, Take 1 capsule (20 mg) by mouth once daily in the morning. Take before meals., Disp: , Rfl:     tamsulosin (Flomax) 0.4 mg 24 hr capsule, Take 1 capsule (0.4 mg) by mouth once daily., Disp: 30 capsule, Rfl: 11      Review of Systems  All other systems have been reviewed and are negative except as noted in the HPI.         Objective   /84 (BP Location: Left arm, Patient Position: Sitting, BP Cuff Size: Adult)   Pulse 73   Temp 36.2 °C (97.1 °F) (Temporal)   Ht 1.676 m (5' 6\")   Wt 70.8 kg (156 lb)   SpO2 97%   BMI 25.18 kg/m²       Physical Exam  Vitals and nursing note reviewed.   Constitutional:       General: He is not in acute distress.     Appearance: Normal appearance. He is not ill-appearing.   Neck:      Vascular: No carotid bruit.   Cardiovascular:      Rate and Rhythm: Normal rate and regular rhythm.      Pulses: Normal pulses.      Heart sounds: Normal heart sounds, S1 normal and S2 normal. No murmur heard.  Pulmonary:      Effort: Pulmonary effort is normal. No respiratory distress.      Breath sounds: Normal breath sounds and air entry.   Musculoskeletal:      Cervical back: Normal range of motion and neck supple. No rigidity or tenderness.      Right lower leg: No edema.      Left lower leg: No edema.   Lymphadenopathy:      Cervical: No cervical adenopathy.   Skin:     General: Skin is warm and dry.      Capillary Refill: Capillary refill takes less than 2 seconds.   Neurological:      General: No focal deficit present.      Mental Status: He is alert. Mental status is at baseline.   Psychiatric:         Mood and Affect: Mood normal.         Behavior: Behavior is " cooperative.           Assessment & Plan  Primary hypertension  Chronic condition, stable today  Continue coreg 12.5 mg BID, hydrochlorothiazide 25 mg daily, lisinopril 20 mg daily as prescribed  Orders:    CBC and Auto Differential; Future    Stage 3a chronic kidney disease (Multi)   Chronic condition, stable.  Maintain hydration  Continue to maintain BP and blood glucose levels.  Avoid NSAIDS  Avoid IV Contrast Dyes  -Repeat BMP as discussed  -another referral to nephrology ordered due to concerns of polycystic kidney disease found on CT exam.    Orders:    Basic metabolic panel; Future    Referral to Nephrology; Future    Polycystic kidney disease    Orders:    Referral to Nephrology; Future    Benign prostatic hyperplasia with nocturia  Cronic condition, stable  Continue flomax 0.4 mg daily as prescribed.  Encouraged follow up with urology as previously referred       Urinary retention    Orders:    Urinalysis with Reflex Microscopic; Future    Encounter for screening for malignant neoplasm of colon    Orders:    Cologuard® colon cancer screening; Future    Cologuard® colon cancer screening          Patient understands and agrees with treatment plan.    Brianne Del Rio, APRN-CNP

## 2024-12-17 ENCOUNTER — APPOINTMENT (OUTPATIENT)
Dept: PRIMARY CARE | Facility: CLINIC | Age: 74
End: 2024-12-17
Payer: MEDICARE

## 2024-12-22 NOTE — PROGRESS NOTES
Subjective   Patient ID: Duane Scott is a 74 y.o. male. He was kindly referred by Brianne Del Rio APRN-*.      HPI  74 y.o. male presents with urinary retention. The patient has reports of decreased urination and increased difficulty with his urinary stream. The patient has other diagnosis that include polycystic kidney disease, BPH with nocturia, and stage 3a kidney disease.    He is present due to difficulty urinating which is effecting his bladder.  He is experiencing pain in his upper and lower abdomen, nothing has been done to treat that. He has been told this could be due to multiple bilateral renal cysts.     He is on aspirin only due to a previous stroke. He states he is in heart failure with fluid around his heart. Also reports having had cardiac arrest in the past.    He was prescribed tamsulosin 0.4 mg daily several years ago. He has since discontinued the medication due to not experiencing effectiveness.         CT ABDOMEN PELVIS WO IV CONTRAST; 9/30/2024   IMPRESSION:  1. No acute abdominal or pelvic pathology.  2. Extensive bilateral renal cysts, likely autosomal dominant  polycystic kidney disease.  3. 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 Stable 6.9 cm  upper pole right Bosniak 2 renal cyst.  4. Prostate enlargement.  5. 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 Bladder diverticula.  6. Constipation. Descending colonic and sigmoid diverticula with no  diverticulitis.  7. Status post cholecystectomy    Lab Results   Component Value Date    PSA 2.2 08/07/2024    PSA 1.5 11/05/2019    PSA 1.9 11/09/2018         Review of Systems  A complete review of systems was performed. All systems are noted to be negative unless indicated in the history of present illness, impression, active problem list, or past histories.     Objective   Physical Exam  General: Well developed, well nourished, alert and cooperative, appears  in no acute distress  Eyes: Non-injected conjunctiva, sclera clear, no proptosis  Cardiac: Extremities are warm and well perfused. No edema, cyanosis or pallor.   Lungs: Breathing is easy, non-labored. Speaking in clear and complete sentences. Normal diaphragmatic movement.  Abdomen: soft, non-tender. Moderate CVA tenderness  MSK: Ambulatory with steady gait, unassisted  Neuro: alert and oriented to person, place and time  Psych: Demonstrates good judgement and reason, without hallucinations, abnormal affect or abnormal behaviors.  Skin: no obvious lesions, no rashes.  : phallus circumcised, normal in size, no plaques or lesions. Testicles normal in size and texture, no masses. A bilateral mild groin rash, possibly fungal.       Assessment/Plan   Diagnoses and all orders for this visit:  Acquired bilateral renal cysts  Urinary retention  -     Referral to Urology  -     Urine Culture; Future  -     Urinalysis with Reflex Microscopic; Future  Bladder diverticulum  Groin rash  -     terbinafine (LamISIL AT) 1 % cream; Apply topically 2 times a day for 14 days.  Benign prostatic hyperplasia with nocturia  -     tamsulosin (Flomax) 0.4 mg 24 hr capsule; Take 2 capsules (0.8 mg) by mouth once daily.  -     finasteride (Proscar) 5 mg tablet; Take 1 tablet (5 mg) by mouth once daily. Do not crush, chew, or split.      74 y.o. male presents with urinary retention. The patient has reports of decreased urination and increased difficulty with his urinary stream. He is prescribed tamsulosin 0.4 mg daily. The patient has other diagnosis that include polycystic kidney disease, BPH with nocturia, and stage 3a kidney disease.    I personally reviewed the medical records of the patient including the lab values, the note of the referring physician and I reviewed the report and visualized the images performed focusing on CT abdomen and pelvis that was completed on 09/30/2024. He has a large diverticulum out of the bladder. This  diverticulum may be the etiology of his pain on the left side. He has large kidney cysts.     During his physical exam, it is noted that he had a rash along his groin. It is potentially fungal and therefore I have ordered turbafine as treatment.     I discussed with the patient the etiology and pathophysiology of his symptoms, related to the anatomical and functional relationship of the bladder and the prostate.    I explained benign prostatic hyperplasia leads to the enlargement of the prostate gland in a circumferential direction, either to the outside, the inside where it starts impinging on the bladder, or even inside the bladder forming a median lobe that is more difficult to treat medically because of the anatomical component.    The resulting symptoms do not necessarily correlate with the size of the prostate, and could be a combination of voiding/obstructive symptoms (hesitancy, weak stream, dribbling, and incomplete voiding of the bladder) and storage/irritative symptoms (frequency, urgency, urge incontinence, nocturia). While the typical treatment includes alpha blockers which work by blocking alpha-1 receptors in the smooth musculature of the prostate and bladder neck, this treatment could be combined with other therapies depending on the symptomatic profile and response. These include anticholinergics, 5 alpha reductase inhibitors proven to work best on prostates over the size of 40 g, and PDE 5 inhibitors using daily low-dose which have an anti-inflammatory and relaxing effect on the prostate, and are ideal for patients who have concomitant erectile dysfunction.    I discussed with him the side effect profile of alpha blockers, including headaches, lightheadedness typically with the first few doses, and retrograde ejaculation in minority of patients depending on the selectivity of the chosen alpha-blocker, more common in silodosin followed by tamsulosin and rare in alfuzosin and  doxazosin.      Plan:  UA/ U Culture  Start tamsulosin 0.4 mg 2 pills at bedtime  Start finasteride 5mg   Start terbinafine for groin rash bid for 2 weeks  FUV in 03/2025      Scribe Attestation   By signing my name below, I, Phil Arvizu, Dread attestation that this documentation has been prepared under the direction and in the presence of Jono Bass MD.

## 2024-12-23 ENCOUNTER — APPOINTMENT (OUTPATIENT)
Dept: UROLOGY | Facility: CLINIC | Age: 74
End: 2024-12-23
Payer: MEDICARE

## 2024-12-23 DIAGNOSIS — N28.1 ACQUIRED BILATERAL RENAL CYSTS: Primary | ICD-10-CM

## 2024-12-23 DIAGNOSIS — N40.1 BENIGN PROSTATIC HYPERPLASIA WITH NOCTURIA: ICD-10-CM

## 2024-12-23 DIAGNOSIS — N32.3 BLADDER DIVERTICULUM: ICD-10-CM

## 2024-12-23 DIAGNOSIS — R35.1 BENIGN PROSTATIC HYPERPLASIA WITH NOCTURIA: ICD-10-CM

## 2024-12-23 DIAGNOSIS — R21 GROIN RASH: ICD-10-CM

## 2024-12-23 DIAGNOSIS — R33.9 URINARY RETENTION: ICD-10-CM

## 2024-12-23 PROCEDURE — 3060F POS MICROALBUMINURIA REV: CPT | Performed by: STUDENT IN AN ORGANIZED HEALTH CARE EDUCATION/TRAINING PROGRAM

## 2024-12-23 PROCEDURE — G2211 COMPLEX E/M VISIT ADD ON: HCPCS | Performed by: STUDENT IN AN ORGANIZED HEALTH CARE EDUCATION/TRAINING PROGRAM

## 2024-12-23 PROCEDURE — 3044F HG A1C LEVEL LT 7.0%: CPT | Performed by: STUDENT IN AN ORGANIZED HEALTH CARE EDUCATION/TRAINING PROGRAM

## 2024-12-23 PROCEDURE — 3048F LDL-C <100 MG/DL: CPT | Performed by: STUDENT IN AN ORGANIZED HEALTH CARE EDUCATION/TRAINING PROGRAM

## 2024-12-23 PROCEDURE — 99204 OFFICE O/P NEW MOD 45 MIN: CPT | Performed by: STUDENT IN AN ORGANIZED HEALTH CARE EDUCATION/TRAINING PROGRAM

## 2024-12-23 PROCEDURE — 4010F ACE/ARB THERAPY RXD/TAKEN: CPT | Performed by: STUDENT IN AN ORGANIZED HEALTH CARE EDUCATION/TRAINING PROGRAM

## 2024-12-23 RX ORDER — FINASTERIDE 5 MG/1
5 TABLET, FILM COATED ORAL DAILY
Qty: 30 TABLET | Refills: 11 | Status: SHIPPED | OUTPATIENT
Start: 2024-12-23 | End: 2025-12-23

## 2024-12-23 RX ORDER — PRENATAL VIT 91/IRON/FOLIC/DHA 28-975-200
COMBINATION PACKAGE (EA) ORAL 2 TIMES DAILY
Qty: 28.4 G | Refills: 1 | Status: SHIPPED | OUTPATIENT
Start: 2024-12-23 | End: 2025-01-06

## 2024-12-23 RX ORDER — TAMSULOSIN HYDROCHLORIDE 0.4 MG/1
0.8 CAPSULE ORAL DAILY
Qty: 60 CAPSULE | Refills: 11 | Status: SHIPPED | OUTPATIENT
Start: 2024-12-23 | End: 2025-12-23

## 2025-01-02 ENCOUNTER — APPOINTMENT (OUTPATIENT)
Dept: OPHTHALMOLOGY | Facility: CLINIC | Age: 75
End: 2025-01-02
Payer: MEDICARE

## 2025-01-02 DIAGNOSIS — H25.13 AGE-RELATED NUCLEAR CATARACT OF BOTH EYES: ICD-10-CM

## 2025-01-02 DIAGNOSIS — H35.371 EPIRETINAL MEMBRANE (ERM) OF RIGHT EYE: Primary | ICD-10-CM

## 2025-01-02 PROCEDURE — 92004 COMPRE OPH EXAM NEW PT 1/>: CPT

## 2025-01-02 PROCEDURE — 92134 CPTRZ OPH DX IMG PST SGM RTA: CPT

## 2025-01-02 ASSESSMENT — CONF VISUAL FIELD
OS_NORMAL: 1
OS_INFERIOR_NASAL_RESTRICTION: 0
OD_SUPERIOR_NASAL_RESTRICTION: 0
OD_SUPERIOR_TEMPORAL_RESTRICTION: 0
OS_SUPERIOR_NASAL_RESTRICTION: 0
OS_INFERIOR_TEMPORAL_RESTRICTION: 0
OD_INFERIOR_NASAL_RESTRICTION: 0
OD_NORMAL: 1
OD_INFERIOR_TEMPORAL_RESTRICTION: 0
OS_SUPERIOR_TEMPORAL_RESTRICTION: 0

## 2025-01-02 ASSESSMENT — VISUAL ACUITY
OS_PH_SC+: +1
OD_SC: 20/25
OS_SC+: -2
OS_PH_SC: 20/25
OS_SC: 20/40
OD_SC+: -2
METHOD: SNELLEN - LINEAR

## 2025-01-02 ASSESSMENT — ENCOUNTER SYMPTOMS
RESPIRATORY NEGATIVE: 0
NEUROLOGICAL NEGATIVE: 0
ALLERGIC/IMMUNOLOGIC NEGATIVE: 0
CARDIOVASCULAR NEGATIVE: 0
GASTROINTESTINAL NEGATIVE: 0
EYES NEGATIVE: 1
HEMATOLOGIC/LYMPHATIC NEGATIVE: 0
CONSTITUTIONAL NEGATIVE: 0
MUSCULOSKELETAL NEGATIVE: 0
PSYCHIATRIC NEGATIVE: 0
ENDOCRINE NEGATIVE: 0

## 2025-01-02 ASSESSMENT — EXTERNAL EXAM - RIGHT EYE: OD_EXAM: NORMAL

## 2025-01-02 ASSESSMENT — TONOMETRY
OS_IOP_MMHG: 14
IOP_METHOD: TONOPEN
OD_IOP_MMHG: 13

## 2025-01-02 ASSESSMENT — SLIT LAMP EXAM - LIDS
COMMENTS: NORMAL
COMMENTS: NORMAL

## 2025-01-02 ASSESSMENT — EXTERNAL EXAM - LEFT EYE: OS_EXAM: NORMAL

## 2025-01-02 NOTE — PROGRESS NOTES
New patient referred for diabetic retinopathy eval  #Screening for Eye Condition  # Type 2 Diabetes without Ocular Complications    Diagnosed in T2DM ~2015, (-) insulin, not currently on medicines , patient reports prior oral med use  - last A1c 6.0 (8/7/24)    OCT : 01/02/25   OD: ERM, Normal Foveal Contour & Retinal laminations, EZ line preserved, (-) IRF/subretinal fluid (SRF), CST-WNL  OS: Normal Foveal Contour & Retinal laminations, EZ line preserved, (-) IRF/subretinal fluid (SRF), CST-WNL    No evidence of diabetic retinopathy (DR) or DME on exam today or OCT  RTC with optometry for refraction and annual DFE    #Combined Nuclear/Cortial Cataracts OU  - not visually significant  - monitor for now  - patient needs updated refraction    #Epiretinal Membrane OD  - mild not visually significant  - monitor

## 2025-01-09 ENCOUNTER — APPOINTMENT (OUTPATIENT)
Dept: CARDIOLOGY | Facility: HOSPITAL | Age: 75
End: 2025-01-09
Payer: MEDICARE

## 2025-01-09 ENCOUNTER — APPOINTMENT (OUTPATIENT)
Dept: RADIOLOGY | Facility: HOSPITAL | Age: 75
End: 2025-01-09
Payer: MEDICARE

## 2025-01-09 ENCOUNTER — HOSPITAL ENCOUNTER (EMERGENCY)
Facility: HOSPITAL | Age: 75
Discharge: HOME | End: 2025-01-09
Attending: EMERGENCY MEDICINE
Payer: MEDICARE

## 2025-01-09 VITALS
RESPIRATION RATE: 14 BRPM | TEMPERATURE: 98.2 F | WEIGHT: 156.09 LBS | DIASTOLIC BLOOD PRESSURE: 69 MMHG | SYSTOLIC BLOOD PRESSURE: 137 MMHG | HEART RATE: 60 BPM | OXYGEN SATURATION: 97 % | HEIGHT: 66 IN | BODY MASS INDEX: 25.09 KG/M2

## 2025-01-09 DIAGNOSIS — R73.9 HYPERGLYCEMIA: ICD-10-CM

## 2025-01-09 DIAGNOSIS — G63 POLYNEUROPATHY ASSOCIATED WITH UNDERLYING DISEASE (MULTI): ICD-10-CM

## 2025-01-09 DIAGNOSIS — J06.9 VIRAL UPPER RESPIRATORY INFECTION: ICD-10-CM

## 2025-01-09 DIAGNOSIS — G56.01 CARPAL TUNNEL SYNDROME OF RIGHT WRIST: ICD-10-CM

## 2025-01-09 DIAGNOSIS — J44.1 COPD WITH ACUTE EXACERBATION (MULTI): Primary | ICD-10-CM

## 2025-01-09 LAB
ALBUMIN SERPL BCP-MCNC: 3.9 G/DL (ref 3.4–5)
ALP SERPL-CCNC: 87 U/L (ref 33–136)
ALT SERPL W P-5'-P-CCNC: 15 U/L (ref 10–52)
ANION GAP SERPL CALCULATED.3IONS-SCNC: 11 MMOL/L (ref 10–20)
AST SERPL W P-5'-P-CCNC: 16 U/L (ref 9–39)
ATRIAL RATE: 74 BPM
BASOPHILS # BLD AUTO: 0.01 X10*3/UL (ref 0–0.1)
BASOPHILS NFR BLD AUTO: 0.2 %
BILIRUB SERPL-MCNC: 0.7 MG/DL (ref 0–1.2)
BNP SERPL-MCNC: 464 PG/ML (ref 0–99)
BUN SERPL-MCNC: 29 MG/DL (ref 6–23)
CALCIUM SERPL-MCNC: 8.4 MG/DL (ref 8.6–10.3)
CARDIAC TROPONIN I PNL SERPL HS: 29 NG/L (ref 0–20)
CHLORIDE SERPL-SCNC: 108 MMOL/L (ref 98–107)
CO2 SERPL-SCNC: 24 MMOL/L (ref 21–32)
CREAT SERPL-MCNC: 1.33 MG/DL (ref 0.5–1.3)
EGFRCR SERPLBLD CKD-EPI 2021: 56 ML/MIN/1.73M*2
EOSINOPHIL # BLD AUTO: 0.21 X10*3/UL (ref 0–0.4)
EOSINOPHIL NFR BLD AUTO: 4.6 %
ERYTHROCYTE [DISTWIDTH] IN BLOOD BY AUTOMATED COUNT: 13 % (ref 11.5–14.5)
FLUAV RNA RESP QL NAA+PROBE: NOT DETECTED
FLUBV RNA RESP QL NAA+PROBE: NOT DETECTED
GLUCOSE SERPL-MCNC: 155 MG/DL (ref 74–99)
HCT VFR BLD AUTO: 36.6 % (ref 41–52)
HGB BLD-MCNC: 12 G/DL (ref 13.5–17.5)
IMM GRANULOCYTES # BLD AUTO: 0.01 X10*3/UL (ref 0–0.5)
IMM GRANULOCYTES NFR BLD AUTO: 0.2 % (ref 0–0.9)
LYMPHOCYTES # BLD AUTO: 0.97 X10*3/UL (ref 0.8–3)
LYMPHOCYTES NFR BLD AUTO: 21.4 %
MCH RBC QN AUTO: 28.8 PG (ref 26–34)
MCHC RBC AUTO-ENTMCNC: 32.8 G/DL (ref 32–36)
MCV RBC AUTO: 88 FL (ref 80–100)
MONOCYTES # BLD AUTO: 0.36 X10*3/UL (ref 0.05–0.8)
MONOCYTES NFR BLD AUTO: 7.9 %
NEUTROPHILS # BLD AUTO: 2.97 X10*3/UL (ref 1.6–5.5)
NEUTROPHILS NFR BLD AUTO: 65.7 %
NRBC BLD-RTO: 0 /100 WBCS (ref 0–0)
P AXIS: 50 DEGREES
P OFFSET: 192 MS
P ONSET: 138 MS
PLATELET # BLD AUTO: 93 X10*3/UL (ref 150–450)
POTASSIUM SERPL-SCNC: 4 MMOL/L (ref 3.5–5.3)
PR INTERVAL: 160 MS
PROT SERPL-MCNC: 6.7 G/DL (ref 6.4–8.2)
Q ONSET: 218 MS
QRS COUNT: 12 BEATS
QRS DURATION: 96 MS
QT INTERVAL: 424 MS
QTC CALCULATION(BAZETT): 470 MS
QTC FREDERICIA: 455 MS
R AXIS: -16 DEGREES
RBC # BLD AUTO: 4.16 X10*6/UL (ref 4.5–5.9)
SARS-COV-2 RNA RESP QL NAA+PROBE: NOT DETECTED
SODIUM SERPL-SCNC: 139 MMOL/L (ref 136–145)
T AXIS: -4 DEGREES
T OFFSET: 430 MS
VENTRICULAR RATE: 74 BPM
WBC # BLD AUTO: 4.5 X10*3/UL (ref 4.4–11.3)

## 2025-01-09 PROCEDURE — 71046 X-RAY EXAM CHEST 2 VIEWS: CPT | Performed by: STUDENT IN AN ORGANIZED HEALTH CARE EDUCATION/TRAINING PROGRAM

## 2025-01-09 PROCEDURE — 36415 COLL VENOUS BLD VENIPUNCTURE: CPT | Performed by: EMERGENCY MEDICINE

## 2025-01-09 PROCEDURE — 85025 COMPLETE CBC W/AUTO DIFF WBC: CPT | Performed by: EMERGENCY MEDICINE

## 2025-01-09 PROCEDURE — 2500000002 HC RX 250 W HCPCS SELF ADMINISTERED DRUGS (ALT 637 FOR MEDICARE OP, ALT 636 FOR OP/ED): Performed by: EMERGENCY MEDICINE

## 2025-01-09 PROCEDURE — 83880 ASSAY OF NATRIURETIC PEPTIDE: CPT | Performed by: EMERGENCY MEDICINE

## 2025-01-09 PROCEDURE — 80053 COMPREHEN METABOLIC PANEL: CPT | Performed by: EMERGENCY MEDICINE

## 2025-01-09 PROCEDURE — 99285 EMERGENCY DEPT VISIT HI MDM: CPT | Mod: 25 | Performed by: EMERGENCY MEDICINE

## 2025-01-09 PROCEDURE — 94640 AIRWAY INHALATION TREATMENT: CPT

## 2025-01-09 PROCEDURE — 96374 THER/PROPH/DIAG INJ IV PUSH: CPT

## 2025-01-09 PROCEDURE — 2500000004 HC RX 250 GENERAL PHARMACY W/ HCPCS (ALT 636 FOR OP/ED): Performed by: EMERGENCY MEDICINE

## 2025-01-09 PROCEDURE — 71046 X-RAY EXAM CHEST 2 VIEWS: CPT

## 2025-01-09 PROCEDURE — 84484 ASSAY OF TROPONIN QUANT: CPT | Performed by: EMERGENCY MEDICINE

## 2025-01-09 PROCEDURE — 87636 SARSCOV2 & INF A&B AMP PRB: CPT | Performed by: EMERGENCY MEDICINE

## 2025-01-09 PROCEDURE — 2500000001 HC RX 250 WO HCPCS SELF ADMINISTERED DRUGS (ALT 637 FOR MEDICARE OP): Performed by: EMERGENCY MEDICINE

## 2025-01-09 PROCEDURE — 93005 ELECTROCARDIOGRAM TRACING: CPT

## 2025-01-09 RX ORDER — PREDNISONE 20 MG/1
40 TABLET ORAL DAILY
Qty: 10 TABLET | Refills: 0 | Status: SHIPPED | OUTPATIENT
Start: 2025-01-09 | End: 2025-01-14

## 2025-01-09 RX ORDER — IPRATROPIUM BROMIDE AND ALBUTEROL SULFATE 2.5; .5 MG/3ML; MG/3ML
3 SOLUTION RESPIRATORY (INHALATION) ONCE
Status: COMPLETED | OUTPATIENT
Start: 2025-01-09 | End: 2025-01-09

## 2025-01-09 RX ORDER — GABAPENTIN 300 MG/1
300 CAPSULE ORAL 3 TIMES DAILY
Qty: 9 CAPSULE | Refills: 0 | Status: SHIPPED | OUTPATIENT
Start: 2025-01-09 | End: 2025-01-12

## 2025-01-09 RX ORDER — GABAPENTIN 300 MG/1
300 CAPSULE ORAL ONCE
Status: COMPLETED | OUTPATIENT
Start: 2025-01-09 | End: 2025-01-09

## 2025-01-09 RX ORDER — ALBUTEROL SULFATE 0.83 MG/ML
5 SOLUTION RESPIRATORY (INHALATION) ONCE
Status: COMPLETED | OUTPATIENT
Start: 2025-01-09 | End: 2025-01-09

## 2025-01-09 RX ADMIN — GABAPENTIN 300 MG: 300 CAPSULE ORAL at 03:45

## 2025-01-09 RX ADMIN — ALBUTEROL SULFATE 5 MG: 2.5 SOLUTION RESPIRATORY (INHALATION) at 02:41

## 2025-01-09 RX ADMIN — METHYLPREDNISOLONE SODIUM SUCCINATE 125 MG: 125 INJECTION, POWDER, FOR SOLUTION INTRAMUSCULAR; INTRAVENOUS at 02:40

## 2025-01-09 RX ADMIN — IPRATROPIUM BROMIDE AND ALBUTEROL SULFATE 3 ML: 2.5; .5 SOLUTION RESPIRATORY (INHALATION) at 02:41

## 2025-01-09 ASSESSMENT — PAIN - FUNCTIONAL ASSESSMENT: PAIN_FUNCTIONAL_ASSESSMENT: 0-10

## 2025-01-09 ASSESSMENT — PAIN SCALES - GENERAL: PAINLEVEL_OUTOF10: 0 - NO PAIN

## 2025-01-09 ASSESSMENT — COLUMBIA-SUICIDE SEVERITY RATING SCALE - C-SSRS
1. IN THE PAST MONTH, HAVE YOU WISHED YOU WERE DEAD OR WISHED YOU COULD GO TO SLEEP AND NOT WAKE UP?: NO
2. HAVE YOU ACTUALLY HAD ANY THOUGHTS OF KILLING YOURSELF?: NO
6. HAVE YOU EVER DONE ANYTHING, STARTED TO DO ANYTHING, OR PREPARED TO DO ANYTHING TO END YOUR LIFE?: NO

## 2025-01-09 NOTE — ED PROVIDER NOTES
HPI   Chief Complaint   Patient presents with    Nasal Congestion    Asthma     Pt has had some congestion for the last 2 weeks and has asthma. Pt has been bringing up phlegm, but can't get the phlegm up anymore.       74-year-old male with a history of asthma/COPD, hypertension, CAD, diabetes, congestive heart failure, dyslipidemia, and non-smoker comes to the emergency department with a chief complaint of shortness of breath. Pt has had some congestion for the last 2 weeks and has asthma. Pt has been bringing up phlegm, but can't get the phlegm up anymore.  He denies any recent weight gain or leg swelling.  He denies any fevers or chest pain or palpitations.  He tried taking his albuterol nebulized 3 hours prior to arrival and notes that it was only temporarily helpful.  Patient is also concerned that over the last 2 weeks he has been having worsening paresthesias of his bilateral hands.  Right hand involves the first second and third digits and involves the entire left hand.      History provided by:  Patient and medical records   used: No            Patient History   Past Medical History:   Diagnosis Date    Acute renal failure syndrome (CMS-HCC) 11/14/2023    Angina pectoris 11/17/2023    Arthritis     Asthma     CAD (coronary artery disease)     s/p stent placement    Cardiac arrest 11/17/2023    Chronic obstructive pulmonary disease requiring drug therapy (Multi) 11/17/2023    Coronary artery disease involving native coronary artery of native heart without angina pectoris 11/14/2023    Diabetes mellitus (Multi)     Diabetes mellitus, type 2 (Multi) 11/08/2018    Diastolic heart failure 11/17/2023    Disorder involving thrombocytopenia (CMS-HCC) 11/12/2019    Essential hypertension 11/08/2018    Gout     High blood pressure     High cholesterol     Kidney disease     Migraine headache     Mixed hyperlipidemia 11/08/2018    Palpitations 11/17/2023    Status post coronary artery stent  placement 11/14/2023    Status post myocardial infarction 09/24/2019     Past Surgical History:   Procedure Laterality Date    BACK SURGERY  2012    HERNIA REPAIR  2015    HERNIA REPAIR  2013     Family History   Problem Relation Name Age of Onset    Heart disease Mother      Stroke Son      Autism Son       Social History     Tobacco Use    Smoking status: Never     Passive exposure: Never    Smokeless tobacco: Never   Vaping Use    Vaping status: Never Used   Substance Use Topics    Alcohol use: Never    Drug use: Never       Physical Exam   ED Triage Vitals [01/09/25 0202]   Temperature Heart Rate Respirations BP   36.8 °C (98.2 °F) 77 20 159/83      Pulse Ox Temp Source Heart Rate Source Patient Position   98 % Temporal Monitor Sitting      BP Location FiO2 (%)     Right arm --       Physical Exam  Vitals and nursing note reviewed.   Constitutional:       General: He is in acute distress.      Appearance: He is well-developed. He is not toxic-appearing or diaphoretic.   HENT:      Head: Normocephalic and atraumatic.      Nose: Congestion present.      Mouth/Throat:      Mouth: Mucous membranes are moist.   Eyes:      Conjunctiva/sclera: Conjunctivae normal.   Cardiovascular:      Rate and Rhythm: Normal rate and regular rhythm.      Heart sounds: No murmur heard.  Pulmonary:      Effort: Respiratory distress present.      Breath sounds: No stridor. Wheezing present. No rhonchi or rales.      Comments: Tachypnea with a respiratory rate of 28, using accessory muscles  Chest:      Chest wall: No tenderness.   Abdominal:      Palpations: Abdomen is soft.      Tenderness: There is no abdominal tenderness.   Musculoskeletal:         General: No swelling.      Cervical back: Neck supple.   Skin:     General: Skin is warm and dry.      Capillary Refill: Capillary refill takes less than 2 seconds.   Neurological:      Mental Status: He is alert and oriented to person, place, and time.      Cranial Nerves: No cranial  nerve deficit.      Sensory: Sensory deficit present.      Motor: No weakness.      Coordination: Coordination normal.      Gait: Gait normal.   Psychiatric:         Mood and Affect: Mood normal.           ED Course & MDM   ED Course as of 01/09/25 0653   u Jan 09, 2025   0225 ECG 12 lead  ECG performed on January 9, 2025 at 2:21 AM and interpreted by me at 225 showing a sinus rhythm with PACs, left axis deviation, otherwise intervals within normal limits, LVH, no STEMI.  No chest pain.  Nonspecific ST-T wave abnormality.  When compared with previous from March 13, 2024, no significant changes. [EG]   0319 Troponin I, High Sensitivity(!)  Minimally elevated, patient denies chest pain.  Most likely secondary to chronic kidney disease [EG]   0320 Comprehensive metabolic panel(!)  Hyperglycemia not consistent with DKA or HHS.  Elevated creatinine and decreased GFR consistent with the patient's baseline and not consistent with acute injury, otherwise unremarkable [EG]   0320 Sars-CoV-2 PCR  Negative viral testing [EG]   0320 B-Type Natriuretic Peptide(!)  Elevated without previous for comparison. [EG]   0321 XR chest 2 views      IMPRESSION:  1.  No evidence of acute cardiopulmonary process.       [EG]   0359 CBC and Auto Differential(!)  Noncontributory and specifically no leukocytosis or leukopenia, significant anemia requiring emergent intervention or significant thrombocytopenia [EG]      ED Course User Index  [EG] Martha Menjivar MD         Diagnoses as of 01/09/25 0653   COPD with acute exacerbation (Multi)   Carpal tunnel syndrome of right wrist   Polyneuropathy associated with underlying disease (Multi)   Hyperglycemia   Viral upper respiratory infection                 No data recorded                                 Medical Decision Making    HPI:  As Above  PMHx/PSHx/Meds/Allergies/SH/FH as per nursing documentation and reviewed.  Review of systems: Total of 10 systems reviewed and otherwise  negative except as noted elsewhere    DDX: As described in MDM    If performed, radiology listed above interpreted by me and confirmed by the Radiologist.  Medications administered during this visit (name and route): see MAR  Social determinants of health considered for this visit: lives at home  If performed, EKG interpreted by me and detailed above    [facility] Summary/considerations:  74-year-old male presenting with acute shortness of breath most consistent with exacerbation of COPD.  Chest x-ray is not consistent with cardiomegaly, pulmonary congestion, effusions, pneumothorax, or other acute intrathoracic pathology.  He was treated with a DuoNeb, albuterol x 2 and IV Solu-Medrol with resolution of wheezing.  Patient was also complaining of bilateral hand paresthesias which may be due to hyperventilation, however the patient is also an uncontrolled diabetic and peripheral neuropathy due to uncontrolled diabetes is high on the differential.  Specifically on his right hand his paresthesias are in the distribution of carpal tunnel syndrome.  He is provided a Velcro splint for his right hand.  Exam post splinting is unchanged.  Patient will be discharged home with a prescription for prednisone and instructed to follow-up with his primary care provider.    Prescriptions provided include: ###    The patient was seen and triaged by our nursing/medic staff, their vitals were taken and the staff notes were reviewed.  If the patient arrived by an EMS squad or an outside agency, we discussed the case with transporting EMS medic, police, or other historians. My initial assessment was attention to their airway, breathing, and circulatory status.  We addressed any immediate or life threatening findings and completed a medical history and a physical exam if the patient or those legally responsible were in agreement with this.   Prior to the patient being discharged, I or my PA/NP or the nursing staff discussed the differential,  results and discharge plan with the patient and/or family/friend/caregiver if present.  I emphasized the importance of follow-up in 2-3 days unless otherwise specified.  I explained reasons for the patient to return to the Emergency Department. Additional verbal discharge instructions were also given and discussed with the patient to supplement those generated by the EMR. We also discussed medications that were prescribed (if any) including common side effects and interactions. The patient was advised to abstain from driving, operating heavy machinery or making significant decisions while taking medications such as antihistamines, benzodiazepines, opiates and muscle relaxers. All questions were addressed.  They understand return precautions and discharge instructions. The patient and/or family/friend/caregiver expressed understanding.  **Disclaimer:  This note was dictated by speech recognition technology.  Minor errors in transcription may be present.  Please contact for clarification or corrections.      Amount and/or Complexity of Data Reviewed  Labs: ordered. Decision-making details documented in ED Course.  Radiology: ordered and independent interpretation performed. Decision-making details documented in ED Course.  ECG/medicine tests: ordered and independent interpretation performed. Decision-making details documented in ED Course.        Procedure  Procedures     Martha Menjivar MD  01/09/25 0653

## 2025-01-13 LAB
ATRIAL RATE: 74 BPM
P AXIS: 50 DEGREES
P OFFSET: 192 MS
P ONSET: 138 MS
PR INTERVAL: 160 MS
Q ONSET: 218 MS
QRS COUNT: 12 BEATS
QRS DURATION: 96 MS
QT INTERVAL: 424 MS
QTC CALCULATION(BAZETT): 470 MS
QTC FREDERICIA: 455 MS
R AXIS: -16 DEGREES
T AXIS: -4 DEGREES
T OFFSET: 430 MS
VENTRICULAR RATE: 74 BPM

## 2025-01-17 ENCOUNTER — LAB (OUTPATIENT)
Dept: LAB | Facility: LAB | Age: 75
End: 2025-01-17
Payer: MEDICARE

## 2025-01-17 DIAGNOSIS — R33.9 URINARY RETENTION: ICD-10-CM

## 2025-01-17 LAB
APPEARANCE UR: CLEAR
BILIRUB UR STRIP.AUTO-MCNC: NEGATIVE MG/DL
COLOR UR: NORMAL
GLUCOSE UR STRIP.AUTO-MCNC: NORMAL MG/DL
KETONES UR STRIP.AUTO-MCNC: NEGATIVE MG/DL
LEUKOCYTE ESTERASE UR QL STRIP.AUTO: NEGATIVE
NITRITE UR QL STRIP.AUTO: NEGATIVE
PH UR STRIP.AUTO: 5.5 [PH]
PROT UR STRIP.AUTO-MCNC: NEGATIVE MG/DL
RBC # UR STRIP.AUTO: NEGATIVE /UL
SP GR UR STRIP.AUTO: 1.02
UROBILINOGEN UR STRIP.AUTO-MCNC: NORMAL MG/DL

## 2025-01-17 PROCEDURE — 87086 URINE CULTURE/COLONY COUNT: CPT

## 2025-01-17 PROCEDURE — 81003 URINALYSIS AUTO W/O SCOPE: CPT

## 2025-01-18 LAB — BACTERIA UR CULT: NORMAL

## 2025-01-27 ENCOUNTER — APPOINTMENT (OUTPATIENT)
Dept: UROLOGY | Facility: CLINIC | Age: 75
End: 2025-01-27
Payer: MEDICARE

## 2025-01-27 DIAGNOSIS — I25.10 CORONARY ARTERY DISEASE INVOLVING NATIVE CORONARY ARTERY OF NATIVE HEART WITHOUT ANGINA PECTORIS: ICD-10-CM

## 2025-01-28 RX ORDER — CARVEDILOL 25 MG/1
12.5 TABLET ORAL
Qty: 90 TABLET | Refills: 3 | Status: SHIPPED | OUTPATIENT
Start: 2025-01-28

## 2025-01-28 RX ORDER — ATORVASTATIN CALCIUM 80 MG/1
80 TABLET, FILM COATED ORAL DAILY
Qty: 90 TABLET | Refills: 3 | Status: SHIPPED | OUTPATIENT
Start: 2025-01-28

## 2025-02-07 ENCOUNTER — TELEPHONE (OUTPATIENT)
Dept: PRIMARY CARE | Facility: CLINIC | Age: 75
End: 2025-02-07

## 2025-02-07 ENCOUNTER — APPOINTMENT (OUTPATIENT)
Dept: PRIMARY CARE | Facility: CLINIC | Age: 75
End: 2025-02-07
Payer: MEDICARE

## 2025-02-07 VITALS
SYSTOLIC BLOOD PRESSURE: 130 MMHG | OXYGEN SATURATION: 94 % | BODY MASS INDEX: 29.73 KG/M2 | DIASTOLIC BLOOD PRESSURE: 72 MMHG | WEIGHT: 185 LBS | HEIGHT: 66 IN | HEART RATE: 61 BPM

## 2025-02-07 DIAGNOSIS — I10 PRIMARY HYPERTENSION: ICD-10-CM

## 2025-02-07 DIAGNOSIS — E78.00 HYPERCHOLESTEROLEMIA: ICD-10-CM

## 2025-02-07 DIAGNOSIS — I10 PRIMARY HYPERTENSION: Primary | ICD-10-CM

## 2025-02-07 DIAGNOSIS — N18.31 STAGE 3A CHRONIC KIDNEY DISEASE (MULTI): ICD-10-CM

## 2025-02-07 DIAGNOSIS — N18.31 DIABETES MELLITUS DUE TO UNDERLYING CONDITION WITH STAGE 3A CHRONIC KIDNEY DISEASE, WITHOUT LONG-TERM CURRENT USE OF INSULIN (MULTI): ICD-10-CM

## 2025-02-07 DIAGNOSIS — E08.22 DIABETES MELLITUS DUE TO UNDERLYING CONDITION WITH STAGE 3A CHRONIC KIDNEY DISEASE, WITHOUT LONG-TERM CURRENT USE OF INSULIN (MULTI): ICD-10-CM

## 2025-02-07 LAB — POC HEMOGLOBIN A1C: 5.6 % (ref 4.2–6.5)

## 2025-02-07 PROCEDURE — 1125F AMNT PAIN NOTED PAIN PRSNT: CPT

## 2025-02-07 PROCEDURE — 1160F RVW MEDS BY RX/DR IN RCRD: CPT

## 2025-02-07 PROCEDURE — 3078F DIAST BP <80 MM HG: CPT

## 2025-02-07 PROCEDURE — 3075F SYST BP GE 130 - 139MM HG: CPT

## 2025-02-07 PROCEDURE — 99214 OFFICE O/P EST MOD 30 MIN: CPT

## 2025-02-07 PROCEDURE — 1159F MED LIST DOCD IN RCRD: CPT

## 2025-02-07 PROCEDURE — 83036 HEMOGLOBIN GLYCOSYLATED A1C: CPT

## 2025-02-07 PROCEDURE — 4010F ACE/ARB THERAPY RXD/TAKEN: CPT

## 2025-02-07 PROCEDURE — G2211 COMPLEX E/M VISIT ADD ON: HCPCS

## 2025-02-07 PROCEDURE — 3008F BODY MASS INDEX DOCD: CPT

## 2025-02-07 ASSESSMENT — PAIN SCALES - GENERAL: PAINLEVEL_OUTOF10: 6
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
## 2025-02-11 LAB
ALBUMIN SERPL-MCNC: 4.2 G/DL (ref 3.6–5.1)
ALP SERPL-CCNC: 100 U/L (ref 35–144)
ALT SERPL-CCNC: 26 U/L (ref 9–46)
ANION GAP SERPL CALCULATED.4IONS-SCNC: 7 MMOL/L (CALC) (ref 7–17)
AST SERPL-CCNC: 24 U/L (ref 10–35)
BASOPHILS # BLD AUTO: 8 CELLS/UL (ref 0–200)
BASOPHILS NFR BLD AUTO: 0.2 %
BILIRUB SERPL-MCNC: 1.4 MG/DL (ref 0.2–1.2)
BUN SERPL-MCNC: 25 MG/DL (ref 7–25)
CALCIUM SERPL-MCNC: 9 MG/DL (ref 8.6–10.3)
CHLORIDE SERPL-SCNC: 105 MMOL/L (ref 98–110)
CHOLEST SERPL-MCNC: 111 MG/DL
CHOLEST/HDLC SERPL: 2.3 (CALC)
CO2 SERPL-SCNC: 27 MMOL/L (ref 20–32)
CREAT SERPL-MCNC: 1.38 MG/DL (ref 0.7–1.28)
EGFRCR SERPLBLD CKD-EPI 2021: 54 ML/MIN/1.73M2
EOSINOPHIL # BLD AUTO: 130 CELLS/UL (ref 15–500)
EOSINOPHIL NFR BLD AUTO: 3.1 %
ERYTHROCYTE [DISTWIDTH] IN BLOOD BY AUTOMATED COUNT: 13.1 % (ref 11–15)
GLUCOSE SERPL-MCNC: 103 MG/DL (ref 65–99)
HCT VFR BLD AUTO: 37 % (ref 38.5–50)
HDLC SERPL-MCNC: 49 MG/DL
HGB BLD-MCNC: 12.2 G/DL (ref 13.2–17.1)
LDLC SERPL CALC-MCNC: 52 MG/DL (CALC)
LYMPHOCYTES # BLD AUTO: 1075 CELLS/UL (ref 850–3900)
LYMPHOCYTES NFR BLD AUTO: 25.6 %
MCH RBC QN AUTO: 29.5 PG (ref 27–33)
MCHC RBC AUTO-ENTMCNC: 33 G/DL (ref 32–36)
MCV RBC AUTO: 89.4 FL (ref 80–100)
MONOCYTES # BLD AUTO: 332 CELLS/UL (ref 200–950)
MONOCYTES NFR BLD AUTO: 7.9 %
NEUTROPHILS # BLD AUTO: 2654 CELLS/UL (ref 1500–7800)
NEUTROPHILS NFR BLD AUTO: 63.2 %
NONHDLC SERPL-MCNC: 62 MG/DL (CALC)
PLATELET # BLD AUTO: 110 THOUSAND/UL (ref 140–400)
PMV BLD REES-ECKER: 12.4 FL (ref 7.5–12.5)
POTASSIUM SERPL-SCNC: 4.5 MMOL/L (ref 3.5–5.3)
PROT SERPL-MCNC: 6.8 G/DL (ref 6.1–8.1)
RBC # BLD AUTO: 4.14 MILLION/UL (ref 4.2–5.8)
SODIUM SERPL-SCNC: 139 MMOL/L (ref 135–146)
TRIGL SERPL-MCNC: 34 MG/DL
WBC # BLD AUTO: 4.2 THOUSAND/UL (ref 3.8–10.8)

## 2025-02-16 NOTE — ASSESSMENT & PLAN NOTE
Chronic condition, stable at this visit  Continue carvedilol 12.5 mg twice daily, hydrochlorothiazide 25 mg daily, lisinopril 20 mg daily as prescribed.  Continue to follow with cardiology as scheduled  Monitor home blood pressures.  Call if blood pressure consistently >140/90.  Non pharmacological interventions such as lowering salt, saturated fats, cholesterol, and sugar diet discussed.  Increase physical activity, aim for 30 minutes 5 days per week as able.  Stress reduction interventions discussed.  Discussed signs and symptoms of major cardiovascular event and need to present to the ED.   Reevaluate in 6 months.    Orders:    CBC and Auto Differential; Future    Comprehensive metabolic panel; Future

## 2025-02-16 NOTE — ASSESSMENT & PLAN NOTE
Chronic condition, stable with medication  Continue atorvastatin 80 mg daily, ASA 81 mg daily  Labs reviewed  Reports any new onset of muscle pain or weakness.  Continue with health diet low in saturated fats, cholesterol, sodium, and limit sugars.  Exercise 30-45 minutes 5 days per week.  Follow up in 6 months.     Orders:    Lipid Panel; Future

## 2025-02-17 ENCOUNTER — APPOINTMENT (OUTPATIENT)
Dept: CARDIOLOGY | Facility: CLINIC | Age: 75
End: 2025-02-17
Payer: MEDICARE

## 2025-02-20 ENCOUNTER — HOSPITAL ENCOUNTER (OUTPATIENT)
Dept: RADIOLOGY | Facility: CLINIC | Age: 75
Discharge: HOME | End: 2025-02-20
Payer: MEDICARE

## 2025-02-20 ENCOUNTER — OFFICE VISIT (OUTPATIENT)
Dept: PRIMARY CARE | Facility: CLINIC | Age: 75
End: 2025-02-20
Payer: MEDICARE

## 2025-02-20 VITALS
WEIGHT: 200.2 LBS | HEIGHT: 66 IN | SYSTOLIC BLOOD PRESSURE: 130 MMHG | OXYGEN SATURATION: 99 % | BODY MASS INDEX: 32.17 KG/M2 | HEART RATE: 57 BPM | DIASTOLIC BLOOD PRESSURE: 70 MMHG | TEMPERATURE: 97 F

## 2025-02-20 DIAGNOSIS — R07.81 RIB PAIN ON RIGHT SIDE: ICD-10-CM

## 2025-02-20 DIAGNOSIS — M25.511 ACUTE PAIN OF RIGHT SHOULDER: ICD-10-CM
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
## 2025-02-20 DIAGNOSIS — M25.521 RIGHT ELBOW PAIN: ICD-10-CM
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
## 2025-02-20 PROCEDURE — 73030 X-RAY EXAM OF SHOULDER: CPT | Mod: RT

## 2025-02-20 PROCEDURE — 1036F TOBACCO NON-USER: CPT

## 2025-02-20 PROCEDURE — 73080 X-RAY EXAM OF ELBOW: CPT | Mod: RT

## 2025-02-20 PROCEDURE — 71101 X-RAY EXAM UNILAT RIBS/CHEST: CPT | Mod: RT

## 2025-02-20 PROCEDURE — 3078F DIAST BP <80 MM HG: CPT

## 2025-02-20 PROCEDURE — 3075F SYST BP GE 130 - 139MM HG: CPT

## 2025-02-20 PROCEDURE — 3008F BODY MASS INDEX DOCD: CPT

## 2025-02-20 PROCEDURE — 1160F RVW MEDS BY RX/DR IN RCRD: CPT

## 2025-02-20 PROCEDURE — 1159F MED LIST DOCD IN RCRD: CPT

## 2025-02-20 PROCEDURE — 4010F ACE/ARB THERAPY RXD/TAKEN: CPT

## 2025-02-20 PROCEDURE — 99213 OFFICE O/P EST LOW 20 MIN: CPT

## 2025-02-20 ASSESSMENT — PATIENT HEALTH QUESTIONNAIRE - PHQ9
SUM OF ALL RESPONSES TO PHQ9 QUESTIONS 1 AND 2: 0
2. FEELING DOWN, DEPRESSED OR HOPELESS: NOT AT ALL
1. LITTLE INTEREST OR PLEASURE IN DOING THINGS: NOT AT ALL

## 2025-02-20 NOTE — PROGRESS NOTES
"Subjective     Patient ID: Duane Scott is a 74 y.o. male who presents for Fall (Hurt shoulder , Rib cage).      HPI  Mercedes presents for concerns of right shoulder pain, right elbow pain, and right rib pain after fall on ice on 2/15/2025 and 2/17/2025. Each fall he was shoveling his driveway and was icy, lost his footing and fell onto his right side.  He admits some pain of right side of chest when taking a deep breath. Denies any shortness of breath or difficulty breathing.  Denies any head trauma or LOC.     Patient's recent visit notes, medication and allergy lists, past medical surgical social hx, immunization, vitals, problem list, recent tests were reviewed by me for pertinence to this visit.        Review of Systems  All other systems have been reviewed and are negative except as noted in the HPI.         Objective   /70 (BP Location: Left arm, Patient Position: Sitting, BP Cuff Size: Adult)   Pulse 57   Temp 36.1 °C (97 °F) (Temporal)   Ht 1.676 m (5' 6\")   Wt 90.8 kg (200 lb 3.2 oz)   SpO2 99%   BMI 32.31 kg/m²       Physical Exam  Vitals and nursing note reviewed.   Constitutional:       General: He is not in acute distress.     Appearance: Normal appearance.   Cardiovascular:      Rate and Rhythm: Normal rate and regular rhythm.      Heart sounds: Normal heart sounds.   Pulmonary:      Effort: Pulmonary effort is normal.      Breath sounds: Normal breath sounds.   Abdominal:      General: Bowel sounds are normal. There is no distension.      Palpations: Abdomen is soft.      Tenderness: There is no abdominal tenderness. There is no guarding or rebound.   Musculoskeletal:      Right shoulder: Tenderness present. No swelling or deformity. Decreased range of motion. Decreased strength. Normal pulse.      Right upper arm: No swelling, edema, deformity or tenderness.      Right elbow: No swelling or deformity. Decreased range of motion. Tenderness present.      Right forearm: Normal.      Right " wrist: Normal.      Right hand: No swelling. Normal range of motion. Normal capillary refill. Normal pulse.      Comments: Abrasion noted to right elbow with scabbing noted.  No signs of infection.    Skin:     General: Skin is warm and dry.   Neurological:      General: No focal deficit present.      Mental Status: He is alert.             Assessment & Plan  Accidental fall, initial encounter  Acute new problem, no red flags  Will have him obtain x-rays of his right side ribs, right elbow, right shoulder as these areas took the direct blow and are causing him the most pain.  Discussed that he can take extra strength Tylenol up to 3 times a day as needed for pain.  Discussed that he needs to avoid NSAIDs due to his decreased kidney function, he is agreeable.  Will follow-up with x-ray results for further intervention.  Discussed safety and allowing his family to shovel his driveway to prevent further falls.  Orders:    XR ribs right 2 views; Future    XR elbow right 1-2 views; Future    XR shoulder right 2+ views; Future    Rib pain on right side    Orders:    XR ribs right 2 views; Future    Acute pain of right shoulder    Orders:    XR shoulder right 2+ views; Future    Right elbow pain    Orders:    XR elbow right 1-2 views; Future              Brianne Del Rio, APRN-CNP

## 2025-02-20 NOTE — ASSESSMENT & PLAN NOTE
Acute new problem, no red flags  Will have him obtain x-rays of his right side ribs, right elbow, right shoulder as these areas took the direct blow and are causing him the most pain.  Discussed that he can take extra strength Tylenol up to 3 times a day as needed for pain.  Discussed that he needs to avoid NSAIDs due to his decreased kidney function, he is agreeable.  Will follow-up with x-ray results for further intervention.  Discussed safety and allowing his family to shovel his driveway to prevent further falls.  Orders:    XR ribs right 2 views; Future    XR elbow right 1-2 views; Future    XR shoulder right 2+ views; Future    
No

## 2025-02-27 ENCOUNTER — APPOINTMENT (OUTPATIENT)
Dept: CARDIOLOGY | Facility: CLINIC | Age: 75
End: 2025-02-27
Payer: MEDICARE

## 2025-02-27 VITALS
WEIGHT: 195 LBS | BODY MASS INDEX: 31.34 KG/M2 | RESPIRATION RATE: 20 BRPM | HEART RATE: 57 BPM | SYSTOLIC BLOOD PRESSURE: 127 MMHG | OXYGEN SATURATION: 96 % | TEMPERATURE: 98.6 F | DIASTOLIC BLOOD PRESSURE: 71 MMHG | HEIGHT: 66 IN

## 2025-02-27 DIAGNOSIS — I20.0 ANGINA PECTORIS, UNSTABLE (MULTI): ICD-10-CM

## 2025-02-27 DIAGNOSIS — Z95.5 STATUS POST CORONARY ARTERY STENT PLACEMENT: ICD-10-CM

## 2025-02-27 DIAGNOSIS — I20.9 ANGINA PECTORIS: ICD-10-CM

## 2025-02-27 DIAGNOSIS — R00.2 PALPITATIONS: ICD-10-CM

## 2025-02-27 DIAGNOSIS — E78.00 HYPERCHOLESTEROLEMIA: ICD-10-CM

## 2025-02-27 DIAGNOSIS — R07.89 CHEST DISCOMFORT: ICD-10-CM

## 2025-02-27 DIAGNOSIS — I10 PRIMARY HYPERTENSION: Primary | ICD-10-CM

## 2025-02-27 DIAGNOSIS — I46.9 CARDIAC ARREST: ICD-10-CM

## 2025-02-27 PROCEDURE — 99215 OFFICE O/P EST HI 40 MIN: CPT | Performed by: INTERNAL MEDICINE

## 2025-02-27 PROCEDURE — 3008F BODY MASS INDEX DOCD: CPT | Performed by: INTERNAL MEDICINE

## 2025-02-27 PROCEDURE — 1036F TOBACCO NON-USER: CPT | Performed by: INTERNAL MEDICINE

## 2025-02-27 PROCEDURE — 4010F ACE/ARB THERAPY RXD/TAKEN: CPT | Performed by: INTERNAL MEDICINE

## 2025-02-27 PROCEDURE — 3078F DIAST BP <80 MM HG: CPT | Performed by: INTERNAL MEDICINE

## 2025-02-27 PROCEDURE — 1126F AMNT PAIN NOTED NONE PRSNT: CPT | Performed by: INTERNAL MEDICINE

## 2025-02-27 PROCEDURE — 3074F SYST BP LT 130 MM HG: CPT | Performed by: INTERNAL MEDICINE

## 2025-02-27 PROCEDURE — 1160F RVW MEDS BY RX/DR IN RCRD: CPT | Performed by: INTERNAL MEDICINE

## 2025-02-27 PROCEDURE — 1159F MED LIST DOCD IN RCRD: CPT | Performed by: INTERNAL MEDICINE

## 2025-02-27 RX ORDER — LOSARTAN POTASSIUM 100 MG/1
100 TABLET ORAL DAILY
Qty: 90 TABLET | Refills: 3 | Status: SHIPPED | OUTPATIENT
Start: 2025-02-27 | End: 2026-02-27

## 2025-02-27 ASSESSMENT — LIFESTYLE VARIABLES
SKIP TO QUESTIONS 9-10: 1
HOW OFTEN DO YOU HAVE SIX OR MORE DRINKS ON ONE OCCASION: NEVER
HOW OFTEN DO YOU HAVE A DRINK CONTAINING ALCOHOL: NEVER
HOW MANY STANDARD DRINKS CONTAINING ALCOHOL DO YOU HAVE ON A TYPICAL DAY: PATIENT DOES NOT DRINK
AUDIT-C TOTAL SCORE: 0

## 2025-02-27 ASSESSMENT — PATIENT HEALTH QUESTIONNAIRE - PHQ9
SUM OF ALL RESPONSES TO PHQ9 QUESTIONS 1 AND 2: 0
1. LITTLE INTEREST OR PLEASURE IN DOING THINGS: NOT AT ALL
2. FEELING DOWN, DEPRESSED OR HOPELESS: NOT AT ALL

## 2025-02-27 ASSESSMENT — ENCOUNTER SYMPTOMS
DEPRESSION: 0
LOSS OF SENSATION IN FEET: 0
OCCASIONAL FEELINGS OF UNSTEADINESS: 1

## 2025-02-27 ASSESSMENT — PAIN SCALES - GENERAL: PAINLEVEL_OUTOF10: 0-NO PAIN

## 2025-02-27 NOTE — PROGRESS NOTES
History of present illness:  74 year old male patient here today following up on hx of lateral STEMI in 08/2019, at that time he was presented with cardiac arrest status post PCI to diagonal 1 that had a 99% hazy lesion also he had 100% occluded LCX that was acute and was treated with JOSE to the proximal segment. Also we ballooned the side of the stent he to open the proper left circ after the OM 1 which is large vessel. We achieved very good result. Has chronically occluded RCA, LAD has moderate disease of 50% in the mid segment. Patient at that time his EF was reduced to 30-35%.  Repeated echo in September 2024 showed normal ejection fraction no major valve abnormalities.  Stress test in February 2024 showed no ischemia normal ejection fraction.  Patient returns to my office for follow-up.  Still complaining of shortness of breath with minimal activity.  Reports cough and difficulty breathing.  Has been coughing chronically he has COPD.  Denies having dizziness lightheadedness or syncope.     Past Medical History:   Diagnosis Date    Acute renal failure syndrome (CMS-HCC) 11/14/2023    Angina pectoris 11/17/2023    Arthritis     Asthma     CAD (coronary artery disease)     s/p stent placement    Cardiac arrest 11/17/2023    Chronic obstructive pulmonary disease requiring drug therapy (Multi) 11/17/2023    Coronary artery disease involving native coronary artery of native heart without angina pectoris 11/14/2023    Diabetes mellitus (Multi)     Diabetes mellitus, type 2 (Multi) 11/08/2018    Diastolic heart failure 11/17/2023    Disorder involving thrombocytopenia (CMS-HCC) 11/12/2019    Essential hypertension 11/08/2018    Gout     High blood pressure     High cholesterol     Kidney disease     Migraine headache     Mixed hyperlipidemia 11/08/2018    Palpitations 11/17/2023    Status post coronary artery stent placement 11/14/2023    Status post myocardial infarction 09/24/2019       Past Surgical History:    Procedure Laterality Date    BACK SURGERY  2012    HERNIA REPAIR  2015    HERNIA REPAIR  2013       Allergies   Allergen Reactions    Hydromorphone Anaphylaxis    Latex Hives and Rash     Gets ill    Penicillins Hives    Dyphylline-Guaifenesin Swelling    Grass Pollen Other     Makes him sick    Mold Other     Gets sick    Ragweed Other     Affects asthma        reports that he has never smoked. He has never been exposed to tobacco smoke. He has never used smokeless tobacco. He reports that he does not drink alcohol and does not use drugs.    Family History   Problem Relation Name Age of Onset    Heart disease Mother      Stroke Son      Autism Son         Patient's Medications   New Prescriptions    No medications on file   Previous Medications    ALBUTEROL (VENTOLIN HFA) 90 MCG/ACTUATION INHALER    Inhale 1 puff every 4 hours if needed for wheezing or shortness of breath.    ASPIRIN 81 MG EC TABLET    Take 1 tablet (81 mg) by mouth once daily.    ATORVASTATIN (LIPITOR) 80 MG TABLET    TAKE ONE TABLET BY MOUTH EVERY DAY    CARVEDILOL (COREG) 25 MG TABLET    TAKE ONE-HALF TABLET BY MOUTH TWICE DAILY with meals    FINASTERIDE (PROSCAR) 5 MG TABLET    Take 1 tablet (5 mg) by mouth once daily. Do not crush, chew, or split.    FLUTICASONE FUROATE-VILANTEROL (BREO ELLIPTA) 200-25 MCG/DOSE INHALER    Inhale 1 puff once daily.    GABAPENTIN (NEURONTIN) 300 MG CAPSULE    Take 1 capsule (300 mg) by mouth 3 times a day for 3 days.    HYDROCHLOROTHIAZIDE (HYDRODIURIL) 25 MG TABLET    Take 1 tablet (25 mg) by mouth once daily.    IPRATROPIUM-ALBUTEROL (DUO-NEB) 0.5-2.5 MG/3 ML NEBULIZER SOLUTION    Inhale 3 mL every 6 hours if needed.    LISINOPRIL 20 MG TABLET    Take 1 tablet (20 mg) by mouth once daily.    OMEPRAZOLE (PRILOSEC) 20 MG DR CAPSULE    Take 1 capsule (20 mg) by mouth once daily in the morning. Take before meals.    TAMSULOSIN (FLOMAX) 0.4 MG 24 HR CAPSULE    Take 2 capsules (0.8 mg) by mouth once daily.    Modified Medications    No medications on file   Discontinued Medications    No medications on file       Objective   Physical Exam  General: Patient in no acute distress   HEENT: Atraumatic normocephalic.  Neck: Supple, jugular venous pressure within normal limit.  No bruits  Lungs: Clear to auscultation bilaterally  Cardiovascular: Regular rate and rhythm, normal heart sounds, no murmurs rubs or gallops  Abdomen: Soft nontender nondistended.  Normal bowel sounds.  Extremities: Warm to touch, no edema.      Lab Review   Office Visit on 02/07/2025   Component Date Value    POC HEMOGLOBIN A1c 02/07/2025 5.6     WHITE BLOOD CELL COUNT 02/10/2025 4.2     RED BLOOD CELL COUNT 02/10/2025 4.14 (L)     HEMOGLOBIN 02/10/2025 12.2 (L)     HEMATOCRIT 02/10/2025 37.0 (L)     MCV 02/10/2025 89.4     MCH 02/10/2025 29.5     MCHC 02/10/2025 33.0     RDW 02/10/2025 13.1     PLATELET COUNT 02/10/2025 110 (L)     MPV 02/10/2025 12.4     ABSOLUTE NEUTROPHILS 02/10/2025 2,654     ABSOLUTE LYMPHOCYTES 02/10/2025 1,075     ABSOLUTE MONOCYTES 02/10/2025 332     ABSOLUTE EOSINOPHILS 02/10/2025 130     ABSOLUTE BASOPHILS 02/10/2025 8     NEUTROPHILS 02/10/2025 63.2     LYMPHOCYTES 02/10/2025 25.6     MONOCYTES 02/10/2025 7.9     EOSINOPHILS 02/10/2025 3.1     BASOPHILS 02/10/2025 0.2     GLUCOSE 02/10/2025 103 (H)     UREA NITROGEN (BUN) 02/10/2025 25     CREATININE 02/10/2025 1.38 (H)     EGFR 02/10/2025 54 (L)     SODIUM 02/10/2025 139     POTASSIUM 02/10/2025 4.5     CHLORIDE 02/10/2025 105     CARBON DIOXIDE 02/10/2025 27     ELECTROLYTE BALANCE 02/10/2025 7     CALCIUM 02/10/2025 9.0     PROTEIN, TOTAL 02/10/2025 6.8     ALBUMIN 02/10/2025 4.2     BILIRUBIN, TOTAL 02/10/2025 1.4 (H)     ALKALINE PHOSPHATASE 02/10/2025 100     AST 02/10/2025 24     ALT 02/10/2025 26     CHOLESTEROL, TOTAL 02/10/2025 111     HDL CHOLESTEROL 02/10/2025 49     TRIGLYCERIDES 02/10/2025 34     LDL-CHOLESTEROL 02/10/2025 52     CHOL/HDLC RATIO 02/10/2025  2.3     NON HDL CHOLESTEROL 02/10/2025 62    Lab on 01/17/2025   Component Date Value    Urine Culture 01/17/2025 Growth indicates contamination with mixed bacterial fer. Repeat culture if clinically indicated.     Color, Urine 01/17/2025 Light-Yellow     Appearance, Urine 01/17/2025 Clear     Specific Gravity, Urine 01/17/2025 1.017     pH, Urine 01/17/2025 5.5     Protein, Urine 01/17/2025 NEGATIVE     Glucose, Urine 01/17/2025 Normal     Blood, Urine 01/17/2025 NEGATIVE     Ketones, Urine 01/17/2025 NEGATIVE     Bilirubin, Urine 01/17/2025 NEGATIVE     Urobilinogen, Urine 01/17/2025 Normal     Nitrite, Urine 01/17/2025 NEGATIVE     Leukocyte Esterase, Urine 01/17/2025 NEGATIVE    Admission on 01/09/2025, Discharged on 01/09/2025   Component Date Value    Ventricular Rate 01/09/2025 74     Atrial Rate 01/09/2025 74     [place] Interval 01/09/2025 160     QRS Duration 01/09/2025 96     QT Interval 01/09/2025 424     QTC Calculation(Bazett) 01/09/2025 470     P Axis 01/09/2025 50     R [place] 01/09/2025 -16     T Axis 01/09/2025 -4     QRS Count 01/09/2025 12     Q Onset 01/09/2025 218     P Onset 01/09/2025 138     P Offset 01/09/2025 192     T Offset 01/09/2025 430     QTC Fredericia 01/09/2025 455     WBC 01/09/2025 4.5     nRBC 01/09/2025 0.0     RBC 01/09/2025 4.16 (L)     Hemoglobin 01/09/2025 12.0 (L)     Hematocrit 01/09/2025 36.6 (L)     MCV 01/09/2025 88     MCH 01/09/2025 28.8     MCHC 01/09/2025 32.8     RDW 01/09/2025 13.0     Platelets 01/09/2025 93 (L)     Neutrophils % 01/09/2025 65.7     Immature Granulocytes %,* 01/09/2025 0.2     Lymphocytes % 01/09/2025 21.4     Monocytes % 01/09/2025 7.9     Eosinophils % 01/09/2025 4.6     Basophils % 01/09/2025 0.2     Neutrophils Absolute 01/09/2025 2.97     Immature Granulocytes Ab* 01/09/2025 0.01     Lymphocytes Absolute 01/09/2025 0.97     Monocytes Absolute 01/09/2025 0.36     Eosinophils Absolute 01/09/2025 0.21     Basophils Absolute 01/09/2025 0.01      Glucose 01/09/2025 155 (H)     Sodium 01/09/2025 139     Potassium 01/09/2025 4.0     Chloride 01/09/2025 108 (H)     Bicarbonate 01/09/2025 24     Anion Gap 01/09/2025 11     Urea Nitrogen 01/09/2025 29 (H)     Creatinine 01/09/2025 1.33 (H)     eGFR 01/09/2025 56 (L)     Calcium 01/09/2025 8.4 (L)     Albumin 01/09/2025 3.9     Alkaline Phosphatase 01/09/2025 87     Total Protein 01/09/2025 6.7     AST 01/09/2025 16     Bilirubin, Total 01/09/2025 0.7     ALT 01/09/2025 15     Troponin I, High Sensiti* 01/09/2025 29 (H)     BNP 01/09/2025 464 (H)     Flu A Result 01/09/2025 Not Detected     Flu B Result 01/09/2025 Not Detected     Coronavirus 2019, PCR 01/09/2025 Not Detected         Assessment/Plan   Patient Active Problem List   Diagnosis    Severe persistent asthma without complication (Multi)    Sleep disorder    Enlarged prostate    Inguinal hernia    Calculus of gallbladder with cholecystitis    Coronary artery disease involving native coronary artery of native heart without angina pectoris    Status post coronary artery stent placement    Hypercholesterolemia    Hypertension    Anxiety    Acute urinary tract infection    Acute renal failure (CMS-HCC)    Asthma    Accidental fall    Acute hepatitis    Injury of kidney    Kidney stone    Cardiac arrest    Cellulitis    Chest discomfort    Angina pectoris    Acute exacerbation of chronic obstructive pulmonary disease (Multi)    Chronic pain    Type 2 diabetes mellitus    Congestive heart failure    Disorder involving thrombocytopenia (CMS-HCC)    Complete tear of rotator cuff    Gastroesophageal reflux disease    Gout    Groin injury    Impaired fasting glucose    Influenza due to influenza virus, type B    Pain of left hip joint    Lumbar back pain with radiculopathy affecting lower extremity    Pain of left hand    Solitary pulmonary nodule    Musculoskeletal pain    Neck pain    Palpitations    Panic attack    Psoriasis    Rash    History of myocardial  infarction    Abrasion of face    Arthralgia of right knee    Bronchitis    Clouded consciousness    Cough    Dizziness    Dyspnea    Injury of head    Retention of urine    Abdominal pain    Backache    Left foot pain    Pain in toe    Knee pain    Arthralgia of shoulder    Obesity    Hypertensive chronic kidney disease w stg 1-4/unsp chr kdny    Urinary tract infection associated with indwelling urethral catheter, initial encounter (CMS-[facility])    Age-related nuclear cataract of both eyes    Epiretinal membrane (ERM) of right eye        74 year old male patient here today following up on hx of lateral STEMI in 08/2019, at that time he was presented with cardiac arrest status post PCI to diagonal 1 that had a 99% hazy lesion also he had 100% occluded LCX that was acute and was treated with JOSE to the proximal segment. Also we ballooned the side of the stent he to open the proper left circ after the OM 1 which is large vessel. We achieved very good result. Has chronically occluded RCA, LAD has moderate disease of 50% in the mid segment. Patient at that time his EF was reduced to 30-35%.  Repeated echo in September 2024 showed normal ejection fraction no major valve abnormalities.  Stress test in February 2024 showed no ischemia normal ejection fraction.  Patient returns to my office for follow-up.  Still complaining of shortness of breath with minimal activity.  Reports cough and difficulty breathing.  Has been coughing chronically he has COPD.  Denies having dizziness lightheadedness or syncope.     At this point I will stop lisinopril and switch him to losartan to see if that helps his dry cough.  Definitely my feeling that his shortness of breath is related to COPD and some upper airways issues with his pattern of breathing however with his history of coronary artery disease  of the RCA and moderate LAD disease few years ago I recommend cardiac catheterization at this point for further delineate his coronary  anatomy since he may have angina equivalent symptoms of shortness of breath.  We will continue other medications.  LDL at target.  Will follow-up in 6 months.     Discussed with patient risks and benefits of cardiac catheterization and he is agreeable to proceed.      Jessika Spencer MD

## 2025-02-27 NOTE — PATIENT INSTRUCTIONS
Stop lisinopril because it may cause cough.    Start losartan 100 mg oral 1 tablet daily.    We will arrange for cardiac catheterization Ms. Huerta will call you about the dates.  You have to be fasting for 6 hours before the cath.  You could take all your pills prior to heart cath in the morning.    You need to follow-up with Dr. Beverly your pulmonologist because a lot of your problems are related to your lungs.

## 2025-02-27 NOTE — H&P (VIEW-ONLY)
History of present illness:  74 year old male patient here today following up on hx of lateral STEMI in 08/2019, at that time he was presented with cardiac arrest status post PCI to diagonal 1 that had a 99% hazy lesion also he had 100% occluded LCX that was acute and was treated with JOSE to the proximal segment. Also we ballooned the side of the stent he to open the proper left circ after the OM 1 which is large vessel. We achieved very good result. Has chronically occluded RCA, LAD has moderate disease of 50% in the mid segment. Patient at that time his EF was reduced to 30-35%.  Repeated echo in September 2024 showed normal ejection fraction no major valve abnormalities.  Stress test in February 2024 showed no ischemia normal ejection fraction.  Patient returns to my office for follow-up.  Still complaining of shortness of breath with minimal activity.  Reports cough and difficulty breathing.  Has been coughing chronically he has COPD.  Denies having dizziness lightheadedness or syncope.     Past Medical History:   Diagnosis Date    Acute renal failure syndrome (CMS-HCC) 11/14/2023    Angina pectoris 11/17/2023    Arthritis     Asthma     CAD (coronary artery disease)     s/p stent placement    Cardiac arrest 11/17/2023    Chronic obstructive pulmonary disease requiring drug therapy (Multi) 11/17/2023    Coronary artery disease involving native coronary artery of native heart without angina pectoris 11/14/2023    Diabetes mellitus (Multi)     Diabetes mellitus, type 2 (Multi) 11/08/2018    Diastolic heart failure 11/17/2023    Disorder involving thrombocytopenia (CMS-HCC) 11/12/2019    Essential hypertension 11/08/2018    Gout     High blood pressure     High cholesterol     Kidney disease     Migraine headache     Mixed hyperlipidemia 11/08/2018    Palpitations 11/17/2023    Status post coronary artery stent placement 11/14/2023    Status post myocardial infarction 09/24/2019       Past Surgical History:    Procedure Laterality Date    BACK SURGERY  2012    HERNIA REPAIR  2015    HERNIA REPAIR  2013       Allergies   Allergen Reactions    Hydromorphone Anaphylaxis    Latex Hives and Rash     Gets ill    Penicillins Hives    Dyphylline-Guaifenesin Swelling    Grass Pollen Other     Makes him sick    Mold Other     Gets sick    Ragweed Other     Affects asthma        reports that he has never smoked. He has never been exposed to tobacco smoke. He has never used smokeless tobacco. He reports that he does not drink alcohol and does not use drugs.    Family History   Problem Relation Name Age of Onset    Heart disease Mother      Stroke Son      Autism Son         Patient's Medications   New Prescriptions    No medications on file   Previous Medications    ALBUTEROL (VENTOLIN HFA) 90 MCG/ACTUATION INHALER    Inhale 1 puff every 4 hours if needed for wheezing or shortness of breath.    ASPIRIN 81 MG EC TABLET    Take 1 tablet (81 mg) by mouth once daily.    ATORVASTATIN (LIPITOR) 80 MG TABLET    TAKE ONE TABLET BY MOUTH EVERY DAY    CARVEDILOL (COREG) 25 MG TABLET    TAKE ONE-HALF TABLET BY MOUTH TWICE DAILY with meals    FINASTERIDE (PROSCAR) 5 MG TABLET    Take 1 tablet (5 mg) by mouth once daily. Do not crush, chew, or split.    FLUTICASONE FUROATE-VILANTEROL (BREO ELLIPTA) 200-25 MCG/DOSE INHALER    Inhale 1 puff once daily.    GABAPENTIN (NEURONTIN) 300 MG CAPSULE    Take 1 capsule (300 mg) by mouth 3 times a day for 3 days.    HYDROCHLOROTHIAZIDE (HYDRODIURIL) 25 MG TABLET    Take 1 tablet (25 mg) by mouth once daily.    IPRATROPIUM-ALBUTEROL (DUO-NEB) 0.5-2.5 MG/3 ML NEBULIZER SOLUTION    Inhale 3 mL every 6 hours if needed.    LISINOPRIL 20 MG TABLET    Take 1 tablet (20 mg) by mouth once daily.    OMEPRAZOLE (PRILOSEC) 20 MG DR CAPSULE    Take 1 capsule (20 mg) by mouth once daily in the morning. Take before meals.    TAMSULOSIN (FLOMAX) 0.4 MG 24 HR CAPSULE    Take 2 capsules (0.8 mg) by mouth once daily.    Modified Medications    No medications on file   Discontinued Medications    No medications on file       Objective   Physical Exam  General: Patient in no acute distress   HEENT: Atraumatic normocephalic.  Neck: Supple, jugular venous pressure within normal limit.  No bruits  Lungs: Clear to auscultation bilaterally  Cardiovascular: Regular rate and rhythm, normal heart sounds, no murmurs rubs or gallops  Abdomen: Soft nontender nondistended.  Normal bowel sounds.  Extremities: Warm to touch, no edema.      Lab Review   Office Visit on 02/07/2025   Component Date Value    POC HEMOGLOBIN A1c 02/07/2025 5.6     WHITE BLOOD CELL COUNT 02/10/2025 4.2     RED BLOOD CELL COUNT 02/10/2025 4.14 (L)     HEMOGLOBIN 02/10/2025 12.2 (L)     HEMATOCRIT 02/10/2025 37.0 (L)     MCV 02/10/2025 89.4     MCH 02/10/2025 29.5     MCHC 02/10/2025 33.0     RDW 02/10/2025 13.1     PLATELET COUNT 02/10/2025 110 (L)     MPV 02/10/2025 12.4     ABSOLUTE NEUTROPHILS 02/10/2025 2,654     ABSOLUTE LYMPHOCYTES 02/10/2025 1,075     ABSOLUTE MONOCYTES 02/10/2025 332     ABSOLUTE EOSINOPHILS 02/10/2025 130     ABSOLUTE BASOPHILS 02/10/2025 8     NEUTROPHILS 02/10/2025 63.2     LYMPHOCYTES 02/10/2025 25.6     MONOCYTES 02/10/2025 7.9     EOSINOPHILS 02/10/2025 3.1     BASOPHILS 02/10/2025 0.2     GLUCOSE 02/10/2025 103 (H)     UREA NITROGEN (BUN) 02/10/2025 25     CREATININE 02/10/2025 1.38 (H)     EGFR 02/10/2025 54 (L)     SODIUM 02/10/2025 139     POTASSIUM 02/10/2025 4.5     CHLORIDE 02/10/2025 105     CARBON DIOXIDE 02/10/2025 27     ELECTROLYTE BALANCE 02/10/2025 7     CALCIUM 02/10/2025 9.0     PROTEIN, TOTAL 02/10/2025 6.8     ALBUMIN 02/10/2025 4.2     BILIRUBIN, TOTAL 02/10/2025 1.4 (H)     ALKALINE PHOSPHATASE 02/10/2025 100     AST 02/10/2025 24     ALT 02/10/2025 26     CHOLESTEROL, TOTAL 02/10/2025 111     HDL CHOLESTEROL 02/10/2025 49     TRIGLYCERIDES 02/10/2025 34     LDL-CHOLESTEROL 02/10/2025 52     CHOL/HDLC RATIO 02/10/2025  2.3     NON HDL CHOLESTEROL 02/10/2025 62    Lab on 01/17/2025   Component Date Value    Urine Culture 01/17/2025 Growth indicates contamination with mixed bacterial fer. Repeat culture if clinically indicated.     Color, Urine 01/17/2025 Light-Yellow     Appearance, Urine 01/17/2025 Clear     Specific Gravity, Urine 01/17/2025 1.017     pH, Urine 01/17/2025 5.5     Protein, Urine 01/17/2025 NEGATIVE     Glucose, Urine 01/17/2025 Normal     Blood, Urine 01/17/2025 NEGATIVE     Ketones, Urine 01/17/2025 NEGATIVE     Bilirubin, Urine 01/17/2025 NEGATIVE     Urobilinogen, Urine 01/17/2025 Normal     Nitrite, Urine 01/17/2025 NEGATIVE     Leukocyte Esterase, Urine 01/17/2025 NEGATIVE    Admission on 01/09/2025, Discharged on 01/09/2025   Component Date Value    Ventricular Rate 01/09/2025 74     Atrial Rate 01/09/2025 74     ND Interval 01/09/2025 160     QRS Duration 01/09/2025 96     QT Interval 01/09/2025 424     QTC Calculation(Bazett) 01/09/2025 470     P Axis 01/09/2025 50     R [place] 01/09/2025 -16     T Axis 01/09/2025 -4     QRS Count 01/09/2025 12     Q Onset 01/09/2025 218     P Onset 01/09/2025 138     P Offset 01/09/2025 192     T Offset 01/09/2025 430     QTC [place] 01/09/2025 455     WBC 01/09/2025 4.5     nRBC 01/09/2025 0.0     RBC 01/09/2025 4.16 (L)     Hemoglobin 01/09/2025 12.0 (L)     Hematocrit 01/09/2025 36.6 (L)     MCV 01/09/2025 88     MCH 01/09/2025 28.8     MCHC 01/09/2025 32.8     RDW 01/09/2025 13.0     Platelets 01/09/2025 93 (L)     Neutrophils % 01/09/2025 65.7     Immature Granulocytes %,* 01/09/2025 0.2     Lymphocytes % 01/09/2025 21.4     Monocytes % 01/09/2025 7.9     Eosinophils % 01/09/2025 4.6     Basophils % 01/09/2025 0.2     Neutrophils Absolute 01/09/2025 2.97     Immature Granulocytes Ab* 01/09/2025 0.01     Lymphocytes Absolute 01/09/2025 0.97     Monocytes Absolute 01/09/2025 0.36     Eosinophils Absolute 01/09/2025 0.21     Basophils Absolute 01/09/2025 0.01      Glucose 01/09/2025 155 (H)     Sodium 01/09/2025 139     Potassium 01/09/2025 4.0     Chloride 01/09/2025 108 (H)     Bicarbonate 01/09/2025 24     Anion Gap 01/09/2025 11     Urea Nitrogen 01/09/2025 29 (H)     Creatinine 01/09/2025 1.33 (H)     eGFR 01/09/2025 56 (L)     Calcium 01/09/2025 8.4 (L)     Albumin 01/09/2025 3.9     Alkaline Phosphatase 01/09/2025 87     Total Protein 01/09/2025 6.7     AST 01/09/2025 16     Bilirubin, Total 01/09/2025 0.7     ALT 01/09/2025 15     Troponin I, High Sensiti* 01/09/2025 29 (H)     BNP 01/09/2025 464 (H)     Flu A Result 01/09/2025 Not Detected     Flu B Result 01/09/2025 Not Detected     Coronavirus 2019, PCR 01/09/2025 Not Detected         Assessment/Plan   Patient Active Problem List   Diagnosis    Severe persistent asthma without complication (Multi)    Sleep disorder    Enlarged prostate    Inguinal hernia    Calculus of gallbladder with cholecystitis    Coronary artery disease involving native coronary artery of native heart without angina pectoris    Status post coronary artery stent placement    Hypercholesterolemia    Hypertension    Anxiety    Acute urinary tract infection    Acute renal failure (CMS-HCC)    Asthma    Accidental fall    Acute hepatitis    Injury of kidney    Kidney stone    Cardiac arrest    Cellulitis    Chest discomfort    Angina pectoris    Acute exacerbation of chronic obstructive pulmonary disease (Multi)    Chronic pain    Type 2 diabetes mellitus    Congestive heart failure    Disorder involving thrombocytopenia (CMS-HCC)    Complete tear of rotator cuff    Gastroesophageal reflux disease    Gout    Groin injury    Impaired fasting glucose    Influenza due to influenza virus, type B    Pain of left hip joint    Lumbar back pain with radiculopathy affecting lower extremity    Pain of left hand    Solitary pulmonary nodule    Musculoskeletal pain    Neck pain    Palpitations    Panic attack    Psoriasis    Rash    History of myocardial  infarction    Abrasion of face    Arthralgia of right knee    Bronchitis    Clouded consciousness    Cough    Dizziness    Dyspnea    Injury of head    Retention of urine    Abdominal pain    Backache    Left foot pain    Pain in toe    Knee pain    Arthralgia of shoulder    Obesity    Hypertensive chronic kidney disease w stg 1-4/unsp chr kdny    Urinary tract infection associated with indwelling urethral catheter, initial encounter (CMS-[facility])    Age-related nuclear cataract of both eyes    Epiretinal membrane (ERM) of right eye        74 year old male patient here today following up on hx of lateral STEMI in 08/2019, at that time he was presented with cardiac arrest status post PCI to diagonal 1 that had a 99% hazy lesion also he had 100% occluded LCX that was acute and was treated with JOSE to the proximal segment. Also we ballooned the side of the stent he to open the proper left circ after the OM 1 which is large vessel. We achieved very good result. Has chronically occluded RCA, LAD has moderate disease of 50% in the mid segment. Patient at that time his EF was reduced to 30-35%.  Repeated echo in September 2024 showed normal ejection fraction no major valve abnormalities.  Stress test in February 2024 showed no ischemia normal ejection fraction.  Patient returns to my office for follow-up.  Still complaining of shortness of breath with minimal activity.  Reports cough and difficulty breathing.  Has been coughing chronically he has COPD.  Denies having dizziness lightheadedness or syncope.     At this point I will stop lisinopril and switch him to losartan to see if that helps his dry cough.  Definitely my feeling that his shortness of breath is related to COPD and some upper airways issues with his pattern of breathing however with his history of coronary artery disease  of the RCA and moderate LAD disease few years ago I recommend cardiac catheterization at this point for further delineate his coronary  anatomy since he may have angina equivalent symptoms of shortness of breath.  We will continue other medications.  LDL at target.  Will follow-up in 6 months.     Discussed with patient risks and benefits of cardiac catheterization and he is agreeable to proceed.      Jessika Spencer MD

## 2025-02-28 ENCOUNTER — TELEPHONE (OUTPATIENT)
Dept: CARDIOLOGY | Facility: CLINIC | Age: 75
End: 2025-02-28
Payer: MEDICARE

## 2025-02-28 NOTE — TELEPHONE ENCOUNTER
Patient wanted to talk to daughter first about dates I will be looking at the week of 03/10/25 lemuel castellon req

## 2025-03-06 PROBLEM — I20.0 ANGINA PECTORIS, UNSTABLE (MULTI): Status: ACTIVE | Noted: 2025-02-27

## 2025-03-07 ENCOUNTER — LAB (OUTPATIENT)
Dept: LAB | Facility: HOSPITAL | Age: 75
End: 2025-03-07
Payer: MEDICARE

## 2025-03-07 DIAGNOSIS — I20.0 UNSTABLE ANGINA (MULTI): Primary | ICD-10-CM

## 2025-03-07 LAB
ANION GAP SERPL CALC-SCNC: 12 MMOL/L (ref 10–20)
BUN SERPL-MCNC: 34 MG/DL (ref 6–23)
CALCIUM SERPL-MCNC: 9.5 MG/DL (ref 8.6–10.6)
CHLORIDE SERPL-SCNC: 104 MMOL/L (ref 98–107)
CO2 SERPL-SCNC: 28 MMOL/L (ref 21–32)
CREAT SERPL-MCNC: 1.31 MG/DL (ref 0.5–1.3)
EGFRCR SERPLBLD CKD-EPI 2021: 57 ML/MIN/1.73M*2
ERYTHROCYTE [DISTWIDTH] IN BLOOD BY AUTOMATED COUNT: 14.4 % (ref 11.5–14.5)
GLUCOSE SERPL-MCNC: 107 MG/DL (ref 74–99)
HCT VFR BLD AUTO: 43 % (ref 41–52)
HGB BLD-MCNC: 13 G/DL (ref 13.5–17.5)
MCH RBC QN AUTO: 29.1 PG (ref 26–34)
MCHC RBC AUTO-ENTMCNC: 30.2 G/DL (ref 32–36)
MCV RBC AUTO: 96 FL (ref 80–100)
NRBC BLD-RTO: 0 /100 WBCS (ref 0–0)
PLATELET # BLD AUTO: 126 X10*3/UL (ref 150–450)
POTASSIUM SERPL-SCNC: 4.4 MMOL/L (ref 3.5–5.3)
RBC # BLD AUTO: 4.47 X10*6/UL (ref 4.5–5.9)
SODIUM SERPL-SCNC: 140 MMOL/L (ref 136–145)
WBC # BLD AUTO: 4.9 X10*3/UL (ref 4.4–11.3)

## 2025-03-07 PROCEDURE — 85027 COMPLETE CBC AUTOMATED: CPT

## 2025-03-07 PROCEDURE — 80048 BASIC METABOLIC PNL TOTAL CA: CPT

## 2025-03-10 ENCOUNTER — APPOINTMENT (OUTPATIENT)
Dept: UROLOGY | Facility: CLINIC | Age: 75
End: 2025-03-10
Payer: MEDICARE

## 2025-03-11 ENCOUNTER — HOSPITAL ENCOUNTER (OUTPATIENT)
Facility: HOSPITAL | Age: 75
Setting detail: OUTPATIENT SURGERY
Discharge: HOME | End: 2025-03-11
Attending: INTERNAL MEDICINE | Admitting: INTERNAL MEDICINE
Payer: MEDICARE

## 2025-03-11 ENCOUNTER — PHARMACY VISIT (OUTPATIENT)
Dept: PHARMACY | Facility: CLINIC | Age: 75
End: 2025-03-11
Payer: COMMERCIAL

## 2025-03-11 ENCOUNTER — APPOINTMENT (OUTPATIENT)
Dept: CARDIOLOGY | Facility: HOSPITAL | Age: 75
End: 2025-03-11
Payer: MEDICARE

## 2025-03-11 VITALS
WEIGHT: 195 LBS | TEMPERATURE: 98.9 F | RESPIRATION RATE: 14 BRPM | BODY MASS INDEX: 31.34 KG/M2 | OXYGEN SATURATION: 100 % | HEART RATE: 62 BPM | SYSTOLIC BLOOD PRESSURE: 159 MMHG | HEIGHT: 66 IN | DIASTOLIC BLOOD PRESSURE: 80 MMHG

## 2025-03-11 DIAGNOSIS — I20.0 ANGINA PECTORIS, UNSTABLE (MULTI): Primary | ICD-10-CM

## 2025-03-11 DIAGNOSIS — I46.9 CARDIAC ARREST: ICD-10-CM

## 2025-03-11 DIAGNOSIS — I20.9 ANGINA PECTORIS, UNSPECIFIED: ICD-10-CM

## 2025-03-11 DIAGNOSIS — Z95.5 STATUS POST CORONARY ARTERY STENT PLACEMENT: ICD-10-CM

## 2025-03-11 DIAGNOSIS — I25.10 CORONARY ARTERY DISEASE INVOLVING NATIVE CORONARY ARTERY OF NATIVE HEART WITHOUT ANGINA PECTORIS: ICD-10-CM

## 2025-03-11 LAB — ACT BLD: 282 SEC (ref 89–169)

## 2025-03-11 PROCEDURE — 85347 COAGULATION TIME ACTIVATED: CPT

## 2025-03-11 PROCEDURE — 7100000010 HC PHASE TWO TIME - EACH INCREMENTAL 1 MINUTE: Performed by: INTERNAL MEDICINE

## 2025-03-11 PROCEDURE — RXMED WILLOW AMBULATORY MEDICATION CHARGE

## 2025-03-11 PROCEDURE — 93458 L HRT ARTERY/VENTRICLE ANGIO: CPT | Performed by: INTERNAL MEDICINE

## 2025-03-11 PROCEDURE — 99153 MOD SED SAME PHYS/QHP EA: CPT | Performed by: INTERNAL MEDICINE

## 2025-03-11 PROCEDURE — 2500000004 HC RX 250 GENERAL PHARMACY W/ HCPCS (ALT 636 FOR OP/ED): Performed by: INTERNAL MEDICINE

## 2025-03-11 PROCEDURE — 2550000001 HC RX 255 CONTRASTS: Performed by: INTERNAL MEDICINE

## 2025-03-11 PROCEDURE — C1874 STENT, COATED/COV W/DEL SYS: HCPCS | Performed by: INTERNAL MEDICINE

## 2025-03-11 PROCEDURE — 2780000003 HC OR 278 NO HCPCS: Performed by: INTERNAL MEDICINE

## 2025-03-11 PROCEDURE — 2720000007 HC OR 272 NO HCPCS: Performed by: INTERNAL MEDICINE

## 2025-03-11 PROCEDURE — C1769 GUIDE WIRE: HCPCS | Performed by: INTERNAL MEDICINE

## 2025-03-11 PROCEDURE — 2500000001 HC RX 250 WO HCPCS SELF ADMINISTERED DRUGS (ALT 637 FOR MEDICARE OP): Performed by: INTERNAL MEDICINE

## 2025-03-11 PROCEDURE — 93005 ELECTROCARDIOGRAM TRACING: CPT

## 2025-03-11 PROCEDURE — 7100000009 HC PHASE TWO TIME - INITIAL BASE CHARGE: Performed by: INTERNAL MEDICINE

## 2025-03-11 PROCEDURE — C1725 CATH, TRANSLUMIN NON-LASER: HCPCS | Performed by: INTERNAL MEDICINE

## 2025-03-11 PROCEDURE — 99152 MOD SED SAME PHYS/QHP 5/>YRS: CPT | Performed by: INTERNAL MEDICINE

## 2025-03-11 PROCEDURE — C1894 INTRO/SHEATH, NON-LASER: HCPCS | Performed by: INTERNAL MEDICINE

## 2025-03-11 PROCEDURE — 2500000005 HC RX 250 GENERAL PHARMACY W/O HCPCS: Performed by: INTERNAL MEDICINE

## 2025-03-11 PROCEDURE — 92928 PRQ TCAT PLMT NTRAC ST 1 LES: CPT | Performed by: INTERNAL MEDICINE

## 2025-03-11 PROCEDURE — C9600 PERC DRUG-EL COR STENT SING: HCPCS | Mod: LD | Performed by: INTERNAL MEDICINE

## 2025-03-11 PROCEDURE — C1887 CATHETER, GUIDING: HCPCS | Performed by: INTERNAL MEDICINE

## 2025-03-11 PROCEDURE — 2500000004 HC RX 250 GENERAL PHARMACY W/ HCPCS (ALT 636 FOR OP/ED): Performed by: NURSE PRACTITIONER

## 2025-03-11 DEVICE — STENT ONYXNG30012UX ONYX 3.00X12RX
Type: IMPLANTABLE DEVICE | Site: HEART | Status: FUNCTIONAL
Brand: ONYX FRONTIER™

## 2025-03-11 RX ORDER — CLOPIDOGREL BISULFATE 75 MG/1
TABLET ORAL AS NEEDED
Status: DISCONTINUED | OUTPATIENT
Start: 2025-03-11 | End: 2025-03-11 | Stop reason: HOSPADM

## 2025-03-11 RX ORDER — ATORVASTATIN CALCIUM 80 MG/1
80 TABLET, FILM COATED ORAL DAILY
Qty: 90 TABLET | Refills: 3 | Status: SHIPPED | OUTPATIENT
Start: 2025-03-11 | End: 2026-03-11

## 2025-03-11 RX ORDER — SODIUM CHLORIDE 9 MG/ML
500 INJECTION, SOLUTION INTRAVENOUS CONTINUOUS
Status: ACTIVE | OUTPATIENT
Start: 2025-03-11 | End: 2025-03-11

## 2025-03-11 RX ORDER — CLOPIDOGREL BISULFATE 75 MG/1
75 TABLET ORAL DAILY
Qty: 90 TABLET | Refills: 3 | Status: SHIPPED | OUTPATIENT
Start: 2025-03-11 | End: 2026-03-11

## 2025-03-11 RX ORDER — ASPIRIN 81 MG/1
81 TABLET ORAL DAILY
Qty: 90 TABLET | Refills: 3 | Status: SHIPPED | OUTPATIENT
Start: 2025-03-11 | End: 2026-03-11

## 2025-03-11 RX ORDER — IODIXANOL 320 MG/ML
INJECTION, SOLUTION INTRAVASCULAR AS NEEDED
Status: DISCONTINUED | OUTPATIENT
Start: 2025-03-11 | End: 2025-03-11 | Stop reason: HOSPADM

## 2025-03-11 RX ORDER — LIDOCAINE HYDROCHLORIDE 10 MG/ML
INJECTION, SOLUTION EPIDURAL; INFILTRATION; INTRACAUDAL; PERINEURAL AS NEEDED
Status: DISCONTINUED | OUTPATIENT
Start: 2025-03-11 | End: 2025-03-11 | Stop reason: HOSPADM

## 2025-03-11 RX ORDER — MIDAZOLAM HYDROCHLORIDE 1 MG/ML
INJECTION, SOLUTION INTRAMUSCULAR; INTRAVENOUS AS NEEDED
Status: DISCONTINUED | OUTPATIENT
Start: 2025-03-11 | End: 2025-03-11 | Stop reason: HOSPADM

## 2025-03-11 RX ORDER — ASPIRIN 325 MG
TABLET ORAL AS NEEDED
Status: DISCONTINUED | OUTPATIENT
Start: 2025-03-11 | End: 2025-03-11 | Stop reason: HOSPADM

## 2025-03-11 RX ORDER — ACETAMINOPHEN 325 MG/1
650 TABLET ORAL EVERY 6 HOURS PRN
Status: DISCONTINUED | OUTPATIENT
Start: 2025-03-11 | End: 2025-03-11 | Stop reason: HOSPADM

## 2025-03-11 RX ORDER — HEPARIN SODIUM 1000 [USP'U]/ML
INJECTION, SOLUTION INTRAVENOUS; SUBCUTANEOUS AS NEEDED
Status: DISCONTINUED | OUTPATIENT
Start: 2025-03-11 | End: 2025-03-11 | Stop reason: HOSPADM

## 2025-03-11 RX ORDER — FENTANYL CITRATE 50 UG/ML
INJECTION, SOLUTION INTRAMUSCULAR; INTRAVENOUS AS NEEDED
Status: DISCONTINUED | OUTPATIENT
Start: 2025-03-11 | End: 2025-03-11 | Stop reason: HOSPADM

## 2025-03-11 RX ADMIN — SODIUM CHLORIDE 500 ML/HR: 900 INJECTION, SOLUTION INTRAVENOUS at 09:10

## 2025-03-11 ASSESSMENT — COLUMBIA-SUICIDE SEVERITY RATING SCALE - C-SSRS
2. HAVE YOU ACTUALLY HAD ANY THOUGHTS OF KILLING YOURSELF?: NO
6. HAVE YOU EVER DONE ANYTHING, STARTED TO DO ANYTHING, OR PREPARED TO DO ANYTHING TO END YOUR LIFE?: NO
1. IN THE PAST MONTH, HAVE YOU WISHED YOU WERE DEAD OR WISHED YOU COULD GO TO SLEEP AND NOT WAKE UP?: NO

## 2025-03-11 ASSESSMENT — PAIN - FUNCTIONAL ASSESSMENT
PAIN_FUNCTIONAL_ASSESSMENT: 0-10
PAIN_FUNCTIONAL_ASSESSMENT: 0-10

## 2025-03-11 ASSESSMENT — PAIN SCALES - GENERAL
PAINLEVEL_OUTOF10: 0 - NO PAIN
PAINLEVEL_OUTOF10: 0 - NO PAIN

## 2025-03-11 NOTE — NURSING NOTE
END OF PROCEDURE MONITORING CRITERIA  01. BP is within +/- 20% of preprocedure  02. Oxygen sat is at 92% or above on RA, or existing order for O2 treatment, or at pre-sedation levels, otherwise new O2 order needed.  03. Unless the patient has a pre-procedure history of diminished level of consciousness, s/he is easily arousable and when aroused is able to responds appropriately for his/her age.  04. Significant complications related to the specific procedure are absent, have been controlled, or have been evaluated, including:      A. Pain.      B. Wound drainage.      C. All drains and tubes are patent.      D. Nausea and Vomiting. Vomiting is not persisten and has not occurred within 15 minutes prior to discharge.      E. Bladder distention. Voided bladder and/or no symptoms or urinary retention (e.g., bladder distention, frequent voiding in small amounts).      F. Neurovascular status.      G. Level of Consciousness consistent with pre procedural status.        Son in law affirmed that they were driving the patient home.

## 2025-03-11 NOTE — INTERVAL H&P NOTE
H&P reviewed. The patient was examined and there are no changes to the H&P.  Hx COPD, denies hx smoking. Few scattered inspiratory wheezes throughout lung fields. no dyspnea at rest, no cough. Instruct pt to use albuterol inhaler pre procedure.

## 2025-03-11 NOTE — Clinical Note
Angioplasty of the proximal left anterior descending lesion. Inflation 1: Pressure = 14 kedar; Duration = 15 sec.

## 2025-03-11 NOTE — Clinical Note
Angioplasty of the proximal left anterior descending lesion. Inflation 1: Pressure = 14 kedar; Duration = 14 sec. Inflation 2: Pressure = 14 kedar; Duration = 10 sec. Inflation 3: Pressure = 17 kedar; Duration = 15 sec. Refused Medication: Propranolol HCl  MG Oral Capsule SR 24 Hr    Patient has not been seen in over 1 year      LOV 7/23/21- No f/u appt on file     Sent pt message via Daio

## 2025-03-12 LAB
ATRIAL RATE: 58 BPM
P AXIS: 41 DEGREES
P OFFSET: 196 MS
P ONSET: 142 MS
PR INTERVAL: 156 MS
Q ONSET: 220 MS
QRS COUNT: 9 BEATS
QRS DURATION: 96 MS
QT INTERVAL: 470 MS
QTC CALCULATION(BAZETT): 461 MS
QTC FREDERICIA: 464 MS
R AXIS: -18 DEGREES
T AXIS: -13 DEGREES
T OFFSET: 455 MS
VENTRICULAR RATE: 58 BPM

## 2025-03-12 ASSESSMENT — PAIN SCALES - GENERAL: PAINLEVEL_OUTOF10: 0 - NO PAIN

## 2025-03-17 DIAGNOSIS — Z95.5 S/P PRIMARY ANGIOPLASTY WITH CORONARY STENT: Primary | ICD-10-CM

## 2025-03-25 ENCOUNTER — CLINICAL SUPPORT (OUTPATIENT)
Dept: CARDIAC REHAB | Facility: CLINIC | Age: 75
End: 2025-03-25
Payer: MEDICARE

## 2025-03-25 VITALS
SYSTOLIC BLOOD PRESSURE: 114 MMHG | DIASTOLIC BLOOD PRESSURE: 60 MMHG | OXYGEN SATURATION: 98 % | WEIGHT: 190 LBS | BODY MASS INDEX: 30.53 KG/M2 | HEIGHT: 66 IN

## 2025-03-25 DIAGNOSIS — Z95.5 S/P PRIMARY ANGIOPLASTY WITH CORONARY STENT: Primary | ICD-10-CM

## 2025-03-25 DIAGNOSIS — I25.10 CORONARY ARTERY DISEASE INVOLVING NATIVE CORONARY ARTERY OF NATIVE HEART WITHOUT ANGINA PECTORIS: ICD-10-CM

## 2025-03-25 DIAGNOSIS — I20.0 ANGINA PECTORIS, UNSTABLE (MULTI): ICD-10-CM

## 2025-03-25 DIAGNOSIS — Z95.5 S/P PRIMARY ANGIOPLASTY WITH CORONARY STENT: ICD-10-CM

## 2025-03-25 DIAGNOSIS — Z95.5 STATUS POST CORONARY ARTERY STENT PLACEMENT: ICD-10-CM

## 2025-03-25 ASSESSMENT — DUKE ACTIVITY SCORE INDEX (DASI)
CAN YOU TAKE CARE OF YOURSELF (EAT, DRESS, BATHE, OR USE TOILET): YES
CAN YOU RUN A SHORT DISTANCE: NO
CAN YOU DO HEAVY WORK AROUND THE HOUSE LIKE SCRUBBING FLOORS OR LIFTING AND MOVING HEAVY FURNITURE: NO
CAN YOU DO LIGHT WORK AROUND THE HOUSE LIKE DUSTING OR WASHING DISHES: YES
CAN YOU CLIMB A FLIGHT OF STAIRS OR WALK UP A HILL: YES
TOTAL_SCORE: 28.7
CAN YOU WALK A BLOCK OR TWO ON LEVEL GROUND: YES
CAN YOU DO YARD WORK LIKE RAKING LEAVES, WEEDING OR PUSHING A MOWER: YES
CAN YOU WALK INDOORS, SUCH AS AROUND YOUR HOUSE: YES
DASI METS SCORE: 6.3
CAN YOU HAVE SEXUAL RELATIONS: YES
CAN YOU PARTICIPATE IN STRENOUS SPORTS LIKE SWIMMING, SINGLES TENNIS, FOOTBALL, BASKETBALL, OR SKIING: NO
CAN YOU PARTICIPATE IN MODERATE RECREATIONAL ACTIVITIES LIKE GOLF, BOWLING, DANCING, DOUBLES TENNIS OR THROWING A BASEBALL OR FOOTBALL: NO
CAN YOU DO MODERATE WORK AROUND THE HOUSE LIKE VACUUMING, SWEEPING FLOORS OR CARRYING GROCERIES: YES

## 2025-03-25 ASSESSMENT — PATIENT HEALTH QUESTIONNAIRE - PHQ9
4. FEELING TIRED OR HAVING LITTLE ENERGY: NOT AT ALL
SUM OF ALL RESPONSES TO PHQ QUESTIONS 1-9: 3
9. THOUGHTS THAT YOU WOULD BE BETTER OFF DEAD, OR OF HURTING YOURSELF: NOT AT ALL
8. MOVING OR SPEAKING SO SLOWLY THAT OTHER PEOPLE COULD HAVE NOTICED. OR THE OPPOSITE, BEING SO FIGETY OR RESTLESS THAT YOU HAVE BEEN MOVING AROUND A LOT MORE THAN USUAL: NOT AT ALL
SUM OF ALL RESPONSES TO PHQ9 QUESTIONS 1 & 2: 0
2. FEELING DOWN, DEPRESSED OR HOPELESS: NOT AT ALL
3. TROUBLE FALLING OR STAYING ASLEEP OR SLEEPING TOO MUCH: NEARLY EVERY DAY
5. POOR APPETITE OR OVEREATING: NOT AT ALL
7. TROUBLE CONCENTRATING ON THINGS, SUCH AS READING THE NEWSPAPER OR WATCHING TELEVISION: NOT AT ALL
SUM OF ALL RESPONSES TO PHQ QUESTIONS 1-9: 3
1. LITTLE INTEREST OR PLEASURE IN DOING THINGS: NOT AT ALL
6. FEELING BAD ABOUT YOURSELF - OR THAT YOU ARE A FAILURE OR HAVE LET YOURSELF OR YOUR FAMILY DOWN: NOT AT ALL

## 2025-03-25 NOTE — PROGRESS NOTES
Cardiac Rehabilitation Initial Treatment Plan    Name: Duane Scott  Medical Record Number: [id number]  YOB: 1950  Age: 74 y.o.    Today’s Date: 3/25/2025  Primary Care Physician: ROBIN Wilson-CNP  Referring Physician: Francisco Javier Spencer MD  Program Location: [address]  Primary Diagnosis:   1. Angina pectoris, unstable (Multi)  Referral to Cardiac Rehab      2. Coronary artery disease involving native coronary artery of native heart without angina pectoris  Referral to Cardiac Rehab      3. Status post coronary artery stent placement  Referral to Cardiac Rehab      4. S/P primary angioplasty with coronary stent  Referral to Cardiac Rehab         Onset/Date of Diagnosis: 3/11/25   Initial Assessment, not yet started program.  AACVPR Risk Stratification: High   Falls Risk: High  Psychosocial Assessment   Pre PHQ-9: 3  Sent PH-Q 9 to MD if score > 20: No; score < 20  Pt reported/currently experiencing stress: No  Patient uses stress management skills: No   History of: no history of anxiety or depression  Currently seeing a mental health provider: No  Social Support: Yes, Whom:Spouse    Learning Assessment:  Learning assessment/barriers: None  Preferred learning method: Reading handout and Writing handout  Barriers: Physical limitations  Comments:  Stages of Change:Preparation    Psychosocial Plan  Goal Status: Initial Assessment; goals not yet started  Psychosocial Goals: Maintain or lower PH-Q 9 score by discharge  Psychosocial Interventions/Education: To be done in Cardiac Rehab.        Nutrition Assessment:    Hyperlipidemia: Yes     Lipids:   Lab Results   Component Value Date    CHOL 111 02/10/2025    HDL 49 02/10/2025    TRIG 34 02/10/2025       Current Dietary Guidelines:  no special diet  Barriers to dietary change: no    Diet Habit Survey: Picture Your Plate  Pre: Initial survey given. Pending completion and return from patient.  Post: To be done at  "discharge.    Diabetes Assessment    Lab Results   Component Value Date    HGBA1C 5.6 02/07/2025       History of Diabetes: Yes Pt monitors BS at home: No   Diet controlled    Weight Management    Height: 167.6 cm (5' 6\")  Weight: 86.2 kg (190 lb)  BMI (Calculated): 30.68  No data recorded    Nutrition Plan  Goal Status: Initial Assessment; goals not yet started  Nutrition Goals: Improve Diet Habit Survey score by 5-10 points by discharge, Adapt a low-sodium, DASH diet prior to discharge, and Learn how to read and interpret nutrition labels prior to discharge  Nutrition Interventions/Education:   To be done in Cardiac Rehab.        Exercise Assessment    No  Mode: NA  Frequency: NA  Duration: NA    Exercise Prescription     Exercise Prescription based on: pt history    DASI Score: 28.7   MET Score: 6.3   Frequency:  3 days/week   Mode: NuStep and Recumbent Cycle   Duration: 24 total aerobic minutes   Intensity: RPE 12-14  Target HR:  To be calculated after 6 attended sessions.  MET Level: 2  Patient wears supplemental O2: No     Modality Workload METs Duration (minutes)   1 Pre-Exercise   2:00   2 NuStep Load 3 @30 christopher  2 8 :00   3 Recumbent Bike Load 2 @50 rpm  2 8 :00   4 Schwinn Airdyne 0.6 Load  2 8 :00   6 Post-Exercise   2:00     Resistance Training: No   Home Exercise Prescription given: To be given prior to discharge from program.    Exercise Plan  Goal Status: Initial Assessment; goals not yet started  Exercise Goals: Increase exercise MET level by 5-10% each week, Increase total exercise duration to 30-45 minutes, Initiate strength training 2-3 days a week, Initiate flexibility training 2-3 days a week, and Establish a home exercise program before discharge  Exercise Interventions/Education:   To be done in Cardiac Rehab.        Other Core Components/Risk Factor Assessment:    Medication adherence  Current Medications:   Medication Documentation Review Audit       Reviewed by Nagi Jain RN " (Registered Nurse) on 03/11/25 at 0917      Medication Order Taking? Sig Documenting Provider Last Dose Status   albuterol (Ventolin HFA) 90 mcg/actuation inhaler [id number] Yes Inhale 1 puff every 4 hours if needed for wheezing or shortness of breath. ABDULAZIZ Wilson 3/11/2025 Morning Active   aspirin 81 mg EC tablet [id number] Yes Take 1 tablet (81 mg) by mouth once daily. Historical MD Angelito Past Week Active   atorvastatin (Lipitor) 80 mg tablet [id number]  TAKE ONE TABLET BY MOUTH EVERY DAY Jessika Spencer MD  Active   carvedilol (Coreg) 25 mg tablet [id number] Yes TAKE ONE-HALF TABLET BY MOUTH TWICE DAILY with meals Jessika Spencer MD 3/10/2025 Active   finasteride (Proscar) 5 mg tablet [id number] Yes Take 1 tablet (5 mg) by mouth once daily. Do not crush, chew, or split. Jono Bass MD 3/10/2025 Active   fluticasone furoate-vilanteroL (Breo Ellipta) 200-25 mcg/dose inhaler [id number]  Inhale 1 puff once daily. ROBIN Wilson-CNP  Active   hydroCHLOROthiazide (HYDRODiuril) 25 mg tablet [id number] Yes Take 1 tablet (25 mg) by mouth once daily. Kulwinder Mai MD 3/10/2025 Active   ipratropium-albuteroL (Duo-Neb) 0.5-2.5 mg/3 mL nebulizer solution [id number] Yes Inhale 3 mL every 6 hours if needed. Taryn Eaton MD 3/10/2025 Active   losartan (Cozaar) 100 mg tablet [id number] Yes Take 1 tablet (100 mg) by mouth once daily. Jessika Spencer MD 3/10/2025 Active   omeprazole (PriLOSEC) 20 mg DR capsule [id number] Yes Take 1 capsule (20 mg) by mouth once daily in the morning. Take before meals. Taryn Eaton MD 3/10/2025 Active   tamsulosin (Flomax) 0.4 mg 24 hr capsule [id number] Yes Take 2 capsules (0.8 mg) by mouth once daily. Jono Bass MD 3/10/2025 Active                                 Medication compliance: Yes   Uses pill box/organizer: Yes    Carries medication list: No     Blood Pressure Management  History of Hypertension: Yes   Medication Changes: No   Resting BP:   Visit Vitals  /60        Heart Failure Management  Hx of Heart Failure: Yes;   Type (selection): HFpEF  Most recent EF: 60      Heart Failure Reassessment: Initial Treatment Plan. Will reassess in 30 days.  Heart Failure Goals: Able to verbalize signs and symptoms of fluid retention and when to contact MD, Adhere to proper fluid restrictions, and Adapt a low sodium diet and verbalize guidelines    Smoking/Tobacco Assessment  Social History     Tobacco Use   Smoking Status Never    Passive exposure: Never   Smokeless Tobacco Never       Other Core Component Plan  Goal Status: Initial Assessment; goals not yet started  Other Core Component Goals: Medication compliance, Verbalize medication usage and drug actions by discharge, and Achieve resting BP of < 130/80 by discharge  Other Core Component Interventions/Education:           Individual Patient Goals:    Home exercise program by discharge      Goal Status: Initial Assessment; goals not yet started    Staff Comments:      Rehab Staff Signature: Brook Hallman RN

## 2025-03-27 ENCOUNTER — APPOINTMENT (OUTPATIENT)
Dept: CARDIOLOGY | Facility: CLINIC | Age: 75
End: 2025-03-27
Payer: MEDICARE

## 2025-03-27 VITALS
SYSTOLIC BLOOD PRESSURE: 128 MMHG | WEIGHT: 190 LBS | HEART RATE: 58 BPM | RESPIRATION RATE: 16 BRPM | OXYGEN SATURATION: 96 % | DIASTOLIC BLOOD PRESSURE: 70 MMHG | BODY MASS INDEX: 30.67 KG/M2

## 2025-03-27 DIAGNOSIS — I46.9 CARDIAC ARREST: ICD-10-CM

## 2025-03-27 DIAGNOSIS — I20.0 ANGINA PECTORIS, UNSTABLE (MULTI): ICD-10-CM

## 2025-03-27 DIAGNOSIS — I25.2 HISTORY OF MYOCARDIAL INFARCTION: ICD-10-CM

## 2025-03-27 DIAGNOSIS — I20.9 ANGINA PECTORIS: Primary | ICD-10-CM

## 2025-03-27 DIAGNOSIS — Z95.5 STATUS POST CORONARY ARTERY STENT PLACEMENT: ICD-10-CM

## 2025-03-27 DIAGNOSIS — I25.10 CORONARY ARTERY DISEASE INVOLVING NATIVE CORONARY ARTERY OF NATIVE HEART WITHOUT ANGINA PECTORIS: ICD-10-CM

## 2025-03-27 PROCEDURE — 1160F RVW MEDS BY RX/DR IN RCRD: CPT | Performed by: INTERNAL MEDICINE

## 2025-03-27 PROCEDURE — 1126F AMNT PAIN NOTED NONE PRSNT: CPT | Performed by: INTERNAL MEDICINE

## 2025-03-27 PROCEDURE — 1036F TOBACCO NON-USER: CPT | Performed by: INTERNAL MEDICINE

## 2025-03-27 PROCEDURE — 99214 OFFICE O/P EST MOD 30 MIN: CPT | Performed by: INTERNAL MEDICINE

## 2025-03-27 PROCEDURE — 3074F SYST BP LT 130 MM HG: CPT | Performed by: INTERNAL MEDICINE

## 2025-03-27 PROCEDURE — 4010F ACE/ARB THERAPY RXD/TAKEN: CPT | Performed by: INTERNAL MEDICINE

## 2025-03-27 PROCEDURE — 1159F MED LIST DOCD IN RCRD: CPT | Performed by: INTERNAL MEDICINE

## 2025-03-27 PROCEDURE — 3078F DIAST BP <80 MM HG: CPT | Performed by: INTERNAL MEDICINE

## 2025-03-27 RX ORDER — CLOPIDOGREL BISULFATE 75 MG/1
75 TABLET ORAL DAILY
Qty: 90 TABLET | Refills: 3 | Status: SHIPPED | OUTPATIENT
Start: 2025-03-27 | End: 2026-03-27

## 2025-03-27 ASSESSMENT — LIFESTYLE VARIABLES
AUDIT-C TOTAL SCORE: 0
HOW MANY STANDARD DRINKS CONTAINING ALCOHOL DO YOU HAVE ON A TYPICAL DAY: PATIENT DOES NOT DRINK
HAVE YOU OR SOMEONE ELSE BEEN INJURED AS A RESULT OF YOUR DRINKING: NO
HOW OFTEN DO YOU HAVE SIX OR MORE DRINKS ON ONE OCCASION: NEVER
HAS A RELATIVE, FRIEND, DOCTOR, OR ANOTHER HEALTH PROFESSIONAL EXPRESSED CONCERN ABOUT YOUR DRINKING OR SUGGESTED YOU CUT DOWN: NO
SKIP TO QUESTIONS 9-10: 1
AUDIT TOTAL SCORE: 0
HOW OFTEN DO YOU HAVE A DRINK CONTAINING ALCOHOL: NEVER

## 2025-03-27 ASSESSMENT — PAIN SCALES - GENERAL: PAINLEVEL_OUTOF10: 0-NO PAIN

## 2025-03-27 ASSESSMENT — ENCOUNTER SYMPTOMS
LOSS OF SENSATION IN FEET: 0
OCCASIONAL FEELINGS OF UNSTEADINESS: 0
DEPRESSION: 0

## 2025-03-27 NOTE — PROGRESS NOTES
History of present illness:  This is a very pleasant 74-year-old male with history of coronary disease status post PCI to proximal LAD with drug-eluting stent with drug-eluting stent on March 11, 2025.  Patient had patent left circumflex stent and known chronically occluded RCA.  Patient returns to my office for follow-up.  Tolerated cardiac catheterization without complications.  Denies having any chest pain or shortness of breath.  No palpitations dizziness lightheadedness or syncope.    Past Medical History:   Diagnosis Date    Acute renal failure syndrome (CMS-[facility]) 11/14/2023    Angina pectoris 11/17/2023    Arthritis     Asthma     CAD (coronary artery disease)     s/p stent placement    Cardiac arrest 11/17/2023    Chronic obstructive pulmonary disease requiring drug therapy (Multi) 11/17/2023    Coronary artery disease involving native coronary artery of native heart without angina pectoris 11/14/2023    Diabetes mellitus (Multi)     Diabetes mellitus, type 2 (Multi) 11/08/2018    Diastolic heart failure 11/17/2023    Disorder involving thrombocytopenia (CMS-HCC) 11/12/2019    Essential hypertension 11/08/2018    Gout     High blood pressure     High cholesterol     Kidney disease     Migraine headache     Mixed hyperlipidemia 11/08/2018    Palpitations 11/17/2023    Status post coronary artery stent placement 11/14/2023    Status post myocardial infarction 09/24/2019       Past Surgical History:   Procedure Laterality Date    BACK SURGERY  2012    CARDIAC CATHETERIZATION N/A 3/11/2025    Procedure: Left Heart Cath;  Surgeon: Jessika Spencer MD;  Location: [facility] Cardiac Cath Lab;  Service: Cardiovascular;  Laterality: N/A;  no pre auth required    HERNIA REPAIR  2015    HERNIA REPAIR  2013       Allergies   Allergen Reactions    Hydromorphone Anaphylaxis    Latex Hives and Rash     Gets ill    Penicillins Hives    Dyphylline-Guaifenesin Swelling    Grass Pollen Other     Makes him sick    Mold Other     Gets  sick    Ragweed Other     Affects asthma        reports that he has never smoked. He has never been exposed to tobacco smoke. He has never used smokeless tobacco. He reports that he does not drink alcohol and does not use drugs.    Family History   Problem Relation Name Age of Onset    Heart disease Mother      Stroke Son      Autism Son         Patient's Medications   New Prescriptions    No medications on file   Previous Medications    ALBUTEROL (VENTOLIN HFA) 90 MCG/ACTUATION INHALER    Inhale 1 puff every 4 hours if needed for wheezing or shortness of breath.    ASPIRIN 81 MG EC TABLET    Take 1 tablet (81 mg) by mouth once daily.    ATORVASTATIN (LIPITOR) 80 MG TABLET    Take 1 tablet (80 mg) by mouth once daily.    CARVEDILOL (COREG) 25 MG TABLET    TAKE ONE-HALF TABLET BY MOUTH TWICE DAILY with meals    CLOPIDOGREL (PLAVIX) 75 MG TABLET    Take 1 tablet (75 mg) by mouth once daily.    FINASTERIDE (PROSCAR) 5 MG TABLET    Take 1 tablet (5 mg) by mouth once daily. Do not crush, chew, or split.    FLUTICASONE FUROATE-VILANTEROL (BREO ELLIPTA) 200-25 MCG/DOSE INHALER    Inhale 1 puff once daily.    HYDROCHLOROTHIAZIDE (HYDRODIURIL) 25 MG TABLET    Take 1 tablet (25 mg) by mouth once daily.    IPRATROPIUM-ALBUTEROL (DUO-NEB) 0.5-2.5 MG/3 ML NEBULIZER SOLUTION    Inhale 3 mL every 6 hours if needed.    LOSARTAN (COZAAR) 100 MG TABLET    Take 1 tablet (100 mg) by mouth once daily.    TAMSULOSIN (FLOMAX) 0.4 MG 24 HR CAPSULE    Take 2 capsules (0.8 mg) by mouth once daily.   Modified Medications    No medications on file   Discontinued Medications    No medications on file       Objective   Physical Exam  General: Patient in no acute distress   HEENT: Atraumatic normocephalic.  Neck: Supple, jugular venous pressure within normal limit.  No bruits  Lungs: Clear to auscultation bilaterally  Cardiovascular: Regular rate and rhythm, normal heart sounds, no murmurs rubs or gallops  Abdomen: Soft nontender nondistended.   Normal bowel sounds.  Extremities: Warm to touch, no edema.      Lab Review   Admission on 03/11/2025, Discharged on 03/11/2025   Component Date Value    POCT Activated Clotting * 03/11/2025 282 (H)     Ventricular Rate 03/11/2025 58     Atrial Rate 03/11/2025 58     AL Interval 03/11/2025 156     QRS Duration 03/11/2025 96     QT Interval 03/11/2025 470     QTC Calculation(Bazett) 03/11/2025 461     P Axis 03/11/2025 41     R Axis 03/11/2025 -18     T Axis 03/11/2025 -13     QRS Count 03/11/2025 9     Q Onset 03/11/2025 220     P Onset 03/11/2025 142     P Offset 03/11/2025 196     T Offset 03/11/2025 455     QTC Fredericia 03/11/2025 464    Office Visit on 02/27/2025   Component Date Value    WBC 03/07/2025 4.9     nRBC 03/07/2025 0.0     RBC 03/07/2025 4.47 (L)     Hemoglobin 03/07/2025 13.0 (L)     Hematocrit 03/07/2025 43.0     MCV 03/07/2025 96     MCH 03/07/2025 29.1     MCHC 03/07/2025 30.2 (L)     RDW 03/07/2025 14.4     Platelets 03/07/2025 126 (L)     Glucose 03/07/2025 107 (H)     Sodium 03/07/2025 140     Potassium 03/07/2025 4.4     Chloride 03/07/2025 104     Bicarbonate 03/07/2025 28     Anion Gap 03/07/2025 12     Urea Nitrogen 03/07/2025 34 (H)     Creatinine 03/07/2025 1.31 (H)     eGFR 03/07/2025 57 (L)     Calcium 03/07/2025 9.5    Office Visit on 02/07/2025   Component Date Value    POC HEMOGLOBIN A1c 02/07/2025 5.6     WHITE BLOOD CELL COUNT 02/10/2025 4.2     RED BLOOD CELL COUNT 02/10/2025 4.14 (L)     HEMOGLOBIN 02/10/2025 12.2 (L)     HEMATOCRIT 02/10/2025 37.0 (L)     MCV 02/10/2025 89.4     MCH 02/10/2025 29.5     MCHC 02/10/2025 33.0     RDW 02/10/2025 13.1     PLATELET COUNT 02/10/2025 110 (L)     MPV 02/10/2025 12.4     ABSOLUTE NEUTROPHILS 02/10/2025 2,654     ABSOLUTE LYMPHOCYTES 02/10/2025 1,075     ABSOLUTE MONOCYTES 02/10/2025 332     ABSOLUTE EOSINOPHILS 02/10/2025 130     ABSOLUTE BASOPHILS 02/10/2025 8     NEUTROPHILS 02/10/2025 63.2     LYMPHOCYTES 02/10/2025 25.6      MONOCYTES 02/10/2025 7.9     EOSINOPHILS 02/10/2025 3.1     BASOPHILS 02/10/2025 0.2     GLUCOSE 02/10/2025 103 (H)     UREA NITROGEN (BUN) 02/10/2025 25     CREATININE 02/10/2025 1.38 (H)     EGFR 02/10/2025 54 (L)     SODIUM 02/10/2025 139     POTASSIUM 02/10/2025 4.5     CHLORIDE 02/10/2025 105     CARBON DIOXIDE 02/10/2025 27     ELECTROLYTE BALANCE 02/10/2025 7     CALCIUM 02/10/2025 9.0     PROTEIN, TOTAL 02/10/2025 6.8     ALBUMIN 02/10/2025 4.2     BILIRUBIN, TOTAL 02/10/2025 1.4 (H)     ALKALINE PHOSPHATASE 02/10/2025 100     AST 02/10/2025 24     ALT 02/10/2025 26     CHOLESTEROL, TOTAL 02/10/2025 111     HDL CHOLESTEROL 02/10/2025 49     TRIGLYCERIDES 02/10/2025 34     LDL-CHOLESTEROL 02/10/2025 52     CHOL/HDLC RATIO 02/10/2025 2.3     NON HDL CHOLESTEROL 02/10/2025 62         Assessment/Plan   Patient Active Problem List   Diagnosis    Severe persistent asthma without complication (Multi)    Sleep disorder    Enlarged prostate    Inguinal hernia    Calculus of gallbladder with cholecystitis    Coronary artery disease involving native coronary artery of native heart without angina pectoris    Status post coronary artery stent placement    Hypercholesterolemia    Hypertension    Anxiety    Acute urinary tract infection    Acute renal failure (CMS-HCC)    Asthma    Accidental fall    Acute hepatitis    Injury of kidney    Kidney stone    Cardiac arrest    Cellulitis    Chest discomfort    Angina pectoris    Acute exacerbation of chronic obstructive pulmonary disease (Multi)    Chronic pain    Type 2 diabetes mellitus    Congestive heart failure    Disorder involving thrombocytopenia (CMS-HCC)    Complete tear of rotator cuff    Gastroesophageal reflux disease    Gout    Groin injury    Impaired fasting glucose    Influenza due to influenza virus, type B    Pain of left hip joint    Lumbar back pain with radiculopathy affecting lower extremity    Pain of left hand    Solitary pulmonary nodule     Musculoskeletal pain    Neck pain    Palpitations    Panic attack    Psoriasis    Rash    History of myocardial infarction    Abrasion of face    Arthralgia of right knee    Bronchitis    Clouded consciousness    Cough    Dizziness    Dyspnea    Injury of head    Retention of urine    Abdominal pain    Backache    Left foot pain    Pain in toe    Knee pain    Arthralgia of shoulder    Obesity    Hypertensive chronic kidney disease w stg 1-4/unsp chr kdny    Urinary tract infection associated with indwelling urethral catheter, initial encounter (CMS-[facility])    Age-related nuclear cataract of both eyes    Epiretinal membrane (ERM) of right eye    Angina pectoris, unstable (Multi)      This is a very pleasant 74-year-old male with history of coronary disease status post PCI to proximal LAD with drug-eluting stent with drug-eluting stent on March 11, 2025.  Patient had patent left circumflex stent and known chronically occluded RCA.  Patient returns to my office for follow-up.  Tolerated cardiac catheterization without complications.  Denies having any chest pain or shortness of breath.  No palpitations dizziness lightheadedness or syncope.    Patient appears stable from my standpoint.  Blood pressure and heart rate well-controlled.  Will start cardiac rehab.  Continue current medications.  Will follow-up in 3 months.      Jessika Spencer MD

## 2025-03-28 DIAGNOSIS — Z95.5 S/P PRIMARY ANGIOPLASTY WITH CORONARY STENT: Primary | ICD-10-CM

## 2025-04-04 ENCOUNTER — CLINICAL SUPPORT (OUTPATIENT)
Dept: CARDIAC REHAB | Facility: CLINIC | Age: 75
End: 2025-04-04
Payer: MEDICARE

## 2025-04-04 DIAGNOSIS — Z95.5 S/P PRIMARY ANGIOPLASTY WITH CORONARY STENT: ICD-10-CM

## 2025-04-04 PROCEDURE — 93798 PHYS/QHP OP CAR RHAB W/ECG: CPT | Performed by: INTERNAL MEDICINE

## 2025-04-04 NOTE — PROGRESS NOTES
Cardiac Rehab Education  Duane Scott   [phone]    4/4/2025  Label reading education was done with program's RDN during today's visit. Label Reading Highlights handout was given with information discussed during appointment and to assist in review of label contents at home.       Signature Brook Hallman RN

## 2025-04-07 ENCOUNTER — CLINICAL SUPPORT (OUTPATIENT)
Dept: CARDIAC REHAB | Facility: CLINIC | Age: 75
End: 2025-04-07
Payer: MEDICARE

## 2025-04-07 DIAGNOSIS — Z95.5 S/P PRIMARY ANGIOPLASTY WITH CORONARY STENT: ICD-10-CM

## 2025-04-07 PROCEDURE — 93798 PHYS/QHP OP CAR RHAB W/ECG: CPT | Performed by: INTERNAL MEDICINE

## 2025-04-08 ENCOUNTER — CLINICAL SUPPORT (OUTPATIENT)
Dept: CARDIAC REHAB | Facility: CLINIC | Age: 75
End: 2025-04-08
Payer: MEDICARE

## 2025-04-08 DIAGNOSIS — Z95.5 S/P PRIMARY ANGIOPLASTY WITH CORONARY STENT: ICD-10-CM

## 2025-04-08 PROCEDURE — 93798 PHYS/QHP OP CAR RHAB W/ECG: CPT | Performed by: INTERNAL MEDICINE

## 2025-04-11 ENCOUNTER — TELEPHONE (OUTPATIENT)
Dept: PRIMARY CARE | Facility: CLINIC | Age: 75
End: 2025-04-11

## 2025-04-11 ENCOUNTER — CLINICAL SUPPORT (OUTPATIENT)
Dept: CARDIAC REHAB | Facility: CLINIC | Age: 75
End: 2025-04-11
Payer: MEDICARE

## 2025-04-11 DIAGNOSIS — Z95.5 S/P PRIMARY ANGIOPLASTY WITH CORONARY STENT: ICD-10-CM

## 2025-04-11 PROCEDURE — 93798 PHYS/QHP OP CAR RHAB W/ECG: CPT | Performed by: INTERNAL MEDICINE

## 2025-04-11 NOTE — PROGRESS NOTES
Cardiac Rehab Education  Chrisabdiaziz BELLO Sonia   [phone]    4/11/2025  Cardiovascular changes with exercise were discussed in today's session. Both immediate and long term effects of exercise were covered and the importance of cooling down post workout was reviewed.      Signature Brook Hallman RN

## 2025-04-11 NOTE — TELEPHONE ENCOUNTER
Phoned patient and spoke with Danii/ wife, scheduled a House Calls visit with Radha Johnson NP 4/14/25 at 2:30 pm.

## 2025-04-14 ENCOUNTER — CLINICAL SUPPORT (OUTPATIENT)
Dept: CARDIAC REHAB | Facility: CLINIC | Age: 75
End: 2025-04-14
Payer: MEDICARE

## 2025-04-14 ENCOUNTER — APPOINTMENT (OUTPATIENT)
Dept: PRIMARY CARE | Facility: CLINIC | Age: 75
End: 2025-04-14
Payer: MEDICARE

## 2025-04-14 DIAGNOSIS — Z95.5 S/P PRIMARY ANGIOPLASTY WITH CORONARY STENT: ICD-10-CM

## 2025-04-14 PROCEDURE — 93798 PHYS/QHP OP CAR RHAB W/ECG: CPT | Performed by: INTERNAL MEDICINE

## 2025-04-15 ENCOUNTER — CLINICAL SUPPORT (OUTPATIENT)
Dept: CARDIAC REHAB | Facility: CLINIC | Age: 75
End: 2025-04-15
Payer: MEDICARE

## 2025-04-15 DIAGNOSIS — Z95.5 S/P PRIMARY ANGIOPLASTY WITH CORONARY STENT: ICD-10-CM

## 2025-04-15 PROCEDURE — 93798 PHYS/QHP OP CAR RHAB W/ECG: CPT | Performed by: INTERNAL MEDICINE

## 2025-04-16 ENCOUNTER — TELEPHONE (OUTPATIENT)
Dept: PRIMARY CARE | Facility: CLINIC | Age: 75
End: 2025-04-16
Payer: MEDICARE

## 2025-04-17 ENCOUNTER — OFFICE VISIT (OUTPATIENT)
Dept: PRIMARY CARE | Facility: CLINIC | Age: 75
End: 2025-04-17
Payer: MEDICARE

## 2025-04-17 VITALS
TEMPERATURE: 97.3 F | RESPIRATION RATE: 18 BRPM | SYSTOLIC BLOOD PRESSURE: 144 MMHG | DIASTOLIC BLOOD PRESSURE: 80 MMHG | HEIGHT: 66 IN | BODY MASS INDEX: 30.53 KG/M2 | OXYGEN SATURATION: 96 % | WEIGHT: 190 LBS | HEART RATE: 65 BPM

## 2025-04-17 DIAGNOSIS — I25.10 CORONARY ARTERY DISEASE INVOLVING NATIVE CORONARY ARTERY OF NATIVE HEART WITHOUT ANGINA PECTORIS: ICD-10-CM

## 2025-04-17 DIAGNOSIS — E11.9 TYPE 2 DIABETES MELLITUS WITHOUT COMPLICATION, WITHOUT LONG-TERM CURRENT USE OF INSULIN: ICD-10-CM

## 2025-04-17 DIAGNOSIS — N40.0 ENLARGED PROSTATE: ICD-10-CM

## 2025-04-17 DIAGNOSIS — J45.50 SEVERE PERSISTENT ASTHMA WITHOUT COMPLICATION (MULTI): ICD-10-CM

## 2025-04-17 DIAGNOSIS — I12.9 HYPERTENSIVE CHRONIC KIDNEY DISEASE W STG 1-4/UNSP CHR KDNY: Primary | ICD-10-CM

## 2025-04-17 PROCEDURE — 3044F HG A1C LEVEL LT 7.0%: CPT | Performed by: NURSE PRACTITIONER

## 2025-04-17 PROCEDURE — 1125F AMNT PAIN NOTED PAIN PRSNT: CPT | Performed by: NURSE PRACTITIONER

## 2025-04-17 PROCEDURE — 3008F BODY MASS INDEX DOCD: CPT | Performed by: NURSE PRACTITIONER

## 2025-04-17 PROCEDURE — 1160F RVW MEDS BY RX/DR IN RCRD: CPT | Performed by: NURSE PRACTITIONER

## 2025-04-17 PROCEDURE — 1159F MED LIST DOCD IN RCRD: CPT | Performed by: NURSE PRACTITIONER

## 2025-04-17 PROCEDURE — 4010F ACE/ARB THERAPY RXD/TAKEN: CPT | Performed by: NURSE PRACTITIONER

## 2025-04-17 PROCEDURE — 3079F DIAST BP 80-89 MM HG: CPT | Performed by: NURSE PRACTITIONER

## 2025-04-17 PROCEDURE — 3077F SYST BP >= 140 MM HG: CPT | Performed by: NURSE PRACTITIONER

## 2025-04-17 PROCEDURE — 99349 HOME/RES VST EST MOD MDM 40: CPT | Performed by: NURSE PRACTITIONER

## 2025-04-17 RX ORDER — HYDROCHLOROTHIAZIDE 25 MG/1
25 TABLET ORAL DAILY
Qty: 90 TABLET | Refills: 3 | Status: SHIPPED | OUTPATIENT
Start: 2025-04-17

## 2025-04-17 ASSESSMENT — ENCOUNTER SYMPTOMS
APPETITE CHANGE: 0
WHEEZING: 0
SHORTNESS OF BREATH: 0
DIZZINESS: 0
HEMATURIA: 0
FEVER: 0
SORE THROAT: 0
BACK PAIN: 1
NERVOUS/ANXIOUS: 1
ABDOMINAL PAIN: 0
TROUBLE SWALLOWING: 0
COUGH: 0
CHILLS: 0
DYSPHORIC MOOD: 0
ARTHRALGIAS: 1
UNEXPECTED WEIGHT CHANGE: 0
BRUISES/BLEEDS EASILY: 1
FATIGUE: 1
LIGHT-HEADEDNESS: 0
SLEEP DISTURBANCE: 0

## 2025-04-17 ASSESSMENT — PAIN SCALES - GENERAL: PAINLEVEL_OUTOF10: 6

## 2025-04-17 NOTE — PROGRESS NOTES
Subjective   Patient ID: Duane Scott is a 74 y.o. male who presents for Follow-up (Routine follow up, multiple medical issues, HTN, HLD, CAD, CKD ).    Visit for 75 y/o male seen today in private home, alone for routine follow up of chronic medical conditions. PMH HTN, CAD with stent placement, angina, Hypercholesterolemia, CHF, Persistent asthma, COPD, DM, CKD, BPH, recurrent UTI, GERD, Chronic pain, panic attacks, anxiety.    Pt answered the door upon provider arrival and stepped outside onto the porch where today's follow up took place. Pt does not like provider to enter the home. He lives with his spouse and family. Pt does not drive or have a vehicle and typically walks to local appointments., or his daughter will help provide transportation. Pt is currently going to cardiac rehab 3x weekly. His daughter drops him off at his appointment and he takes [place] home, although he reports that this costs $7.50 weekly which is financially challenging. Pt also reports that he missed his last urology follow up due to a transportation issue. Pt is alert, answering all questions today. He manages his own medications. Pt did bring his medication bottles outside although he was visibly annoyed by this. Pt had all medications except his hydrochlorothiazide. Last refill was sent about 14 months ago. Pt tells me that he is taking the medication but does not know where the bottle is. Pt denies appetite changes, signs of weight loss. Denies abdominal pain, nausea, vomiting. Pt has history of BPH, recurrent UTIs and follows with urology. Next appointment is scheduled on 6/9/25. He denies any bowel concerns. Pt with history of MI, HTN, CAD. Cardiologist is Dr. Spencer. Pt had cardiac cath with stent placement last month. He did follow up with Dr. Spencer on 3/27, next follow up scheduled 7/7/25. Pt denies having any chest pain or shortness of breath.  No palpitations dizziness lightheadedness or syncope. Pt remains ambulatory. He  "had 2 falls in February but denies any recent issues. Pt endorses chronic pain, will occasionally take a tylenol but does not do this consistently. Pt with history of DM. Not managed with medications. Last A1c 5.6. History of CKD. Last GFR 57.      Home Visit: Medically necessary due to: patient has limited support systems to help the patient attend office visits, unsteady gait/poor balance        Current Medications[1]     Review of Systems   Constitutional:  Positive for fatigue. Negative for appetite change, chills, fever and unexpected weight change.   HENT:  Positive for hearing loss. Negative for sore throat and trouble swallowing.    Eyes:  Positive for visual disturbance (reports worsening vision, wears corrective lenses).   Respiratory:  Negative for cough, shortness of breath and wheezing.    Cardiovascular:  Positive for leg swelling (occasional). Negative for chest pain and palpitations.   Gastrointestinal:  Negative for abdominal pain, constipation, diarrhea, nausea and vomiting.   Genitourinary:  Negative for hematuria.   Musculoskeletal:  Positive for arthralgias and back pain.   Neurological:  Negative for dizziness and light-headedness.   Hematological:  Bruises/bleeds easily.   Psychiatric/Behavioral:  Negative for dysphoric mood and sleep disturbance. The patient is nervous/anxious.      Objective   /80 (BP Location: Right arm, Patient Position: Standing, BP Cuff Size: Adult)   Pulse 65   Temp 36.3 °C (97.3 °F) (Temporal)   Resp 18   Ht 1.676 m (5' 6\")   Wt 86.2 kg (190 lb)   SpO2 96%   BMI 30.67 kg/m²     Physical Exam  Constitutional:       General: He is not in acute distress.     Appearance: Normal appearance. He is not ill-appearing.   Neck:      Vascular: No carotid bruit.   Cardiovascular:      Rate and Rhythm: Normal rate and regular rhythm.      Pulses: Normal pulses.      Heart sounds: Normal heart sounds, S1 normal and S2 normal. No murmur heard.  Pulmonary:      Effort: " Pulmonary effort is normal. No respiratory distress.      Breath sounds: Normal breath sounds and air entry.   Musculoskeletal:      Cervical back: Normal range of motion and neck supple. No rigidity or tenderness.      Right lower leg: No edema.      Left lower leg: No edema.   Lymphadenopathy:      Cervical: No cervical adenopathy.   Skin:     General: Skin is warm and dry.      Capillary Refill: Capillary refill takes less than 2 seconds.   Neurological:      General: No focal deficit present.      Mental Status: He is alert. Mental status is at baseline.   Psychiatric:         Mood and Affect: Mood normal.         Behavior: Behavior normal. Behavior is cooperative.         Thought Content: Thought content normal.         Judgment: Judgment normal.     Lab Results   Component Value Date    WBC 4.9 03/07/2025    HGB 13.0 (L) 03/07/2025    HCT 43.0 03/07/2025    MCV 96 03/07/2025     (L) 03/07/2025       Chemistry    Lab Results   Component Value Date/Time     03/07/2025 0907     02/10/2025 0933    K 4.4 03/07/2025 0907    K 4.5 02/10/2025 0933     03/07/2025 0907     02/10/2025 0933    CO2 28 03/07/2025 0907    CO2 27 02/10/2025 0933    BUN 34 (H) 03/07/2025 0907    BUN 25 02/10/2025 0933    CREATININE 1.31 (H) 03/07/2025 0907    CREATININE 1.38 (H) 02/10/2025 0933    Lab Results   Component Value Date/Time    CALCIUM 9.5 03/07/2025 0907    CALCIUM 9.0 02/10/2025 0933    ALKPHOS 100 02/10/2025 0933    AST 24 02/10/2025 0933    ALT 26 02/10/2025 0933    BILITOT 1.4 (H) 02/10/2025 0933         Assessment/Plan   Diagnoses and all orders for this visit:  Hypertensive chronic kidney disease w stg 1-4/unsp chr kdny  -     hydroCHLOROthiazide (HYDRODiuril) 25 mg tablet; Take 1 tablet (25 mg) by mouth once daily.  -continue Losartan, aspirin    Coronary artery disease involving native coronary artery of native heart without angina pectoris  -chronic, s/p stent placement in March, follows  with cardiology   -continue carvedilol, clopidogrel, atorvastatin, aspirin   -continue cardiac rehab 3x weekly   -follow up with cardiology as scheduled     Severe persistent asthma without complication (Multi)  -chronic, follows with pulmonology  -continue to use albuterol ingaler, duonebs as needed     Type 2 diabetes mellitus without complication, without long-term current use of insulin  -stable, controlled without medications, last A1c 5.6.     Enlarged prostate  -chronic, managed with tamsulosin, finasteride  -follow up with urology as scheduled     Patient stable. Refill for hydrochlorothiazide sent to pharmacy. Pt aware. Reports daughter will  medication. Will continue house calls NP program as needed as pt continues to follow with cardiology, urology, and PCP.          Radha Johnson, APRN-CNP          [1]   Current Outpatient Medications:     albuterol (Ventolin HFA) 90 mcg/actuation inhaler, Inhale 1 puff every 4 hours if needed for wheezing or shortness of breath., Disp: 18 g, Rfl: 1    aspirin 81 mg EC tablet, Take 1 tablet (81 mg) by mouth once daily., Disp: 90 tablet, Rfl: 3    atorvastatin (Lipitor) 80 mg tablet, Take 1 tablet (80 mg) by mouth once daily., Disp: 90 tablet, Rfl: 3    carvedilol (Coreg) 25 mg tablet, TAKE ONE-HALF TABLET BY MOUTH TWICE DAILY with meals, Disp: 90 tablet, Rfl: 3    clopidogrel (Plavix) 75 mg tablet, Take 1 tablet (75 mg) by mouth once daily., Disp: 90 tablet, Rfl: 3    finasteride (Proscar) 5 mg tablet, Take 1 tablet (5 mg) by mouth once daily. Do not crush, chew, or split., Disp: 30 tablet, Rfl: 11    fluticasone furoate-vilanteroL (Breo Ellipta) 200-25 mcg/dose inhaler, Inhale 1 puff once daily. (Patient not taking: Reported on 4/17/2025), Disp: 28 each, Rfl: 1    hydroCHLOROthiazide (HYDRODiuril) 25 mg tablet, Take 1 tablet (25 mg) by mouth once daily., Disp: 90 tablet, Rfl: 3    ipratropium-albuteroL (Duo-Neb) 0.5-2.5 mg/3 mL nebulizer solution, Inhale 3 mL  every 6 hours if needed., Disp: , Rfl:     losartan (Cozaar) 100 mg tablet, Take 1 tablet (100 mg) by mouth once daily., Disp: 90 tablet, Rfl: 3    tamsulosin (Flomax) 0.4 mg 24 hr capsule, Take 2 capsules (0.8 mg) by mouth once daily., Disp: 60 capsule, Rfl: 11

## 2025-04-18 ENCOUNTER — CLINICAL SUPPORT (OUTPATIENT)
Dept: CARDIAC REHAB | Facility: CLINIC | Age: 75
End: 2025-04-18
Payer: MEDICARE

## 2025-04-18 DIAGNOSIS — Z95.5 S/P PRIMARY ANGIOPLASTY WITH CORONARY STENT: ICD-10-CM

## 2025-04-18 PROCEDURE — 93798 PHYS/QHP OP CAR RHAB W/ECG: CPT | Performed by: INTERNAL MEDICINE

## 2025-04-18 ASSESSMENT — ENCOUNTER SYMPTOMS
PALPITATIONS: 0
NAUSEA: 0
VOMITING: 0
CONSTIPATION: 0
DIARRHEA: 0

## 2025-04-18 NOTE — PROGRESS NOTES
Cardiac Rehab Education  Duane EUGENIA Sonia   [phone]    4/18/2025  Education on sodium intake was done. Discussed with patient the risks of excess levels of sodium and how to decrease dietary intake with provided list of substitutions. Handouts were given for home reference.            Signature Brook Halmlan RN

## 2025-04-21 ENCOUNTER — DOCUMENTATION (OUTPATIENT)
Dept: CARDIAC REHAB | Facility: CLINIC | Age: 75
End: 2025-04-21
Payer: MEDICARE

## 2025-04-21 ENCOUNTER — CLINICAL SUPPORT (OUTPATIENT)
Dept: CARDIAC REHAB | Facility: CLINIC | Age: 75
End: 2025-04-21
Payer: MEDICARE

## 2025-04-21 DIAGNOSIS — Z95.5 S/P PRIMARY ANGIOPLASTY WITH CORONARY STENT: ICD-10-CM

## 2025-04-21 PROCEDURE — 93798 PHYS/QHP OP CAR RHAB W/ECG: CPT | Performed by: INTERNAL MEDICINE

## 2025-04-21 NOTE — PROGRESS NOTES
Cardiac Rehabilitation 30 Day Reassessment    Name: Duane Scott  Medical Record Number: [id number]  YOB: 1950  Age: 74 y.o.    Today’s Date: 4/21/2025  Primary Care Physician: ROBIN Wilson-CNP  Referring Physician: Francisco Javier Spencer MD  Program Location: [address]  Primary Diagnosis:   1. Angina pectoris, unstable (Multi)  Referral to Cardiac Rehab      2. Coronary artery disease involving native coronary artery of native heart without angina pectoris  Referral to Cardiac Rehab      3. Status post coronary artery stent placement  Referral to Cardiac Rehab      4. S/P primary angioplasty with coronary stent  Referral to Cardiac Rehab         Onset/Date of Diagnosis: 3/11/25   Session #7  AACVPR Risk Stratification: High   Falls Risk: High  Psychosocial Assessment   Pre PHQ-9: 3  Sent PH-Q 9 to MD if score > 20: No; score < 20  Pt reported/currently experiencing stress: No  Patient uses stress management skills: No   History of: no history of anxiety or depression  Currently seeing a mental health provider: No  Social Support: Yes, Whom:Spouse    Learning Assessment:  Learning assessment/barriers: None  Preferred learning method: Reading handout and Writing handout  Barriers: Physical limitations  Comments:  Stages of Change:Action    Psychosocial Plan  Goal Status: In progress  Psychosocial Goals: Maintain or lower PH-Q 9 score by discharge  Psychosocial Interventions/Education: To be done in Cardiac Rehab.  3/25/25 PHQ-9 score reviewed on initial nursing assessment/mk      Nutrition Assessment:    Hyperlipidemia: Yes     Lipids:   Lab Results   Component Value Date    CHOL 111 02/10/2025    HDL 49 02/10/2025    TRIG 34 02/10/2025       Current Dietary Guidelines:  no special diet  Barriers to dietary change: no    Diet Habit Survey: Picture Your Plate  Pre: Survey not yet returned by patient.  Post: To be done at discharge.    Diabetes Assessment    Lab Results  "  Component Value Date    HGBA1C 5.6 02/07/2025       History of Diabetes: Yes Pt monitors BS at home: No   Diet controlled    Weight Management    Height: 167.6 cm (5' 6\")  Weight: 86.2 kg (190 lb)  BMI (Calculated): 30.68  No data recorded    Nutrition Plan  Goal Status: In progress  Nutrition Goals: Improve Diet Habit Survey score by 5-10 points by discharge, Adapt a low-sodium, DASH diet prior to discharge, and Learn how to read and interpret nutrition labels prior to discharge  Nutrition Interventions/Education:   4/4/2025  Label reading education was done with program's RDN during today's visit. Label Reading Highlights handout was given with information discussed during appointment and to assist in review of label contents at home.   Signature Brook Hallman RN  4/18/2025  Education on sodium intake was done. Discussed with patient the risks of excess levels of sodium and how to decrease dietary intake with provided list of substitutions. Handouts were given for home reference.    Signature Brook Hallman RN         Exercise Assessment    No  Mode: NA  Frequency: NA  Duration: NA    Exercise Prescription     Exercise Prescription based on: current ex rx    DASI Score: 28.7   MET Score: 6.3   Frequency:  3 days/week   Mode: NuStep and Recumbent Cycle   Duration: 30-40 total aerobic minutes   Intensity: RPE 12-14  Target HR:   87-97  MET Level: 2  Patient wears supplemental O2: No     Modality Workload METs Duration (minutes)   1 Pre-Exercise   2:00   2 NuStep Load 5 @ 50 christopher  2 30:00   3 Recumbent Bike Load 2 @50 rpm  2 10:00   4 Post-Exercise   2:00     Resistance Training: No   Home Exercise Prescription given: To be given prior to discharge from program.    Exercise Plan  Goal Status: In progress  Exercise Goals: Increase exercise MET level by 5-10% each week, Increase total exercise duration to 30-45 minutes, Initiate strength training 2-3 days a week, Initiate flexibility training 2-3 days a week, " and Establish a home exercise program before discharge  4/11/2025  Cardiovascular changes with exercise were discussed in today's session. Both immediate and long term effects of exercise were covered and the importance of cooling down post workout was reviewed.     Signature Brook Hallman RN         Other Core Components/Risk Factor Assessment:    Medication adherence  Current Medications:   Medication Documentation Review Audit       Reviewed by ABDULAZIZ Clark (Nurse Practitioner) on 04/17/25 at 1500      Medication Order Taking? Sig Documenting Provider Last Dose Status   albuterol (Ventolin HFA) 90 mcg/actuation inhaler [id number] No Inhale 1 puff every 4 hours if needed for wheezing or shortness of breath. ABDULAZIZ Wilson 3/11/2025 Morning Active   aspirin 81 mg EC tablet [id number]  Take 1 tablet (81 mg) by mouth once daily. ABDULAZIZ Benz  Active   atorvastatin (Lipitor) 80 mg tablet [id number]  Take 1 tablet (80 mg) by mouth once daily. ABDULAZIZ Benz  Active   carvedilol (Coreg) 25 mg tablet [id number] No TAKE ONE-HALF TABLET BY MOUTH TWICE DAILY with meals Jessika Spencer MD 3/10/2025 Active   clopidogrel (Plavix) 75 mg tablet [id number]  Take 1 tablet (75 mg) by mouth once daily. Jessika Spencer MD  Active   finasteride (Proscar) 5 mg tablet [id number] No Take 1 tablet (5 mg) by mouth once daily. Do not crush, chew, or split. Jono Bass MD 3/10/2025 Active   fluticasone furoate-vilanteroL (Breo Ellipta) 200-25 mcg/dose inhaler [id number]  Inhale 1 puff once daily. ABDULAZIZ Wilson  Active   hydroCHLOROthiazide (HYDRODiuril) 25 mg tablet [id number] No Take 1 tablet (25 mg) by mouth once daily. Kulwinder Mai MD 3/10/2025 Active   ipratropium-albuteroL (Duo-Neb) 0.5-2.5 mg/3 mL nebulizer solution [id number] No Inhale 3 mL every 6 hours if needed. Taryn Eaton MD 3/10/2025 Active   losartan (Cozaar) 100 mg tablet [id number] No Take 1 tablet (100  mg) by mouth once daily. Jessika Spencer MD 3/10/2025 Active   tamsulosin (Flomax) 0.4 mg 24 hr capsule [id number] No Take 2 capsules (0.8 mg) by mouth once daily. Jono Bass MD 3/10/2025 Active                                 Medication compliance: Yes   Uses pill box/organizer: Yes    Carries medication list: No     Blood Pressure Management  History of Hypertension: Yes   Medication Changes: No   Resting BP:  Visit Vitals  BP Occasional systolic blood pressure greater than 140. Will continue to assess        Heart Failure Management  Hx of Heart Failure: Yes;   Type (selection): HFpEF  Most recent EF: 60      Heart Failure Reassessment: Reassessed every 30 days while in program.   Unplanned MD and/or ED Heart Failure visit: No  Hospitalization since starting program/last assessment: No  MD contacted regarding Heart Failure symptoms: No  Heart Failure Goals: Able to verbalize signs and symptoms of fluid retention and when to contact MD, Adhere to proper fluid restrictions, and Adapt a low sodium diet and verbalize guidelines    Smoking/Tobacco Assessment  Social History     Tobacco Use   Smoking Status Never    Passive exposure: Never   Smokeless Tobacco Never       Other Core Component Plan  Goal Status: Initial Assessment; goals not yet started  Other Core Component Goals: Medication compliance, Verbalize medication usage and drug actions by discharge, and Achieve resting BP of < 130/80 by discharge  Other Core Component Interventions/Education:           Individual Patient Goals:    Home exercise program by discharge      Goal Status: In progress    Staff Comments:  Mr Scott has been attending rehab 3 days a week. No ST depression or ectopy noted on telemetry monitor. Heart rate blunted. Denies any cardiac issues.     Rehab Staff Signature: Brook Hallman RN

## 2025-04-22 ENCOUNTER — CLINICAL SUPPORT (OUTPATIENT)
Dept: CARDIAC REHAB | Facility: CLINIC | Age: 75
End: 2025-04-22
Payer: MEDICARE

## 2025-04-22 DIAGNOSIS — Z95.5 S/P PRIMARY ANGIOPLASTY WITH CORONARY STENT: ICD-10-CM

## 2025-04-22 PROCEDURE — 93798 PHYS/QHP OP CAR RHAB W/ECG: CPT | Performed by: INTERNAL MEDICINE

## 2025-04-25 ENCOUNTER — CLINICAL SUPPORT (OUTPATIENT)
Dept: CARDIAC REHAB | Facility: CLINIC | Age: 75
End: 2025-04-25
Payer: MEDICARE

## 2025-04-25 DIAGNOSIS — Z95.5 S/P PRIMARY ANGIOPLASTY WITH CORONARY STENT: ICD-10-CM

## 2025-04-25 PROCEDURE — 93798 PHYS/QHP OP CAR RHAB W/ECG: CPT | Performed by: INTERNAL MEDICINE

## 2025-04-25 NOTE — PROGRESS NOTES
Cardiac Rehab Education  Duane EUGENIA Sonia   [id number]    4/25/2025  Discussion of prescribed drug names, doses, side effects, and medication uses was done with patient. Patient's medication list was reviewed with patient and copy of current medication list was given to patient in addition to education handout, Medications Used for Cardiac Conditions. Patient was instructed to come back with any questions.             Signature Brook Hallman RN

## 2025-04-28 ENCOUNTER — CLINICAL SUPPORT (OUTPATIENT)
Dept: CARDIAC REHAB | Facility: CLINIC | Age: 75
End: 2025-04-28
Payer: MEDICARE

## 2025-04-28 DIAGNOSIS — Z95.5 S/P PRIMARY ANGIOPLASTY WITH CORONARY STENT: ICD-10-CM

## 2025-04-28 PROCEDURE — 93798 PHYS/QHP OP CAR RHAB W/ECG: CPT | Performed by: INTERNAL MEDICINE

## 2025-04-29 ENCOUNTER — CLINICAL SUPPORT (OUTPATIENT)
Dept: CARDIAC REHAB | Facility: CLINIC | Age: 75
End: 2025-04-29
Payer: MEDICARE

## 2025-04-29 DIAGNOSIS — Z95.5 S/P PRIMARY ANGIOPLASTY WITH CORONARY STENT: ICD-10-CM

## 2025-04-29 PROCEDURE — 93798 PHYS/QHP OP CAR RHAB W/ECG: CPT | Performed by: INTERNAL MEDICINE

## 2025-05-02 ENCOUNTER — CLINICAL SUPPORT (OUTPATIENT)
Dept: CARDIAC REHAB | Facility: CLINIC | Age: 75
End: 2025-05-02
Payer: MEDICARE

## 2025-05-02 DIAGNOSIS — Z95.5 S/P PRIMARY ANGIOPLASTY WITH CORONARY STENT: ICD-10-CM

## 2025-05-02 PROCEDURE — 93798 PHYS/QHP OP CAR RHAB W/ECG: CPT | Performed by: INTERNAL MEDICINE

## 2025-05-02 NOTE — PROGRESS NOTES
Cardiac Rehab Education  Chrisabdiaziz BELLO Sonia   [id number]    5/2/2025  Patient attended Heart Healthy Diet lecture with program RDN during today's exercise session. Heart Healthy diet tips sheet was given for further review at home and patient was encouraged to come back with any follow up questions.     Signature Brook Hallman RN

## 2025-05-05 ENCOUNTER — CLINICAL SUPPORT (OUTPATIENT)
Dept: CARDIAC REHAB | Facility: CLINIC | Age: 75
End: 2025-05-05
Payer: MEDICARE

## 2025-05-05 DIAGNOSIS — Z95.5 S/P PRIMARY ANGIOPLASTY WITH CORONARY STENT: ICD-10-CM

## 2025-05-05 PROCEDURE — 93798 PHYS/QHP OP CAR RHAB W/ECG: CPT | Performed by: INTERNAL MEDICINE

## 2025-05-06 ENCOUNTER — CLINICAL SUPPORT (OUTPATIENT)
Dept: CARDIAC REHAB | Facility: CLINIC | Age: 75
End: 2025-05-06
Payer: MEDICARE

## 2025-05-06 DIAGNOSIS — Z95.5 S/P PRIMARY ANGIOPLASTY WITH CORONARY STENT: ICD-10-CM

## 2025-05-06 PROCEDURE — 93798 PHYS/QHP OP CAR RHAB W/ECG: CPT | Performed by: INTERNAL MEDICINE

## 2025-05-09 ENCOUNTER — CLINICAL SUPPORT (OUTPATIENT)
Dept: CARDIAC REHAB | Facility: CLINIC | Age: 75
End: 2025-05-09
Payer: MEDICARE

## 2025-05-09 DIAGNOSIS — Z95.5 S/P PRIMARY ANGIOPLASTY WITH CORONARY STENT: ICD-10-CM

## 2025-05-09 PROCEDURE — 93798 PHYS/QHP OP CAR RHAB W/ECG: CPT | Performed by: INTERNAL MEDICINE

## 2025-05-09 NOTE — PROGRESS NOTES
Cardiac Rehab Education  Duane EUGENIA Sonia   [id number]    5/9/2025  Blood pressure education was done. How to read measurement numbers, ways to decrease, and risks of not managing blood pressure were discussed. Handouts were given for home reference and patient was encouraged to return with any questions.            Signature Brook Hallman RN

## 2025-05-12 ENCOUNTER — CLINICAL SUPPORT (OUTPATIENT)
Dept: CARDIAC REHAB | Facility: CLINIC | Age: 75
End: 2025-05-12
Payer: MEDICARE

## 2025-05-12 DIAGNOSIS — Z95.5 S/P PRIMARY ANGIOPLASTY WITH CORONARY STENT: ICD-10-CM

## 2025-05-12 PROCEDURE — 93798 PHYS/QHP OP CAR RHAB W/ECG: CPT | Performed by: INTERNAL MEDICINE

## 2025-05-13 ENCOUNTER — CLINICAL SUPPORT (OUTPATIENT)
Dept: CARDIAC REHAB | Facility: CLINIC | Age: 75
End: 2025-05-13
Payer: MEDICARE

## 2025-05-13 DIAGNOSIS — Z95.5 S/P PRIMARY ANGIOPLASTY WITH CORONARY STENT: ICD-10-CM

## 2025-05-13 PROCEDURE — 93798 PHYS/QHP OP CAR RHAB W/ECG: CPT | Performed by: INTERNAL MEDICINE

## 2025-05-13 NOTE — PROGRESS NOTES
Cardiac Rehab Education  Chrisabdiaziz BELLO Sonia   [id number]    5/13/2025  Education on cholesterol was done during today's session. Discussed with patient the different types of cholesterol, risk factors and how to make lifestyle modifications to improve levels. Handout and patient's recent lab results were given for home review.                Signature Brook Hallman RN

## 2025-05-16 ENCOUNTER — APPOINTMENT (OUTPATIENT)
Dept: CARDIAC REHAB | Facility: CLINIC | Age: 75
End: 2025-05-16
Payer: MEDICARE

## 2025-05-19 ENCOUNTER — DOCUMENTATION (OUTPATIENT)
Dept: CARDIAC REHAB | Facility: CLINIC | Age: 75
End: 2025-05-19
Payer: MEDICARE

## 2025-05-19 ENCOUNTER — CLINICAL SUPPORT (OUTPATIENT)
Dept: CARDIAC REHAB | Facility: CLINIC | Age: 75
End: 2025-05-19
Payer: MEDICARE

## 2025-05-19 DIAGNOSIS — Z95.5 S/P PRIMARY ANGIOPLASTY WITH CORONARY STENT: ICD-10-CM

## 2025-05-19 PROCEDURE — 93798 PHYS/QHP OP CAR RHAB W/ECG: CPT | Performed by: INTERNAL MEDICINE

## 2025-05-19 NOTE — PROGRESS NOTES
Cardiac Rehabilitation 60 Day Reassessment    Name: Duane Scott  Medical Record Number: [id number]  YOB: 1950  Age: 75 y.o.    Today’s Date: 5/19/2025  Primary Care Physician: ROBIN Wilson-CNP  Referring Physician: Francisco Javier Spencer MD  Program Location: [address]  Primary Diagnosis:   1. Angina pectoris, unstable (Multi)  Referral to Cardiac Rehab      2. Coronary artery disease involving native coronary artery of native heart without angina pectoris  Referral to Cardiac Rehab      3. Status post coronary artery stent placement  Referral to Cardiac Rehab      4. S/P primary angioplasty with coronary stent  Referral to Cardiac Rehab         Onset/Date of Diagnosis: 3/11/25   Session #18  AACVPR Risk Stratification: High   Falls Risk: High  Psychosocial Assessment   Pre PHQ-9: 3  Sent PH-Q 9 to MD if score > 20: No; score < 20  Pt reported/currently experiencing stress: No  Patient uses stress management skills: No   History of: no history of anxiety or depression  Currently seeing a mental health provider: No  Social Support: Yes, Whom:Spouse    Learning Assessment:  Learning assessment/barriers: None  Preferred learning method: Reading handout and Writing handout  Barriers: Physical limitations  Comments:  Stages of Change:Action    Psychosocial Plan  Goal Status: In progress  Psychosocial Goals: Maintain or lower PH-Q 9 score by discharge  Psychosocial Interventions/Education:   3/25/25 PHQ-9 score reviewed on initial nursing assessment/mk  5/13/25 No psychosocial  concerns voiced/mk    Nutrition Assessment:    Hyperlipidemia: Yes     Lipids:   Lab Results   Component Value Date    CHOL 111 02/10/2025    HDL 49 02/10/2025    TRIG 34 02/10/2025       Current Dietary Guidelines: no special diet  Barriers to dietary change: no    Diet Habit Survey: Picture Your Plate  Pre: Survey not yet returned by patient.  Post: To be done at discharge.    Diabetes Assessment    Lab  "Results   Component Value Date    HGBA1C 5.6 02/07/2025       History of Diabetes: Yes Pt monitors BS at home: No   Diet controlled    Weight Management    Height: 167.6 cm (5' 6\")  Weight: 86.2 kg (190 lb)  BMI (Calculated): 30.68  No data recorded    Nutrition Plan  Goal Status: In progress  Nutrition Goals: Improve Diet Habit Survey score by 5-10 points by discharge, Adapt a low-sodium, DASH diet prior to discharge, and Learn how to read and interpret nutrition labels prior to discharge  Nutrition Interventions/Education:   4/4/2025  Label reading education was done with program's RDN during today's visit. Label Reading Highlights handout was given with information discussed during appointment and to assist in review of label contents at home.   Nik Hallman RN  4/18/2025  Education on sodium intake was done. Discussed with patient the risks of excess levels of sodium and how to decrease dietary intake with provided list of substitutions. Handouts were given for home reference.    Nik Hallman RN   5/2/2025  Patient attended Heart Healthy Diet lecture with program RDN during today's exercise session. Heart Healthy diet tips sheet was given for further review at home and patient was encouraged to come back with any follow up questions. Nik Hallman RN   5/13/2025  Education on cholesterol was done during today's session. Discussed with patient the different types of cholesterol, risk factors and how to make lifestyle modifications to improve levels. Handout and patient's recent lab results were given for home review. /mk               Exercise Assessment    Home exercise: No  Mode: NA  Frequency: NA  Duration: NA    Exercise Prescription     Exercise Prescription based on: current ex rx    DASI Score: 28.7   MET Score: 6.3   Frequency:  3 days/week   Mode: NuStep and Recumbent Cycle   Duration: 30-40 total aerobic minutes   Intensity: RPE 12-14  Target HR:  87-97  MET " Level: 3.1  Patient wears supplemental O2: No     Modality Workload METs Duration (minutes)   1 Pre-Exercise   2:00   2 NuStep Load 6 @ 60 christopher  2 20:00   3 Recumbent Bike Load 5 @55 rpm  2 20:00   4 Post-Exercise   2:00     Resistance Training: No   Home Exercise Prescription given: To be given prior to discharge from program.    Exercise Plan  Goal Status: In progress  Exercise Goals: Increase exercise MET level by 5-10% each week, Increase total exercise duration to 30-45 minutes, Initiate strength training 2-3 days a week, Initiate flexibility training 2-3 days a week, and Establish a home exercise program before discharge  4/11/2025  Cardiovascular changes with exercise were discussed in today's session. Both immediate and long term effects of exercise were covered and the importance of cooling down post workout was reviewed.     Signature Brook Hallman RN         Other Core Components/Risk Factor Assessment:    Medication adherence  Current Medications:   Medication Documentation Review Audit       Reviewed by ABDULAZIZ Clark (Nurse Practitioner) on 04/17/25 at 1500      Medication Order Taking? Sig Documenting Provider Last Dose Status   albuterol (Ventolin HFA) 90 mcg/actuation inhaler [id number] No Inhale 1 puff every 4 hours if needed for wheezing or shortness of breath. ABDULAZIZ Wilson 3/11/2025 Morning Active   aspirin 81 mg EC tablet [id number]  Take 1 tablet (81 mg) by mouth once daily. ABDULAZIZ Benz  Active   atorvastatin (Lipitor) 80 mg tablet [id number]  Take 1 tablet (80 mg) by mouth once daily. ABDULAZIZ Benz  Active   carvedilol (Coreg) 25 mg tablet [id number] No TAKE ONE-HALF TABLET BY MOUTH TWICE DAILY with meals Jessika Spencer MD 3/10/2025 Active   clopidogrel (Plavix) 75 mg tablet [id number]  Take 1 tablet (75 mg) by mouth once daily. Jessika Spencer MD  Active   finasteride (Proscar) 5 mg tablet [id number] No Take 1 tablet (5 mg) by mouth once daily.  Do not crush, chew, or split. Jono Bass MD 3/10/2025 Active   fluticasone furoate-vilanteroL (Breo Ellipta) 200-25 mcg/dose inhaler [id number]  Inhale 1 puff once daily. Brianne Del Rio, APRN-CNP  Active   hydroCHLOROthiazide (HYDRODiuril) 25 mg tablet [id number] No Take 1 tablet (25 mg) by mouth once daily. Kulwinder Mai MD 3/10/2025 Active   ipratropium-albuteroL (Duo-Neb) 0.5-2.5 mg/3 mL nebulizer solution [id number] No Inhale 3 mL every 6 hours if needed. Taryn Eaton MD 3/10/2025 Active   losartan (Cozaar) 100 mg tablet [id number] No Take 1 tablet (100 mg) by mouth once daily. Jessika Spencer MD 3/10/2025 Active   tamsulosin (Flomax) 0.4 mg 24 hr capsule [id number] No Take 2 capsules (0.8 mg) by mouth once daily. Jono Bass MD 3/10/2025 Active                                 Medication compliance: Yes   Uses pill box/organizer: Yes    Carries medication list: No     Blood Pressure Management  History of Hypertension: Yes   Medication Changes: No   Resting BP:  Visit Vitals  BP Occasional systolic blood pressure greater than 140. Will continue to assess        Heart Failure Management  Hx of Heart Failure: Yes;   Type (selection): HFpEF  Most recent EF: 60      Heart Failure Reassessment: Reassessed every 30 days while in program.   Unplanned MD and/or ED Heart Failure visit: No  Hospitalization since starting program/last assessment: No  MD contacted regarding Heart Failure symptoms: No  Heart Failure Goals: Able to verbalize signs and symptoms of fluid retention and when to contact MD, Adhere to proper fluid restrictions, and Adapt a low sodium diet and verbalize guidelines    Smoking/Tobacco Assessment  Social History     Tobacco Use   Smoking Status Never    Passive exposure: Never   Smokeless Tobacco Never       Other Core Component Plan  Goal Status: In progress  Other Core Component Goals: Medication compliance, Verbalize medication usage and drug actions by discharge, and Achieve  resting BP of < 130/80 by discharge  Other Core Component Interventions/Education:   4/25/2025  Discussion of prescribed drug names, doses, side effects, and medication uses was done with patient. Patient's medication list was reviewed with patient and copy of current medication list was given to patient in addition to education handout, Medications Used for Cardiac Conditions. Patient was instructed to come back with any questions. Signature Brook Hallman RN  5/9/2025  Blood pressure education was done. How to read measurement numbers, ways to decrease, and risks of not managing blood pressure were discussed. Handouts were given for home reference and patient was encouraged to return with any questions.  Signature Brook Hallman RN         Individual Patient Goals:    Home exercise program by discharge      Goal Status: In progress    Staff Comments:  Mr Scott has been attending rehab 3 days a week. Progressing as planned. No ST depression or ectopy noted on telemetry monitor. Heart rate blunted. Denies any cardiac issues.     Rehab Staff Signature: Brook Hallman RN

## 2025-05-20 ENCOUNTER — CLINICAL SUPPORT (OUTPATIENT)
Dept: CARDIAC REHAB | Facility: CLINIC | Age: 75
End: 2025-05-20
Payer: MEDICARE

## 2025-05-20 DIAGNOSIS — Z95.5 S/P PRIMARY ANGIOPLASTY WITH CORONARY STENT: ICD-10-CM

## 2025-05-20 NOTE — PROGRESS NOTES
Cardiac Rehab Education  Duane Scott   [id number]    5/20/2025  Education on [place]-3 fatty acids was done during today's session. Discussed with patient sources and benefits of omega-3 fatty acids to cholesterol and heart health. Handouts were given for home reference and review.      Signature Brook Hallman RN

## 2025-05-23 ENCOUNTER — CLINICAL SUPPORT (OUTPATIENT)
Dept: CARDIAC REHAB | Facility: CLINIC | Age: 75
End: 2025-05-23
Payer: MEDICARE

## 2025-05-23 DIAGNOSIS — Z95.5 S/P PRIMARY ANGIOPLASTY WITH CORONARY STENT: ICD-10-CM

## 2025-05-23 PROCEDURE — 93798 PHYS/QHP OP CAR RHAB W/ECG: CPT | Performed by: INTERNAL MEDICINE

## 2025-05-27 ENCOUNTER — CLINICAL SUPPORT (OUTPATIENT)
Dept: CARDIAC REHAB | Facility: CLINIC | Age: 75
End: 2025-05-27
Payer: MEDICARE

## 2025-05-27 DIAGNOSIS — Z95.5 S/P PRIMARY ANGIOPLASTY WITH CORONARY STENT: ICD-10-CM

## 2025-05-27 PROCEDURE — 93798 PHYS/QHP OP CAR RHAB W/ECG: CPT | Performed by: INTERNAL MEDICINE

## 2025-05-30 ENCOUNTER — CLINICAL SUPPORT (OUTPATIENT)
Dept: CARDIAC REHAB | Facility: CLINIC | Age: 75
End: 2025-05-30
Payer: MEDICARE

## 2025-05-30 DIAGNOSIS — Z95.5 S/P PRIMARY ANGIOPLASTY WITH CORONARY STENT: ICD-10-CM

## 2025-05-30 PROCEDURE — 93798 PHYS/QHP OP CAR RHAB W/ECG: CPT | Performed by: INTERNAL MEDICINE

## 2025-05-30 NOTE — PROGRESS NOTES
Cardiac Rehab Education  Duane Scott   [id number]    5/30/2025  Education done on dining out. Dining out handout given for further review at home. Encouraged to follow up with questions as needed     Signature Brook Hallman RN

## 2025-06-02 ENCOUNTER — APPOINTMENT (OUTPATIENT)
Dept: CARDIAC REHAB | Facility: CLINIC | Age: 75
End: 2025-06-02
Payer: MEDICARE

## 2025-06-02 DIAGNOSIS — Z95.5 S/P PRIMARY ANGIOPLASTY WITH CORONARY STENT: ICD-10-CM

## 2025-06-02 PROCEDURE — 93798 PHYS/QHP OP CAR RHAB W/ECG: CPT | Performed by: INTERNAL MEDICINE

## 2025-06-03 ENCOUNTER — APPOINTMENT (OUTPATIENT)
Dept: CARDIAC REHAB | Facility: CLINIC | Age: 75
End: 2025-06-03
Payer: MEDICARE

## 2025-06-03 DIAGNOSIS — Z95.5 S/P PRIMARY ANGIOPLASTY WITH CORONARY STENT: ICD-10-CM

## 2025-06-03 PROCEDURE — 93798 PHYS/QHP OP CAR RHAB W/ECG: CPT | Performed by: INTERNAL MEDICINE

## 2025-06-06 ENCOUNTER — APPOINTMENT (OUTPATIENT)
Dept: CARDIAC REHAB | Facility: CLINIC | Age: 75
End: 2025-06-06
Payer: MEDICARE

## 2025-06-06 DIAGNOSIS — Z95.5 S/P PRIMARY ANGIOPLASTY WITH CORONARY STENT: ICD-10-CM

## 2025-06-06 PROCEDURE — 93798 PHYS/QHP OP CAR RHAB W/ECG: CPT | Performed by: INTERNAL MEDICINE

## 2025-06-06 NOTE — PROGRESS NOTES
Duane Scott  1950  [id number]  75 y.o.    [facility] [place] [id number] GARCIA      Cardiac/Pulmonary Rehab Nutrition Education    6/6/2025  Label reading education was done with program's RDN during today's visit. Label Reading Highlights handout was given with information discussed during appointment and to assist in review of label contents at home.      Signature Sofie Thurman RDN, LD

## 2025-06-08 NOTE — PROGRESS NOTES
Subjective   Patient ID: Duane Scott is a 75 y.o. male.      HPI  75 y.o. male presents for a follow up visit regarding urinary retention. The patient's previous symptoms he was experiencing included decreased urination and increased difficulty initiating a urinary stream.  He has a history of BPH with nocturia, polycystic kidney disease, and stage 3a kidney disease.    The patient was prescribed tamsulosin 0.4 mg 2 pills daily at bedtime with finasteride 5 mg daily. Since starting the medications, he has noticed no improvement in his symptoms.     He is prescribed clopidogrel due to stent placements. He had 2 different stent placements in his heart. His last stent placement was this year.     He states the last time he preformed CIC was in 05/2024.    He does not want to undergo any surgical intervention for his symptoms.     CT ABDOMEN PELVIS WO IV CONTRAST; 9/30/2024   IMPRESSION:  1. No acute abdominal or pelvic pathology.  2. Extensive bilateral renal cysts, likely autosomal dominant  polycystic kidney disease.  3. 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 Stable 6.9 cm  upper pole right Bosniak 2 renal cyst.  4. Prostate enlargement.  5. 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 Bladder diverticula.  6. Constipation. Descending colonic and sigmoid diverticula with no  diverticulitis.  7. Status post cholecystectomy.    Review of Systems  A complete review of systems was performed. All systems are noted to be negative unless indicated in the history of present illness, impression, active problem list, or past histories.     Objective   Physical Exam  PVR: 578 cc    Assessment/Plan   Diagnoses and all orders for this visit:  Benign prostatic hyperplasia with urinary frequency  Retention of urine      75 y.o. male presents for a follow up visit regarding urinary retention. The patient's previous symptoms he was experiencing  included decreased urination and increased difficulty initiating a urinary stream. He has a history of BPH with nocturia, polycystic kidney disease, and stage 3a kidney disease.     I personally reviewed the CT abdomen and pelvis without contrast that was obtained on 09/30/2024. The patient's imaging details that he has bladder diverticulum as well as a large prostate that was 72 grams.     In addition to the patient's enlarged prostate and his increased urinary symptoms, he is on maximum medical management. He is currently prescribed tamsulosin 0.4 mg 2 pills daily at bedtime and finasteride 5 mg daily. As his symptoms are increased and an enlarged prostate, surgical intervention is indicated. He does not want to undergo any surgical intervention for his symptoms.     The patient's PVR result was 578 cc.  I advised the patient to consider surgical intervention for his prostate, acknowledging that this would be of considerable cardiovascular risk considering his significant cardiovascular morbidity.  He is not interested in surgery and is comfortable continuing with his symptoms the way they are at this point.  As a result, he will require CIC twice daily, as he can void between sessions. He has previously preformed CIC, therefore no indication for teaching at this time.     Plan:  Perform CIC twice daily   Continue tamsulosin 0.4 mg 2 pills daily at bedtime.  Continue finasteride 5 mg daily   FUV with Dr. Waddell in 1 year     Scribe Attestation   By signing my name below, I, Phil Arvizu, Scribe attestation that this documentation has been prepared under the direction and in the presence of Jono Bass MD.

## 2025-06-09 ENCOUNTER — CLINICAL SUPPORT (OUTPATIENT)
Dept: CARDIAC REHAB | Facility: CLINIC | Age: 75
End: 2025-06-09
Payer: MEDICARE

## 2025-06-09 ENCOUNTER — APPOINTMENT (OUTPATIENT)
Dept: UROLOGY | Facility: CLINIC | Age: 75
End: 2025-06-09
Payer: MEDICARE

## 2025-06-09 ENCOUNTER — APPOINTMENT (OUTPATIENT)
Dept: CARDIAC REHAB | Facility: CLINIC | Age: 75
End: 2025-06-09
Payer: MEDICARE

## 2025-06-09 DIAGNOSIS — N40.1 BENIGN PROSTATIC HYPERPLASIA WITH URINARY FREQUENCY: Primary | ICD-10-CM

## 2025-06-09 DIAGNOSIS — R33.9 RETENTION OF URINE: ICD-10-CM

## 2025-06-09 DIAGNOSIS — Z95.5 S/P PRIMARY ANGIOPLASTY WITH CORONARY STENT: ICD-10-CM

## 2025-06-09 DIAGNOSIS — R35.0 BENIGN PROSTATIC HYPERPLASIA WITH URINARY FREQUENCY: Primary | ICD-10-CM

## 2025-06-09 PROCEDURE — 93798 PHYS/QHP OP CAR RHAB W/ECG: CPT | Performed by: INTERNAL MEDICINE

## 2025-06-09 PROCEDURE — G2211 COMPLEX E/M VISIT ADD ON: HCPCS | Performed by: STUDENT IN AN ORGANIZED HEALTH CARE EDUCATION/TRAINING PROGRAM

## 2025-06-09 PROCEDURE — 51798 US URINE CAPACITY MEASURE: CPT | Performed by: STUDENT IN AN ORGANIZED HEALTH CARE EDUCATION/TRAINING PROGRAM

## 2025-06-09 PROCEDURE — 3044F HG A1C LEVEL LT 7.0%: CPT | Performed by: STUDENT IN AN ORGANIZED HEALTH CARE EDUCATION/TRAINING PROGRAM

## 2025-06-09 PROCEDURE — 99214 OFFICE O/P EST MOD 30 MIN: CPT | Performed by: STUDENT IN AN ORGANIZED HEALTH CARE EDUCATION/TRAINING PROGRAM

## 2025-06-09 PROCEDURE — 4010F ACE/ARB THERAPY RXD/TAKEN: CPT | Performed by: STUDENT IN AN ORGANIZED HEALTH CARE EDUCATION/TRAINING PROGRAM

## 2025-06-10 ENCOUNTER — APPOINTMENT (OUTPATIENT)
Dept: CARDIAC REHAB | Facility: CLINIC | Age: 75
End: 2025-06-10
Payer: MEDICARE

## 2025-06-10 DIAGNOSIS — Z95.5 S/P PRIMARY ANGIOPLASTY WITH CORONARY STENT: ICD-10-CM

## 2025-06-10 PROCEDURE — 93798 PHYS/QHP OP CAR RHAB W/ECG: CPT | Performed by: INTERNAL MEDICINE

## 2025-06-13 ENCOUNTER — DOCUMENTATION (OUTPATIENT)
Dept: CARDIAC REHAB | Facility: CLINIC | Age: 75
End: 2025-06-13

## 2025-06-13 ENCOUNTER — APPOINTMENT (OUTPATIENT)
Dept: CARDIAC REHAB | Facility: CLINIC | Age: 75
End: 2025-06-13
Payer: MEDICARE

## 2025-06-13 DIAGNOSIS — Z95.5 S/P PRIMARY ANGIOPLASTY WITH CORONARY STENT: ICD-10-CM

## 2025-06-13 PROCEDURE — 93798 PHYS/QHP OP CAR RHAB W/ECG: CPT | Performed by: INTERNAL MEDICINE

## 2025-06-13 NOTE — PROGRESS NOTES
Cardiac Rehabilitation 90 Day Progress Report    Name: Duane Scott  Medical Record Number: [id number]  YOB: 1950  Age: 75 y.o.    Today’s Date: 6/13/2025  Primary Care Physician: ROBIN Wilson-CNP  Referring Physician: Francisco Javier Spencer MD  Program Location: [address]  Primary Diagnosis:   1. Angina pectoris, unstable (Multi)  Referral to Cardiac Rehab      2. Coronary artery disease involving native coronary artery of native heart without angina pectoris  Referral to Cardiac Rehab      3. Status post coronary artery stent placement  Referral to Cardiac Rehab      4. S/P primary angioplasty with coronary stent  Referral to Cardiac Rehab         Onset/Date of Diagnosis: 3/11/25   Session #28  AACVPR Risk Stratification: High   Falls Risk: High  Psychosocial Assessment   Pre PHQ-9: 3  Sent PH-Q 9 to MD if score > 20: No; score < 20  Pt reported/currently experiencing stress: No  Patient uses stress management skills: No   History of: no history of anxiety or depression  Currently seeing a mental health provider: No  Social Support: Yes, Whom:Spouse    Learning Assessment:  Learning assessment/barriers: None  Preferred learning method: Reading handout and Writing handout  Barriers: Physical limitations  Comments:  Stages of Change:Action    Psychosocial Plan  Goal Status: In progress  Psychosocial Goals: Maintain or lower PH-Q 9 score by discharge  Psychosocial Interventions/Education:   3/25/25 PHQ-9 score reviewed on initial nursing assessment/mk  5/13/25 No psychosocial  concerns voiced/mk    Nutrition Assessment:    Hyperlipidemia: Yes     Lipids:   Lab Results   Component Value Date    CHOL 111 02/10/2025    HDL 49 02/10/2025    TRIG 34 02/10/2025       Current Dietary Guidelines: no special diet  Barriers to dietary change: no    Diet Habit Survey: Picture Your Plate  Pre: Survey not yet returned by patient.  Post: To be done at discharge.    Diabetes  "Assessment    Lab Results   Component Value Date    HGBA1C 5.6 02/07/2025       History of Diabetes: Yes Pt monitors BS at home: No   Diet controlled    Weight Management    Height: 167.6 cm (5' 6\")  Weight: 86.2 kg (190 lb)  BMI (Calculated): 30.68      Nutrition Plan  Goal Status: In progress  Nutrition Goals: Improve Diet Habit Survey score by 5-10 points by discharge, Adapt a low-sodium, DASH diet prior to discharge, and Learn how to read and interpret nutrition labels prior to discharge  Nutrition Interventions/Education:   4/4/2025  Label reading education was done with program's RDN during today's visit. Label Reading Highlights handout was given with information discussed during appointment and to assist in review of label contents at home.   Nik Hallman RN  4/18/2025  Education on sodium intake was done. Discussed with patient the risks of excess levels of sodium and how to decrease dietary intake with provided list of substitutions. Handouts were given for home reference.    Nik Hallman RN   5/2/2025  Patient attended Heart Healthy Diet lecture with program RDN during today's exercise session. Heart Healthy diet tips sheet was given for further review at home and patient was encouraged to come back with any follow up questions. Nik Hallman RN   5/13/2025  Education on cholesterol was done during today's session. Discussed with patient the different types of cholesterol, risk factors and how to make lifestyle modifications to improve levels. Handout and patient's recent lab results were given for home review. /ty  5/20/2025  Education on [place]-3 fatty acids was done during today's session. Discussed with patient sources and benefits of omega-3 fatty acids to cholesterol and heart health. Handouts were given for home reference and review.    Nik Hallman RN  5/30/2025  Education done on dining out. Dining out handout given for further review at " home. Encouraged to follow up with questions as needed Signature Brook Hallman RN  6/6/2025  Label reading education was done with program's RDN during today's visit. Label Reading Highlights handout was given with information discussed during appointment and to assist in review of label contents at home.  Signature Sofie Thurman RDN, LD            Exercise Assessment    Home exercise: No  Mode: NA  Frequency: NA  Duration: NA    Exercise Prescription     Exercise Prescription based on: current ex rx    DASI Score: 28.7   MET Score: 6.3   Frequency:  3 days/week   Mode: NuStep and Recumbent Cycle   Duration: 30-40 total aerobic minutes   Intensity: RPE 12-14  Target HR:  87-97  MET Level: 3.1  Patient wears supplemental O2: No     Modality Workload METs Duration (minutes)   1 Pre-Exercise   2:00   2 NuStep Load 6 @ 60 christopher  2.7 21:00   3 Recumbent Bike Load 5 @55 rpm  3.1 21:00   4 Post-Exercise   2:00     Resistance Training: No   Home Exercise Prescription given: To be given prior to discharge from program.    Exercise Plan  Goal Status: In progress  Exercise Goals: Establish a regular home ex program by discharge  4/11/2025  Cardiovascular changes with exercise were discussed in today's session. Both immediate and long term effects of exercise were covered and the importance of cooling down post workout was reviewed.     Signature Brook Hallman RN   4/15/25 adjusted THR/MS      Other Core Components/Risk Factor Assessment:    Medication adherence  Current Medications:   Medication Documentation Review Audit       Reviewed by ABDULAZIZ Clark (Nurse Practitioner) on 04/17/25 at 1500      Medication Order Taking? Sig Documenting Provider Last Dose Status   albuterol (Ventolin HFA) 90 mcg/actuation inhaler [id number] No Inhale 1 puff every 4 hours if needed for wheezing or shortness of breath. ABDULAZIZ Wilosn 3/11/2025 Morning Active   aspirin 81 mg EC tablet [id number]  Take 1 tablet  (81 mg) by mouth once daily. ABDULAZIZ Benz  Active   atorvastatin (Lipitor) 80 mg tablet [id number]  Take 1 tablet (80 mg) by mouth once daily. ABDULAZIZ Benz  Active   carvedilol (Coreg) 25 mg tablet [id number] No TAKE ONE-HALF TABLET BY MOUTH TWICE DAILY with meals Jessika Spencer MD 3/10/2025 Active   clopidogrel (Plavix) 75 mg tablet [id number]  Take 1 tablet (75 mg) by mouth once daily. Jessika Spencer MD  Active   finasteride (Proscar) 5 mg tablet [id number] No Take 1 tablet (5 mg) by mouth once daily. Do not crush, chew, or split. Jono Bass MD 3/10/2025 Active   fluticasone furoate-vilanteroL (Breo Ellipta) 200-25 mcg/dose inhaler [id number]  Inhale 1 puff once daily. ABDULAZIZ Wilson  Active   hydroCHLOROthiazide (HYDRODiuril) 25 mg tablet [id number] No Take 1 tablet (25 mg) by mouth once daily. Kulwinder Mai MD 3/10/2025 Active   ipratropium-albuteroL (Duo-Neb) 0.5-2.5 mg/3 mL nebulizer solution [id number] No Inhale 3 mL every 6 hours if needed. Taryn Eaton MD 3/10/2025 Active   losartan (Cozaar) 100 mg tablet [id number] No Take 1 tablet (100 mg) by mouth once daily. Jesskia Spencer MD 3/10/2025 Active   tamsulosin (Flomax) 0.4 mg 24 hr capsule [id number] No Take 2 capsules (0.8 mg) by mouth once daily. Jono Bass MD 3/10/2025 Active                                 Medication compliance: Yes   Uses pill box/organizer: Yes    Carries medication list: No     Blood Pressure Management  History of Hypertension: Yes   Medication Changes: No   Resting BP:  Visit Vitals  BP Occasional systolic blood pressure greater than 140. Will continue to assess        Heart Failure Management  Hx of Heart Failure: Yes;   Type (selection): HFpEF  Most recent EF: 60      Heart Failure Reassessment: Reassessed every 30 days while in program.   Unplanned MD and/or ED Heart Failure visit: No  Hospitalization since starting program/last assessment: No  MD contacted regarding Heart  Failure symptoms: No  Heart Failure Goals: Able to verbalize signs and symptoms of fluid retention and when to contact MD, Adhere to proper fluid restrictions, and Adapt a low sodium diet and verbalize guidelines    Smoking/Tobacco Assessment  Social History     Tobacco Use   Smoking Status Never    Passive exposure: Never   Smokeless Tobacco Never       Other Core Component Plan  Goal Status: In progress  Other Core Component Goals: Medication compliance, Verbalize medication usage and drug actions by discharge, and Achieve resting BP of < 130/80 by discharge  Other Core Component Interventions/Education:   4/25/2025  Discussion of prescribed drug names, doses, side effects, and medication uses was done with patient. Patient's medication list was reviewed with patient and copy of current medication list was given to patient in addition to education handout, Medications Used for Cardiac Conditions. Patient was instructed to come back with any questions. Signature Brook Hallman RN  5/9/2025  Blood pressure education was done. How to read measurement numbers, ways to decrease, and risks of not managing blood pressure were discussed. Handouts were given for home reference and patient was encouraged to return with any questions.  Signature Brook Hallman RN         Individual Patient Goals:    Home exercise program by discharge      Goal Status: In progress    Staff Comments:  Mr Scott has been attending rehab 3 days a week. Progressing as planned. No ST depression or ectopy noted on telemetry monitor. Heart rate blunted. Denies any cardiac issues.     Rehab Staff Signature: Ariane Paz MS, CCRP

## 2025-06-13 NOTE — PROGRESS NOTES
Cardiac Rehab Education  Duane Scott   [id number]    6/13/2025  Education on portion sizes was done during today's session. Gave visual demonstration of portions. Handouts were given for home reference and review.     Signature Brook Hallman RN

## 2025-06-13 NOTE — PROGRESS NOTES
Duane Scott  1950  [id number]  75 y.o.    [facility] [place] [id number] GARCIA      Cardiac/Pulmonary Rehab Nutrition Education    6/13/2025  Gave handout on [facility] on Aging Seniors on the go program. Encouraged pt to reach out to them for help with getting rides to needed services for free.     Signature Sofie Thurman, RDN, LD

## 2025-06-16 ENCOUNTER — APPOINTMENT (OUTPATIENT)
Dept: CARDIAC REHAB | Facility: CLINIC | Age: 75
End: 2025-06-16
Payer: MEDICARE

## 2025-06-16 DIAGNOSIS — Z95.5 S/P PRIMARY ANGIOPLASTY WITH CORONARY STENT: ICD-10-CM

## 2025-06-16 PROCEDURE — 93798 PHYS/QHP OP CAR RHAB W/ECG: CPT | Performed by: INTERNAL MEDICINE

## 2025-06-17 ENCOUNTER — APPOINTMENT (OUTPATIENT)
Dept: CARDIAC REHAB | Facility: CLINIC | Age: 75
End: 2025-06-17
Payer: MEDICARE

## 2025-06-17 DIAGNOSIS — Z95.5 S/P PRIMARY ANGIOPLASTY WITH CORONARY STENT: ICD-10-CM

## 2025-06-17 PROCEDURE — 93798 PHYS/QHP OP CAR RHAB W/ECG: CPT | Performed by: INTERNAL MEDICINE

## 2025-06-20 ENCOUNTER — APPOINTMENT (OUTPATIENT)
Dept: CARDIAC REHAB | Facility: CLINIC | Age: 75
End: 2025-06-20
Payer: MEDICARE

## 2025-06-20 DIAGNOSIS — Z95.5 S/P PRIMARY ANGIOPLASTY WITH CORONARY STENT: ICD-10-CM

## 2025-06-20 PROCEDURE — 93798 PHYS/QHP OP CAR RHAB W/ECG: CPT | Performed by: INTERNAL MEDICINE

## 2025-06-20 NOTE — PROGRESS NOTES
Cardiac Rehab Education  Duane Scott   [id number]    6/20/2025  Exercise prescription and maintenance education was done during today's session. Discussed FITT principle, reviewed perceived exertion scale, and patient's THR was given for home practice with instructions on how to obtain. Handouts were given for personal reference. Home exercise prescription is to be given prior to discharge from program.      Signature Brook Hallman RN

## 2025-06-22 ENCOUNTER — HOSPITAL ENCOUNTER (EMERGENCY)
Facility: HOSPITAL | Age: 75
Discharge: HOME | End: 2025-06-23
Attending: STUDENT IN AN ORGANIZED HEALTH CARE EDUCATION/TRAINING PROGRAM
Payer: MEDICARE

## 2025-06-22 DIAGNOSIS — R33.8 ACUTE URINARY RETENTION: ICD-10-CM

## 2025-06-22 DIAGNOSIS — R10.9 ACUTE ABDOMINAL PAIN: ICD-10-CM

## 2025-06-22 DIAGNOSIS — N40.0 BENIGN PROSTATIC HYPERPLASIA, UNSPECIFIED WHETHER LOWER URINARY TRACT SYMPTOMS PRESENT: ICD-10-CM

## 2025-06-22 DIAGNOSIS — N39.0 ACUTE UTI: Primary | ICD-10-CM

## 2025-06-22 LAB
ALBUMIN SERPL BCP-MCNC: 4.6 G/DL (ref 3.4–5)
ALP SERPL-CCNC: 90 U/L (ref 33–136)
ALT SERPL W P-5'-P-CCNC: 23 U/L (ref 10–52)
ANION GAP SERPL CALCULATED.3IONS-SCNC: 12 MMOL/L (ref 10–20)
APPEARANCE UR: ABNORMAL
APPEARANCE UR: ABNORMAL
AST SERPL W P-5'-P-CCNC: 22 U/L (ref 9–39)
BACTERIA #/AREA URNS AUTO: ABNORMAL /HPF
BACTERIA #/AREA URNS AUTO: ABNORMAL /HPF
BASOPHILS # BLD AUTO: 0.01 X10*3/UL (ref 0–0.1)
BASOPHILS NFR BLD AUTO: 0.1 %
BILIRUB SERPL-MCNC: 0.8 MG/DL (ref 0–1.2)
BILIRUB UR STRIP.AUTO-MCNC: NEGATIVE MG/DL
BILIRUB UR STRIP.AUTO-MCNC: NEGATIVE MG/DL
BUN SERPL-MCNC: 39 MG/DL (ref 6–23)
CALCIUM SERPL-MCNC: 9 MG/DL (ref 8.6–10.3)
CHLORIDE SERPL-SCNC: 111 MMOL/L (ref 98–107)
CO2 SERPL-SCNC: 22 MMOL/L (ref 21–32)
COLOR UR: ABNORMAL
COLOR UR: ABNORMAL
CREAT SERPL-MCNC: 1.41 MG/DL (ref 0.5–1.3)
EGFRCR SERPLBLD CKD-EPI 2021: 52 ML/MIN/1.73M*2
EOSINOPHIL # BLD AUTO: 0.22 X10*3/UL (ref 0–0.4)
EOSINOPHIL NFR BLD AUTO: 2.7 %
ERYTHROCYTE [DISTWIDTH] IN BLOOD BY AUTOMATED COUNT: 12.6 % (ref 11.5–14.5)
GLUCOSE SERPL-MCNC: 110 MG/DL (ref 74–99)
GLUCOSE UR STRIP.AUTO-MCNC: NORMAL MG/DL
GLUCOSE UR STRIP.AUTO-MCNC: NORMAL MG/DL
HCT VFR BLD AUTO: 41.5 % (ref 41–52)
HGB BLD-MCNC: 13.7 G/DL (ref 13.5–17.5)
IMM GRANULOCYTES # BLD AUTO: 0.03 X10*3/UL (ref 0–0.5)
IMM GRANULOCYTES NFR BLD AUTO: 0.4 % (ref 0–0.9)
KETONES UR STRIP.AUTO-MCNC: NEGATIVE MG/DL
KETONES UR STRIP.AUTO-MCNC: NEGATIVE MG/DL
LEUKOCYTE ESTERASE UR QL STRIP.AUTO: ABNORMAL
LEUKOCYTE ESTERASE UR QL STRIP.AUTO: ABNORMAL
LYMPHOCYTES # BLD AUTO: 1.13 X10*3/UL (ref 0.8–3)
LYMPHOCYTES NFR BLD AUTO: 13.8 %
MAGNESIUM SERPL-MCNC: 2.1 MG/DL (ref 1.6–2.4)
MCH RBC QN AUTO: 29.7 PG (ref 26–34)
MCHC RBC AUTO-ENTMCNC: 33 G/DL (ref 32–36)
MCV RBC AUTO: 90 FL (ref 80–100)
MONOCYTES # BLD AUTO: 0.62 X10*3/UL (ref 0.05–0.8)
MONOCYTES NFR BLD AUTO: 7.6 %
NEUTROPHILS # BLD AUTO: 6.18 X10*3/UL (ref 1.6–5.5)
NEUTROPHILS NFR BLD AUTO: 75.4 %
NITRITE UR QL STRIP.AUTO: ABNORMAL
NITRITE UR QL STRIP.AUTO: ABNORMAL
NRBC BLD-RTO: 0 /100 WBCS (ref 0–0)
PH UR STRIP.AUTO: 8.5 [PH]
PH UR STRIP.AUTO: 8.5 [PH]
PLATELET # BLD AUTO: 96 X10*3/UL (ref 150–450)
POTASSIUM SERPL-SCNC: 4.4 MMOL/L (ref 3.5–5.3)
PROT SERPL-MCNC: 7.4 G/DL (ref 6.4–8.2)
PROT UR STRIP.AUTO-MCNC: ABNORMAL MG/DL
PROT UR STRIP.AUTO-MCNC: ABNORMAL MG/DL
RBC # BLD AUTO: 4.62 X10*6/UL (ref 4.5–5.9)
RBC # UR STRIP.AUTO: ABNORMAL MG/DL
RBC # UR STRIP.AUTO: ABNORMAL MG/DL
RBC #/AREA URNS AUTO: >20 /HPF
RBC #/AREA URNS AUTO: >20 /HPF
SODIUM SERPL-SCNC: 141 MMOL/L (ref 136–145)
SP GR UR STRIP.AUTO: 1.02
SP GR UR STRIP.AUTO: 1.02
TRI-PHOS CRY #/AREA UR COMP ASSIST: ABNORMAL /HPF
TRI-PHOS CRY #/AREA UR COMP ASSIST: ABNORMAL /HPF
UROBILINOGEN UR STRIP.AUTO-MCNC: NORMAL MG/DL
UROBILINOGEN UR STRIP.AUTO-MCNC: NORMAL MG/DL
WBC # BLD AUTO: 8.2 X10*3/UL (ref 4.4–11.3)
WBC #/AREA URNS AUTO: >50 /HPF
WBC #/AREA URNS AUTO: >50 /HPF

## 2025-06-22 PROCEDURE — 83735 ASSAY OF MAGNESIUM: CPT | Performed by: STUDENT IN AN ORGANIZED HEALTH CARE EDUCATION/TRAINING PROGRAM

## 2025-06-22 PROCEDURE — 99284 EMERGENCY DEPT VISIT MOD MDM: CPT | Mod: 25 | Performed by: STUDENT IN AN ORGANIZED HEALTH CARE EDUCATION/TRAINING PROGRAM

## 2025-06-22 PROCEDURE — 2500000002 HC RX 250 W HCPCS SELF ADMINISTERED DRUGS (ALT 637 FOR MEDICARE OP, ALT 636 FOR OP/ED): Performed by: STUDENT IN AN ORGANIZED HEALTH CARE EDUCATION/TRAINING PROGRAM

## 2025-06-22 PROCEDURE — 2500000004 HC RX 250 GENERAL PHARMACY W/ HCPCS (ALT 636 FOR OP/ED): Performed by: STUDENT IN AN ORGANIZED HEALTH CARE EDUCATION/TRAINING PROGRAM

## 2025-06-22 PROCEDURE — 80053 COMPREHEN METABOLIC PANEL: CPT | Performed by: STUDENT IN AN ORGANIZED HEALTH CARE EDUCATION/TRAINING PROGRAM

## 2025-06-22 PROCEDURE — 87086 URINE CULTURE/COLONY COUNT: CPT | Mod: TRILAB | Performed by: STUDENT IN AN ORGANIZED HEALTH CARE EDUCATION/TRAINING PROGRAM

## 2025-06-22 PROCEDURE — 51798 US URINE CAPACITY MEASURE: CPT

## 2025-06-22 PROCEDURE — 81001 URINALYSIS AUTO W/SCOPE: CPT | Performed by: STUDENT IN AN ORGANIZED HEALTH CARE EDUCATION/TRAINING PROGRAM

## 2025-06-22 PROCEDURE — 36415 COLL VENOUS BLD VENIPUNCTURE: CPT | Performed by: STUDENT IN AN ORGANIZED HEALTH CARE EDUCATION/TRAINING PROGRAM

## 2025-06-22 PROCEDURE — 2500000001 HC RX 250 WO HCPCS SELF ADMINISTERED DRUGS (ALT 637 FOR MEDICARE OP): Performed by: STUDENT IN AN ORGANIZED HEALTH CARE EDUCATION/TRAINING PROGRAM

## 2025-06-22 PROCEDURE — 96374 THER/PROPH/DIAG INJ IV PUSH: CPT

## 2025-06-22 PROCEDURE — 85025 COMPLETE CBC W/AUTO DIFF WBC: CPT | Performed by: STUDENT IN AN ORGANIZED HEALTH CARE EDUCATION/TRAINING PROGRAM

## 2025-06-22 RX ORDER — ACETAMINOPHEN 500 MG
1000 TABLET ORAL EVERY 6 HOURS PRN
Qty: 30 TABLET | Refills: 0 | Status: SHIPPED | OUTPATIENT
Start: 2025-06-22 | End: 2025-06-23

## 2025-06-22 RX ORDER — SULFAMETHOXAZOLE AND TRIMETHOPRIM 800; 160 MG/1; MG/1
1 TABLET ORAL ONCE
Status: COMPLETED | OUTPATIENT
Start: 2025-06-22 | End: 2025-06-22

## 2025-06-22 RX ORDER — ACETAMINOPHEN 500 MG
1000 TABLET ORAL ONCE
Status: COMPLETED | OUTPATIENT
Start: 2025-06-22 | End: 2025-06-22

## 2025-06-22 RX ORDER — FENTANYL CITRATE 50 UG/ML
50 INJECTION, SOLUTION INTRAMUSCULAR; INTRAVENOUS ONCE
Status: COMPLETED | OUTPATIENT
Start: 2025-06-22 | End: 2025-06-22

## 2025-06-22 RX ORDER — SULFAMETHOXAZOLE AND TRIMETHOPRIM 800; 160 MG/1; MG/1
1 TABLET ORAL 2 TIMES DAILY
Qty: 10 TABLET | Refills: 0 | Status: SHIPPED | OUTPATIENT
Start: 2025-06-22 | End: 2025-06-23

## 2025-06-22 RX ADMIN — SULFAMETHOXAZOLE AND TRIMETHOPRIM 1 TABLET: 800; 160 TABLET ORAL at 23:36

## 2025-06-22 RX ADMIN — FENTANYL CITRATE 50 MCG: 50 INJECTION INTRAMUSCULAR; INTRAVENOUS at 23:36

## 2025-06-22 RX ADMIN — SODIUM CHLORIDE 1000 ML: 900 INJECTION, SOLUTION INTRAVENOUS at 23:33

## 2025-06-22 RX ADMIN — ACETAMINOPHEN 1000 MG: 500 TABLET ORAL at 22:27

## 2025-06-22 ASSESSMENT — PAIN DESCRIPTION - PROGRESSION: CLINICAL_PROGRESSION: NOT CHANGED

## 2025-06-22 ASSESSMENT — PAIN DESCRIPTION - LOCATION
LOCATION: ABDOMEN
LOCATION: PENIS

## 2025-06-22 ASSESSMENT — PAIN - FUNCTIONAL ASSESSMENT
PAIN_FUNCTIONAL_ASSESSMENT: 0-10
PAIN_FUNCTIONAL_ASSESSMENT: 0-10

## 2025-06-22 ASSESSMENT — PAIN SCALES - GENERAL
PAINLEVEL_OUTOF10: 8
PAINLEVEL_OUTOF10: 6

## 2025-06-22 ASSESSMENT — PAIN DESCRIPTION - ONSET: ONSET: SUDDEN

## 2025-06-22 ASSESSMENT — PAIN DESCRIPTION - DESCRIPTORS
DESCRIPTORS: BURNING
DESCRIPTORS: DISCOMFORT

## 2025-06-22 ASSESSMENT — PAIN DESCRIPTION - PAIN TYPE: TYPE: ACUTE PAIN

## 2025-06-22 ASSESSMENT — PAIN DESCRIPTION - ORIENTATION: ORIENTATION: LOWER

## 2025-06-22 ASSESSMENT — PAIN DESCRIPTION - FREQUENCY: FREQUENCY: CONSTANT/CONTINUOUS

## 2025-06-23 ENCOUNTER — APPOINTMENT (OUTPATIENT)
Dept: CARDIAC REHAB | Facility: CLINIC | Age: 75
End: 2025-06-23
Payer: MEDICARE

## 2025-06-23 VITALS
TEMPERATURE: 98.2 F | HEART RATE: 72 BPM | OXYGEN SATURATION: 98 % | BODY MASS INDEX: 29.34 KG/M2 | DIASTOLIC BLOOD PRESSURE: 104 MMHG | WEIGHT: 182.54 LBS | SYSTOLIC BLOOD PRESSURE: 150 MMHG | RESPIRATION RATE: 18 BRPM | HEIGHT: 66 IN

## 2025-06-23 LAB — HOLD SPECIMEN: NORMAL

## 2025-06-23 PROCEDURE — 96361 HYDRATE IV INFUSION ADD-ON: CPT | Mod: 59

## 2025-06-23 PROCEDURE — 51702 INSERT TEMP BLADDER CATH: CPT

## 2025-06-23 RX ORDER — ACETAMINOPHEN 500 MG
1000 TABLET ORAL EVERY 6 HOURS PRN
Qty: 30 TABLET | Refills: 0 | Status: SHIPPED | OUTPATIENT
Start: 2025-06-23 | End: 2025-07-23

## 2025-06-23 RX ORDER — SULFAMETHOXAZOLE AND TRIMETHOPRIM 800; 160 MG/1; MG/1
1 TABLET ORAL 2 TIMES DAILY
Qty: 10 TABLET | Refills: 0 | Status: SHIPPED | OUTPATIENT
Start: 2025-06-23 | End: 2025-06-28

## 2025-06-23 NOTE — ED PROVIDER NOTES
Emergency Department Provider Note       History of Present Illness     History provided by: Patient  Limitations to History: None  External Records Reviewed with Brief Summary: None    HPI:  Duane Scott is a 75 y.o. male presents to the ED with increased urinary frequency/urgency and dysuria along with reported difficulty with micturition/maintaining urinary stream.  Notes no appreciable hematuria and denies any associated bilateral back/flank pain.  Denies any fever/chills.  Notes no significant abdominal pain.  Experiences some pelvic pressure.  Denies engages in bowel habits.  Denies any other significant systemic symptoms or complaints.    Physical Exam   Triage vitals:  T 36.8 °C (98.2 °F)  HR (!) 115  BP (!) 188/139  RR 20  O2 97 % None (Room air)    Physical Exam  Vitals and nursing note reviewed.   Constitutional:       General: He is not in acute distress.     Appearance: Normal appearance. He is normal weight. He is not ill-appearing.   HENT:      Nose: Nose normal.      Mouth/Throat:      Mouth: Mucous membranes are moist.      Pharynx: Oropharynx is clear.   Eyes:      General: No scleral icterus.     Pupils: Pupils are equal, round, and reactive to light.   Cardiovascular:      Rate and Rhythm: Normal rate.   Pulmonary:      Effort: Pulmonary effort is normal.   Abdominal:      General: Abdomen is protuberant.      Palpations: Abdomen is soft. There is no mass.      Tenderness: There is no abdominal tenderness. There is no right CVA tenderness or left CVA tenderness.      Comments: Soft, mild to moderate protuberance, not endorsing associated tenderness with palpation, no appreciable suprapubic fullness/mass, no appreciable CVAT bilaterally   Skin:     General: Skin is warm and dry.   Neurological:      General: No focal deficit present.      Mental Status: He is alert and oriented to person, place, and time.           Medical Decision Making & ED Course   Medical Decision Makin y.o.  male presented to the ED for increased urinary frequency/urgency with dysuria, pelvic pressure and difficulty with micturition/maintaining stream over the last 12 hours with concerning PMHx of HTN, HLD, DM2, nephro/urolithiasis, GERD, COPD.  I personally reviewed and interpreted VS and labs which are as stated below in the ED course.    Assessment/evaluation multifactorial consistent with UTI/cystitis likely complicated by mild urinary traction from BPH with evidence of difficulty with micturition status post Hull catheterization placement.    Prescribed Tylenol and Bactrim for appropriate treatment and symptomatic management.  After receiving an appropriate exam, clinical work-up, and necessary interventions/treatment, Patient is appropriate for discharge at this time due to no concerning symptoms or findings requiring hospitalization for stabilization or further interrogation/management and is appropriate for management of symptoms at home with recommended appropriate outpatient follow-up.  Patient was encouraged to ask any questions or for clarification of today's ED encounter.  Patient is agreeable to plan of care. Discussed with Patient today's results/findings and likely diagnosis, provided appropriate RTED precautions along with recommended follow-up with PCP and Urologist/Urology .      Per Chart Review: CT A/P obtained on 9/30/2025 notable for prostate enlargement measuring 5.5 x 5.1 cm with circumferential thickening of the bladder consistent with postobstructive uropathy changes.      Parts of this chart have been completed using voice-to-tect recognition software. Please excuse any errors of transcription that were missed for editing/correcting.  ----      Differential diagnoses considered include but are not limited to: Pyelonephritis, nephro/ureterolithiasis or hydroureteronephrosis, significant lecture light/metabolic derangement, worsening renal function/FANTA, sepsis/septic shock    Social  Determinants of Health which Significantly Impact Care: Social Determinants of Health which Significantly Impact Care: None identified     EKG Independent Interpretation: EKG not obtained    Independent Result Review and Interpretation: Relevant laboratory and radiographic results were reviewed and independently interpreted by myself.  As necessary, they are commented on in the ED Course.    Chronic conditions affecting the patient's care: As documented above in MDM    The patient was discussed with the following consultants/services: None    Care Considerations: None    ED Course:  ED Course as of 06/29/25 2148   Sun Jun 22, 2025 2150 VSS notable for significant hypertensive and mild to moderate tachycardic on presentation in setting of reported urinary symptoms, remaining VSS [BC]   2150 Comprehensive metabolic panel(!)  Baseline renal function otherwise unremarkable and noncontributory to Patient condition/symptoms [BC]   2150 Magnesium  WNL [BC]   2210 CBC and Auto Differential(!)  Unremarkable and noncontributory to Patient condition/symptoms [BC]   2215 Urinalysis with Reflex Culture and Microscopic(!)  Moderate proteinuria with 2+ nitrite/significant leuk esterase and significant pyuria/hematuria with 1+ bacteria concerning for UTI/cystitis, no immediate concern for pyelonephritis at this time [BC]   Mon Jun 23, 2025   0045 On reassessment patient endorses mild to moderate Preven symptoms post ED interventions, not endorsing any new or actively worsening symptoms.  Mild to moderate Preven VS, improvement in urinary function with Hull catheter placement. [BC]      ED Course User Index  [BC] Moody Brar MD         Diagnoses as of 06/29/25 2148   Acute UTI   Benign prostatic hyperplasia, unspecified whether lower urinary tract symptoms present   Acute urinary retention   Acute abdominal pain       Disposition   As a result of the work-up, the patient was discharged home.  he was informed of his  diagnosis and instructed to come back with any concerns or worsening of condition.  he and was agreeable to the plan as discussed above.  he was given the opportunity to ask questions.  All of the patient's questions were answered.    Procedures   Procedures        Moody Brar MD  Emergency Medicine                                                       Moody Brar MD  06/29/25 [id number]

## 2025-06-23 NOTE — DISCHARGE INSTRUCTIONS
Thank you for allowing myself and the team to take care of you during your emergency situation and addressing your health concerns.    You were evaluated for urinary symptoms     Your likely condition/diagnosis: Urinary tract infection and concern for urinary obstruction associated with your prostate    During your visit in the emergency department you were evaluated for your presenting symptoms.  Based on the extensive workup you received,  all efforts were made to identify the source of your symptoms and identify any life-threatening conditions.  These life-threatening conditions that were attempted to be identified and medically managed/treated include but not limited to urinary tract infection, pyelonephritis (kidney infection), nephrolithiasis/ureterolithiasis (kidney/ureter stones), hydronephrosis (fluid backing in the kidney), urinary obstruction.  Additionally, you may be experiencing symptoms that are non-life-threatening but are uncomfortable and disruptive to your everyday life.  All efforts were made to manage your symptoms while in the emergency department along with appropriate at home therapy for symptomatic management during your recovery. Please be aware that not all of the conditions explained/discussed in these discharge instructions are applicable to your condition but encompass many of the conditions that were evaluated for during your ED encounter.      During your evaluation and assessment, all measures were taken to evaluate you and address your health concerns to identify dangerous and life threatening health conditions. It is not possible to identify all health conditions or pathologies and eliminate any chance of unfavorable outcomes while in the Emergency Department. My team encourages you to be vigilant with your health and follow-up with the appropriate providers outlined in your discharge paperwork. Please return to the Emergency Department if you feel that your health has not improved  or experiencing worsening symptoms.    Special instructions please take the medication as prescribed and please take the antibiotics in their entirety.  Please make a follow-up with your primary care physician to further manage symptoms and address your health concerns.  Urology referral was placed to further evaluate and manage her symptoms.    Incidental findings:  None

## 2025-06-24 ENCOUNTER — APPOINTMENT (OUTPATIENT)
Dept: CARDIAC REHAB | Facility: CLINIC | Age: 75
End: 2025-06-24
Payer: MEDICARE

## 2025-06-24 DIAGNOSIS — Z95.5 S/P PRIMARY ANGIOPLASTY WITH CORONARY STENT: ICD-10-CM

## 2025-06-24 PROCEDURE — 93798 PHYS/QHP OP CAR RHAB W/ECG: CPT | Performed by: INTERNAL MEDICINE

## 2025-06-26 LAB — BACTERIA UR CULT: ABNORMAL

## 2025-06-27 ENCOUNTER — TELEPHONE (OUTPATIENT)
Dept: PHARMACY | Facility: HOSPITAL | Age: 75
End: 2025-06-27

## 2025-06-27 ENCOUNTER — CLINICAL SUPPORT (OUTPATIENT)
Dept: CARDIAC REHAB | Facility: CLINIC | Age: 75
End: 2025-06-27
Payer: MEDICARE

## 2025-06-27 DIAGNOSIS — Z95.5 S/P PRIMARY ANGIOPLASTY WITH CORONARY STENT: ICD-10-CM

## 2025-06-27 PROCEDURE — 93798 PHYS/QHP OP CAR RHAB W/ECG: CPT | Performed by: INTERNAL MEDICINE

## 2025-06-27 NOTE — PROGRESS NOTES
EDPD Note: Antibiotics Reviewed and Warranted    Contacted Mr./Mrs./Ms. Duane Scott regarding a positive urine culture/result that was taken during their recent emergency room visit. I completed education with patient . The patient is being treated appropriately with Bactrim DS BID x5.     Presented to ED acute UTI.  Minimal information in ED note at time of outreach. At time of call, patient reports urinary symptoms starting to improve, doing well. Advised patient to finish full course of antibiotic and follow up with PCP or urgent care if symptoms do not completely resolve.      Susceptibility data from last 90 days.  Collected Specimen Info Organism Amoxicillin/Clavulanate Ampicillin Ampicillin/Sulbactam Cefazolin Cefepime Cefotaxime Ceftazidime Ceftriaxone Cefuroxime (oral) Ciprofloxacin Gentamicin Levofloxacin Nitrofurantoin   06/22/25 Urine from Clean Catch/Voided Proteus vulgaris group  S  R  S  R  S  R  S  R  R  S  S  S  R     Collected Specimen Info Organism Piperacillin/Tazobactam Tetracycline Trimethoprim/Sulfamethoxazole   06/22/25 Urine from Clean Catch/Voided Proteus vulgaris group  S  R  S        No further follow up needed from EDPD Team.     Halina Jerez, PharmD

## 2025-06-27 NOTE — PROGRESS NOTES
Cardiac Rehab Education  Duane EUGENIA Sonia   [id number]    6/27/2025  Effects of stress on the body, risks of unmanaged stress and techniques to minimize anxieties were all discussed in today's session. Handout was given for reference with resources to  Behavioral Health Services should patient need further assistance with stress management. Patient encouraged to notify staff if stressors increase and additional help is required.      Signature Brook Hallman RN

## 2025-06-30 ENCOUNTER — CLINICAL SUPPORT (OUTPATIENT)
Dept: CARDIAC REHAB | Facility: CLINIC | Age: 75
End: 2025-06-30
Payer: MEDICARE

## 2025-06-30 DIAGNOSIS — Z95.5 S/P PRIMARY ANGIOPLASTY WITH CORONARY STENT: ICD-10-CM

## 2025-06-30 PROCEDURE — 93798 PHYS/QHP OP CAR RHAB W/ECG: CPT | Performed by: INTERNAL MEDICINE

## 2025-07-01 ENCOUNTER — CLINICAL SUPPORT (OUTPATIENT)
Dept: CARDIAC REHAB | Facility: CLINIC | Age: 75
End: 2025-07-01
Payer: MEDICARE

## 2025-07-01 ENCOUNTER — DOCUMENTATION (OUTPATIENT)
Dept: CARDIAC REHAB | Facility: CLINIC | Age: 75
End: 2025-07-01

## 2025-07-01 DIAGNOSIS — Z95.5 S/P PRIMARY ANGIOPLASTY WITH CORONARY STENT: ICD-10-CM

## 2025-07-01 PROCEDURE — 93798 PHYS/QHP OP CAR RHAB W/ECG: CPT | Performed by: INTERNAL MEDICINE

## 2025-07-01 NOTE — PROGRESS NOTES
Cardiac Rehabilitation Discharge Report    Name: Duane Scott  Medical Record Number: [id number]  YOB: 1950  Age: 75 y.o.    Today’s Date: 7/1/2025  Primary Care Physician: ROBIN Wilson-CNP  Referring Physician: Francisco Javier Spencer MD  Program Location: [address]  Primary Diagnosis:   1. Angina pectoris, unstable (Multi)  Referral to Cardiac Rehab      2. Coronary artery disease involving native coronary artery of native heart without angina pectoris  Referral to Cardiac Rehab      3. Status post coronary artery stent placement  Referral to Cardiac Rehab      4. S/P primary angioplasty with coronary stent  Referral to Cardiac Rehab         Onset/Date of Diagnosis: 3/11/25   Session #36  AACVPR Risk Stratification: High   Falls Risk: High  Psychosocial Assessment   Pre PHQ-9: 3  Sent PH-Q 9 to MD if score > 20: No; score < 20  Post PHQ-9:  0  Pt reported/currently experiencing stress: No  Patient uses stress management skills: No   History of: no history of anxiety or depression  Currently seeing a mental health provider: No  Social Support: Yes, Whom:Spouse    Learning Assessment:  Learning assessment/barriers: None  Preferred learning method: Reading handout and Writing handout  Barriers: Physical limitations  Comments:  Stages of Change:Maintenance    Psychosocial Plan  Goal Status: Met  Psychosocial Goals: Maintain or lower PH-Q 9 score by discharge  Psychosocial Interventions/Education:   3/25/25 PHQ-9 score reviewed on initial nursing assessment/mk  5/13/25 No psychosocial  concerns voiced/mk  6/27/2025  Effects of stress on the body, risks of unmanaged stress and techniques to minimize anxieties were all discussed in today's session. Handout was given for reference with resources to  Behavioral Health Services should patient need further assistance with stress management. Patient encouraged to notify staff if stressors increase and additional help is required.   "Nik Hallman RN    Nutrition Assessment:    Hyperlipidemia: Yes     Lipids:   Lab Results   Component Value Date    CHOL 111 02/10/2025    HDL 49 02/10/2025    TRIG 34 02/10/2025       Current Dietary Guidelines: no special diet  Barriers to dietary change: no    Diet Habit Survey: Picture Your Plate  Pre: Survey not yet returned by patient.  Post: not given as initial never completed  Diabetes Assessment    Lab Results   Component Value Date    HGBA1C 5.6 02/07/2025       History of Diabetes: Yes Pt monitors BS at home: No   Diet controlled    Weight Management    Height: 167.6 cm (5' 6\")  Weight: 86.2 kg (190 lb)  BMI (Calculated): 30.68      Nutrition Plan  Goal Status: In progress  Nutrition Goals: Improve Diet Habit Survey score by 5-10 points by discharge, Adapt a low-sodium, DASH diet prior to discharge, and Learn how to read and interpret nutrition labels prior to discharge  Nutrition Interventions/Education:   4/4/2025  Label reading education was done with program's RDN during today's visit. Label Reading Highlights handout was given with information discussed during appointment and to assist in review of label contents at home.   Nik Hallman RN  4/18/2025  Education on sodium intake was done. Discussed with patient the risks of excess levels of sodium and how to decrease dietary intake with provided list of substitutions. Handouts were given for home reference.    Nik Hallman RN   5/2/2025  Patient attended Heart Healthy Diet lecture with program RDN during today's exercise session. Heart Healthy diet tips sheet was given for further review at home and patient was encouraged to come back with any follow up questions. Nik Hallman RN   5/13/2025  Education on cholesterol was done during today's session. Discussed with patient the different types of cholesterol, risk factors and how to make lifestyle modifications to improve levels. Handout and " patient's recent lab results were given for home review. /mk  5/20/2025  Education on [place]-3 fatty acids was done during today's session. Discussed with patient sources and benefits of omega-3 fatty acids to cholesterol and heart health. Handouts were given for home reference and review.    Nik Hallman RN  5/30/2025  Education done on dining out. Dining out handout given for further review at home. Encouraged to follow up with questions as needed Nik Hallman RN  6/6/2025  Label reading education was done with program's RDN during today's visit. Label Reading Highlights handout was given with information discussed during appointment and to assist in review of label contents at home.  Nik Thurman RDN, STEPHEN  6/13/2025  Education on portion sizes was done during today's session. Gave visual demonstration of portions. Handouts were given for home reference and review.   Nik Hallman RN   6/13/2025  Gave handout on [facility] on the go program. Encouraged pt to reach out to them for help with getting rides to needed services for free. Nik Thurman RDN, STEPHEN       Exercise Assessment    Home exercise: No  Mode: NA  Frequency: NA  Duration: NA    Exercise Prescription     Exercise Prescription based on: current ex rx    DASI Score: 28.7   MET Score: 6.3   Frequency:  3 days/week   Mode: NuStep and Recumbent Cycle   Duration: 30-40 total aerobic minutes   Intensity: RPE 12-14  Target HR:  87-97  MET Level: 3.2  Patient wears supplemental O2: No     Modality Workload METs Duration (minutes)   1 Pre-Exercise   2:00   2 NuStep Load 7 @ 70 christopher  2.9 21:00   3 Recumbent Bike Load 6 @55 rpm  3.2 21:00   4 Post-Exercise   2:00     Resistance Training: No   Home Exercise Prescription given: yes    Exercise Plan  Goal Status: In Progress  Exercise Goals: Establish a regular home ex program by discharge  4/11/2025  Cardiovascular  changes with exercise were discussed in today's session. Both immediate and long term effects of exercise were covered and the importance of cooling down post workout was reviewed.     Nik Hallman RN  4/15/25 adjusted THR/MS  6/20/2025  Exercise prescription and maintenance education was done during today's session. Discussed FITT principle, reviewed perceived exertion scale, and patient's THR was given for home practice with instructions on how to obtain. Handouts were given for personal reference. Home exercise prescription is to be given prior to discharge from program.  Nik Hallman RN  7/1/25 outcome remains in progress as pt does not have a home exercise program in place/MS    Other Core Components/Risk Factor Assessment:    Medication adherence  Current Medications:   Medication Documentation Review Audit       Reviewed by ABDULAZIZ Clark (Nurse Practitioner) on 04/17/25 at 1500      Medication Order Taking? Sig Documenting Provider Last Dose Status   albuterol (Ventolin HFA) 90 mcg/actuation inhaler [id number] No Inhale 1 puff every 4 hours if needed for wheezing or shortness of breath. ABDULAZIZ Wilson 3/11/2025 Morning Active   aspirin 81 mg EC tablet [id number]  Take 1 tablet (81 mg) by mouth once daily. ABDULAZIZ Benz  Active   atorvastatin (Lipitor) 80 mg tablet [id number]  Take 1 tablet (80 mg) by mouth once daily. ABDULAZIZ Benz  Active   carvedilol (Coreg) 25 mg tablet [id number] No TAKE ONE-HALF TABLET BY MOUTH TWICE DAILY with meals Jessika Spencer MD 3/10/2025 Active   clopidogrel (Plavix) 75 mg tablet [id number]  Take 1 tablet (75 mg) by mouth once daily. Jessika Spencer MD  Active   finasteride (Proscar) 5 mg tablet [id number] No Take 1 tablet (5 mg) by mouth once daily. Do not crush, chew, or split. Jono Bass MD 3/10/2025 Active   fluticasone furoate-vilanteroL (Breo Ellipta) 200-25 mcg/dose inhaler [id number]  Inhale 1  puff once daily. Brianne Del Rio, APRN-CNP  Active   hydroCHLOROthiazide (HYDRODiuril) 25 mg tablet [id number] No Take 1 tablet (25 mg) by mouth once daily. Kulwinder Mai MD 3/10/2025 Active   ipratropium-albuteroL (Duo-Neb) 0.5-2.5 mg/3 mL nebulizer solution [id number] No Inhale 3 mL every 6 hours if needed. Taryn Eaton MD 3/10/2025 Active   losartan (Cozaar) 100 mg tablet [id number] No Take 1 tablet (100 mg) by mouth once daily. Jessika Spencer MD 3/10/2025 Active   tamsulosin (Flomax) 0.4 mg 24 hr capsule [id number] No Take 2 capsules (0.8 mg) by mouth once daily. Jono Bass MD 3/10/2025 Active                                 Medication compliance: Yes   Uses pill box/organizer: Yes    Carries medication list: No     Blood Pressure Management  History of Hypertension: Yes   Medication Changes: No   Resting BP:  Visit Vitals  BP Occasional systolic blood pressure greater than 140. Will continue to assess        Heart Failure Management  Hx of Heart Failure: Yes;   Type (selection): HFpEF  Most recent EF: 60      Heart Failure Reassessment: Reassessed every 30 days while in program.   Unplanned MD and/or ED Heart Failure visit: No  Hospitalization since starting program/last assessment: No  MD contacted regarding Heart Failure symptoms: No  Heart Failure Goals: Able to verbalize signs and symptoms of fluid retention and when to contact MD, Adhere to proper fluid restrictions, and Adapt a low sodium diet and verbalize guidelines    Smoking/Tobacco Assessment  Social History     Tobacco Use   Smoking Status Never    Passive exposure: Never   Smokeless Tobacco Never       Other Core Component Plan  Goal Status: Met  Other Core Component Goals: Medication compliance, Verbalize medication usage and drug actions by discharge, and Achieve resting BP of < 130/80 by discharge  Other Core Component Interventions/Education:   4/25/2025  Discussion of prescribed drug names, doses, side effects, and medication  uses was done with patient. Patient's medication list was reviewed with patient and copy of current medication list was given to patient in addition to education handout, Medications Used for Cardiac Conditions. Patient was instructed to come back with any questions. Signature Brook Hallman RN  5/9/2025  Blood pressure education was done. How to read measurement numbers, ways to decrease, and risks of not managing blood pressure were discussed. Handouts were given for home reference and patient was encouraged to return with any questions.  Signature Brook Hallman RN         Individual Patient Goals:    Home exercise program by discharge      Goal Status: In progress    Staff Comments:  Mr. Scott has completed the Phase II Cardiac Rehab Program.  He met half of the outcomes set for him.  He personal goal remains in progress.  No cardiac complaints voiced. He is unsure where he will continue to exercise.    Rehab Staff Signature: Ariane Paz MS, CCRP

## 2025-07-07 ENCOUNTER — APPOINTMENT (OUTPATIENT)
Dept: CARDIOLOGY | Facility: CLINIC | Age: 75
End: 2025-07-07
Payer: MEDICARE

## 2025-07-07 VITALS
BODY MASS INDEX: 30.67 KG/M2 | DIASTOLIC BLOOD PRESSURE: 50 MMHG | WEIGHT: 190 LBS | HEART RATE: 84 BPM | OXYGEN SATURATION: 99 % | RESPIRATION RATE: 16 BRPM | SYSTOLIC BLOOD PRESSURE: 112 MMHG

## 2025-07-07 DIAGNOSIS — I12.9 HYPERTENSIVE CHRONIC KIDNEY DISEASE W STG 1-4/UNSP CHR KDNY: ICD-10-CM

## 2025-07-07 DIAGNOSIS — E78.00 HYPERCHOLESTEROLEMIA: ICD-10-CM

## 2025-07-07 DIAGNOSIS — I25.10 CORONARY ARTERY DISEASE INVOLVING NATIVE CORONARY ARTERY OF NATIVE HEART WITHOUT ANGINA PECTORIS: Primary | ICD-10-CM

## 2025-07-07 DIAGNOSIS — I10 PRIMARY HYPERTENSION: ICD-10-CM

## 2025-07-07 DIAGNOSIS — E11.9 TYPE 2 DIABETES MELLITUS WITHOUT COMPLICATION, WITHOUT LONG-TERM CURRENT USE OF INSULIN: ICD-10-CM

## 2025-07-07 DIAGNOSIS — I20.0 ANGINA PECTORIS, UNSTABLE (MULTI): ICD-10-CM

## 2025-07-07 DIAGNOSIS — I25.2 HISTORY OF MYOCARDIAL INFARCTION: ICD-10-CM

## 2025-07-07 DIAGNOSIS — Z95.5 STATUS POST CORONARY ARTERY STENT PLACEMENT: ICD-10-CM

## 2025-07-07 PROCEDURE — 1159F MED LIST DOCD IN RCRD: CPT | Performed by: INTERNAL MEDICINE

## 2025-07-07 PROCEDURE — 3074F SYST BP LT 130 MM HG: CPT | Performed by: INTERNAL MEDICINE

## 2025-07-07 PROCEDURE — 99214 OFFICE O/P EST MOD 30 MIN: CPT | Performed by: INTERNAL MEDICINE

## 2025-07-07 PROCEDURE — 3044F HG A1C LEVEL LT 7.0%: CPT | Performed by: INTERNAL MEDICINE

## 2025-07-07 PROCEDURE — 3078F DIAST BP <80 MM HG: CPT | Performed by: INTERNAL MEDICINE

## 2025-07-07 PROCEDURE — G2211 COMPLEX E/M VISIT ADD ON: HCPCS | Performed by: INTERNAL MEDICINE

## 2025-07-07 PROCEDURE — 1036F TOBACCO NON-USER: CPT | Performed by: INTERNAL MEDICINE

## 2025-07-07 PROCEDURE — 1160F RVW MEDS BY RX/DR IN RCRD: CPT | Performed by: INTERNAL MEDICINE

## 2025-07-07 PROCEDURE — 1126F AMNT PAIN NOTED NONE PRSNT: CPT | Performed by: INTERNAL MEDICINE

## 2025-07-07 PROCEDURE — 4010F ACE/ARB THERAPY RXD/TAKEN: CPT | Performed by: INTERNAL MEDICINE

## 2025-07-07 RX ORDER — ATORVASTATIN CALCIUM 80 MG/1
80 TABLET, FILM COATED ORAL DAILY
Qty: 90 TABLET | Refills: 3 | Status: SHIPPED | OUTPATIENT
Start: 2025-07-07 | End: 2026-07-07

## 2025-07-07 RX ORDER — ASPIRIN 81 MG/1
81 TABLET ORAL DAILY
Qty: 90 TABLET | Refills: 3 | Status: SHIPPED | OUTPATIENT
Start: 2025-07-07 | End: 2026-07-07

## 2025-07-07 RX ORDER — CARVEDILOL 25 MG/1
12.5 TABLET ORAL
Qty: 90 TABLET | Refills: 3 | Status: SHIPPED | OUTPATIENT
Start: 2025-07-07

## 2025-07-07 RX ORDER — LOSARTAN POTASSIUM 100 MG/1
100 TABLET ORAL DAILY
Qty: 90 TABLET | Refills: 3 | Status: SHIPPED | OUTPATIENT
Start: 2025-07-07 | End: 2026-07-07

## 2025-07-07 RX ORDER — CLOPIDOGREL BISULFATE 75 MG/1
75 TABLET ORAL DAILY
Qty: 90 TABLET | Refills: 3 | Status: SHIPPED | OUTPATIENT
Start: 2025-07-07 | End: 2026-07-07

## 2025-07-07 RX ORDER — HYDROCHLOROTHIAZIDE 25 MG/1
25 TABLET ORAL DAILY
Qty: 90 TABLET | Refills: 3 | Status: SHIPPED | OUTPATIENT
Start: 2025-07-07

## 2025-07-07 ASSESSMENT — LIFESTYLE VARIABLES
AUDIT-C TOTAL SCORE: 0
HOW MANY STANDARD DRINKS CONTAINING ALCOHOL DO YOU HAVE ON A TYPICAL DAY: PATIENT DOES NOT DRINK
HOW OFTEN DO YOU HAVE SIX OR MORE DRINKS ON ONE OCCASION: NEVER
HAVE YOU OR SOMEONE ELSE BEEN INJURED AS A RESULT OF YOUR DRINKING: NO
HOW OFTEN DO YOU HAVE A DRINK CONTAINING ALCOHOL: NEVER
SKIP TO QUESTIONS 9-10: 1
HAS A RELATIVE, FRIEND, DOCTOR, OR ANOTHER HEALTH PROFESSIONAL EXPRESSED CONCERN ABOUT YOUR DRINKING OR SUGGESTED YOU CUT DOWN: NO
AUDIT TOTAL SCORE: 0

## 2025-07-07 ASSESSMENT — PAIN SCALES - GENERAL: PAINLEVEL_OUTOF10: 0-NO PAIN

## 2025-07-07 ASSESSMENT — ENCOUNTER SYMPTOMS
OCCASIONAL FEELINGS OF UNSTEADINESS: 0
DEPRESSION: 0
LOSS OF SENSATION IN FEET: 0

## 2025-07-07 NOTE — PROGRESS NOTES
[facility] [place]        Subjective   Chief Complaint   Patient presents with    Follow-up     4 month follow up      75-year-old patient was seen by Dr. Spencer.  For history of CAD, hypertension hyperlipidemia history of stent placement in the past.  Last hemoglobin A1c of the femur was 5.6%.  No active chest pain tightness.  Patient multiple PCI in the past.  Patient had PCI of proximal LAD with drug-eluting stents in the March 11, 2025.  Patent circumflex stents in the past although chronic occluded right coronary artery in the past.  No active chest pain tightness.  I reviewed the lab independently about 3 weeks ago patient had a BMP done creatinine 1.4 EGFR is 52 glucose 110.  LFT unremarkable patient is unremarkable although remain thrombocytopenia platelet count is 96,000.  Patient had lipid profile which I reviewed LDL was 52.  Echocardiogram also I reviewed independently last year September which was a normal ejection fraction diastolic dysfunction with a mild aortic stenosis.    He has a past medical history of Acute renal failure syndrome (11/14/2023), Angina pectoris (11/17/2023), Arthritis, Asthma, CAD (coronary artery disease), Cardiac arrest (11/17/2023), Chronic obstructive pulmonary disease requiring drug therapy (Multi) (11/17/2023), Coronary artery disease involving native coronary artery of native heart without angina pectoris (11/14/2023), Diabetes mellitus (Multi), Diabetes mellitus, type 2 (Multi) (11/08/2018), Diastolic heart failure (11/17/2023), 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  He has a past surgical history that includes Back surgery (2012);  Hernia repair (2015); Hernia repair (2013); and Cardiac catheterization (N/A, 3/11/2025).   Family medical history includes stroke in son.  Current Outpatient Medications   Medication Sig Dispense Refill    acetaminophen (Tylenol) 500 mg tablet Take 2 tablets (1,000 mg) by mouth every 6 hours if needed for mild pain (1 - 3), moderate pain (4 - 6) or fever (temp greater than 38.0 C). 30 tablet 0    albuterol (Ventolin HFA) 90 mcg/actuation inhaler Inhale 1 puff every 4 hours if needed for wheezing or shortness of breath. 18 g 1    aspirin 81 mg EC tablet Take 1 tablet (81 mg) by mouth once daily. 90 tablet 3    atorvastatin (Lipitor) 80 mg tablet Take 1 tablet (80 mg) by mouth once daily. 90 tablet 3    carvedilol (Coreg) 25 mg tablet TAKE ONE-HALF TABLET BY MOUTH TWICE DAILY with meals 90 tablet 3    clopidogrel (Plavix) 75 mg tablet Take 1 tablet (75 mg) by mouth once daily. 90 tablet 3    finasteride (Proscar) 5 mg tablet Take 1 tablet (5 mg) by mouth once daily. Do not crush, chew, or split. 30 tablet 11    hydroCHLOROthiazide (HYDRODiuril) 25 mg tablet Take 1 tablet (25 mg) by mouth once daily. 90 tablet 3    ipratropium-albuteroL (Duo-Neb) 0.5-2.5 mg/3 mL nebulizer solution Inhale 3 mL every 6 hours if needed.      losartan (Cozaar) 100 mg tablet Take 1 tablet (100 mg) by mouth once daily. 90 tablet 3    tamsulosin (Flomax) 0.4 mg 24 hr capsule Take 2 capsules (0.8 mg) by mouth once daily. 60 capsule 11    fluticasone furoate-vilanteroL (Breo Ellipta) 200-25 mcg/dose inhaler Inhale 1 puff once daily. (Patient not taking: Reported on 7/7/2025) 28 each 1     No current facility-administered medications for this visit.      reports that he has never smoked. He has never been exposed to tobacco smoke. He has never used smokeless tobacco. He reports that he does not drink alcohol and does not use drugs.  Allergies:  Hydromorphone, Latex, Penicillins, Dyphylline-guaifenesin, Grass pollen, Mold, and Ragweed    ROS:  See HPI  CONSTITUTIONAL: Chills- none. Fever- none. Weight change appropriate for age.  HEENT: Headache- Negative.  Change in vision- none.  Ear pain- none. Nasal congestion- none. Post-nasal drip-none.  Sore throat-none.  CARDIOLOGY: Chest pain- none.  Leg edema-trace.  Murmurs-soft systolic.  Palpitation- none.  RESPIRATORY: Denies any shortness of breath.  GI: Abdominal pain- none.  Change in bowel habits- none.  Constipation- none.  Diarrhea- none.  Nausea- none.  Vomiting- none.  MUSCULOSKELETAL: Joint pain- none.  Muscle aches- none.  DERMATOLOGY: Rash- none.  NEUROLOGY: Dizziness- none.   Headache- none.  PSYCHIATRY: Denies any depression or anxiety     Vitals:    07/07/25 1316   BP: 112/50   Pulse: 84   Resp: 16   SpO2: 99%   Weight: 86.2 kg (190 lb)   PainSc: 0-No pain      BMI:Body mass index is 30.67 kg/m².   General Cardiology:  General Appearance: Alert, oriented and in no acute distress.  HEENT: extra ocular movements intact (EOMI), pupils equal,  round, reactive to light and accommodation (PERRLA).  Carotid Upstroke: no bruit, normal.  Jugular Venous Distention (JVD): flat.  Chest: normal.  Lungs: Clear to auscultation,   Heart Sounds: no S3 or S4, normal S1, S2, regular rate.  Murmur, Click, Gallop: soft systolic murmur.  Abdomen: no hepatomegaly, no masses felt, soft.  Extremities: no leg edema.  Peripheral pulses: 2 plus bilateral.  NEUROLOGY Cranial nerves II-XII grossly intact.     Last Labs:  CMP:  Recent Labs     06/22/25 2151 03/07/25  0907 02/10/25  0933    140 139   K 4.4 4.4 4.5   * 104 105   CO2 22 28 27   ANIONGAP 12 12 7   BUN 39* 34* 25   CREATININE 1.41* 1.31* 1.38*   EGFR 52* 57* 54*   GLUCOSE 110* 107* 103*     Recent Labs     06/22/25  2151 02/10/25  0933 01/09/25  0238   ALBUMIN 4.6 4.2 3.9   ALKPHOS 90 100 87   ALT 23 26 15   AST 22 24 16   BILITOT 0.8 1.4* 0.7     CBC:  Recent Labs     06/22/25  2151 03/07/25  0907 02/10/25  0933   WBC 8.2 4.9 4.2   HGB 13.7 13.0*  12.2*   HCT 41.5 43.0 37.0*   PLT 96* 126* 110*   MCV 90 96 89.4     COAG:   Recent Labs     09/17/20  1842 11/26/19  0743   INR 1.1 1.1     HEME/ENDO:  Recent Labs     02/07/25  1102 08/07/24  1139 12/23/19  1128 11/05/19  1036 08/01/19  1430 03/06/19 2001 11/09/18  0708   FERRITIN  --   --  203  --   --   --   --    IRONSAT  --   --  35.2  --   --   --   --    TSH  --   --   --  2.36  --  1.33 2.81   HGBA1C 5.6 6.0*  --  5.2   < > 5.2 5.6    < > = values in this interval not displayed.      CARDIAC:   Recent Labs     01/09/25  0238   TROPHS 29*   *     Recent Labs     02/10/25  0933 07/25/24  1403 05/18/23  1545   CHOL 111 120* 181   LDLCALC 52 51* 113   HDL 49 61.0 57   TRIG 34 39* 55       Last Cardiology Tests:  Echo:  Echo Results:  Transthoracic Echo (TTE) Complete 09/04/2024    [address], [place] [id number]  Phone [phone]    TRANSTHORACIC ECHOCARDIOGRAM REPORT    Patient Name:      RAMIREZ BELLO OSMANI Burnett Physician:    [id number] Luis Eduardo Carrillo MD  Study Date:        9/4/2024             Ordering Provider:    [id number] ANI BRIGGS  MRN/PID:           [id number]             Fellow:  Accession#:        [id number]         Nurse:  Date of Birth/Age: 1950 / 74 years Sonographer:          Francesca Mcdaniel RDCS  Gender:            M                    Additional Staff:  Height:            167.64 cm            Admit Date:  Weight:            83.46 kg             Admission Status:     Outpatient  BSA / BMI:         1.93 m2 / 29.70      Department Location:  [facility]  kg/m2  Blood Pressure: 142 /72 mmHg    Study Type:    TRANSTHORACIC ECHO (TTE) COMPLETE  Diagnosis/ICD: Shortness of breath-R06.02  Indication:    SOB  CPT Codes:     Echo Complete w Full Doppler-09685    Patient History:  Pertinent History: CAD, HTN, Angina, CHF and Palpitations. hypercholesterolemia,  cardiac arrest, DM, MI, CKD.    Study Detail: The following Echo studies were  performed: 2D, M-Mode, Doppler and  color flow.      PHYSICIAN INTERPRETATION:  Left Ventricle: Left ventricular ejection fraction is normal, by visual estimate at 55-60%. There are no regional wall motion abnormalities. The left ventricular cavity size is normal. Spectral Doppler shows an impaired relaxation pattern of left ventricular diastolic filling.  Left Atrium: The left atrium is upper limits of normal in size.  Right Ventricle: The right ventricle is upper limits of normal in size. There is normal right ventricular global systolic function.  Right Atrium: The right atrium is normal in size.  Aortic Valve: The aortic valve is probably trileaflet. There is mild aortic valve cusp calcification. There is mild aortic valve thickening. There is evidence of mild aortic valve stenosis.  The aortic valve dimensionless index is 0.81. There is trace aortic valve regurgitation. The peak instantaneous gradient of the aortic valve is 8.6 mmHg. The mean gradient of the aortic valve is 4.0 mmHg.  Mitral Valve: The mitral valve is normal in structure. There is trace mitral valve regurgitation.  Tricuspid Valve: The tricuspid valve is structurally normal. There is trace to mild tricuspid regurgitation.  Pulmonic Valve: The pulmonic valve is not well visualized. There is trace pulmonic valve regurgitation.  Pericardium: There is no pericardial effusion noted.  Aorta: The aortic root was not well visualized.      CONCLUSIONS:  1. Left ventricular ejection fraction is normal, by visual estimate at 55-60%.  2. Spectral Doppler shows an impaired relaxation pattern of left ventricular diastolic filling.  3. There is normal right ventricular global systolic function.  4. Mild aortic valve stenosis.    QUANTITATIVE DATA SUMMARY:  LA VOLUME:  Normal Ranges:  LA Vol A4C:        61.8 ml    (22+/-6mL/m2)  LA Vol A2C:        75.5 ml  LA Vol BP:         78.8 ml  LA Vol Index A4C:  32.0ml/m2  LA Vol Index A2C:  39.1 ml/m2  LA Vol Index BP:    40.8 ml/m2  LA Area A4C:       20.3 cm2  LA Area A2C:       25.9 cm2  LA Major Axis A4C: 5.7 cm  LA Major Axis A2C: 7.6 cm  LA Volume Index:   39.6 ml/m2  LA Vol A4C:        57.8 ml  LA Vol A2C:        76.5 ml  LA Vol Index BSA:  34.8 ml/m2    RA VOLUME BY A/L METHOD:  Normal Ranges:  RA Vol A4C:        44.0 ml    (8.3-19.5ml)  RA Vol Index A4C:  22.8 ml/m2  RA Area A4C:       17.1 cm2  RA Major Axis A4C: 5.6 cm    LV SYSTOLIC FUNCTION BY 2D PLANIMETRY (MOD):  Normal Ranges:  EF-A4C View:    52 % (>=55%)  EF-A2C View:    56 %  EF-Biplane:     55 %  EF-Visual:      58 %  LV EF Reported: 58 %    LV DIASTOLIC FUNCTION:  Normal Ranges:  MV Peak E:    0.48 m/s  (0.7-1.2 m/s)  MV Peak A:    0.70 m/s  (0.42-0.7 m/s)  E/A Ratio:    0.69      (1.0-2.2)  MV e'         0.078 m/s (>8.0)  MV lateral e' 0.09 m/s  MV medial e'  0.07 m/s  E/e' Ratio:   6.18      (<8.0)    MITRAL VALVE:  Normal Ranges:  MV DT: 296 msec (150-240msec)    AORTIC VALVE:  Normal Ranges:  AoV Vmax:                1.47 m/s (<=1.7m/s)  AoV Peak P.6 mmHg (<20mmHg)  AoV Mean P.0 mmHg (1.7-11.5mmHg)  LVOT Max Zechariah:            1.04 m/s (<=1.1m/s)  AoV VTI:                 27.30 cm (18-25cm)  LVOT VTI:                22.00 cm  LVOT Diameter:           2.00 cm  (1.8-2.4cm)  AoV Area, VTI:           2.53 cm2 (2.5-5.5cm2)  AoV Area,Vmax:           2.22 cm2 (2.5-4.5cm2)  AoV Dimensionless Index: 0.81      RIGHT VENTRICLE:  RV Basal 3.70 cm  RV Mid   2.01 cm  TAPSE:   23.6 mm  RV s'    0.12 m/s    TRICUSPID VALVE/RVSP:  Normal Ranges:  Peak TR Velocity: 2.30 m/s  RV Syst Pressure: 24.2 mmHg (< 30mmHg)  IVC Diam:         1.54 cm      [id number] Luis Eduardo Carrillo MD  Electronically signed on 2024 at 12:15:52 PM        ** Final **      Transthoracic Echo (TTE) Complete 2024    [facility]  [address], [place] [address]  Phone [phone]    TRANSTHORACIC ECHOCARDIOGRAM REPORT      Patient Name:       RAMIREZ Burnett Physician:    [id number] Gulshan Aviles MD  Study Date:        2/20/2024             Ordering Provider:    [id number] BLAKE ORTIZ  MRN/PID:           [id number]              Fellow:  Accession#:        [id number]          Nurse:  Date of Birth/Age: 1950 / 73 years  Sonographer:          Angelique Peterson RDCS  Gender:            M                     Additional Staff:  Height:            167.64 cm             Admit Date:           2/20/2024  Weight:            91.63 kg              Admission Status:     Inpatient -  Routine  BSA / BMI:         2.01 m2 / 32.60 kg/m2 Department Location:  [facility]  Blood Pressure: 136 /70 mmHg    Study Type:    TRANSTHORACIC ECHO (TTE) COMPLETE  Diagnosis/ICD: Acute systolic (congestive) heart failure (CHF)-I50.21  Indication:    chf  CPT Codes:     Echo Complete w Full Doppler-[id number]    Patient History:  Pertinent History: CAD, HTN, CHF and Angina.    Study Detail: The following Echo studies were performed: 2D, M-Mode, Doppler and  color flow.      PHYSICIAN INTERPRETATION:  Left Ventricle: Left ventricular systolic function is normal, with an estimated ejection fraction of 60%. There are no regional wall motion abnormalities. The left ventricular cavity size is normal. Spectral Doppler shows a normal pattern of left ventricular diastolic filling. Basal septal hypertrophy measuring up to 1.5 cm.  Left Atrium: The left atrium is normal in size.  Right Ventricle: The right ventricle is normal in size. There is normal right ventricular global systolic function. RV normal size and function.  Right Atrium: The right atrium is normal in size.  Aortic Valve: The aortic valve appears abnormal. There is no evidence of aortic valve regurgitation. The peak instantaneous gradient of the aortic valve is 8.1 mmHg. The mean gradient of the aortic valve is 5.0 mmHg. Recent echodensity mainly in the noncoronary cusp probably sclerosis  Excursions of the aortic valve  normal  No aortic regurgitation.  Mitral Valve: The mitral valve is normal in structure. There is trace mitral valve regurgitation. Trace mitral regurgitation.  Tricuspid Valve: The tricuspid valve is structurally normal. There is trace tricuspid regurgitation. Trace tricuspid regurgitation RVSP 32 mmHg.  Pulmonic Valve: The pulmonic valve is not well visualized. The pulmonic valve regurgitation was not assessed.  Pericardium: There is no pericardial effusion noted. No pericardial effusion.  Aorta: The aortic root is normal.      CONCLUSIONS:  1. Left ventricular systolic function is normal with a 60% estimated ejection fraction.  2. Basal septal hypertrophy measuring up to 1.5 cm.  3. No pericardial effusion.  4. Trace mitral regurgitation.  5. Aortic valve appears abnormal.  6. Recent echodensity mainly in the noncoronary cusp probably sclerosis  Excursions of the aortic valve normal  No aortic regurgitation.    QUANTITATIVE DATA SUMMARY:  2D MEASUREMENTS:  Normal Ranges:  IVSd:          1.53 cm    (0.6-1.1cm)  LVPWd:         1.15 cm    (0.6-1.1cm)  LVIDd:         5.34 cm    (3.9-5.9cm)  LVIDs:         3.88 cm  LV Mass Index: 150.9 g/m2  LV % FS        27.3 %    LA VOLUME:  Normal Ranges:  LA Vol A4C:        53.4 ml    (22+/-6mL/m2)  LA Vol A2C:        52.1 ml  LA Vol BP:         56.4 ml  LA Vol Index A4C:  26.6ml/m2  LA Vol Index A2C:  26.0 ml/m2  LA Vol Index BP:   28.1 ml/m2  LA Area A4C:       18.2 cm2  LA Area A2C:       19.2 cm2  LA Major Axis A4C: 5.3 cm  LA Major Axis A2C: 6.0 cm  LA Volume Index:   27.0 ml/m2  LA Vol A4C:        51.7 ml  LA Vol A2C:        50.1 ml    RA VOLUME BY A/L METHOD:  Normal Ranges:  RA Vol A4C:        54.5 ml    (8.3-19.5ml)  RA Vol Index A4C:  27.2 ml/m2  RA Area A4C:       18.7 cm2  RA Major Axis A4C: 5.4 cm    M-MODE MEASUREMENTS:  Normal Ranges:  Ao Root: 2.50 cm (2.0-3.7cm)    LV SYSTOLIC FUNCTION BY 2D PLANIMETRY (MOD):  Normal Ranges:  EF-A4C View: 60.2 % (>=55%)    LV  DIASTOLIC FUNCTION:  Normal Ranges:  MV Peak E:    0.65 m/s (0.7-1.2 m/s)  MV Peak A:    0.77 m/s (0.42-0.7 m/s)  E/A Ratio:    0.84     (1.0-2.2)  MV e'         0.06 m/s (>8.0)  MV lateral e' 0.06 m/s  MV medial e'  0.07 m/s  E/e' Ratio:   9.95     (<8.0)    MITRAL VALVE:  Normal Ranges:  MV DT: 275 msec (150-240msec)    AORTIC VALVE:  Normal Ranges:  AoV Vmax:                1.42 m/s (<=1.7m/s)  AoV Peak P.1 mmHg (<20mmHg)  AoV Mean P.0 mmHg (1.7-11.5mmHg)  LVOT Max Zechariah:            1.11 m/s (<=1.1m/s)  AoV VTI:                 31.10 cm (18-25cm)  LVOT VTI:                24.80 cm  LVOT Diameter:           2.00 cm  (1.8-2.4cm)  AoV Area, VTI:           2.51 cm2 (2.5-5.5cm2)  AoV Area,Vmax:           2.46 cm2 (2.5-4.5cm2)  AoV Dimensionless Index: 0.80      RIGHT VENTRICLE:  RV Basal 3.08 cm  RV Mid   3.32 cm  RV Major 8.1 cm  TAPSE:   21.4 mm  RV s'    0.13 m/s    TRICUSPID VALVE/RVSP:  Normal Ranges:  Peak TR Velocity: 2.32 m/s  RV Syst Pressure: 24.5 mmHg (< 30mmHg)      [id number] Gulshan Aviles MD  Electronically signed on 2024 at 8:07:34 PM        ** Final **     Cath:  Stress Test:  Stress Results:  No results found for this or any previous visit from the past 365 days.     Cardiac Imaging:    Problem List Items Addressed This Visit       Coronary artery disease involving native coronary artery of native heart without angina pectoris - Primary    Status post coronary artery stent placement    Hypercholesterolemia    Hypertension    Type 2 diabetes mellitus    History of myocardial infarction      75-year-old patient with a history of CAD, multiple PCI in the past including LCx/LAD.  Occluded RCA.  So far stable cardiac wise no active chest pain tightness.  1.  CAD: Continue current aspirin and beta-blocker.  2.  Mixed hyperlipidemia: Continue current atorvastatin 80 mg daily daily.  3.  Benign hypertension: Continue current losartan as well as hydrochlorothiazide goal to keep  blood pressure below 125/75.  4.  Type 2 diabetes: Optimize glycemic control.    Advised patient to avoid lunch meats, canned soups, pizzas, bread rolls, and sandwiches. Advised patient to limit salt intake 1,500 mg daily. Advised patient to exercise 30 mins/3 times a week including treadmill or aerobic type, Goal to achieve 65% target HR.    Diet and exercise reviewed with patient..advice to walk about 10,000 steps or about 2 hours during day time. Cut back on salt, sugar and flour.    Pt. care time is spent includes independent review of diagnostic tests, labs, radiographs, EKGs and coordination of care. Assessment, impression and plans are reflected in the note above as well as the orders.    Luis Eduardo Carrillo MD  [facility] [place]  [facility]

## 2025-07-08 DIAGNOSIS — I10 PRIMARY HYPERTENSION: ICD-10-CM

## 2025-07-08 DIAGNOSIS — N18.31 STAGE 3A CHRONIC KIDNEY DISEASE (MULTI): ICD-10-CM

## 2025-07-08 DIAGNOSIS — E08.22 DIABETES MELLITUS DUE TO UNDERLYING CONDITION WITH STAGE 3A CHRONIC KIDNEY DISEASE, WITHOUT LONG-TERM CURRENT USE OF INSULIN (MULTI): ICD-10-CM

## 2025-07-08 DIAGNOSIS — N18.31 DIABETES MELLITUS DUE TO UNDERLYING CONDITION WITH STAGE 3A CHRONIC KIDNEY DISEASE, WITHOUT LONG-TERM CURRENT USE OF INSULIN (MULTI): ICD-10-CM

## 2025-07-08 DIAGNOSIS — E78.00 HYPERCHOLESTEROLEMIA: ICD-10-CM

## 2025-08-04 ENCOUNTER — APPOINTMENT (OUTPATIENT)
Dept: RADIOLOGY | Facility: HOSPITAL | Age: 75
End: 2025-08-04
Payer: MEDICARE

## 2025-08-04 ENCOUNTER — APPOINTMENT (OUTPATIENT)
Dept: CARDIOLOGY | Facility: HOSPITAL | Age: 75
End: 2025-08-04
Payer: MEDICARE

## 2025-08-04 ENCOUNTER — HOSPITAL ENCOUNTER (INPATIENT)
Facility: HOSPITAL | Age: 75
LOS: 2 days | Discharge: ANOTHER HOSPITAL | End: 2025-08-06
Attending: EMERGENCY MEDICINE | Admitting: STUDENT IN AN ORGANIZED HEALTH CARE EDUCATION/TRAINING PROGRAM
Payer: MEDICARE

## 2025-08-04 DIAGNOSIS — D64.9 ANEMIA, UNSPECIFIED TYPE: ICD-10-CM

## 2025-08-04 DIAGNOSIS — N17.9 ACUTE KIDNEY INJURY: ICD-10-CM

## 2025-08-04 DIAGNOSIS — I21.4 NSTEMI (NON-ST ELEVATED MYOCARDIAL INFARCTION) (MULTI): Primary | ICD-10-CM

## 2025-08-04 LAB
ALBUMIN SERPL BCP-MCNC: 4.2 G/DL (ref 3.4–5)
ALP SERPL-CCNC: 89 U/L (ref 33–136)
ALT SERPL W P-5'-P-CCNC: 24 U/L (ref 10–52)
ANION GAP SERPL CALCULATED.3IONS-SCNC: 13 MMOL/L (ref 10–20)
APTT PPP: 25.1 SECONDS (ref 22–32.5)
APTT PPP: 41 SECONDS (ref 22–32.5)
APTT PPP: 45.4 SECONDS (ref 22–32.5)
AST SERPL W P-5'-P-CCNC: 23 U/L (ref 9–39)
BASOPHILS # BLD AUTO: 0.01 X10*3/UL (ref 0–0.1)
BASOPHILS # BLD AUTO: 0.01 X10*3/UL (ref 0–0.1)
BASOPHILS NFR BLD AUTO: 0.2 %
BASOPHILS NFR BLD AUTO: 0.2 %
BILIRUB SERPL-MCNC: 1.1 MG/DL (ref 0–1.2)
BNP SERPL-MCNC: 301 PG/ML (ref 0–99)
BUN SERPL-MCNC: 46 MG/DL (ref 6–23)
CALCIUM SERPL-MCNC: 9.3 MG/DL (ref 8.6–10.3)
CARDIAC TROPONIN I PNL SERPL HS: 395 NG/L (ref 0–20)
CARDIAC TROPONIN I PNL SERPL HS: 502 NG/L (ref 0–20)
CARDIAC TROPONIN I PNL SERPL HS: 609 NG/L (ref 0–20)
CARDIAC TROPONIN I PNL SERPL HS: 650 NG/L (ref 0–20)
CHLORIDE SERPL-SCNC: 107 MMOL/L (ref 98–107)
CO2 SERPL-SCNC: 24 MMOL/L (ref 21–32)
CREAT SERPL-MCNC: 1.61 MG/DL (ref 0.5–1.3)
D DIMER PPP FEU-MCNC: 0.44 MG/L FEU (ref 0.19–0.5)
EGFRCR SERPLBLD CKD-EPI 2021: 45 ML/MIN/1.73M*2
EOSINOPHIL # BLD AUTO: 0.18 X10*3/UL (ref 0–0.4)
EOSINOPHIL # BLD AUTO: 0.22 X10*3/UL (ref 0–0.4)
EOSINOPHIL NFR BLD AUTO: 4.4 %
EOSINOPHIL NFR BLD AUTO: 5.4 %
ERYTHROCYTE [DISTWIDTH] IN BLOOD BY AUTOMATED COUNT: 13.3 % (ref 11.5–14.5)
ERYTHROCYTE [DISTWIDTH] IN BLOOD BY AUTOMATED COUNT: 13.4 % (ref 11.5–14.5)
FLUAV RNA RESP QL NAA+PROBE: NOT DETECTED
FLUBV RNA RESP QL NAA+PROBE: NOT DETECTED
GLUCOSE BLD MANUAL STRIP-MCNC: 159 MG/DL (ref 74–99)
GLUCOSE BLD MANUAL STRIP-MCNC: 84 MG/DL (ref 74–99)
GLUCOSE SERPL-MCNC: 145 MG/DL (ref 74–99)
HCT VFR BLD AUTO: 32.7 % (ref 41–52)
HCT VFR BLD AUTO: 34.3 % (ref 41–52)
HGB BLD-MCNC: 10.9 G/DL (ref 13.5–17.5)
HGB BLD-MCNC: 11.4 G/DL (ref 13.5–17.5)
IMM GRANULOCYTES # BLD AUTO: 0.02 X10*3/UL (ref 0–0.5)
IMM GRANULOCYTES # BLD AUTO: 0.02 X10*3/UL (ref 0–0.5)
IMM GRANULOCYTES NFR BLD AUTO: 0.5 % (ref 0–0.9)
IMM GRANULOCYTES NFR BLD AUTO: 0.5 % (ref 0–0.9)
INR PPP: 1.1 (ref 0.9–1.2)
LIPASE SERPL-CCNC: 37 U/L (ref 9–82)
LYMPHOCYTES # BLD AUTO: 0.83 X10*3/UL (ref 0.8–3)
LYMPHOCYTES # BLD AUTO: 0.84 X10*3/UL (ref 0.8–3)
LYMPHOCYTES NFR BLD AUTO: 20.2 %
LYMPHOCYTES NFR BLD AUTO: 20.7 %
MAGNESIUM SERPL-MCNC: 2.07 MG/DL (ref 1.6–2.4)
MCH RBC QN AUTO: 29.3 PG (ref 26–34)
MCH RBC QN AUTO: 29.4 PG (ref 26–34)
MCHC RBC AUTO-ENTMCNC: 33.2 G/DL (ref 32–36)
MCHC RBC AUTO-ENTMCNC: 33.3 G/DL (ref 32–36)
MCV RBC AUTO: 88 FL (ref 80–100)
MCV RBC AUTO: 88 FL (ref 80–100)
MONOCYTES # BLD AUTO: 0.35 X10*3/UL (ref 0.05–0.8)
MONOCYTES # BLD AUTO: 0.43 X10*3/UL (ref 0.05–0.8)
MONOCYTES NFR BLD AUTO: 10.5 %
MONOCYTES NFR BLD AUTO: 8.6 %
NEUTROPHILS # BLD AUTO: 2.62 X10*3/UL (ref 1.6–5.5)
NEUTROPHILS # BLD AUTO: 2.64 X10*3/UL (ref 1.6–5.5)
NEUTROPHILS NFR BLD AUTO: 64.2 %
NEUTROPHILS NFR BLD AUTO: 64.6 %
NRBC BLD-RTO: 0 /100 WBCS (ref 0–0)
NRBC BLD-RTO: 0 /100 WBCS (ref 0–0)
PLATELET # BLD AUTO: 106 X10*3/UL (ref 150–450)
PLATELET # BLD AUTO: 108 X10*3/UL (ref 150–450)
POTASSIUM SERPL-SCNC: 4.7 MMOL/L (ref 3.5–5.3)
PROT SERPL-MCNC: 7.2 G/DL (ref 6.4–8.2)
PROTHROMBIN TIME: 12 SECONDS (ref 9.3–12.7)
RBC # BLD AUTO: 3.71 X10*6/UL (ref 4.5–5.9)
RBC # BLD AUTO: 3.89 X10*6/UL (ref 4.5–5.9)
SARS-COV-2 RNA RESP QL NAA+PROBE: NOT DETECTED
SODIUM SERPL-SCNC: 139 MMOL/L (ref 136–145)
WBC # BLD AUTO: 4.1 X10*3/UL (ref 4.4–11.3)
WBC # BLD AUTO: 4.1 X10*3/UL (ref 4.4–11.3)

## 2025-08-04 PROCEDURE — 93005 ELECTROCARDIOGRAM TRACING: CPT

## 2025-08-04 PROCEDURE — 2500000004 HC RX 250 GENERAL PHARMACY W/ HCPCS (ALT 636 FOR OP/ED): Performed by: STUDENT IN AN ORGANIZED HEALTH CARE EDUCATION/TRAINING PROGRAM

## 2025-08-04 PROCEDURE — 84484 ASSAY OF TROPONIN QUANT: CPT | Performed by: STUDENT IN AN ORGANIZED HEALTH CARE EDUCATION/TRAINING PROGRAM

## 2025-08-04 PROCEDURE — 85025 COMPLETE CBC W/AUTO DIFF WBC: CPT | Performed by: CLINICAL NURSE SPECIALIST

## 2025-08-04 PROCEDURE — 36415 COLL VENOUS BLD VENIPUNCTURE: CPT | Performed by: EMERGENCY MEDICINE

## 2025-08-04 PROCEDURE — 85300 ANTITHROMBIN III ACTIVITY: CPT | Performed by: EMERGENCY MEDICINE

## 2025-08-04 PROCEDURE — 82947 ASSAY GLUCOSE BLOOD QUANT: CPT

## 2025-08-04 PROCEDURE — 84075 ASSAY ALKALINE PHOSPHATASE: CPT | Performed by: EMERGENCY MEDICINE

## 2025-08-04 PROCEDURE — 2500000004 HC RX 250 GENERAL PHARMACY W/ HCPCS (ALT 636 FOR OP/ED): Performed by: CLINICAL NURSE SPECIALIST

## 2025-08-04 PROCEDURE — 96374 THER/PROPH/DIAG INJ IV PUSH: CPT

## 2025-08-04 PROCEDURE — 99291 CRITICAL CARE FIRST HOUR: CPT | Performed by: CLINICAL NURSE SPECIALIST

## 2025-08-04 PROCEDURE — 99223 1ST HOSP IP/OBS HIGH 75: CPT | Performed by: STUDENT IN AN ORGANIZED HEALTH CARE EDUCATION/TRAINING PROGRAM

## 2025-08-04 PROCEDURE — 2060000001 HC INTERMEDIATE ICU ROOM DAILY

## 2025-08-04 PROCEDURE — 71275 CT ANGIOGRAPHY CHEST: CPT | Mod: FOREIGN READ | Performed by: RADIOLOGY

## 2025-08-04 PROCEDURE — 80053 COMPREHEN METABOLIC PANEL: CPT | Performed by: EMERGENCY MEDICINE

## 2025-08-04 PROCEDURE — 83690 ASSAY OF LIPASE: CPT | Performed by: CLINICAL NURSE SPECIALIST

## 2025-08-04 PROCEDURE — 87636 SARSCOV2 & INF A&B AMP PRB: CPT | Performed by: CLINICAL NURSE SPECIALIST

## 2025-08-04 PROCEDURE — 36415 COLL VENOUS BLD VENIPUNCTURE: CPT | Performed by: CLINICAL NURSE SPECIALIST

## 2025-08-04 PROCEDURE — 2500000002 HC RX 250 W HCPCS SELF ADMINISTERED DRUGS (ALT 637 FOR MEDICARE OP, ALT 636 FOR OP/ED): Performed by: STUDENT IN AN ORGANIZED HEALTH CARE EDUCATION/TRAINING PROGRAM

## 2025-08-04 PROCEDURE — 85730 THROMBOPLASTIN TIME PARTIAL: CPT | Performed by: CLINICAL NURSE SPECIALIST

## 2025-08-04 PROCEDURE — 85730 THROMBOPLASTIN TIME PARTIAL: CPT | Performed by: STUDENT IN AN ORGANIZED HEALTH CARE EDUCATION/TRAINING PROGRAM

## 2025-08-04 PROCEDURE — 85610 PROTHROMBIN TIME: CPT | Performed by: STUDENT IN AN ORGANIZED HEALTH CARE EDUCATION/TRAINING PROGRAM

## 2025-08-04 PROCEDURE — 83735 ASSAY OF MAGNESIUM: CPT | Performed by: EMERGENCY MEDICINE

## 2025-08-04 PROCEDURE — 2550000001 HC RX 255 CONTRASTS: Performed by: EMERGENCY MEDICINE

## 2025-08-04 PROCEDURE — 83880 ASSAY OF NATRIURETIC PEPTIDE: CPT | Performed by: EMERGENCY MEDICINE

## 2025-08-04 PROCEDURE — 71275 CT ANGIOGRAPHY CHEST: CPT

## 2025-08-04 PROCEDURE — 84484 ASSAY OF TROPONIN QUANT: CPT | Performed by: EMERGENCY MEDICINE

## 2025-08-04 PROCEDURE — 2500000001 HC RX 250 WO HCPCS SELF ADMINISTERED DRUGS (ALT 637 FOR MEDICARE OP): Performed by: CLINICAL NURSE SPECIALIST

## 2025-08-04 PROCEDURE — 96375 TX/PRO/DX INJ NEW DRUG ADDON: CPT

## 2025-08-04 PROCEDURE — 83036 HEMOGLOBIN GLYCOSYLATED A1C: CPT | Mod: TRILAB | Performed by: STUDENT IN AN ORGANIZED HEALTH CARE EDUCATION/TRAINING PROGRAM

## 2025-08-04 PROCEDURE — 2500000001 HC RX 250 WO HCPCS SELF ADMINISTERED DRUGS (ALT 637 FOR MEDICARE OP): Performed by: STUDENT IN AN ORGANIZED HEALTH CARE EDUCATION/TRAINING PROGRAM

## 2025-08-04 PROCEDURE — 85025 COMPLETE CBC W/AUTO DIFF WBC: CPT | Performed by: EMERGENCY MEDICINE

## 2025-08-04 RX ORDER — ACETAMINOPHEN 325 MG/1
650 TABLET ORAL EVERY 4 HOURS PRN
Status: DISCONTINUED | OUTPATIENT
Start: 2025-08-04 | End: 2025-08-06 | Stop reason: HOSPADM

## 2025-08-04 RX ORDER — ACETAMINOPHEN 650 MG/1
650 SUPPOSITORY RECTAL EVERY 4 HOURS PRN
Status: DISCONTINUED | OUTPATIENT
Start: 2025-08-04 | End: 2025-08-06 | Stop reason: HOSPADM

## 2025-08-04 RX ORDER — DEXTROSE 50 % IN WATER (D50W) INTRAVENOUS SYRINGE
12.5
Status: DISCONTINUED | OUTPATIENT
Start: 2025-08-04 | End: 2025-08-06 | Stop reason: HOSPADM

## 2025-08-04 RX ORDER — ASPIRIN 81 MG/1
81 TABLET ORAL DAILY
Status: DISCONTINUED | OUTPATIENT
Start: 2025-08-05 | End: 2025-08-06 | Stop reason: HOSPADM

## 2025-08-04 RX ORDER — ALBUTEROL SULFATE 0.83 MG/ML
3 SOLUTION RESPIRATORY (INHALATION) EVERY 6 HOURS PRN
Status: DISCONTINUED | OUTPATIENT
Start: 2025-08-04 | End: 2025-08-06 | Stop reason: HOSPADM

## 2025-08-04 RX ORDER — HEPARIN SODIUM 5000 [USP'U]/ML
4000 INJECTION, SOLUTION INTRAVENOUS; SUBCUTANEOUS ONCE
Status: COMPLETED | OUTPATIENT
Start: 2025-08-04 | End: 2025-08-04

## 2025-08-04 RX ORDER — HYDROCHLOROTHIAZIDE 25 MG/1
25 TABLET ORAL DAILY
Status: DISCONTINUED | OUTPATIENT
Start: 2025-08-04 | End: 2025-08-06

## 2025-08-04 RX ORDER — DEXTROSE 50 % IN WATER (D50W) INTRAVENOUS SYRINGE
25
Status: DISCONTINUED | OUTPATIENT
Start: 2025-08-04 | End: 2025-08-06 | Stop reason: HOSPADM

## 2025-08-04 RX ORDER — ATORVASTATIN CALCIUM 80 MG/1
80 TABLET, FILM COATED ORAL DAILY
Status: DISCONTINUED | OUTPATIENT
Start: 2025-08-04 | End: 2025-08-06 | Stop reason: HOSPADM

## 2025-08-04 RX ORDER — ACETAMINOPHEN 160 MG/5ML
650 SOLUTION ORAL EVERY 4 HOURS PRN
Status: DISCONTINUED | OUTPATIENT
Start: 2025-08-04 | End: 2025-08-06 | Stop reason: HOSPADM

## 2025-08-04 RX ORDER — CARVEDILOL 12.5 MG/1
12.5 TABLET ORAL
Status: DISCONTINUED | OUTPATIENT
Start: 2025-08-04 | End: 2025-08-06 | Stop reason: HOSPADM

## 2025-08-04 RX ORDER — FINASTERIDE 5 MG/1
5 TABLET, FILM COATED ORAL DAILY
Status: DISCONTINUED | OUTPATIENT
Start: 2025-08-04 | End: 2025-08-06 | Stop reason: HOSPADM

## 2025-08-04 RX ORDER — LOSARTAN POTASSIUM 100 MG/1
100 TABLET ORAL DAILY
Status: DISCONTINUED | OUTPATIENT
Start: 2025-08-04 | End: 2025-08-06

## 2025-08-04 RX ORDER — FAMOTIDINE 10 MG/ML
40 INJECTION, SOLUTION INTRAVENOUS ONCE
Status: COMPLETED | OUTPATIENT
Start: 2025-08-04 | End: 2025-08-04

## 2025-08-04 RX ORDER — HEPARIN SODIUM 5000 [USP'U]/ML
2000-4000 INJECTION, SOLUTION INTRAVENOUS; SUBCUTANEOUS AS NEEDED
Status: DISCONTINUED | OUTPATIENT
Start: 2025-08-04 | End: 2025-08-06

## 2025-08-04 RX ORDER — POLYETHYLENE GLYCOL 3350 17 G/17G
17 POWDER, FOR SOLUTION ORAL DAILY
Status: DISCONTINUED | OUTPATIENT
Start: 2025-08-04 | End: 2025-08-06 | Stop reason: HOSPADM

## 2025-08-04 RX ORDER — NAPROXEN SODIUM 220 MG/1
324 TABLET, FILM COATED ORAL ONCE
Status: COMPLETED | OUTPATIENT
Start: 2025-08-04 | End: 2025-08-04

## 2025-08-04 RX ORDER — NITROGLYCERIN 0.4 MG/1
0.4 TABLET SUBLINGUAL EVERY 5 MIN PRN
Status: DISCONTINUED | OUTPATIENT
Start: 2025-08-04 | End: 2025-08-06 | Stop reason: HOSPADM

## 2025-08-04 RX ORDER — HEPARIN SODIUM 10000 [USP'U]/100ML
0-4000 INJECTION, SOLUTION INTRAVENOUS CONTINUOUS
Status: DISCONTINUED | OUTPATIENT
Start: 2025-08-04 | End: 2025-08-06

## 2025-08-04 RX ORDER — INSULIN LISPRO 100 [IU]/ML
0-5 INJECTION, SOLUTION INTRAVENOUS; SUBCUTANEOUS
Status: DISCONTINUED | OUTPATIENT
Start: 2025-08-04 | End: 2025-08-06 | Stop reason: HOSPADM

## 2025-08-04 RX ORDER — CLOPIDOGREL BISULFATE 75 MG/1
75 TABLET ORAL DAILY
Status: DISCONTINUED | OUTPATIENT
Start: 2025-08-04 | End: 2025-08-06 | Stop reason: HOSPADM

## 2025-08-04 RX ORDER — TAMSULOSIN HYDROCHLORIDE 0.4 MG/1
0.8 CAPSULE ORAL DAILY
Status: DISCONTINUED | OUTPATIENT
Start: 2025-08-04 | End: 2025-08-06 | Stop reason: HOSPADM

## 2025-08-04 RX ORDER — IPRATROPIUM BROMIDE AND ALBUTEROL SULFATE 2.5; .5 MG/3ML; MG/3ML
3 SOLUTION RESPIRATORY (INHALATION) EVERY 6 HOURS PRN
Status: DISCONTINUED | OUTPATIENT
Start: 2025-08-04 | End: 2025-08-06 | Stop reason: HOSPADM

## 2025-08-04 RX ADMIN — IOHEXOL 75 ML: 350 INJECTION, SOLUTION INTRAVENOUS at 12:28

## 2025-08-04 RX ADMIN — TAMSULOSIN HYDROCHLORIDE 0.8 MG: 0.4 CAPSULE ORAL at 16:14

## 2025-08-04 RX ADMIN — HEPARIN SODIUM 1000 UNITS/HR: 10000 INJECTION, SOLUTION INTRAVENOUS at 11:42

## 2025-08-04 RX ADMIN — SODIUM CHLORIDE 500 ML: 900 INJECTION, SOLUTION INTRAVENOUS at 16:28

## 2025-08-04 RX ADMIN — ASPIRIN 324 MG: 81 TABLET, CHEWABLE ORAL at 10:50

## 2025-08-04 RX ADMIN — ATORVASTATIN CALCIUM 80 MG: 80 TABLET, FILM COATED ORAL at 16:15

## 2025-08-04 RX ADMIN — POLYETHYLENE GLYCOL 3350 17 G: 17 POWDER, FOR SOLUTION ORAL at 16:15

## 2025-08-04 RX ADMIN — CARVEDILOL 12.5 MG: 12.5 TABLET, FILM COATED ORAL at 16:15

## 2025-08-04 RX ADMIN — FAMOTIDINE 40 MG: 10 INJECTION, SOLUTION INTRAVENOUS at 10:52

## 2025-08-04 RX ADMIN — FINASTERIDE 5 MG: 5 TABLET, FILM COATED ORAL at 16:15

## 2025-08-04 RX ADMIN — HEPARIN SODIUM 4000 UNITS: 5000 INJECTION, SOLUTION INTRAVENOUS; SUBCUTANEOUS at 11:41

## 2025-08-04 SDOH — ECONOMIC STABILITY: INCOME INSECURITY: IN THE PAST 12 MONTHS HAS THE ELECTRIC, GAS, OIL, OR WATER COMPANY THREATENED TO SHUT OFF SERVICES IN YOUR HOME?: NO

## 2025-08-04 SDOH — ECONOMIC STABILITY: HOUSING INSECURITY: AT ANY TIME IN THE PAST 12 MONTHS, WERE YOU HOMELESS OR LIVING IN A SHELTER (INCLUDING NOW)?: NO

## 2025-08-04 SDOH — ECONOMIC STABILITY: FOOD INSECURITY: WITHIN THE PAST 12 MONTHS, YOU WORRIED THAT YOUR FOOD WOULD RUN OUT BEFORE YOU GOT THE MONEY TO BUY MORE.: OFTEN TRUE

## 2025-08-04 SDOH — HEALTH STABILITY: MENTAL HEALTH
DO YOU FEEL STRESS - TENSE, RESTLESS, NERVOUS, OR ANXIOUS, OR UNABLE TO SLEEP AT NIGHT BECAUSE YOUR MIND IS TROUBLED ALL THE TIME - THESE DAYS?: TO SOME EXTENT

## 2025-08-04 SDOH — SOCIAL STABILITY: SOCIAL INSECURITY: DO YOU FEEL ANYONE HAS EXPLOITED OR TAKEN ADVANTAGE OF YOU FINANCIALLY OR OF YOUR PERSONAL PROPERTY?: NO

## 2025-08-04 SDOH — SOCIAL STABILITY: SOCIAL INSECURITY: HAVE YOU HAD THOUGHTS OF HARMING ANYONE ELSE?: NO

## 2025-08-04 SDOH — SOCIAL STABILITY: SOCIAL INSECURITY: ARE THERE ANY APPARENT SIGNS OF INJURIES/BEHAVIORS THAT COULD BE RELATED TO ABUSE/NEGLECT?: NO

## 2025-08-04 SDOH — ECONOMIC STABILITY: FOOD INSECURITY: WITHIN THE PAST 12 MONTHS, THE FOOD YOU BOUGHT JUST DIDN'T LAST AND YOU DIDN'T HAVE MONEY TO GET MORE.: SOMETIMES TRUE

## 2025-08-04 SDOH — SOCIAL STABILITY: SOCIAL INSECURITY: WERE YOU ABLE TO COMPLETE ALL THE BEHAVIORAL HEALTH SCREENINGS?: YES

## 2025-08-04 SDOH — ECONOMIC STABILITY: TRANSPORTATION INSECURITY: IN THE PAST 12 MONTHS, HAS LACK OF TRANSPORTATION KEPT YOU FROM MEDICAL APPOINTMENTS OR FROM GETTING MEDICATIONS?: YES

## 2025-08-04 SDOH — SOCIAL STABILITY: SOCIAL INSECURITY
WITHIN THE LAST YEAR, HAVE YOU BEEN RAPED OR FORCED TO HAVE ANY KIND OF SEXUAL ACTIVITY BY YOUR PARTNER OR EX-PARTNER?: NO

## 2025-08-04 SDOH — SOCIAL STABILITY: SOCIAL INSECURITY: WITHIN THE LAST YEAR, HAVE YOU BEEN HUMILIATED OR EMOTIONALLY ABUSED IN OTHER WAYS BY YOUR PARTNER OR EX-PARTNER?: NO

## 2025-08-04 SDOH — SOCIAL STABILITY: SOCIAL INSECURITY: WITHIN THE LAST YEAR, HAVE YOU BEEN AFRAID OF YOUR PARTNER OR EX-PARTNER?: NO

## 2025-08-04 SDOH — ECONOMIC STABILITY: HOUSING INSECURITY: IN THE LAST 12 MONTHS, WAS THERE A TIME WHEN YOU WERE NOT ABLE TO PAY THE MORTGAGE OR RENT ON TIME?: NO

## 2025-08-04 SDOH — SOCIAL STABILITY: SOCIAL INSECURITY
WITHIN THE LAST YEAR, HAVE YOU BEEN KICKED, HIT, SLAPPED, OR OTHERWISE PHYSICALLY HURT BY YOUR PARTNER OR EX-PARTNER?: NO

## 2025-08-04 SDOH — SOCIAL STABILITY: SOCIAL INSECURITY: ARE YOU OR HAVE YOU BEEN THREATENED OR ABUSED PHYSICALLY, EMOTIONALLY, OR SEXUALLY BY ANYONE?: NO

## 2025-08-04 SDOH — ECONOMIC STABILITY: FOOD INSECURITY: HOW HARD IS IT FOR YOU TO PAY FOR THE VERY BASICS LIKE FOOD, HOUSING, MEDICAL CARE, AND HEATING?: SOMEWHAT HARD

## 2025-08-04 SDOH — ECONOMIC STABILITY: HOUSING INSECURITY: IN THE PAST 12 MONTHS, HOW MANY TIMES HAVE YOU MOVED WHERE YOU WERE LIVING?: 0

## 2025-08-04 SDOH — SOCIAL STABILITY: SOCIAL INSECURITY: DOES ANYONE TRY TO KEEP YOU FROM HAVING/CONTACTING OTHER FRIENDS OR DOING THINGS OUTSIDE YOUR HOME?: NO

## 2025-08-04 SDOH — SOCIAL STABILITY: SOCIAL INSECURITY: HAS ANYONE EVER THREATENED TO HURT YOUR FAMILY OR YOUR PETS?: NO

## 2025-08-04 SDOH — SOCIAL STABILITY: SOCIAL INSECURITY: HAVE YOU HAD ANY THOUGHTS OF HARMING ANYONE ELSE?: NO

## 2025-08-04 SDOH — SOCIAL STABILITY: SOCIAL INSECURITY: DO YOU FEEL UNSAFE GOING BACK TO THE PLACE WHERE YOU ARE LIVING?: NO

## 2025-08-04 ASSESSMENT — ENCOUNTER SYMPTOMS
ENDOCRINE NEGATIVE: 1
NEUROLOGICAL NEGATIVE: 1
EYES NEGATIVE: 1
CONSTITUTIONAL NEGATIVE: 1
GASTROINTESTINAL NEGATIVE: 1
MUSCULOSKELETAL NEGATIVE: 1
PALPITATIONS: 0
RESPIRATORY NEGATIVE: 1
PSYCHIATRIC NEGATIVE: 1

## 2025-08-04 ASSESSMENT — COGNITIVE AND FUNCTIONAL STATUS - GENERAL
DAILY ACTIVITIY SCORE: 24
MOBILITY SCORE: 24
DAILY ACTIVITIY SCORE: 24
MOBILITY SCORE: 24
PATIENT BASELINE BEDBOUND: NO

## 2025-08-04 ASSESSMENT — LIFESTYLE VARIABLES
HOW MANY STANDARD DRINKS CONTAINING ALCOHOL DO YOU HAVE ON A TYPICAL DAY: PATIENT DOES NOT DRINK
HOW OFTEN DO YOU HAVE 6 OR MORE DRINKS ON ONE OCCASION: NEVER
HOW OFTEN DO YOU HAVE A DRINK CONTAINING ALCOHOL: NEVER
AUDIT-C TOTAL SCORE: 0
SKIP TO QUESTIONS 9-10: 1
PRESCIPTION_ABUSE_PAST_12_MONTHS: NO
AUDIT-C TOTAL SCORE: 0
SUBSTANCE_ABUSE_PAST_12_MONTHS: NO

## 2025-08-04 ASSESSMENT — ACTIVITIES OF DAILY LIVING (ADL)
BATHING: INDEPENDENT
DRESSING YOURSELF: INDEPENDENT
FEEDING YOURSELF: INDEPENDENT
WALKS IN HOME: INDEPENDENT
HEARING - RIGHT EAR: FUNCTIONAL
LACK_OF_TRANSPORTATION: YES
HEARING - LEFT EAR: FUNCTIONAL
JUDGMENT_ADEQUATE_SAFELY_COMPLETE_DAILY_ACTIVITIES: YES
GROOMING: INDEPENDENT
TOILETING: INDEPENDENT
PATIENT'S MEMORY ADEQUATE TO SAFELY COMPLETE DAILY ACTIVITIES?: YES
ADEQUATE_TO_COMPLETE_ADL: YES

## 2025-08-04 ASSESSMENT — PAIN SCALES - GENERAL
PAINLEVEL_OUTOF10: 0 - NO PAIN
PAINLEVEL_OUTOF10: 4

## 2025-08-04 ASSESSMENT — PAIN - FUNCTIONAL ASSESSMENT
PAIN_FUNCTIONAL_ASSESSMENT: 0-10
PAIN_FUNCTIONAL_ASSESSMENT: 0-10

## 2025-08-04 ASSESSMENT — PAIN DESCRIPTION - LOCATION: LOCATION: CHEST

## 2025-08-04 ASSESSMENT — PAIN DESCRIPTION - PROGRESSION: CLINICAL_PROGRESSION: NOT CHANGED

## 2025-08-04 ASSESSMENT — PAIN DESCRIPTION - DESCRIPTORS: DESCRIPTORS: PRESSURE

## 2025-08-04 ASSESSMENT — PAIN DESCRIPTION - ORIENTATION: ORIENTATION: MID;UPPER

## 2025-08-04 ASSESSMENT — PAIN DESCRIPTION - PAIN TYPE: TYPE: ACUTE PAIN

## 2025-08-04 NOTE — ASSESSMENT & PLAN NOTE
-Chest discomfort, resolved now. up trending troponin.   -ED started heparin drip and talked to cardiology, and was told can stay at [facility]  -Echo  -Cardiology consult  -Npo midnight  -Statin  -Aspirin  -Trend troponin until flat  -Holding plavix, will consider starting in am  -Pt/OT    # HTN  -Continue coreg  -Holding thiazide and losartan due to FANTA

## 2025-08-04 NOTE — CARE PLAN
The patient's goals for the shift include get better and go home    The clinical goals for the shift include stay hemodynamiclly stable    Problem: Safety - Adult  Goal: Free from fall injury  8/4/2025 1637 by Mari Best RN  Flowsheets (Taken 8/4/2025 1637)  Free from fall injury: Instruct family/caregiver on patient safety  8/4/2025 1637 by Mari Best RN  Outcome: Progressing  Flowsheets (Taken 8/4/2025 1637)  Free from fall injury: Instruct family/caregiver on patient safety     Problem: Pain  Goal: Takes deep breaths with improved pain control throughout the shift  Outcome: Progressing  Goal: Turns in bed with improved pain control throughout the shift  Outcome: Progressing  Goal: Walks with improved pain control throughout the shift  Outcome: Progressing  Goal: Performs ADL's with improved pain control throughout shift  Outcome: Progressing     Problem: Fall/Injury  Goal: Not fall by end of shift  Outcome: Progressing  Goal: Be free from injury by end of the shift  Outcome: Progressing  Goal: Verbalize understanding of personal risk factors for fall in the hospital  Outcome: Progressing     Problem: Skin  Goal: Decreased wound size/increased tissue granulation at next dressing change  8/4/2025 1637 by Mari Best RN  Flowsheets (Taken 8/4/2025 1637)  Decreased wound size/increased tissue granulation at next dressing change: Promote sleep for wound healing  8/4/2025 1637 by Mari Best RN  Outcome: Progressing  Flowsheets (Taken 8/4/2025 1637)  Decreased wound size/increased tissue granulation at next dressing change: Promote sleep for wound healing

## 2025-08-04 NOTE — H&P
History Of Present Illness  Duane Scott is a 75 y.o. male hx of lateral STEMI in 08/2019, at that time he was presented with cardiac arrest status post PCI to diagonal 1 that had a 99% hazy lesion also he had 100% occluded LCX that was acute and was treated with JOSE to the proximal segment. Also cardiology ballooned the side of the stent to open the proper left circ after the OM 1 which is large vessel.  Has chronically occluded RCA, LAD s/p stent 3/2025, DM, CHF, hx of HLD, Hx of stroke, came for chest discomfort. Patient states he woke up this morning and did not feel well felt funny in his chest.  Then developed pain left side of his chest did not radiate anywhere.  Reports the pain was brief and then subsided.  Got himself ready to go to [employer] started walking to [employer] with his wagon became short of breath and had pain in his chest again that was constant.  Sat down at the [employer] and became pale and diaphoretic EMS was initiated.  He is currently chest pain-free at this time.  He denies any fever or chills.  No cough congestion runny nose sore throat no abdominal pain no nausea no vomiting.  Does complain of increased swelling in his legs.  Patient reports in 2019 he had similar symptoms where he had cardiac arrest. Patient follows with Dr. Spencer and had an PCI in March 2025.     He does not smoke or do drugs. He said he quit smoking. He takes medications on time. He wants to be full code.     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    Surgical History  He has a past surgical history that includes Back surgery (2012); Hernia repair (2015); Hernia repair (2013); and Cardiac catheterization (N/A, 3/11/2025).     Social History  He reports that he has never smoked. He has never been exposed to tobacco smoke. He has never used smokeless tobacco. He reports that he does not drink alcohol and does not use drugs.    Family History  Family History[1]     Allergies  Hydromorphone, Latex, Penicillins, Dyphylline-guaifenesin, Grass pollen, Mold, and Ragweed    Review of Systems   Constitutional: Negative.    HENT: Negative.     Eyes: Negative.    Respiratory: Negative.     Cardiovascular:  Positive for chest pain and leg swelling. Negative for palpitations.   Gastrointestinal: Negative.    Endocrine: Negative.    Genitourinary: Negative.    Musculoskeletal: Negative.    Neurological: Negative.    Psychiatric/Behavioral: Negative.          Physical Exam  Constitutional:       Appearance: He is ill-appearing.   HENT:      Head: Normocephalic and atraumatic.      Nose: Nose normal.      Mouth/Throat:      Mouth: Mucous membranes are dry.      Pharynx: Oropharynx is clear.     Eyes:      Extraocular Movements: Extraocular movements intact.      Conjunctiva/sclera: Conjunctivae normal.      Pupils: Pupils are equal, round, and reactive to light.       Cardiovascular:      Rate and Rhythm: Normal rate and regular rhythm.      Pulses: Normal pulses.      Heart sounds: Normal heart sounds.   Pulmonary:      Effort: Pulmonary effort is normal.      Breath sounds: Normal breath sounds.   Abdominal:      General: Abdomen is flat.      Musculoskeletal:         General: Normal range of motion.      Cervical back: Normal range of motion and neck supple.      Right lower leg: Edema present.      Left lower leg: Edema present.     Skin:     General: Skin is warm.      Capillary Refill: Capillary refill takes less than 2 seconds.     Neurological:      General: No focal deficit present.      Mental Status: He is alert and oriented to person, place, and time.      Cranial Nerves: No cranial nerve deficit.      Motor: No weakness.     Psychiatric:         Mood and Affect: Mood normal.         Thought Content: Thought content normal.          Last Recorded Vitals  /74 (BP Location: Right arm)   Pulse 91   Temp 36.3 °C (97.4 °F) (Tympanic)   Resp 19   Wt 87.5 kg (193 lb)   SpO2 99%     Relevant Results           Assessment/Plan   Assessment & Plan  NSTEMI (non-ST elevated myocardial infarction) (Multi)  -Chest discomfort, resolved now. up trending troponin.   -ED started heparin drip and talked to cardiology, and was told can stay at [facility]  -Echo  -Cardiology consult  -Npo midnight  -Statin  -Aspirin  -Trend troponin until flat  -Holding plavix, will consider starting in am  -Pt/OT    # HTN  -Continue coreg  -Holding thiazide and losartan due to FANTA  Acute kidney injury  -Baseline cr 1.3. came with 1.6  -Trial of a small bolus  -Follow BMP    # DM  -Not sure, does not on any meds  -Sliding scale  -A1c    # Chronic Dysuria with hx of bladder cancer  -Continue home meds flomax, and finasteride      # Anxiety  -Not on any meds    DVT: Heparin protocol for NSTEMI  Code: Amena Keys MD         [1]   Family History  Problem Relation Name Age of Onset    Heart disease Mother      Stroke Son      Autism Son

## 2025-08-04 NOTE — ED PROVIDER NOTES
Department of Emergency Medicine   ED  Provider Note  Admit Date/RoomTime: 8/4/2025 10:38 AM  ED Room: 419/419-A        History of Present Illness:  Chief Complaint   Patient presents with    Chest Pain     PT bib EMS from [facility] for chest pain with SOB, pt with hx of MI and cardiac arrest, vitals stable.          Duane Scott is a 75 y.o. male history of hypertension, hypercholesterolemia, anxiety, coronary artery disease status post MI with cardiac arrest, diabetes, congestive heart failure, reflux present to the emergency department complaints of chest pain.  Patient states he woke up this morning and did not feel well felt funny in his chest.  Then developed pain left side of his chest did not radiate anywhere.  Reports the pain was brief and then subsided.  Got himself ready to go to [facility] started walking to [facility] with his wagon became short of breath and had pain in his chest again that was constant.  Sat down at the [facility] and became pale and diaphoretic EMS was initiated.  He is currently chest pain-free at this time.  He denies any fever or chills.  No cough congestion runny nose sore throat no abdominal pain no nausea no vomiting.  Does complain of increased swelling in his legs.  Patient reports in 2019 he had similar symptoms where he  Had cardiac arrest patient follows with Dr. Spencer    Review of Systems:   Pertinent positives and negatives are stated within HPI, all other systems reviewed and are negative.        --------------------------------------------- PAST HISTORY ---------------------------------------------  Past Medical History:  has a past medical history of Acute renal failure syndrome (11/14/2023), Angina pectoris (11/17/2023), Arthritis, Asthma, CAD (coronary artery disease), Cardiac arrest (11/17/2023), Chronic obstructive pulmonary disease requiring drug therapy (Multi) (11/17/2023), Coronary artery disease involving native coronary artery of native heart without angina pectoris  (11/14/2023), Diabetes mellitus (Multi), Diabetes mellitus, type 2 (Multi) (11/08/2018), Diastolic heart failure (11/17/2023), 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  Past Surgical History:  has a past surgical history that includes Back surgery (2012); Hernia repair (2015); Hernia repair (2013); and Cardiac catheterization (N/A, 3/11/2025).  Social History:  reports that he has never smoked. He has never been exposed to tobacco smoke. He has never used smokeless tobacco. He reports that he does not drink alcohol and does not use drugs.  Family History: family history includes Autism in his son; Heart disease in his mother; Stroke in his son.. Unless otherwise noted, family history is non contributory  The patient’s home medications have been reviewed.  Allergies: Hydromorphone, Latex, Penicillins, Dyphylline-guaifenesin, Grass pollen, Mold, and Ragweed        ---------------------------------------------------PHYSICAL EXAM--------------------------------------    GENERAL APPEARANCE: Awake and alert.   VITAL SIGNS: As per the nurses' triage record.   HEENT: Normocephalic, atraumatic. Extraocular muscles are intact. Pupils equal round and reactive to light. Conjunctiva are pink. Negative scleral icterus. Mucous membranes are moist. Tongue in the midline. Pharynx was without erythema or exudates, uvula midline  NECK: Soft Nontender and supple, full gross ROM, no meningeal signs.  CHEST: Nontender to palpation. Clear to auscultation bilaterally. No rales, rhonchi, or wheezing.   HEART: S1, S2. Regular rate and rhythm. No murmurs, gallops or rubs.  Strong and equal pulses in the extremities.   ABDOMEN: Soft, nontender,  "nondistended, positive bowel sounds, no palpable masses.  MUSCULCSKELETAL: The calves are nontender to palpation. Full gross active range of motion.  Peripheral pulses intact.  Trace edema bilateral lower extremities.     NEUROLOGICAL: Awake, alert and oriented x 3. Power intact in the upper and lower extremities. Sensation is intact to light touch in the upper and lower extremities.   IMMUNOLOGICAL: No lymphatic streaking noted   DERM: No petechiae, rashes, or ecchymoses.          ------------------------- NURSING NOTES AND VITALS REVIEWED ---------------------------  The nursing notes within the ED encounter and vital signs as below have been reviewed by myself  /84 (BP Location: Right arm, Patient Position: Lying)   Pulse 68   Temp 36.7 °C (98.1 °F) (Temporal)   Resp 18   Ht 1.676 m (5' 6\")   Wt 87.5 kg (193 lb)   SpO2 100%   BMI 31.15 kg/m²     Oxygen Saturation Interpretation: 99% room air    The cardiac monitor revealed sinus rhythm with a heart rate in the 70s as interpreted by me. The cardiac monitor was ordered secondary to the patient's heart rate and to monitor the patient for dysrhythmia.       The patient’s available past medical records and past encounters were reviewed.          -----------------------DIAGNOSTIC RESULTS------------------------  LABS:    Labs Reviewed   CBC WITH AUTO DIFFERENTIAL - Abnormal       Result Value    WBC 4.1 (*)     nRBC 0.0      RBC 3.89 (*)     Hemoglobin 11.4 (*)     Hematocrit 34.3 (*)     MCV 88      MCH 29.3      MCHC 33.2      RDW 13.4      Platelets 106 (*)     Neutrophils % 64.6      Immature Granulocytes %, Automated 0.5      Lymphocytes % 20.7      Monocytes % 8.6      Eosinophils % 5.4      Basophils % 0.2      Neutrophils Absolute 2.62      Immature Granulocytes Absolute, Automated 0.02      Lymphocytes Absolute 0.84      Monocytes Absolute 0.35      Eosinophils Absolute 0.22      Basophils Absolute 0.01     COMPREHENSIVE METABOLIC PANEL - Abnormal "    Glucose 145 (*)     Sodium 139      Potassium 4.7      Chloride 107      Bicarbonate 24      Anion Gap 13      Urea Nitrogen 46 (*)     Creatinine 1.61 (*)     eGFR 45 (*)     Calcium 9.3      Albumin 4.2      Alkaline Phosphatase 89      Total Protein 7.2      AST 23      Bilirubin, Total 1.1      ALT 24     B-TYPE NATRIURETIC PEPTIDE - Abnormal     (*)     Narrative:        <100 pg/mL - Heart failure unlikely  100-299 pg/mL - Intermediate probability of acute heart                  failure exacerbation. Correlate with clinical                  context and patient history.    >=300 pg/mL - Heart Failure likely. Correlate with clinical                  context and patient history.    BNP testing is performed using different testing methodology at [facility] than at other [facility]. Direct result comparisons should only be made within the same method.      SERIAL TROPONIN-INITIAL - Abnormal    Troponin I, High Sensitivity 395 (*)     Narrative:     Less than 99th percentile of normal range cutoff-  Female and children under 18 years old <14 ng/L; Male <21 ng/L: Negative  Repeat testing should be performed if clinically indicated.     Female and children under 18 years old 14-50 ng/L; Male 21-50 ng/L:  Consistent with possible cardiac damage and possible increased clinical   risk. Serial measurements may help to assess extent of myocardial damage.     >50 ng/L: Consistent with cardiac damage, increased clinical risk and  myocardial infarction. Serial measurements may help assess extent of   myocardial damage.      NOTE: Children less than 1 year old may have higher baseline troponin   levels and results should be interpreted in conjunction with the overall   clinical context.     NOTE: Troponin I testing is performed using a different   testing methodology at [facility] than at other   [facility]. Direct result comparisons should only   be made within the same method.    SERIAL TROPONIN, 1 HOUR - Abnormal    Troponin I, High Sensitivity 502 (*)     Narrative:     Less than 99th percentile of normal range cutoff-  Female and children under 18 years old <14 ng/L; Male <21 ng/L: Negative  Repeat testing should be performed if clinically indicated.     Female and children under 18 years old 14-50 ng/L; Male 21-50 ng/L:  Consistent with possible cardiac damage and possible increased clinical   risk. Serial measurements may help to assess extent of myocardial damage.     >50 ng/L: Consistent with cardiac damage, increased clinical risk and  myocardial infarction. Serial measurements may help assess extent of   myocardial damage.      NOTE: Children less than 1 year old may have higher baseline troponin   levels and results should be interpreted in conjunction with the overall   clinical context.     NOTE: Troponin I testing is performed using a different   testing methodology at [facility] than at other   [facility]. Direct result comparisons should only   be made within the same method.   CBC WITH AUTO DIFFERENTIAL - Abnormal    WBC 4.1 (*)     nRBC 0.0      RBC 3.71 (*)     Hemoglobin 10.9 (*)     Hematocrit 32.7 (*)     MCV 88      MCH 29.4      MCHC 33.3      RDW 13.3      Platelets 108 (*)     Neutrophils % 64.2      Immature Granulocytes %, Automated 0.5      Lymphocytes % 20.2      Monocytes % 10.5      Eosinophils % 4.4      Basophils % 0.2      Neutrophils Absolute 2.64      Immature Granulocytes Absolute, Automated 0.02      Lymphocytes Absolute 0.83      Monocytes Absolute 0.43      Eosinophils Absolute 0.18      Basophils Absolute 0.01     D-DIMER, NON VTE - Normal    D-Dimer Non VTE, Quant (mg/L FEU) 0.44      Narrative:     THROMBOEMBOLIC EVENTS CANNOT BE EXCLUDED SOLELY ON THE BASIS OF THE D-DIMER LEVEL BEING WITHIN THE NORMAL REFERENCE RANGE. D-DIMER LEVELS LESS THAN 0.5 MG/L FEU IN CONJUNCTION WITH A LOW CLINICAL PROBABILITY HAVE AN EXCELLENT  NEGATIVE PREDICTIVE VALUE IN EXCLUDING A DIAGNOSIS OF PULMONARY EMBOLUS (PE) OR DEEP VEIN THROMBOSIS (DVT). ELEVATED D-DIMER LEVELS ARE NOT SPECIFIC TO PE OR DVT, AND MAY BE SEEN IN PATIENTS WITH DIC, ADVANCED AGE, PREGNANCY, MALIGNANCY, LIVER DISEASE, INFECTION, AND INFLAMMATORY CONDITIONS AMONG OTHERS. D-DIMER LEVELS MAY BE DECREASED IN PATIENTS RECEIVING ANTI-COAGULATION THERAPY.   MAGNESIUM - Normal    Magnesium 2.07     SARS-COV-2 AND INFLUENZA A/B PCR - Normal    Flu A Result Not Detected      Flu B Result Not Detected      Coronavirus 2019, PCR Not Detected      Narrative:     This assay is an FDA-cleared, in vitro diagnostic nucleic acid amplification test for the qualitative detection and differentiation of SARS CoV-2/ Influenza A/B from nasopharyngeal specimens collected from individuals with signs and symptoms of respiratory tract infections, and has been validated for use at [facility]. Negative results do not preclude COVID-19/ Influenza A/B infections and should not be used as the sole basis for diagnosis, treatment, or other management decisions. Testing for SARS CoV-2 is recommended only for patients who meet current clinical and/or epidemiological criteria defined by federal, state, or local public health directives.   LIPASE - Normal    Lipase 37      Narrative:     Venipuncture immediately after or during the administration of Metamizole may lead to falsely low results. Testing should be performed immediately prior to Metamizole dosing.   APTT - Normal    aPTT 25.1     POCT GLUCOSE - Normal    POCT Glucose 84     TROPONIN SERIES- (INITIAL, 1 HR)    Narrative:     The following orders were created for panel order Troponin I Series, High Sensitivity (0, 1 HR).  Procedure                               Abnormality         Status                     ---------                               -----------         ------                     Troponin I, High Sensiti...[[id number]]   Abnormal            Final result               Troponin, High Sensitivi...[[id number]]  Abnormal            Final result                 Please view results for these tests on the individual orders.   PROTIME-INR   HEMOGLOBIN A1C   TROPONIN I, HIGH SENSITIVITY   COAGULATION SCREEN   APTT   POCT GLUCOSE METER   POCT GLUCOSE METER       As interpreted by me, the above displayed labs are abnormal. All other labs obtained during this visit were within normal range or not returned as of this dictation.      EKG Interpretation    1102 EKG was independently reviewed and interpreted by myself at 10:55 AM.  Normal sinus rhythm, leftward axis, no evidence of STEMI QTc is 425 ms ventricular rate 66 bpm   [TH]           CT angio chest for pulmonary embolism   Final Result   1.No pulmonary embolism or other acute intrathoracic process.   2.Cardiomegaly with severe coronary artery calcifications.   3.Splenomegaly.   Signed by Zhang Husain MD      Transthoracic Echo Complete    (Results Pending)           CT angio chest for pulmonary embolism   Final Result   1.No pulmonary embolism or other acute intrathoracic process.   2.Cardiomegaly with severe coronary artery calcifications.   3.Splenomegaly.   Signed by Zhang Husain MD      Transthoracic Echo Complete    (Results Pending)           ------------------------------ ED COURSE/MEDICAL DECISION MAKING----------------------  Medical Decision Making:   Exam: A medically appropriate exam performed, outlined above, given the known history and presentation.    History obtained from: Review of medical record nursing notes patient      Social Determinants of Health considered during this visit: Takes care of himself at home      PAST MEDICAL HISTORY/Chronic Conditions Affecting Care     has a past medical history of Acute renal failure syndrome (11/14/2023), Angina pectoris (11/17/2023), Arthritis, Asthma, CAD (coronary artery disease), Cardiac arrest (11/17/2023), Chronic obstructive  pulmonary disease requiring drug therapy (Multi) (11/17/2023), Coronary artery disease involving native coronary artery of native heart without angina pectoris (11/14/2023), Diabetes mellitus (Multi), Diabetes mellitus, type 2 (Multi) (11/08/2018), Diastolic heart failure (11/17/2023), 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       CC/HPI Summary, Social Determinants of health, Records Reviewed, DDx, testing done/not done, ED Course, Reassessment, disposition considerations/shared decision making with patient, consults, disposition:   Presents to the emergency department complaints of chest pain and shortness of breath  Plan  Chest x-ray, EKG, COVID flu, aspirin, Pepcid, heparin, APTT, BNP, CBC, CMP, CMP, lipase, magnesium, troponin    Medical Decision Making/Differential Diagnosis:  Differentials include not limited to acute coronary syndrome versus pneumonia versus viral illness versus exacerbation of CHF versus  electrolyte abnormality versus dehydration versus biliary colic versus reflux  Review glucose 145  Electrolytes unremarkable  BUN/creatinine elevated from baseline  LFTs unremarkable  Magnesium within normal limits  proBNP 301  Lipase 37  White blood cell count 4.1  Hemoglobin 9.4 no signs of active bleeding decreased a month ago  COVID flu negative  Troponin 395   Patient presented to the emergency room complaints of chest pain and shortness of breath.  History of cardiac arrest  heart cath performed in March of this year showed left anterior descending artery with 1 drug-eluting stent chronically occluded of the right coronary artery currently managed medically EF of 55%.  Patient is currently chest pain-free  received aspirin and Pepcid.  Troponin came back at 395.  EKG showed no ST elevation or arrhythmia repeat EKG no change.  Started on heparin.  CT angio of the chest to be ordered t 1.No pulmonary embolism or other acute intrathoracic process. 2.Cardiomegaly with severe coronary artery calcifications. 3.Splenomegaly. proBNP is mildly elevated patient does not appear to be fluid overloaded on exam.  He is not requiring oxygen has trace edema in his legs lungs are clear.  BUN/creatinine elevated from baseline  White blood cell count of 4.1 hemoglobin 10.9 decreased from baseline no signs of active bleeding COVID flu are negative.  Patient seen and evaluated with attending physician  [place]   Based on patient's clinical presentation history and symptoms consistent with  NSTEMI,   acute kidney injury  PROCEDURES  Unless otherwise noted below, none      CONSULTS:   IP CONSULT TO CARDIOLOGY  IP CONSULT TO SOCIAL WORK      ED Course as of 08/04/25 1656   Mon Aug 04, 2025   1102 EKG was independently reviewed and interpreted by myself at 10:55 AM.  Normal sinus rhythm, leftward axis, no evidence of STEMI QTc is 425 ms ventricular rate 66 bpm   [TH]   1129 Troponin I, High Sensitivity(!!): 395  EKG per attending note no ST elevation or arrhythmia noted.  Patient started on heparin [TB]   1140 proBNP is elevated troponin elevated patient complains of chest pain and shortness of breath D-dimer has not resulted secondary to machine malfunction will take several hours to complete CT angio of the chest to be ordered to evaluate for PE [TB]   1142 Heart cath in March 2025 showed  1. Successful percutaneous coronary intervention to the mid left anterior descending artery with 1 drug-eluting stent with excellent result.   2. The patient has a chronically occluded right coronary artery which we will manage medically for now if he remains symptomatic we may consider PCI  intervention.  Ejection fraction of 55% [TB]   1312 CT angio  chest for pulmonary embolism  1.No pulmonary embolism or other acute intrathoracic process.  2.Cardiomegaly with severe coronary artery calcifications.  3.Splenomegaly.   [TB]   1320 Upon reevaluation patient is chest pain-free.  Laboratory data and test results reviewed with patient.  Amenable to admission and family was updated by nursing [TB]   1333 Discussed case with hospitalist team Dr. Keys advised to consult cardiology before he would accept the patient.  Consult placed for cardiology [TB]   1351 Discussed case with cardiology team Dr. Mayer recommends admission okay to stay at [facility] no further recommendations at this time [TB]   1405 Updated the hospitalist team cardiology recommendations has agreed to admit the patient intermittent bed [TB]      ED Course User Index  [TB] ABDULAZIZ Giraldo  [TH] Evgeny Murphy DO         Diagnoses as of 08/04/25 1656   NSTEMI (non-ST elevated myocardial infarction) (Multi)   Acute kidney injury   Anemia, unspecified type         This patient has remained hemodynamically stable during their ED course.      Critical Care: 31  Critical Care    Performed by: ABDULAZIZ Giraldo  Authorized by: Evgeny Murphy DO    Critical care provider statement:     Critical care time (minutes):  31    Critical care time was exclusive of:  Separately billable procedures and treating other patients and teaching time    Critical care was necessary to treat or prevent imminent or life-threatening deterioration of the following conditions:  Cardiac failure    Critical care was time spent personally by me on the following activities:  Blood draw for specimens, development of treatment plan with patient or surrogate, evaluation of patient's response to treatment, examination of patient, interpretation of cardiac output measurements, obtaining history from patient or surrogate, ordering and performing treatments and interventions, ordering and review of laboratory studies,  ordering and review of radiographic studies, pulse oximetry, re-evaluation of patient's condition and review of old charts    Care discussed with: admitting provider    Critical care time secondary to NSTEMI requiring close monitoring advanced testing IV heparin frequent evaluation      Counseling:  The emergency provider has spoken with the patient and discussed today’s results, in addition to providing specific details for the plan of care and counseling regarding the diagnosis and prognosis.  Questions are answered at this time and they are agreeable with the plan.         --------------------------------- IMPRESSION AND DISPOSITION ---------------------------------    IMPRESSION  1. NSTEMI (non-ST elevated myocardial infarction) (Multi)    2. Acute kidney injury    3. Anemia, unspecified type        DISPOSITION  Disposition: Admit hospitalist service intermediate  Patient condition is stable improved with ER intervention        NOTE: This report was transcribed using voice recognition software. Every effort was made to ensure accuracy; however, inadvertent computerized transcription errors may be present      ROBIN Giraldo-GABRIEL  08/04/25 [id number]

## 2025-08-04 NOTE — ED PROVIDER NOTES
Emergency Department Provider Note       History of Present Illness     History provided by: Patient, EMS, and Jennifer Baumann, ROBIN-CNP  Limitations to History: None      HPI:  Duane Scott is a 75 y.o. male Patient was seen evaluated with Jennifer Baumann, sravan this 75-year-old male with hypertension high cholesterol anxiety coronary artery disease CHF who presented with chest pain    Physical Exam   Triage vitals:  T 36.3 °C (97.4 °F)  HR 77  /65  RR 16  O2 99 % None (Room air)  Heart: Regular rate and rhythm without any murmurs  Lungs: Clear to auscultation bilaterally  Extremities trace edema bilaterally no posterior calf or popliteal tenderness        Medical Decision Making & ED Course   Medical Decision Makin y.o. male presents with shortness of breath and chest pain the patient was found to have elevated troponins.  His EKG showed a normal sinus rhythm leftward axis no evidence of STEMI QTc is 425 ms ventricular rate was 66 bpm.  The patient had a CTA of the chest which was negative for pulmonary embolism.  The patient did have evidence of renal insufficiency which is worse than about a month ago his GFR went from 52-45.    The case was discussed with Dr. Allison who recommended admission okay to stay at [facility] and no further recommendations at this time.  Case was also discussed with the hospitalist Dr. Keys    ED Course:    Patient evaluated by myself at 11:35 AM.  The patient currently denies any chest pain.  I did share the results of his elevated troponin and that he would need to be admitted for cardiology consultation.  The patients EKG was reevaluated again which showed no evidence of STEMI.  ED Course as of 25 0625   Mon Aug 04, 2025   1102 EKG was independently reviewed and interpreted by myself at 10:55 AM.  Normal sinus rhythm, leftward axis, no evidence of STEMI QTc is 425 ms ventricular rate 66 bpm   [TH]   1129 Troponin I, High Sensitivity(!!): 395  EKG per attending  note no ST elevation or arrhythmia noted.  Patient started on heparin [TB]   1140 proBNP is elevated troponin elevated patient complains of chest pain and shortness of breath D-dimer has not resulted secondary to machine malfunction will take several hours to complete CT angio of the chest to be ordered to evaluate for PE [TB]   1142 Heart cath in March 2025 showed  1. Successful percutaneous coronary intervention to the mid left anterior descending artery with 1 drug-eluting stent with excellent result.   2. The patient has a chronically occluded right coronary artery which we will manage medically for now if he remains symptomatic we may consider PCI  intervention.  Ejection fraction of 55% [TB]   1312 CT angio chest for pulmonary embolism  1.No pulmonary embolism or other acute intrathoracic process.  2.Cardiomegaly with severe coronary artery calcifications.  3.Splenomegaly.   [TB]   1320 Upon reevaluation patient is chest pain-free.  Laboratory data and test results reviewed with patient.  Amenable to admission and family was updated by nursing [TB]   1333 Discussed case with hospitalist team Dr. Keys advised to consult cardiology before he would accept the patient.  Consult placed for cardiology [TB]   1351 Discussed case with cardiology team Dr. Mayer recommends admission okay to stay at [facility] no further recommendations at this time [TB]   1405 Updated the hospitalist team cardiology recommendations has agreed to admit the patient intermittent bed [TB]      ED Course User Index  [TB] ROBIN Giraldo-CNP  [TH] Evgeny Murphy DO         Diagnoses as of 08/05/25 0625   NSTEMI (non-ST elevated myocardial infarction) (Multi)   Acute kidney injury   Anemia, unspecified type       Disposition   As a result of their workup, the patient will require admission to the hospital.  The patient was informed of his diagnosis.  The patient was given the opportunity to ask questions and I answered them. The  patient agreed to be admitted to the hospital.    Procedures   Procedures      Evgeny Murphy DO  Emergency Medicine                                                       Evgeny Murphy DO  08/05/25 0632

## 2025-08-04 NOTE — PROGRESS NOTES
Pharmacy Medication History Review    Duane Scott is a 75 y.o. male admitted for NSTEMI (non-ST elevated myocardial infarction) ([facility]). Pharmacy reviewed the patient's ggwzr-[id number] medications and allergies for accuracy.    Medications ADDED:  N/A  Medications CHANGED:  N/A  Medications REMOVED:   N/A    The list below reflects the updated PTA list.   Prior to Admission Medications   Prescriptions Last Dose Informant Patient Reported? Taking?   albuterol (Ventolin HFA) 90 mcg/actuation inhaler Unknown  No No   Sig: Inhale 1 puff every 4 hours if needed for wheezing or shortness of breath.   aspirin 81 mg EC tablet Unknown  No No   Sig: Take 1 tablet (81 mg) by mouth once daily.   atorvastatin (Lipitor) 80 mg tablet Past Week  No Yes   Sig: Take 1 tablet (80 mg) by mouth once daily.   carvedilol (Coreg) 25 mg tablet Unknown  No No   Sig: Take 0.5 tablets (12.5 mg) by mouth 2 times a day before meals.   clopidogrel (Plavix) 75 mg tablet Past Week  No Yes   Sig: Take 1 tablet (75 mg) by mouth once daily.   finasteride (Proscar) 5 mg tablet Unknown  No No   Sig: Take 1 tablet (5 mg) by mouth once daily. Do not crush, chew, or split.   hydroCHLOROthiazide (HYDRODiuril) 25 mg tablet Past Week  No Yes   Sig: Take 1 tablet (25 mg) by mouth once daily.   ipratropium-albuteroL (Duo-Neb) 0.5-2.5 mg/3 mL nebulizer solution Unknown  Yes No   Sig: Inhale 3 mL every 6 hours if needed.   losartan (Cozaar) 100 mg tablet Past Week  No Yes   Sig: Take 1 tablet (100 mg) by mouth once daily.   tamsulosin (Flomax) 0.4 mg 24 hr capsule Past Week  No Yes   Sig: Take 2 capsules (0.8 mg) by mouth once daily.      Facility-Administered Medications: None         The list below reflects the updated allergy list. Please review each documented allergy for additional clarification and justification.  Allergies  Reviewed by Saniya Sandhu on 8/4/2025        Severity Reactions Comments    Hydromorphone High Anaphylaxis     Latex Medium  Hives, Rash Gets ill    Penicillins Medium Hives     Dyphylline-guaifenesin Not Specified Swelling     Grass Pollen Not Specified Other Makes him sick    Mold Not Specified Other Gets sick    Ragweed Not Specified Other Affects asthma            Patient declines M2B at discharge.     Sources:   Patient Interview Poor historian  Chart Review     Additional Comments:  Patient unable to confirm which medication he's taking. Was able to confirm he takes the 5 most recently prescribed, un able to confirm or deny the other medications in his chart. Patient unable to confirm last doses      Saniya Sandhu  Pharmacy Technician  08/04/25

## 2025-08-05 ENCOUNTER — APPOINTMENT (OUTPATIENT)
Dept: CARDIOLOGY | Facility: HOSPITAL | Age: 75
End: 2025-08-05
Payer: MEDICARE

## 2025-08-05 ENCOUNTER — TELEPHONE (OUTPATIENT)
Dept: PRIMARY CARE | Facility: CLINIC | Age: 75
End: 2025-08-05

## 2025-08-05 LAB
ALBUMIN SERPL BCP-MCNC: 3.7 G/DL (ref 3.4–5)
ALP SERPL-CCNC: 75 U/L (ref 33–136)
ALT SERPL W P-5'-P-CCNC: 21 U/L (ref 10–52)
ANION GAP SERPL CALCULATED.3IONS-SCNC: 8 MMOL/L (ref 10–20)
AORTIC VALVE MEAN GRADIENT: 4 MMHG
AORTIC VALVE PEAK VELOCITY: 1.41 M/S
APTT PPP: 36.8 SECONDS (ref 22–32.5)
AST SERPL W P-5'-P-CCNC: 20 U/L (ref 9–39)
ATRIAL RATE: 66 BPM
AV PEAK GRADIENT: 8 MMHG
AVA (PEAK VEL): 1.98 CM2
AVA (VTI): 2.12 CM2
BILIRUB SERPL-MCNC: 1.2 MG/DL (ref 0–1.2)
BUN SERPL-MCNC: 39 MG/DL (ref 6–23)
CALCIUM SERPL-MCNC: 8.9 MG/DL (ref 8.6–10.3)
CHLORIDE SERPL-SCNC: 110 MMOL/L (ref 98–107)
CHOLEST SERPL-MCNC: 87 MG/DL (ref 0–199)
CHOLESTEROL/HDL RATIO: 2
CO2 SERPL-SCNC: 24 MMOL/L (ref 21–32)
CREAT SERPL-MCNC: 1.66 MG/DL (ref 0.5–1.3)
EGFRCR SERPLBLD CKD-EPI 2021: 43 ML/MIN/1.73M*2
EJECTION FRACTION APICAL 4 CHAMBER: 51.5
EJECTION FRACTION: 53 %
ERYTHROCYTE [DISTWIDTH] IN BLOOD BY AUTOMATED COUNT: 13.2 % (ref 11.5–14.5)
EST. AVERAGE GLUCOSE BLD GHB EST-MCNC: 108 MG/DL
GLOBAL LONGITUDINAL STRAIN: 12.3 %
GLUCOSE BLD MANUAL STRIP-MCNC: 102 MG/DL (ref 74–99)
GLUCOSE BLD MANUAL STRIP-MCNC: 160 MG/DL (ref 74–99)
GLUCOSE BLD MANUAL STRIP-MCNC: 97 MG/DL (ref 74–99)
GLUCOSE BLD MANUAL STRIP-MCNC: 99 MG/DL (ref 74–99)
GLUCOSE SERPL-MCNC: 117 MG/DL (ref 74–99)
HBA1C MFR BLD: 5.4 % (ref ?–5.7)
HCT VFR BLD AUTO: 33.2 % (ref 41–52)
HDLC SERPL-MCNC: 43.2 MG/DL
HGB BLD-MCNC: 11.1 G/DL (ref 13.5–17.5)
INR PPP: 1.1 (ref 0.9–1.2)
LDLC SERPL CALC-MCNC: 36 MG/DL
LEFT ATRIUM VOLUME AREA LENGTH INDEX BSA: 38.8 ML/M2
LEFT VENTRICLE INTERNAL DIMENSION DIASTOLE: 5.67 CM (ref 3.5–6)
LEFT VENTRICULAR OUTFLOW TRACT DIAMETER: 2 CM
LV EJECTION FRACTION BIPLANE: 54 %
MCH RBC QN AUTO: 29.4 PG (ref 26–34)
MCHC RBC AUTO-ENTMCNC: 33.4 G/DL (ref 32–36)
MCV RBC AUTO: 88 FL (ref 80–100)
MITRAL VALVE E/A RATIO: 0.99
NON HDL CHOLESTEROL: 44 MG/DL (ref 0–149)
NRBC BLD-RTO: 0 /100 WBCS (ref 0–0)
P AXIS: 61 DEGREES
P OFFSET: 181 MS
P ONSET: 128 MS
PLATELET # BLD AUTO: 107 X10*3/UL (ref 150–450)
POTASSIUM SERPL-SCNC: 4.4 MMOL/L (ref 3.5–5.3)
PR INTERVAL: 152 MS
PROT SERPL-MCNC: 6.5 G/DL (ref 6.4–8.2)
PROTHROMBIN TIME: 11.9 SECONDS (ref 9.3–12.7)
Q ONSET: 204 MS
QRS COUNT: 11 BEATS
QRS DURATION: 100 MS
QT INTERVAL: 406 MS
QTC CALCULATION(BAZETT): 425 MS
QTC FREDERICIA: 419 MS
R AXIS: -28 DEGREES
RBC # BLD AUTO: 3.77 X10*6/UL (ref 4.5–5.9)
RIGHT VENTRICLE FREE WALL PEAK S': 12.9 CM/S
RIGHT VENTRICLE PEAK SYSTOLIC PRESSURE: 26 MMHG
SODIUM SERPL-SCNC: 138 MMOL/L (ref 136–145)
T AXIS: 65 DEGREES
T OFFSET: 407 MS
TRICUSPID ANNULAR PLANE SYSTOLIC EXCURSION: 2.6 CM
TRIGL SERPL-MCNC: 39 MG/DL (ref 0–149)
VENTRICULAR RATE: 66 BPM
VLDL: 8 MG/DL (ref 0–40)
WBC # BLD AUTO: 4.3 X10*3/UL (ref 4.4–11.3)

## 2025-08-05 PROCEDURE — 99233 SBSQ HOSP IP/OBS HIGH 50: CPT | Performed by: STUDENT IN AN ORGANIZED HEALTH CARE EDUCATION/TRAINING PROGRAM

## 2025-08-05 PROCEDURE — 85730 THROMBOPLASTIN TIME PARTIAL: CPT | Performed by: STUDENT IN AN ORGANIZED HEALTH CARE EDUCATION/TRAINING PROGRAM

## 2025-08-05 PROCEDURE — 2060000001 HC INTERMEDIATE ICU ROOM DAILY

## 2025-08-05 PROCEDURE — 99222 1ST HOSP IP/OBS MODERATE 55: CPT | Performed by: INTERNAL MEDICINE

## 2025-08-05 PROCEDURE — 2500000001 HC RX 250 WO HCPCS SELF ADMINISTERED DRUGS (ALT 637 FOR MEDICARE OP): Performed by: INTERNAL MEDICINE

## 2025-08-05 PROCEDURE — 85610 PROTHROMBIN TIME: CPT | Performed by: STUDENT IN AN ORGANIZED HEALTH CARE EDUCATION/TRAINING PROGRAM

## 2025-08-05 PROCEDURE — 2500000004 HC RX 250 GENERAL PHARMACY W/ HCPCS (ALT 636 FOR OP/ED): Performed by: STUDENT IN AN ORGANIZED HEALTH CARE EDUCATION/TRAINING PROGRAM

## 2025-08-05 PROCEDURE — C8929 TTE W OR WO FOL WCON,DOPPLER: HCPCS

## 2025-08-05 PROCEDURE — 93306 TTE W/DOPPLER COMPLETE: CPT | Performed by: INTERNAL MEDICINE

## 2025-08-05 PROCEDURE — 85027 COMPLETE CBC AUTOMATED: CPT | Performed by: STUDENT IN AN ORGANIZED HEALTH CARE EDUCATION/TRAINING PROGRAM

## 2025-08-05 PROCEDURE — 36415 COLL VENOUS BLD VENIPUNCTURE: CPT | Performed by: STUDENT IN AN ORGANIZED HEALTH CARE EDUCATION/TRAINING PROGRAM

## 2025-08-05 PROCEDURE — 4A023N7 MEASUREMENT OF CARDIAC SAMPLING AND PRESSURE, LEFT HEART, PERCUTANEOUS APPROACH: ICD-10-PCS | Performed by: INTERNAL MEDICINE

## 2025-08-05 PROCEDURE — 2500000001 HC RX 250 WO HCPCS SELF ADMINISTERED DRUGS (ALT 637 FOR MEDICARE OP): Performed by: STUDENT IN AN ORGANIZED HEALTH CARE EDUCATION/TRAINING PROGRAM

## 2025-08-05 PROCEDURE — 84075 ASSAY ALKALINE PHOSPHATASE: CPT | Performed by: STUDENT IN AN ORGANIZED HEALTH CARE EDUCATION/TRAINING PROGRAM

## 2025-08-05 PROCEDURE — 80061 LIPID PANEL: CPT | Performed by: STUDENT IN AN ORGANIZED HEALTH CARE EDUCATION/TRAINING PROGRAM

## 2025-08-05 PROCEDURE — 82947 ASSAY GLUCOSE BLOOD QUANT: CPT

## 2025-08-05 PROCEDURE — B2111ZZ FLUOROSCOPY OF MULTIPLE CORONARY ARTERIES USING LOW OSMOLAR CONTRAST: ICD-10-PCS | Performed by: INTERNAL MEDICINE

## 2025-08-05 PROCEDURE — 2500000004 HC RX 250 GENERAL PHARMACY W/ HCPCS (ALT 636 FOR OP/ED): Performed by: INTERNAL MEDICINE

## 2025-08-05 PROCEDURE — 2500000002 HC RX 250 W HCPCS SELF ADMINISTERED DRUGS (ALT 637 FOR MEDICARE OP, ALT 636 FOR OP/ED): Performed by: STUDENT IN AN ORGANIZED HEALTH CARE EDUCATION/TRAINING PROGRAM

## 2025-08-05 RX ORDER — SODIUM CHLORIDE 9 MG/ML
100 INJECTION, SOLUTION INTRAVENOUS CONTINUOUS
Status: DISCONTINUED | OUTPATIENT
Start: 2025-08-05 | End: 2025-08-06 | Stop reason: HOSPADM

## 2025-08-05 RX ADMIN — CLOPIDOGREL 75 MG: 75 TABLET ORAL at 10:46

## 2025-08-05 RX ADMIN — CARVEDILOL 12.5 MG: 12.5 TABLET, FILM COATED ORAL at 08:01

## 2025-08-05 RX ADMIN — HEPARIN SODIUM 1000 UNITS/HR: 10000 INJECTION, SOLUTION INTRAVENOUS at 11:07

## 2025-08-05 RX ADMIN — TAMSULOSIN HYDROCHLORIDE 0.8 MG: 0.4 CAPSULE ORAL at 10:46

## 2025-08-05 RX ADMIN — ASPIRIN 81 MG: 81 TABLET, COATED ORAL at 10:46

## 2025-08-05 RX ADMIN — PERFLUTREN 2 ML OF DILUTION: 6.52 INJECTION, SUSPENSION INTRAVENOUS at 09:52

## 2025-08-05 RX ADMIN — CARVEDILOL 12.5 MG: 12.5 TABLET, FILM COATED ORAL at 17:23

## 2025-08-05 RX ADMIN — SODIUM CHLORIDE 100 ML/HR: 900 INJECTION, SOLUTION INTRAVENOUS at 08:24

## 2025-08-05 RX ADMIN — FINASTERIDE 5 MG: 5 TABLET, FILM COATED ORAL at 10:46

## 2025-08-05 RX ADMIN — ATORVASTATIN CALCIUM 80 MG: 80 TABLET, FILM COATED ORAL at 10:46

## 2025-08-05 RX ADMIN — SODIUM CHLORIDE 100 ML/HR: 900 INJECTION, SOLUTION INTRAVENOUS at 21:16

## 2025-08-05 ASSESSMENT — COGNITIVE AND FUNCTIONAL STATUS - GENERAL
MOBILITY SCORE: 21
CLIMB 3 TO 5 STEPS WITH RAILING: TOTAL
DAILY ACTIVITIY SCORE: 24

## 2025-08-05 ASSESSMENT — ENCOUNTER SYMPTOMS
WEIGHT LOSS: 0
NEAR-SYNCOPE: 0
DYSPNEA ON EXERTION: 0
CLAUDICATION: 0
PND: 0
WEAKNESS: 0
SYNCOPE: 0
DIAPHORESIS: 0
WEIGHT GAIN: 0
COUGH: 0
IRREGULAR HEARTBEAT: 0
FEVER: 0
PALPITATIONS: 0
WHEEZING: 0
ORTHOPNEA: 0
MYALGIAS: 0
DIZZINESS: 0
SHORTNESS OF BREATH: 0

## 2025-08-05 ASSESSMENT — PAIN - FUNCTIONAL ASSESSMENT
PAIN_FUNCTIONAL_ASSESSMENT: 0-10

## 2025-08-05 ASSESSMENT — PAIN SCALES - GENERAL
PAINLEVEL_OUTOF10: 0 - NO PAIN

## 2025-08-05 NOTE — CONSULTS
Inpatient consult to Cardiology  Consult performed by: Davi Mayer DO  Consult ordered by: Flakita Keys MD  Reason for consult: NSTEMI        History Of Present Illness:    Duane Scott is a 75 y.o. male presenting with chest pain.  He has a history of atherosclerotic disease, ST elevation myocardial infarction complicated by cardiac arrest in 2009 where stents were placed in his circumflex and first diagonal.  He had an LAD PCI in March 2025 where a 3.0 x 12 Medtronic Rufus frontier drug-eluting stent was placed in his proximal to mid LAD.  Ejection fraction by ventriculogram at that time was 60%.  He also has a history of chronic kidney disease and cerebrovascular disease.  He follows with Dr. Spencer, was just seen by Dr. Carrillo earlier this month doing well from a cardiac standpoint.  He reports having chest pain while in the shower at 3 AM yesterday morning this subsided.  He then experienced chest pain while walking to [facility].  He sat down at [facility] and was told that he looked rather pale.  EMS was contacted who brought him to [facility] for evaluation.  His EKGs show sinus rhythm without any concerning ST-T wave abnormalities.  His vitals are stable.  He is currently on heparin per ACS protocol and is chest pain-free.    Review of Systems   Constitutional: Negative for diaphoresis, fever, weight gain and weight loss.   Eyes:  Negative for visual disturbance.   Cardiovascular:  Negative for chest pain, claudication, dyspnea on exertion, irregular heartbeat, leg swelling, near-syncope, orthopnea, palpitations, paroxysmal nocturnal dyspnea and syncope.   Respiratory:  Negative for cough, shortness of breath and wheezing.    Musculoskeletal:  Negative for muscle weakness and myalgias.   Neurological:  Negative for dizziness and weakness.   All other systems reviewed and are negative.         Last Recorded Vitals:  Vitals:    08/04/25 2325 08/05/25 0451 08/05/25 0454 08/05/25 0727   BP: 116/58  136/67  133/67   BP Location:    Left arm   Patient Position: Lying  Lying Lying   Pulse: 58  59    Resp:    18   Temp: 36.9 °C (98.4 °F)  37 °C (98.6 °F) 36.8 °C (98.2 °F)   TempSrc: Temporal  Temporal Temporal   SpO2: 97%  97%    Weight:  86.5 kg (190 lb 12.8 oz)     Height:           Last Labs:  CBC - 8/5/2025:  5:16 AM  4.3 11.1 107    33.2      CMP - 8/5/2025:  5:16 AM  8.9 6.5 20 --- 1.2   _ 3.7 21 75      PTT - 8/5/2025:  5:16 AM  1.1   11.9 36.8     Troponin I, High Sensitivity   Date/Time Value Ref Range Status   08/04/2025 10:20  (HH) 0 - 20 ng/L Final     Comment:     Previous result verified on 8/4/2025 1126 on specimen/case [id number] called with component [facility] for procedure Troponin I, High Sensitivity, Initial with value 395 ng/L.   08/04/2025 04:47  (HH) 0 - 20 ng/L Final     Comment:     Previous result verified on 8/4/2025 1126 on specimen/case [id number] called with component [facility] for procedure Troponin I, High Sensitivity, Initial with value 395 ng/L.   08/04/2025 11:33  (HH) 0 - 20 ng/L Final     Comment:     Previous result verified on 8/4/2025 1126 on specimen/case [id number] called with component [facility]PH[facility] for procedure Troponin I, High Sensitivity, Initial with value 395 ng/L.     BNP   Date/Time Value Ref Range Status   08/04/2025 10:46  (H) 0 - 99 pg/mL Final   01/09/2025 02:38  (H) 0 - 99 pg/mL Final     POC HEMOGLOBIN A1c   Date/Time Value Ref Range Status   02/07/2025 11:02 AM 5.6 4.2 - 6.5 % Final     Hemoglobin A1C   Date/Time Value Ref Range Status   08/04/2025 11:33 AM 5.4 See comment % Final   08/07/2024 11:39 AM 6.0 (H) See below % Final     LDL-CHOLESTEROL   Date/Time Value Ref Range Status   02/10/2025 09:33 AM 52 mg/dL (calc) Final     Comment:     Reference range: <100     Desirable range <100 mg/dL for primary prevention;    <70 mg/dL for patients with CHD or diabetic patients   with > or = 2 CHD risk factors.     LDL-C is now calculated  using the Sammy   calculation, which is a validated novel method providing   better accuracy than the Friedewald equation in the   estimation of LDL-C.   Jeremy CUEVAS et al. PABLO. 2013;310(35): 0173-0686   (http://ARI.Into The Gloss/faq/VYN940)       LDL Calculated   Date/Time Value Ref Range Status   08/05/2025 05:16 AM 36 <=99 mg/dL Final     Comment:                                 Near   Borderline      AGE      Desirable  Optimal    High     High     Very High     0-19 Y     0 - 109     ---    110-129   >/= 130     ----    20-24 Y     0 - 119     ---    120-159   >/= 160     ----      >24 Y     0 -  99   100-129  130-159   160-189     >/=190    LDL Cholesterol is calculated using the Friedewald equation.   07/25/2024 02:03 PM 51 (L) 65 - 130 mg/dL Final   05/18/2023 03:45  65 - 130 MG/DL Final   05/03/2022 01:34 PM 96 65 - 130 MG/DL Final   11/05/2019 10:36 AM 41 (L) 65 - 130 MG/DL Final     VLDL   Date/Time Value Ref Range Status   08/05/2025 05:16 AM 8 0 - 40 mg/dL Final      Last I/O:  I/O last 3 completed shifts:  In: 1183 (13.7 mL/kg) [I.V.:183 (2.1 mL/kg); IV Piggyback:1000]  Out: - (0 mL/kg)   Weight: 86.5 kg     Past Cardiology Tests (Last 3 Years):  EKG:  ECG 12 lead 08/04/2025 (Preliminary)      Electrocardiogram 12 Lead 03/11/2025      ECG 12 lead 01/09/2025      ECG 12 lead 03/13/2024      ECG 12 lead 02/19/2024      ECG 12 lead 11/19/2023    Echo:  Transthoracic Echo (TTE) Complete 09/04/2024      Transthoracic Echo (TTE) Complete 02/20/2024    Ejection Fractions:  EF   Date/Time Value Ref Range Status   09/04/2024 10:38 AM 58 %      Cath:  Cardiac Catheterization Procedure 03/11/2025    Stress Test:  Nuclear Stress Test 02/20/2024    Cardiac Imaging:  No results found for this or any previous visit from the past 1095 days.      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    Past Surgical History:  He has a past surgical history that includes Back surgery (2012); Hernia repair (2015); Hernia repair (2013); and Cardiac catheterization (N/A, 3/11/2025).      Social History:  He reports that he has never smoked. He has never been exposed to tobacco smoke. He has never used smokeless tobacco. He reports that he does not drink alcohol and does not use drugs.    Family History:  Family History[1]     Allergies:  Hydromorphone, Latex, Penicillins, Dyphylline-guaifenesin, Grass pollen, Mold, and Ragweed    Inpatient Medications:  Scheduled Medications[2]  PRN Medications[3]  Continuous Medications[4]  Outpatient Medications:  Current Outpatient Medications   Medication Instructions    albuterol (Ventolin HFA) 90 mcg/actuation inhaler 1 puff, inhalation, Every 4 hours PRN    aspirin 81 mg, oral, Daily    atorvastatin (LIPITOR) 80 mg, oral, Daily    carvedilol (COREG) 12.5 mg, oral, 2 times daily before meals    clopidogrel (PLAVIX) 75 mg, oral, Daily    finasteride (PROSCAR) 5 mg, oral, Daily, Do not crush, chew, or split.    hydroCHLOROthiazide (HYDRODIURIL) 25 mg, oral, Daily    ipratropium-albuteroL (Duo-Neb) 0.5-2.5 mg/3 mL nebulizer solution 3 mL, Every 6 hours PRN    losartan (COZAAR) 100 mg,  oral, Daily    tamsulosin (FLOMAX) 0.8 mg, oral, Daily       Physical Exam:   Gen: NAD   Neck: no JVD, carotid upstroke is brisk and without delay   Heart: rrr, s1s2+ no mrg   Lungs: CTA   Ext: warm no edema     Assessment/Plan   NSTEMI: All labs and EKGs have been reviewed, this likely represents acute coronary syndrome.  I will continue heparin per ACS protocol.  Please continue aspirin and Plavix without interruption given his recent stent in March.  Will continue his high potency statin.  He is scheduled for an echocardiogram today.  Will pursue cardiac catheterization as part of his early invasive strategy.  Details of the procedure as well as the risks benefits and alternatives were reviewed with him.  Will get him to [facility] for this procedure today.  Hypertension: Currently normotensive.  Reasonable to hold his hydrochlorothiazide and losartan for now  Acute kidney injury: Upon review of his labs it looks as if his serum creatinine is very close to baseline.  Will hydrate him prior to cardiac catheterization and postprocedure depending on his end-diastolic pressure.      Peripheral IV 08/04/25 20 G Anterior;Left;Upper Arm (Active)   Site Assessment Clean;Intact;Dry 08/04/25 2100   Dressing Status Clean;Dry;Occlusive 08/04/25 2100   Number of days: 1       Peripheral IV 08/04/25 20 G Anterior;Right Hand (Active)   Site Assessment Clean;Dry;Intact 08/04/25 2100   Dressing Status Clean;Dry;Occlusive 08/04/25 2100   Number of days: 1       Code Status:  Full Code        Davi Mayer DO       [1]   Family History  Problem Relation Name Age of Onset    Heart disease Mother      Stroke Son      Autism Son     [2]   Scheduled medications   Medication Dose Route Frequency    aspirin  81 mg oral Daily    atorvastatin  80 mg oral Daily    carvedilol  12.5 mg oral BID AC    clopidogrel  75 mg oral Daily    finasteride  5 mg oral Daily    [Held by provider] hydroCHLOROthiazide  25 mg oral Daily    insulin lispro  0-5  Units subcutaneous TID AC    [Held by provider] losartan  100 mg oral Daily    perflutren lipid microspheres  0.5-10 mL of dilution intravenous Once in imaging    perflutren protein A microsphere  0.5 mL intravenous Once in imaging    polyethylene glycol  17 g oral Daily    sulfur hexafluoride microsphr  2 mL intravenous Once in imaging    tamsulosin  0.8 mg oral Daily   [3]   PRN medications   Medication    acetaminophen    Or    acetaminophen    Or    acetaminophen    albuterol    dextrose    dextrose    glucagon    glucagon    heparin (porcine)    ipratropium-albuteroL    nitroglycerin    oxygen   [4]   Continuous Medications   Medication Dose Last Rate    heparin  0-4,000 Units/hr 1,000 Units/hr (08/05/25 0007)    sodium chloride 0.9%  100 mL/hr

## 2025-08-05 NOTE — H&P (VIEW-ONLY)
Inpatient consult to Cardiology  Consult performed by: Davi Mayer DO  Consult ordered by: Flakita Keys MD  Reason for consult: NSTEMI        History Of Present Illness:    Duane Scott is a 75 y.o. male presenting with chest pain.  He has a history of atherosclerotic disease, ST elevation myocardial infarction complicated by cardiac arrest in 2009 where stents were placed in his circumflex and first diagonal.  He had an LAD PCI in March 2025 where a 3.0 x 12 Medtronic Rufus frontier drug-eluting stent was placed in his proximal to mid LAD.  Ejection fraction by ventriculogram at that time was 60%.  He also has a history of chronic kidney disease and cerebrovascular disease.  He follows with Dr. Spencer, was just seen by Dr. Carrillo earlier this month doing well from a cardiac standpoint.  He reports having chest pain while in the shower at 3 AM yesterday morning this subsided.  He then experienced chest pain while walking to [employer].  He sat down at [employer] and was told that he looked rather pale.  EMS was contacted who brought him to [facility] for evaluation.  His EKGs show sinus rhythm without any concerning ST-T wave abnormalities.  His vitals are stable.  He is currently on heparin per ACS protocol and is chest pain-free.    Review of Systems   Constitutional: Negative for diaphoresis, fever, weight gain and weight loss.   Eyes:  Negative for visual disturbance.   Cardiovascular:  Negative for chest pain, claudication, dyspnea on exertion, irregular heartbeat, leg swelling, near-syncope, orthopnea, palpitations, paroxysmal nocturnal dyspnea and syncope.   Respiratory:  Negative for cough, shortness of breath and wheezing.    Musculoskeletal:  Negative for muscle weakness and myalgias.   Neurological:  Negative for dizziness and weakness.   All other systems reviewed and are negative.         Last Recorded Vitals:  Vitals:    08/04/25 2325 08/05/25 0451 08/05/25 0454 08/05/25 0727   BP: 116/58  136/67  133/67   BP Location:    Left arm   Patient Position: Lying  Lying Lying   Pulse: 58  59    Resp:    18   Temp: 36.9 °C (98.4 °F)  37 °C (98.6 °F) 36.8 °C (98.2 °F)   TempSrc: Temporal  Temporal Temporal   SpO2: 97%  97%    Weight:  86.5 kg (190 lb 12.8 oz)     Height:           Last Labs:  CBC - 8/5/2025:  5:16 AM  4.3 11.1 107    33.2      CMP - 8/5/2025:  5:16 AM  8.9 6.5 20 --- 1.2   _ 3.7 21 75      PTT - 8/5/2025:  5:16 AM  1.1   11.9 36.8     Troponin I, High Sensitivity   Date/Time Value Ref Range Status   08/04/2025 10:20  (HH) 0 - 20 ng/L Final     Comment:     Previous result verified on 8/4/2025 1126 on specimen/case [id number] called with component [facility] for procedure Troponin I, High Sensitivity, Initial with value 395 ng/L.   08/04/2025 04:47  (HH) 0 - 20 ng/L Final     Comment:     Previous result verified on 8/4/2025 1126 on specimen/case [id number] called with component [facility] for procedure Troponin I, High Sensitivity, Initial with value 395 ng/L.   08/04/2025 11:33  (HH) 0 - 20 ng/L Final     Comment:     Previous result verified on 8/4/2025 1126 on specimen/case [id number] called with component [facility]PHS for procedure Troponin I, High Sensitivity, Initial with value 395 ng/L.     BNP   Date/Time Value Ref Range Status   08/04/2025 10:46  (H) 0 - 99 pg/mL Final   01/09/2025 02:38  (H) 0 - 99 pg/mL Final     POC HEMOGLOBIN A1c   Date/Time Value Ref Range Status   02/07/2025 11:02 AM 5.6 4.2 - 6.5 % Final     Hemoglobin A1C   Date/Time Value Ref Range Status   08/04/2025 11:33 AM 5.4 See comment % Final   08/07/2024 11:39 AM 6.0 (H) See below % Final     LDL-CHOLESTEROL   Date/Time Value Ref Range Status   02/10/2025 09:33 AM 52 mg/dL (calc) Final     Comment:     Reference range: <100     Desirable range <100 mg/dL for primary prevention;    <70 mg/dL for patients with CHD or diabetic patients   with > or = 2 CHD risk factors.     LDL-C is now calculated  using the Sammy   calculation, which is a validated novel method providing   better accuracy than the Friedewald equation in the   estimation of LDL-C.   Jeremy CUEVAS et al. PABLO. 2013;310(96): 2632-3544   (http://[employer].SiteBrains/faq/WZH155)       LDL Calculated   Date/Time Value Ref Range Status   08/05/2025 05:16 AM 36 <=99 mg/dL Final     Comment:                                 Near   Borderline      AGE      Desirable  Optimal    High     High     Very High     0-19 Y     0 - 109     ---    110-129   >/= 130     ----    20-24 Y     0 - 119     ---    120-159   >/= 160     ----      >24 Y     0 -  99   100-129  130-159   160-189     >/=190    LDL Cholesterol is calculated using the Friedewald equation.   07/25/2024 02:03 PM 51 (L) 65 - 130 mg/dL Final   05/18/2023 03:45  65 - 130 MG/DL Final   05/03/2022 01:34 PM 96 65 - 130 MG/DL Final   11/05/2019 10:36 AM 41 (L) 65 - 130 MG/DL Final     VLDL   Date/Time Value Ref Range Status   08/05/2025 05:16 AM 8 0 - 40 mg/dL Final      Last I/O:  I/O last 3 completed shifts:  In: 1183 (13.7 mL/kg) [I.V.:183 (2.1 mL/kg); IV Piggyback:1000]  Out: - (0 mL/kg)   Weight: 86.5 kg     Past Cardiology Tests (Last 3 Years):  EKG:  ECG 12 lead 08/04/2025 (Preliminary)      Electrocardiogram 12 Lead 03/11/2025      ECG 12 lead 01/09/2025      ECG 12 lead 03/13/2024      ECG 12 lead 02/19/2024      ECG 12 lead 11/19/2023    Echo:  Transthoracic Echo (TTE) Complete 09/04/2024      Transthoracic Echo (TTE) Complete 02/20/2024    Ejection Fractions:  EF   Date/Time Value Ref Range Status   09/04/2024 10:38 AM 58 %      Cath:  Cardiac Catheterization Procedure 03/11/2025    Stress Test:  Nuclear Stress Test 02/20/2024    Cardiac Imaging:  No results found for this or any previous visit from the past 1095 days.      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    Past Surgical History:  He has a past surgical history that includes Back surgery (2012); Hernia repair (2015); Hernia repair (2013); and Cardiac catheterization (N/A, 3/11/2025).      Social History:  He reports that he has never smoked. He has never been exposed to tobacco smoke. He has never used smokeless tobacco. He reports that he does not drink alcohol and does not use drugs.    Family History:  Family History[1]     Allergies:  Hydromorphone, Latex, Penicillins, Dyphylline-guaifenesin, Grass pollen, Mold, and Ragweed    Inpatient Medications:  Scheduled Medications[2]  PRN Medications[3]  Continuous Medications[4]  Outpatient Medications:  Current Outpatient Medications   Medication Instructions    albuterol (Ventolin HFA) 90 mcg/actuation inhaler 1 puff, inhalation, Every 4 hours PRN    aspirin 81 mg, oral, Daily    atorvastatin (LIPITOR) 80 mg, oral, Daily    carvedilol (COREG) 12.5 mg, oral, 2 times daily before meals    clopidogrel (PLAVIX) 75 mg, oral, Daily    finasteride (PROSCAR) 5 mg, oral, Daily, Do not crush, chew, or split.    hydroCHLOROthiazide (HYDRODIURIL) 25 mg, oral, Daily    ipratropium-albuteroL (Duo-Neb) 0.5-2.5 mg/3 mL nebulizer solution 3 mL, Every 6 hours PRN    losartan (COZAAR) 100 mg,  oral, Daily    tamsulosin (FLOMAX) 0.8 mg, oral, Daily       Physical Exam:   Gen: NAD   Neck: no JVD, carotid upstroke is brisk and without delay   Heart: rrr, s1s2+ no mrg   Lungs: CTA   Ext: warm no edema     Assessment/Plan   NSTEMI: All labs and EKGs have been reviewed, this likely represents acute coronary syndrome.  I will continue heparin per ACS protocol.  Please continue aspirin and Plavix without interruption given his recent stent in March.  Will continue his high potency statin.  He is scheduled for an echocardiogram today.  Will pursue cardiac catheterization as part of his early invasive strategy.  Details of the procedure as well as the risks benefits and alternatives were reviewed with him.  Will get him to [facility] for this procedure today.  Hypertension: Currently normotensive.  Reasonable to hold his hydrochlorothiazide and losartan for now  Acute kidney injury: Upon review of his labs it looks as if his serum creatinine is very close to baseline.  Will hydrate him prior to cardiac catheterization and postprocedure depending on his end-diastolic pressure.      Peripheral IV 08/04/25 20 G Anterior;Left;Upper Arm (Active)   Site Assessment Clean;Intact;Dry 08/04/25 2100   Dressing Status Clean;Dry;Occlusive 08/04/25 2100   Number of days: 1       Peripheral IV 08/04/25 20 G Anterior;Right Hand (Active)   Site Assessment Clean;Dry;Intact 08/04/25 2100   Dressing Status Clean;Dry;Occlusive 08/04/25 2100   Number of days: 1       Code Status:  Full Code        Davi Mayer DO       [1]   Family History  Problem Relation Name Age of Onset    Heart disease Mother      Stroke Son      Autism Son     [2]   Scheduled medications   Medication Dose Route Frequency    aspirin  81 mg oral Daily    atorvastatin  80 mg oral Daily    carvedilol  12.5 mg oral BID AC    clopidogrel  75 mg oral Daily    finasteride  5 mg oral Daily    [Held by provider] hydroCHLOROthiazide  25 mg oral Daily    insulin lispro  0-5  Units subcutaneous TID AC    [Held by provider] losartan  100 mg oral Daily    perflutren lipid microspheres  0.5-10 mL of dilution intravenous Once in imaging    perflutren protein A microsphere  0.5 mL intravenous Once in imaging    polyethylene glycol  17 g oral Daily    sulfur hexafluoride microsphr  2 mL intravenous Once in imaging    tamsulosin  0.8 mg oral Daily   [3]   PRN medications   Medication    acetaminophen    Or    acetaminophen    Or    acetaminophen    albuterol    dextrose    dextrose    glucagon    glucagon    heparin (porcine)    ipratropium-albuteroL    nitroglycerin    oxygen   [4]   Continuous Medications   Medication Dose Last Rate    heparin  0-4,000 Units/hr 1,000 Units/hr (08/05/25 0007)    sodium chloride 0.9%  100 mL/hr

## 2025-08-05 NOTE — PROGRESS NOTES
Spiritual Care Visit  Spiritual Care Request    Reason for Visit:  Routine Visit: Introduction     Request Received From:       Focus of Care:  Visited With: Patient not available         Refer to :          Spiritual Care Assessment    Spiritual Assessment:                      Care Provided:       Sense of Community and or Christianity Affiliation:  Gnosticist   Values/Beliefs  Spiritual Requests During Hospitalization: Duane could not be seen today.     Addressed Needs/Concerns and/or Kadeem Through:          Outcome:        Advance Directives:         Spiritual Care Annotation    Annotation:  Duane could not be seen today.  Eleazar Broussard

## 2025-08-05 NOTE — PROGRESS NOTES
08/05/25 1720   Discharge Planning   Living Arrangements Spouse/significant other   Support Systems Children;Spouse/significant other   Assistance Needed None   Type of Residence Private residence   Number of Stairs to Enter Residence 1   Number of Stairs Within Residence 0   Do you have animals or pets at home? Yes   Home or Post Acute Services None   Expected Discharge Disposition Home

## 2025-08-05 NOTE — PROGRESS NOTES
Duane Scott is a 75 y.o. male on day 1 of admission presenting with NSTEMI (non-ST elevated myocardial infarction) (Multi).      Subjective   No chest pain. Feeling better.     IV Heparin drip.        Objective     Last Recorded Vitals  /69 (BP Location: Right arm, Patient Position: Lying)   Pulse 54   Temp 36.8 °C (98.2 °F) (Temporal)   Resp 18   Wt 86.5 kg (190 lb 12.8 oz)   SpO2 97%   Intake/Output last 3 Shifts:    Intake/Output Summary (Last 24 hours) at 8/5/2025 1253  Last data filed at 8/5/2025 0600  Gross per 24 hour   Intake 1183 ml   Output --   Net 1183 ml       Admission Weight  Weight: 87.5 kg (193 lb) (08/04/25 1039)    Daily Weight  08/05/25 : 86.5 kg (190 lb 12.8 oz)    Image Results  Transthoracic Echo Complete              [place] [id number]              Phone [phone]    TRANSTHORACIC ECHOCARDIOGRAM REPORT    Patient Name:       DUANE SCOTT       Reading Physician:    [id number] Mayer DO  Study Date:         8/5/2025             Ordering Provider:    [id number] MÉNDEZ  MRN/PID:            [id number]             Fellow:  Accession#:         [id number]         Nurse:  Date of Birth/Age:  1950 / 75 years Sonographer:          Carole Valenzuela RDCS  Gender Assigned at  M                    Additional Staff:  Birth:  Height:             167.64 cm            Admit Date:  Weight:             87.54 kg             Admission Status:     Inpatient -                                                                 Routine  BSA / BMI:          1.97 m2 / 31.15      Department Location:  [facility]                      kg/m2  Blood Pressure: 125 /74 mmHg    Study Type:    TRANSTHORACIC ECHO (TTE) COMPLETE  Diagnosis/ICD: Non ST elevation  (NSTEMI) myocardial infarction-I21.4  Indication:    chest pain, Nstemi  CPT Codes:     Echo Complete w Full Doppler-[id number]    Patient History:  Pertinent History: CAD, hypercholesterolemia, HTN, Cardiac arrest, angina,                     NStemi, DM, GERD, CKD.    Study Detail: The following Echo studies were performed: 2D, M-Mode, Doppler and                color flow. Technically challenging study due to prominent lung                artifact. Definity used as a contrast agent for endocardial border                definition. Total contrast used for this procedure was 3 mL via IV                push.       PHYSICIAN INTERPRETATION:  Left Ventricle: Left ventricular ejection fraction is low normal by visual estimate at 50-55%. There is moderate concentric left ventricular hypertrophy. There are no regional wall motion abnormalities. The left ventricular cavity size is normal. There is normal septal and mildly increased posterior left ventricular wall thickness. Left Ventricular Global Longitudinal Strain - 12.3 %. Spectral Doppler shows a Grade II (pseudonormal pattern) of left ventricular diastolic filling with an elevated left atrial pressure.  Left Atrium: The left atrial size is mildly dilated.  Right Ventricle: The right ventricle is normal in size. There is normal right ventricular global systolic function.  Right Atrium: The right atrial size is normal.  Aortic Valve: The aortic valve is trileaflet. The aortic valve area by VTI is 2.12 cmÂ² with a peak velocity of 1.41 m/s. The peak and mean gradients are 8 mmHg and 4 mmHg, respectively, with a dimensionless index of 0.67. There is no evidence of aortic valve regurgitation.  Mitral Valve: The mitral valve is normal in structure. There is trace mitral valve regurgitation. The E Vmax is 0.72 m/s.  Tricuspid Valve: The tricuspid valve is structurally normal. There is trace tricuspid regurgitation.  Pulmonic Valve: The pulmonic valve is not well visualized. The  pulmonic valve regurgitation was not well visualized.  Pericardium: No pericardial effusion noted.  Aorta: The aortic root is normal.  Systemic Veins: The inferior vena cava was not assessed, IVC inspiratory collapse was not assessed.       CONCLUSIONS:   1. Left ventricular ejection fraction is low normal by visual estimate at 50-55%.   2. Spectral Doppler shows a Grade II (pseudonormal pattern) of left ventricular diastolic filling with an elevated left atrial pressure.   3. There is normal right ventricular global systolic function.   4. There is moderate concentric left ventricular hypertrophy.    QUANTITATIVE DATA SUMMARY:     2D MEASUREMENTS:              Normal Ranges:  LAs:             4.80 cm      (2.7-4.0cm)  IVSd:            0.98 cm      (0.6-1.1cm)  LVPWd:           1.30 cm      (0.6-1.1cm)  LVIDd:           5.67 cm      (3.9-5.9cm)  LVIDs:           4.44 cm  LV Mass Index:   135.8 g/m2  LVEDV Index:     109.74 ml/m2  LV % FS          21.7 %       LEFT ATRIUM:                  Normal Ranges:  LA Vol A4C:        73.4 ml    (22+/-6mL/m2)  LA Vol A2C:        77.2 ml  LA Vol BP:         76.5 ml  LA Vol Index A4C:  37.3ml/m2  LA Vol Index A2C:  39.2 ml/m2  LA Vol Index BP:   38.8 ml/m2  LA Area A4C:       23.6 cm2  LA Area A2C:       23.8 cm2  LA Major Axis A4C: 6.4 cm  LA Major Axis A2C: 6.2 cm  LA Volume Index:   37.1 ml/m2  LA Vol A4C:        70.5 ml  LA Vol A2C:        74.2 ml  LA Vol Index BSA:  36.7 ml/m2       RIGHT ATRIUM:                 Normal Ranges:  RA Vol A4C:        71.5 ml    (8.3-19.5ml)  RA Vol Index A4C:  36.3 ml/m2  RA Area A4C:       22.7 cm2  RA Major Axis A4C: 6.1 cm       M-MODE MEASUREMENTS:         Normal Ranges:  Ao Root:             3.00 cm (2.0-3.7cm)  LAs:                 4.40 cm (2.7-4.0cm)       AORTA MEASUREMENTS:         Normal Ranges:  Ao Sinus, d:        2.75 cm (2.1-3.5cm)  Ao STJ, d:          3.15 cm (1.7-3.4cm)       LV SYSTOLIC FUNCTION:                                          Normal Ranges:  EF-A4C View:                      52 % (>=55%)  EF-A2C View:                      58 %  EF-Biplane:                       54 %  EF-Visual:                        53 %  LV EF Reported:                   53 %  Global Longitudinal Strain (GLS): 12 %       LV DIASTOLIC FUNCTION:           Normal Ranges:  MV Peak E:             0.72 m/s  (0.7-1.2 m/s)  MV Peak A:             0.72 m/s  (0.42-0.7 m/s)  E/A Ratio:             0.99      (1.0-2.2)  MV e'                  0.045 m/s (>8.0)  MV lateral e'          0.05 m/s  MV medial e'           0.04 m/s  E/e' Ratio:            16.05     (<8.0)       MITRAL VALVE:          Normal Ranges:  MV DT:        305 msec (150-240msec)       AORTIC VALVE:                     Normal Ranges:  AoV Vmax:                1.41 m/s (<=1.7m/s)  AoV Peak P.0 mmHg (<20mmHg)  AoV Mean P.0 mmHg (1.7-11.5mmHg)  LVOT Max Zechariah:            0.89 m/s (<=1.1m/s)  AoV VTI:                 29.40 cm (18-25cm)  LVOT VTI:                19.80 cm  LVOT Diameter:           2.00 cm  (1.8-2.4cm)  AoV Area, VTI:           2.12 cm2 (2.5-5.5cm2)  AoV Area,Vmax:           1.98 cm2 (2.5-4.5cm2)  AoV Dimensionless Index: 0.67       RIGHT VENTRICLE:  RV Basal 3.99 cm  RV Mid   2.85 cm  RV Major 7.1 cm  TAPSE:   25.9 mm  RV s'    0.13 m/s       TRICUSPID VALVE/RVSP:          Normal Ranges:  Peak TR Velocity:     2.41 m/s       [id number] Davi Mayer DO  Electronically signed on 2025 at 10:38:15 AM       ** Final **  ECG 12 lead  Normal sinus rhythm with sinus arrhythmia  Normal ECG  When compared with ECG of 11-MAR-2025 11:13,  Premature atrial complexes are no longer Present  T wave inversion no longer evident in Inferior leads  Confirmed by Luis Eduardo Carrillo ([id number]) on 2025 9:09:08 AM      Physical Exam  Constitutional:       Appearance: He is ill-appearing.   HENT:      Head: Normocephalic and atraumatic.      Nose: Nose normal.      Mouth/Throat:      Mouth: Mucous  membranes are dry.      Pharynx: Oropharynx is clear.      Eyes:      Extraocular Movements: Extraocular movements intact.      Conjunctiva/sclera: Conjunctivae normal.      Pupils: Pupils are equal, round, and reactive to light.         Cardiovascular:      Rate and Rhythm: Normal rate and regular rhythm.      Pulses: Normal pulses.      Heart sounds: Normal heart sounds.   Pulmonary:      Effort: Pulmonary effort is normal.      Breath sounds: Normal breath sounds.   Abdominal:      General: Abdomen is flat.      Musculoskeletal:         General: Normal range of motion.      Cervical back: Normal range of motion and neck supple.      Right lower leg: Edema present.      Left lower leg: Edema present.      Skin:     General: Skin is warm.      Capillary Refill: Capillary refill takes less than 2 seconds.      Neurological:      General: No focal deficit present.      Mental Status: He is alert and oriented to person, place, and time.      Cranial Nerves: No cranial nerve deficit.      Motor: No weakness.      Psychiatric:         Mood and Affect: Mood normal.         Thought Content: Thought content normal.   Relevant Results                            Assessment & Plan  NSTEMI (non-ST elevated myocardial infarction) (Multi)  -Chest discomfort, resolved now. up trending troponin.   -ED started heparin drip and talked to cardiology, and was told can stay at [facility]  -Echo completed  -Cardiology consult, angiogram on 8/6 at [place]  -Npo midnight  -Statin  -Aspirin  -Trend troponin until flat, dced  -Plavix  -Pt/OT    # HTN  -Continue coreg  -Holding thiazide and losartan due to FANTA  Acute kidney injury  -Baseline cr 1.3. came with 1.6  -Trial of a Iv fluids pre cath  -Follow BMP    DVT: Heparin protocol for NSTEMI             Flakita Keys MD

## 2025-08-05 NOTE — CARE PLAN
The patient's goals for the shift include get better and go home    The clinical goals for the shift include monitor PTT

## 2025-08-05 NOTE — ASSESSMENT & PLAN NOTE
-Chest discomfort, resolved now. up trending troponin.   -ED started heparin drip and talked to cardiology, and was told can stay at [facility]  -Echo completed  -Cardiology consult, angiogram on 8/6 at [place]  -Npo midnight  -Statin  -Aspirin  -Trend troponin until flat, dced  -Plavix  -Pt/OT    # HTN  -Continue coreg  -Holding thiazide and losartan due to FANTA

## 2025-08-06 ENCOUNTER — HOSPITAL ENCOUNTER (INPATIENT)
Facility: HOSPITAL | Age: 75
LOS: 1 days | Discharge: ANOTHER HOSPITAL | DRG: 281 | End: 2025-08-06
Attending: INTERNAL MEDICINE | Admitting: INTERNAL MEDICINE
Payer: MEDICARE

## 2025-08-06 ENCOUNTER — APPOINTMENT (OUTPATIENT)
Dept: CARDIOLOGY | Facility: HOSPITAL | Age: 75
DRG: 281 | End: 2025-08-06
Payer: MEDICARE

## 2025-08-06 ENCOUNTER — HOSPITAL ENCOUNTER (INPATIENT)
Facility: HOSPITAL | Age: 75
End: 2025-08-06
Attending: STUDENT IN AN ORGANIZED HEALTH CARE EDUCATION/TRAINING PROGRAM | Admitting: STUDENT IN AN ORGANIZED HEALTH CARE EDUCATION/TRAINING PROGRAM
Payer: MEDICARE

## 2025-08-06 ENCOUNTER — APPOINTMENT (OUTPATIENT)
Dept: RADIOLOGY | Facility: HOSPITAL | Age: 75
DRG: 281 | End: 2025-08-06
Payer: MEDICARE

## 2025-08-06 VITALS
OXYGEN SATURATION: 97 % | RESPIRATION RATE: 18 BRPM | HEART RATE: 58 BPM | HEIGHT: 66 IN | DIASTOLIC BLOOD PRESSURE: 70 MMHG | SYSTOLIC BLOOD PRESSURE: 131 MMHG | BODY MASS INDEX: 30.75 KG/M2 | TEMPERATURE: 98.2 F | WEIGHT: 191.36 LBS

## 2025-08-06 VITALS
SYSTOLIC BLOOD PRESSURE: 148 MMHG | OXYGEN SATURATION: 97 % | BODY MASS INDEX: 31.32 KG/M2 | RESPIRATION RATE: 17 BRPM | HEART RATE: 59 BPM | TEMPERATURE: 97.7 F | DIASTOLIC BLOOD PRESSURE: 85 MMHG | WEIGHT: 194.89 LBS | HEIGHT: 66 IN

## 2025-08-06 DIAGNOSIS — R25.3 FASCICULATIONS: ICD-10-CM

## 2025-08-06 DIAGNOSIS — G89.18 POSTOPERATIVE PAIN: ICD-10-CM

## 2025-08-06 DIAGNOSIS — K21.9 GASTROESOPHAGEAL REFLUX DISEASE WITHOUT ESOPHAGITIS: ICD-10-CM

## 2025-08-06 DIAGNOSIS — I25.119 ATHEROSCLEROTIC HEART DISEASE OF NATIVE CORONARY ARTERY WITH UNSPECIFIED ANGINA PECTORIS: ICD-10-CM

## 2025-08-06 DIAGNOSIS — Z01.818 ENCOUNTER FOR OTHER PREPROCEDURAL EXAMINATION: ICD-10-CM

## 2025-08-06 DIAGNOSIS — I21.4 NSTEMI (NON-ST ELEVATED MYOCARDIAL INFARCTION) (MULTI): Primary | ICD-10-CM

## 2025-08-06 DIAGNOSIS — R93.1 ABNORMAL FINDINGS ON DIAGNOSTIC IMAGING OF HEART AND CORONARY CIRCULATION: ICD-10-CM

## 2025-08-06 DIAGNOSIS — K59.00 CONSTIPATION, UNSPECIFIED CONSTIPATION TYPE: ICD-10-CM

## 2025-08-06 DIAGNOSIS — I31.9 PERICARDITIS, UNSPECIFIED CHRONICITY, UNSPECIFIED TYPE (HHS-HCC): ICD-10-CM

## 2025-08-06 DIAGNOSIS — E11.59 TYPE 2 DIABETES MELLITUS WITH OTHER CIRCULATORY COMPLICATION, WITHOUT LONG-TERM CURRENT USE OF INSULIN: ICD-10-CM

## 2025-08-06 DIAGNOSIS — R07.9 CHEST PAIN, UNSPECIFIED: ICD-10-CM

## 2025-08-06 DIAGNOSIS — Z95.5 STATUS POST CORONARY ARTERY STENT PLACEMENT: ICD-10-CM

## 2025-08-06 DIAGNOSIS — R52 PAIN: ICD-10-CM

## 2025-08-06 DIAGNOSIS — Z01.810 ENCOUNTER FOR PREPROCEDURAL CARDIOVASCULAR EXAMINATION: ICD-10-CM

## 2025-08-06 DIAGNOSIS — N39.0 ACUTE UTI: ICD-10-CM

## 2025-08-06 DIAGNOSIS — I20.0 ANGINA PECTORIS, UNSTABLE (MULTI): ICD-10-CM

## 2025-08-06 DIAGNOSIS — R09.89 OTHER SPECIFIED SYMPTOMS AND SIGNS INVOLVING THE CIRCULATORY AND RESPIRATORY SYSTEMS: ICD-10-CM

## 2025-08-06 DIAGNOSIS — I12.9 HYPERTENSIVE CHRONIC KIDNEY DISEASE W STG 1-4/UNSP CHR KDNY: ICD-10-CM

## 2025-08-06 DIAGNOSIS — J43.1 PANLOBULAR EMPHYSEMA (MULTI): ICD-10-CM

## 2025-08-06 DIAGNOSIS — R60.1 GENERALIZED EDEMA: ICD-10-CM

## 2025-08-06 DIAGNOSIS — D69.6 THROMBOCYTOPENIA, UNSPECIFIED: ICD-10-CM

## 2025-08-06 DIAGNOSIS — I25.10 CORONARY ARTERY DISEASE INVOLVING NATIVE CORONARY ARTERY OF NATIVE HEART WITHOUT ANGINA PECTORIS: ICD-10-CM

## 2025-08-06 LAB
ACT BLD: 119 SEC (ref 89–169)
EST. AVERAGE GLUCOSE BLD GHB EST-MCNC: 111 MG/DL
GLUCOSE BLD MANUAL STRIP-MCNC: 103 MG/DL (ref 74–99)
GLUCOSE BLD MANUAL STRIP-MCNC: 162 MG/DL (ref 74–99)
GLUCOSE BLD MANUAL STRIP-MCNC: 87 MG/DL (ref 74–99)
HBA1C MFR BLD: 5.5 % (ref ?–5.7)
TSH SERPL-ACNC: 4.05 MIU/L (ref 0.44–3.98)

## 2025-08-06 PROCEDURE — 84443 ASSAY THYROID STIM HORMONE: CPT | Performed by: NURSE PRACTITIONER

## 2025-08-06 PROCEDURE — 99223 1ST HOSP IP/OBS HIGH 75: CPT | Performed by: INTERNAL MEDICINE

## 2025-08-06 PROCEDURE — 99238 HOSP IP/OBS DSCHRG MGMT 30/<: CPT | Performed by: STUDENT IN AN ORGANIZED HEALTH CARE EDUCATION/TRAINING PROGRAM

## 2025-08-06 PROCEDURE — 36415 COLL VENOUS BLD VENIPUNCTURE: CPT | Performed by: STUDENT IN AN ORGANIZED HEALTH CARE EDUCATION/TRAINING PROGRAM

## 2025-08-06 PROCEDURE — 93970 EXTREMITY STUDY: CPT | Performed by: SURGERY

## 2025-08-06 PROCEDURE — 2500000001 HC RX 250 WO HCPCS SELF ADMINISTERED DRUGS (ALT 637 FOR MEDICARE OP): Performed by: NURSE PRACTITIONER

## 2025-08-06 PROCEDURE — 2720000007 HC OR 272 NO HCPCS: Performed by: INTERNAL MEDICINE

## 2025-08-06 PROCEDURE — 93880 EXTRACRANIAL BILAT STUDY: CPT | Performed by: SURGERY

## 2025-08-06 PROCEDURE — 82947 ASSAY GLUCOSE BLOOD QUANT: CPT

## 2025-08-06 PROCEDURE — 93454 CORONARY ARTERY ANGIO S&I: CPT | Performed by: INTERNAL MEDICINE

## 2025-08-06 PROCEDURE — 85730 THROMBOPLASTIN TIME PARTIAL: CPT | Performed by: STUDENT IN AN ORGANIZED HEALTH CARE EDUCATION/TRAINING PROGRAM

## 2025-08-06 PROCEDURE — 2500000004 HC RX 250 GENERAL PHARMACY W/ HCPCS (ALT 636 FOR OP/ED): Performed by: STUDENT IN AN ORGANIZED HEALTH CARE EDUCATION/TRAINING PROGRAM

## 2025-08-06 PROCEDURE — 99222 1ST HOSP IP/OBS MODERATE 55: CPT | Performed by: NURSE PRACTITIONER

## 2025-08-06 PROCEDURE — 80053 COMPREHEN METABOLIC PANEL: CPT | Performed by: STUDENT IN AN ORGANIZED HEALTH CARE EDUCATION/TRAINING PROGRAM

## 2025-08-06 PROCEDURE — 99153 MOD SED SAME PHYS/QHP EA: CPT | Performed by: INTERNAL MEDICINE

## 2025-08-06 PROCEDURE — 1200000002 HC GENERAL ROOM WITH TELEMETRY DAILY

## 2025-08-06 PROCEDURE — 84075 ASSAY ALKALINE PHOSPHATASE: CPT | Performed by: PHYSICIAN ASSISTANT

## 2025-08-06 PROCEDURE — 2500000004 HC RX 250 GENERAL PHARMACY W/ HCPCS (ALT 636 FOR OP/ED): Performed by: INTERNAL MEDICINE

## 2025-08-06 PROCEDURE — 99152 MOD SED SAME PHYS/QHP 5/>YRS: CPT | Performed by: INTERNAL MEDICINE

## 2025-08-06 PROCEDURE — 85347 COAGULATION TIME ACTIVATED: CPT

## 2025-08-06 PROCEDURE — 2500000002 HC RX 250 W HCPCS SELF ADMINISTERED DRUGS (ALT 637 FOR MEDICARE OP, ALT 636 FOR OP/ED): Performed by: INTERNAL MEDICINE

## 2025-08-06 PROCEDURE — 93005 ELECTROCARDIOGRAM TRACING: CPT

## 2025-08-06 PROCEDURE — 2500000001 HC RX 250 WO HCPCS SELF ADMINISTERED DRUGS (ALT 637 FOR MEDICARE OP): Performed by: INTERNAL MEDICINE

## 2025-08-06 PROCEDURE — 71250 CT THORAX DX C-: CPT

## 2025-08-06 PROCEDURE — 93880 EXTRACRANIAL BILAT STUDY: CPT

## 2025-08-06 PROCEDURE — 84439 ASSAY OF FREE THYROXINE: CPT | Performed by: PHYSICIAN ASSISTANT

## 2025-08-06 PROCEDURE — 83036 HEMOGLOBIN GLYCOSYLATED A1C: CPT | Mod: WESLAB | Performed by: NURSE PRACTITIONER

## 2025-08-06 PROCEDURE — 84443 ASSAY THYROID STIM HORMONE: CPT | Performed by: PHYSICIAN ASSISTANT

## 2025-08-06 PROCEDURE — 71250 CT THORAX DX C-: CPT | Performed by: RADIOLOGY

## 2025-08-06 PROCEDURE — 2550000001 HC RX 255 CONTRASTS: Mod: JW | Performed by: INTERNAL MEDICINE

## 2025-08-06 PROCEDURE — 84484 ASSAY OF TROPONIN QUANT: CPT | Performed by: NURSE PRACTITIONER

## 2025-08-06 PROCEDURE — 85027 COMPLETE CBC AUTOMATED: CPT | Mod: WESLAB | Performed by: NURSE PRACTITIONER

## 2025-08-06 PROCEDURE — 2060000001 HC INTERMEDIATE ICU ROOM DAILY

## 2025-08-06 PROCEDURE — 93970 EXTREMITY STUDY: CPT

## 2025-08-06 PROCEDURE — 36415 COLL VENOUS BLD VENIPUNCTURE: CPT | Performed by: NURSE PRACTITIONER

## 2025-08-06 PROCEDURE — 2500000004 HC RX 250 GENERAL PHARMACY W/ HCPCS (ALT 636 FOR OP/ED): Performed by: NURSE PRACTITIONER

## 2025-08-06 RX ORDER — ACETAMINOPHEN 650 MG/1
650 SUPPOSITORY RECTAL EVERY 4 HOURS PRN
Status: DISCONTINUED | OUTPATIENT
Start: 2025-08-06 | End: 2025-08-06 | Stop reason: HOSPADM

## 2025-08-06 RX ORDER — LOSARTAN POTASSIUM 100 MG/1
100 TABLET ORAL DAILY
Status: DISCONTINUED | OUTPATIENT
Start: 2025-08-06 | End: 2025-08-06 | Stop reason: HOSPADM

## 2025-08-06 RX ORDER — MIDAZOLAM HYDROCHLORIDE 1 MG/ML
INJECTION, SOLUTION INTRAMUSCULAR; INTRAVENOUS AS NEEDED
Status: DISCONTINUED | OUTPATIENT
Start: 2025-08-06 | End: 2025-08-06 | Stop reason: HOSPADM

## 2025-08-06 RX ORDER — CARVEDILOL 12.5 MG/1
12.5 TABLET ORAL
Status: DISCONTINUED | OUTPATIENT
Start: 2025-08-06 | End: 2025-08-06 | Stop reason: HOSPADM

## 2025-08-06 RX ORDER — FINASTERIDE 5 MG/1
5 TABLET, FILM COATED ORAL DAILY
Status: DISCONTINUED | OUTPATIENT
Start: 2025-08-06 | End: 2025-08-06 | Stop reason: HOSPADM

## 2025-08-06 RX ORDER — LOSARTAN POTASSIUM 100 MG/1
100 TABLET ORAL DAILY
Status: DISCONTINUED | OUTPATIENT
Start: 2025-08-07 | End: 2025-08-07

## 2025-08-06 RX ORDER — TAMSULOSIN HYDROCHLORIDE 0.4 MG/1
0.8 CAPSULE ORAL DAILY
Status: DISCONTINUED | OUTPATIENT
Start: 2025-08-06 | End: 2025-08-06 | Stop reason: HOSPADM

## 2025-08-06 RX ORDER — ACETAMINOPHEN 325 MG/1
650 TABLET ORAL EVERY 6 HOURS PRN
Status: DISCONTINUED | OUTPATIENT
Start: 2025-08-06 | End: 2025-08-06

## 2025-08-06 RX ORDER — LIDOCAINE HYDROCHLORIDE 10 MG/ML
INJECTION, SOLUTION EPIDURAL; INFILTRATION; INTRACAUDAL; PERINEURAL AS NEEDED
Status: DISCONTINUED | OUTPATIENT
Start: 2025-08-06 | End: 2025-08-06 | Stop reason: HOSPADM

## 2025-08-06 RX ORDER — ONDANSETRON HYDROCHLORIDE 2 MG/ML
4 INJECTION, SOLUTION INTRAVENOUS EVERY 8 HOURS PRN
Status: ACTIVE | OUTPATIENT
Start: 2025-08-06

## 2025-08-06 RX ORDER — ONDANSETRON 4 MG/1
4 TABLET, FILM COATED ORAL EVERY 8 HOURS PRN
Status: DISCONTINUED | OUTPATIENT
Start: 2025-08-06 | End: 2025-08-06 | Stop reason: HOSPADM

## 2025-08-06 RX ORDER — ISOSORBIDE MONONITRATE 60 MG/1
60 TABLET, EXTENDED RELEASE ORAL DAILY
Status: DISCONTINUED | OUTPATIENT
Start: 2025-08-06 | End: 2025-08-06 | Stop reason: HOSPADM

## 2025-08-06 RX ORDER — SODIUM CHLORIDE 9 MG/ML
INJECTION, SOLUTION INTRAVENOUS CONTINUOUS PRN
Status: COMPLETED | OUTPATIENT
Start: 2025-08-06 | End: 2025-08-06

## 2025-08-06 RX ORDER — INSULIN LISPRO 100 [IU]/ML
0-5 INJECTION, SOLUTION INTRAVENOUS; SUBCUTANEOUS EVERY 4 HOURS
Status: DISCONTINUED | OUTPATIENT
Start: 2025-08-06 | End: 2025-08-08

## 2025-08-06 RX ORDER — DEXTROSE 50 % IN WATER (D50W) INTRAVENOUS SYRINGE
12.5
Status: DISCONTINUED | OUTPATIENT
Start: 2025-08-06 | End: 2025-08-06 | Stop reason: HOSPADM

## 2025-08-06 RX ORDER — ACETAMINOPHEN 325 MG/1
650 TABLET ORAL EVERY 4 HOURS PRN
Status: DISCONTINUED | OUTPATIENT
Start: 2025-08-06 | End: 2025-08-06 | Stop reason: HOSPADM

## 2025-08-06 RX ORDER — ATORVASTATIN CALCIUM 80 MG/1
80 TABLET, FILM COATED ORAL NIGHTLY
Status: DISCONTINUED | OUTPATIENT
Start: 2025-08-06 | End: 2025-08-06 | Stop reason: HOSPADM

## 2025-08-06 RX ORDER — DEXTROSE 50 % IN WATER (D50W) INTRAVENOUS SYRINGE
25
Status: ACTIVE | OUTPATIENT
Start: 2025-08-06

## 2025-08-06 RX ORDER — CARVEDILOL 12.5 MG/1
12.5 TABLET ORAL
Status: DISPENSED | OUTPATIENT
Start: 2025-08-07

## 2025-08-06 RX ORDER — INSULIN LISPRO 100 [IU]/ML
0-10 INJECTION, SOLUTION INTRAVENOUS; SUBCUTANEOUS
Status: DISCONTINUED | OUTPATIENT
Start: 2025-08-06 | End: 2025-08-06 | Stop reason: HOSPADM

## 2025-08-06 RX ORDER — ENOXAPARIN SODIUM 100 MG/ML
40 INJECTION SUBCUTANEOUS EVERY 24 HOURS
Status: DISCONTINUED | OUTPATIENT
Start: 2025-08-06 | End: 2025-08-06

## 2025-08-06 RX ORDER — FINASTERIDE 5 MG/1
5 TABLET, FILM COATED ORAL DAILY
Status: DISPENSED | OUTPATIENT
Start: 2025-08-07

## 2025-08-06 RX ORDER — ONDANSETRON 4 MG/1
4 TABLET, ORALLY DISINTEGRATING ORAL EVERY 8 HOURS PRN
Status: ACTIVE | OUTPATIENT
Start: 2025-08-06

## 2025-08-06 RX ORDER — CLOPIDOGREL BISULFATE 75 MG/1
75 TABLET ORAL DAILY
Status: DISCONTINUED | OUTPATIENT
Start: 2025-08-07 | End: 2025-08-07

## 2025-08-06 RX ORDER — SODIUM CHLORIDE, SODIUM LACTATE, POTASSIUM CHLORIDE, CALCIUM CHLORIDE 600; 310; 30; 20 MG/100ML; MG/100ML; MG/100ML; MG/100ML
75 INJECTION, SOLUTION INTRAVENOUS CONTINUOUS
Status: DISCONTINUED | OUTPATIENT
Start: 2025-08-06 | End: 2025-08-07

## 2025-08-06 RX ORDER — ONDANSETRON HYDROCHLORIDE 2 MG/ML
4 INJECTION, SOLUTION INTRAVENOUS EVERY 8 HOURS PRN
Status: DISCONTINUED | OUTPATIENT
Start: 2025-08-06 | End: 2025-08-06 | Stop reason: HOSPADM

## 2025-08-06 RX ORDER — IPRATROPIUM BROMIDE AND ALBUTEROL SULFATE 2.5; .5 MG/3ML; MG/3ML
3 SOLUTION RESPIRATORY (INHALATION) EVERY 6 HOURS PRN
Status: DISCONTINUED | OUTPATIENT
Start: 2025-08-06 | End: 2025-08-06 | Stop reason: HOSPADM

## 2025-08-06 RX ORDER — ALBUTEROL SULFATE 90 UG/1
1 INHALANT RESPIRATORY (INHALATION) EVERY 4 HOURS PRN
Status: DISPENSED | OUTPATIENT
Start: 2025-08-06

## 2025-08-06 RX ORDER — TALC
3 POWDER (GRAM) TOPICAL NIGHTLY PRN
Status: DISPENSED | OUTPATIENT
Start: 2025-08-06

## 2025-08-06 RX ORDER — DEXTROSE 50 % IN WATER (D50W) INTRAVENOUS SYRINGE
25
Status: DISCONTINUED | OUTPATIENT
Start: 2025-08-06 | End: 2025-08-06 | Stop reason: HOSPADM

## 2025-08-06 RX ORDER — NAPROXEN SODIUM 220 MG/1
81 TABLET, FILM COATED ORAL ONCE
Status: COMPLETED | OUTPATIENT
Start: 2025-08-06 | End: 2025-08-06

## 2025-08-06 RX ORDER — HEPARIN SODIUM 10000 [USP'U]/100ML
0-4000 INJECTION, SOLUTION INTRAVENOUS CONTINUOUS
Status: DISPENSED | OUTPATIENT
Start: 2025-08-06

## 2025-08-06 RX ORDER — ASPIRIN 81 MG/1
81 TABLET ORAL DAILY
Status: DISCONTINUED | OUTPATIENT
Start: 2025-08-07 | End: 2025-08-06 | Stop reason: HOSPADM

## 2025-08-06 RX ORDER — ACETAMINOPHEN 160 MG/5ML
650 SOLUTION ORAL EVERY 4 HOURS PRN
Status: DISCONTINUED | OUTPATIENT
Start: 2025-08-06 | End: 2025-08-06 | Stop reason: HOSPADM

## 2025-08-06 RX ORDER — SODIUM CHLORIDE 9 MG/ML
50 INJECTION, SOLUTION INTRAVENOUS CONTINUOUS
Status: ACTIVE | OUTPATIENT
Start: 2025-08-06 | End: 2025-08-06

## 2025-08-06 RX ORDER — ATORVASTATIN CALCIUM 80 MG/1
80 TABLET, FILM COATED ORAL DAILY
Status: DISPENSED | OUTPATIENT
Start: 2025-08-07

## 2025-08-06 RX ORDER — ENOXAPARIN SODIUM 100 MG/ML
40 INJECTION SUBCUTANEOUS EVERY 24 HOURS
Status: DISCONTINUED | OUTPATIENT
Start: 2025-08-06 | End: 2025-08-06 | Stop reason: HOSPADM

## 2025-08-06 RX ORDER — HYDROCHLOROTHIAZIDE 25 MG/1
25 TABLET ORAL DAILY
Status: DISPENSED | OUTPATIENT
Start: 2025-08-07

## 2025-08-06 RX ORDER — IODIXANOL 320 MG/ML
INJECTION, SOLUTION INTRAVASCULAR AS NEEDED
Status: DISCONTINUED | OUTPATIENT
Start: 2025-08-06 | End: 2025-08-06 | Stop reason: HOSPADM

## 2025-08-06 RX ORDER — POLYETHYLENE GLYCOL 3350 17 G/17G
17 POWDER, FOR SOLUTION ORAL DAILY
Status: DISPENSED | OUTPATIENT
Start: 2025-08-07

## 2025-08-06 RX ORDER — IPRATROPIUM BROMIDE AND ALBUTEROL SULFATE 2.5; .5 MG/3ML; MG/3ML
3 SOLUTION RESPIRATORY (INHALATION) EVERY 6 HOURS PRN
Status: DISPENSED | OUTPATIENT
Start: 2025-08-06

## 2025-08-06 RX ORDER — TAMSULOSIN HYDROCHLORIDE 0.4 MG/1
0.8 CAPSULE ORAL DAILY
Status: DISPENSED | OUTPATIENT
Start: 2025-08-07

## 2025-08-06 RX ORDER — ASPIRIN 81 MG/1
81 TABLET ORAL DAILY
Status: DISPENSED | OUTPATIENT
Start: 2025-08-07

## 2025-08-06 RX ORDER — DEXTROSE 50 % IN WATER (D50W) INTRAVENOUS SYRINGE
12.5
Status: ACTIVE | OUTPATIENT
Start: 2025-08-06

## 2025-08-06 RX ADMIN — ASPIRIN 81 MG: 81 TABLET, CHEWABLE ORAL at 07:38

## 2025-08-06 RX ADMIN — ENOXAPARIN SODIUM 40 MG: 100 INJECTION SUBCUTANEOUS at 14:30

## 2025-08-06 RX ADMIN — ATORVASTATIN CALCIUM 80 MG: 80 TABLET, FILM COATED ORAL at 20:33

## 2025-08-06 RX ADMIN — INSULIN LISPRO 2 UNITS: 100 INJECTION, SOLUTION INTRAVENOUS; SUBCUTANEOUS at 16:46

## 2025-08-06 RX ADMIN — SODIUM CHLORIDE, POTASSIUM CHLORIDE, SODIUM LACTATE AND CALCIUM CHLORIDE 75 ML/HR: 600; 310; 30; 20 INJECTION, SOLUTION INTRAVENOUS at 23:30

## 2025-08-06 RX ADMIN — HEPARIN SODIUM 1000 UNITS/HR: 10000 INJECTION, SOLUTION INTRAVENOUS at 23:28

## 2025-08-06 RX ADMIN — CARVEDILOL 12.5 MG: 12.5 TABLET, FILM COATED ORAL at 16:46

## 2025-08-06 RX ADMIN — SODIUM CHLORIDE 50 ML/HR: 900 INJECTION, SOLUTION INTRAVENOUS at 09:34

## 2025-08-06 RX ADMIN — ISOSORBIDE MONONITRATE 60 MG: 60 TABLET, EXTENDED RELEASE ORAL at 14:30

## 2025-08-06 RX ADMIN — FINASTERIDE 5 MG: 5 TABLET, FILM COATED ORAL at 14:30

## 2025-08-06 RX ADMIN — TAMSULOSIN HYDROCHLORIDE 0.8 MG: 0.4 CAPSULE ORAL at 14:37

## 2025-08-06 SDOH — ECONOMIC STABILITY: INCOME INSECURITY: IN THE PAST 12 MONTHS HAS THE ELECTRIC, GAS, OIL, OR WATER COMPANY THREATENED TO SHUT OFF SERVICES IN YOUR HOME?: NO

## 2025-08-06 SDOH — SOCIAL STABILITY: SOCIAL INSECURITY: ARE THERE ANY APPARENT SIGNS OF INJURIES/BEHAVIORS THAT COULD BE RELATED TO ABUSE/NEGLECT?: NO

## 2025-08-06 SDOH — ECONOMIC STABILITY: FOOD INSECURITY: WITHIN THE PAST 12 MONTHS, YOU WORRIED THAT YOUR FOOD WOULD RUN OUT BEFORE YOU GOT THE MONEY TO BUY MORE.: NEVER TRUE

## 2025-08-06 SDOH — SOCIAL STABILITY: SOCIAL INSECURITY: ABUSE: ADULT

## 2025-08-06 SDOH — ECONOMIC STABILITY: FOOD INSECURITY: WITHIN THE PAST 12 MONTHS, THE FOOD YOU BOUGHT JUST DIDN'T LAST AND YOU DIDN'T HAVE MONEY TO GET MORE.: NEVER TRUE

## 2025-08-06 SDOH — SOCIAL STABILITY: SOCIAL INSECURITY: WITHIN THE LAST YEAR, HAVE YOU BEEN AFRAID OF YOUR PARTNER OR EX-PARTNER?: NO

## 2025-08-06 SDOH — SOCIAL STABILITY: SOCIAL INSECURITY: DO YOU FEEL UNSAFE GOING BACK TO THE PLACE WHERE YOU ARE LIVING?: NO

## 2025-08-06 SDOH — SOCIAL STABILITY: SOCIAL INSECURITY: DOES ANYONE TRY TO KEEP YOU FROM HAVING/CONTACTING OTHER FRIENDS OR DOING THINGS OUTSIDE YOUR HOME?: NO

## 2025-08-06 SDOH — SOCIAL STABILITY: SOCIAL INSECURITY: ARE YOU OR HAVE YOU BEEN THREATENED OR ABUSED PHYSICALLY, EMOTIONALLY, OR SEXUALLY BY ANYONE?: NO

## 2025-08-06 SDOH — SOCIAL STABILITY: SOCIAL INSECURITY: WERE YOU ABLE TO COMPLETE ALL THE BEHAVIORAL HEALTH SCREENINGS?: YES

## 2025-08-06 SDOH — SOCIAL STABILITY: SOCIAL INSECURITY: HAS ANYONE EVER THREATENED TO HURT YOUR FAMILY OR YOUR PETS?: NO

## 2025-08-06 SDOH — SOCIAL STABILITY: SOCIAL INSECURITY: WITHIN THE LAST YEAR, HAVE YOU BEEN HUMILIATED OR EMOTIONALLY ABUSED IN OTHER WAYS BY YOUR PARTNER OR EX-PARTNER?: NO

## 2025-08-06 SDOH — SOCIAL STABILITY: SOCIAL INSECURITY: HAVE YOU HAD THOUGHTS OF HARMING ANYONE ELSE?: NO

## 2025-08-06 SDOH — SOCIAL STABILITY: SOCIAL INSECURITY: DO YOU FEEL ANYONE HAS EXPLOITED OR TAKEN ADVANTAGE OF YOU FINANCIALLY OR OF YOUR PERSONAL PROPERTY?: NO

## 2025-08-06 SDOH — SOCIAL STABILITY: SOCIAL INSECURITY: HAVE YOU HAD ANY THOUGHTS OF HARMING ANYONE ELSE?: NO

## 2025-08-06 ASSESSMENT — LIFESTYLE VARIABLES
HOW OFTEN DO YOU HAVE 6 OR MORE DRINKS ON ONE OCCASION: NEVER
AUDIT-C TOTAL SCORE: 0
HOW MANY STANDARD DRINKS CONTAINING ALCOHOL DO YOU HAVE ON A TYPICAL DAY: PATIENT DOES NOT DRINK
HOW OFTEN DO YOU HAVE A DRINK CONTAINING ALCOHOL: NEVER
SKIP TO QUESTIONS 9-10: 1
AUDIT-C TOTAL SCORE: 0
SKIP TO QUESTIONS 9-10: 1
HOW OFTEN DO YOU HAVE A DRINK CONTAINING ALCOHOL: NEVER
HOW OFTEN DO YOU HAVE 6 OR MORE DRINKS ON ONE OCCASION: NEVER
AUDIT-C TOTAL SCORE: 0
HOW MANY STANDARD DRINKS CONTAINING ALCOHOL DO YOU HAVE ON A TYPICAL DAY: PATIENT DOES NOT DRINK
AUDIT-C TOTAL SCORE: 0

## 2025-08-06 ASSESSMENT — ACTIVITIES OF DAILY LIVING (ADL)
HEARING - RIGHT EAR: FUNCTIONAL
TOILETING: INDEPENDENT
HEARING - RIGHT EAR: FUNCTIONAL
BATHING: INDEPENDENT
HEARING - LEFT EAR: FUNCTIONAL
PATIENT'S MEMORY ADEQUATE TO SAFELY COMPLETE DAILY ACTIVITIES?: YES
FEEDING YOURSELF: INDEPENDENT
LACK_OF_TRANSPORTATION: NO
TOILETING: INDEPENDENT
WALKS IN HOME: INDEPENDENT
ADEQUATE_TO_COMPLETE_ADL: YES
JUDGMENT_ADEQUATE_SAFELY_COMPLETE_DAILY_ACTIVITIES: YES
GROOMING: INDEPENDENT
ADEQUATE_TO_COMPLETE_ADL: YES
WALKS IN HOME: INDEPENDENT
JUDGMENT_ADEQUATE_SAFELY_COMPLETE_DAILY_ACTIVITIES: YES
BATHING: INDEPENDENT
HEARING - LEFT EAR: FUNCTIONAL
FEEDING YOURSELF: INDEPENDENT
DRESSING YOURSELF: INDEPENDENT
DRESSING YOURSELF: INDEPENDENT
LACK_OF_TRANSPORTATION: NO
PATIENT'S MEMORY ADEQUATE TO SAFELY COMPLETE DAILY ACTIVITIES?: YES
GROOMING: INDEPENDENT

## 2025-08-06 ASSESSMENT — COGNITIVE AND FUNCTIONAL STATUS - GENERAL
CLIMB 3 TO 5 STEPS WITH RAILING: A LOT
MOBILITY SCORE: 22
CLIMB 3 TO 5 STEPS WITH RAILING: A LOT
DAILY ACTIVITIY SCORE: 24
STANDING UP FROM CHAIR USING ARMS: A LITTLE
MOBILITY SCORE: 20
PATIENT BASELINE BEDBOUND: NO
DAILY ACTIVITIY SCORE: 24
PATIENT BASELINE BEDBOUND: NO
WALKING IN HOSPITAL ROOM: A LITTLE

## 2025-08-06 ASSESSMENT — PAIN - FUNCTIONAL ASSESSMENT
PAIN_FUNCTIONAL_ASSESSMENT: 0-10
PAIN_FUNCTIONAL_ASSESSMENT: 0-10

## 2025-08-06 ASSESSMENT — PATIENT HEALTH QUESTIONNAIRE - PHQ9
2. FEELING DOWN, DEPRESSED OR HOPELESS: NOT AT ALL
SUM OF ALL RESPONSES TO PHQ9 QUESTIONS 1 & 2: 0
1. LITTLE INTEREST OR PLEASURE IN DOING THINGS: NOT AT ALL
1. LITTLE INTEREST OR PLEASURE IN DOING THINGS: NOT AT ALL
2. FEELING DOWN, DEPRESSED OR HOPELESS: NOT AT ALL
SUM OF ALL RESPONSES TO PHQ9 QUESTIONS 1 & 2: 0

## 2025-08-06 ASSESSMENT — PAIN SCALES - GENERAL
PAINLEVEL_OUTOF10: 0 - NO PAIN
PAINLEVEL_OUTOF10: 6
PAINLEVEL_OUTOF10: 0 - NO PAIN

## 2025-08-06 ASSESSMENT — PAIN DESCRIPTION - DESCRIPTORS: DESCRIPTORS: SORE

## 2025-08-06 NOTE — H&P
History Of Present Illness  Duane Scott is a 75 y.o. male presenting with chest pain and coronary artery disease.  He was admitted to [facility] on 8/4, transferred to [facility] for a left heart catheterization today and is now admitted to [facility].  He has a history of type 2 diabetes, hyperlipidemia, stroke coronary artery disease, lateral STEMI in 8/2019 at which time he presented with cardiac arrest.  At that time, stents were placed in the circumflex and first diagonal branches.  More recently, in 3/2025 he had PCI to the LAD.  He presented to [facility] on 8/4 account of chest pain which he began to experience while going discharge.  His troponin levels were elevated at 395, 502 and 650.  He was started on a heparin drip, seen by cardiology and transferred to [facility] today.  He underwent left heart catheterization.  This showed a 90% ostial lesion in the diagonal branch.  He is admitted to [facility] for evaluation by cardiothoracic surgery service for CABG. at the time of this encounter, he reported no chest pain or shortness of breath.     Past Medical History  Medical History[1]    Surgical History  Surgical History[2]     Social History  He reports that he has never smoked. He has never been exposed to tobacco smoke. He has never used smokeless tobacco. He reports that he does not drink alcohol and does not use drugs.    Family History  Family History[3]     Allergies  Hydromorphone, Latex, Penicillins, Dyphylline-guaifenesin, Grass pollen, Mold, and Ragweed    Review of Systems   General: Negative for fever,  chills or fatigue.    HEENT: Negative for headache, blurring of vision or double vision.    Cardiovascular: Negative for chest pain, palpitations or orthopnea.    Respiratory: Negative for cough, shortness of breath or wheezing.    Gastrointestinal: Negative for nausea, vomiting, hematemesis, abdominal pain or diarrhea.   Genitourinary: Negative for  "dysuria, hematuria, frequency or nocturia.   Musculoskeletal: Negative for joint pain, joint swelling or deformity.   Skin: Negative for rash, itching, or jaundice.   Hematologic: Negative for bleeding or bruising.   Neurologic: Negative for headache, loss of consciousness. syncope or seizures     Physical Exam   General.: Awake alert well-developed, responsive   HEENT: Normocephalic, not icteric, not pale, no facial asymmetry, no pharyngeal erythema.   Neck: Supple, no carotid bruit, no thyroid enlargement.   Cardiovascular: Regular heart rate and rhythm normal S1 and S2.   Respiratory: Equal breath sounds bilaterally clear to auscultation.   Abdomen: Soft, nontender to palpation, bowel sounds present and normoactive.   Extremities: No peripheral cyanosis, no pedal edema.   Neurologic: Alert and oriented to self, place and date, muscle strength 5/5 in all extremities.   Dermatologic: No rash, ecchymosis, or jaundice.   Psychological: Appropriate affect and behavior.       Last Recorded Vitals  Blood pressure 137/62, pulse 74, temperature 36.7 °C (98.1 °F), temperature source Temporal, resp. rate 14, height 1.676 m (5' 6\"), weight 88.4 kg (194 lb 14.2 oz), SpO2 96%.    Relevant Results  Results for orders placed or performed during the hospital encounter of 08/06/25 (from the past 24 hours)   ACTIVATED CLOTTING TIME LOW   Result Value Ref Range    POCT Activated Clotting Time Low Range 119 89 - 169 sec          Assessment & Plan    Non-ST elevation myocardial infarction  Status post left heart catheterization    Coronary artery disease  Cardiothoracic surgery on consult for evaluation for coronary bypass    Hypertension  Continuous isosorbide mononitrate, carvedilol    Diabetes mellitus type 2 with hyperglycemia  Humalog sliding scale  Hypoglycemia protocol    Acute kidney injury on chronic kidney disease  Mild elevation of creatinine to 1.66, baseline of 1.34  Monitor kidney function      Kamini Santana, " MD         [1]   Past Medical History:  Diagnosis Date    Arthritis     Asthma     CAD (coronary artery disease)     s/p stent placement    Cardiac arrest 11/17/2023    Chronic kidney disease     Chronic obstructive pulmonary disease requiring drug therapy (Multi) 11/17/2023    Coronary artery disease     Diabetes mellitus, type 2 (Multi) 11/08/2018    Diastolic heart failure 11/17/2023    Gout     Hyperlipidemia     Migraine headache     Myocardial infarction (Multi)     Status post coronary artery stent placement 11/14/2023   [2]   Past Surgical History:  Procedure Laterality Date    BACK SURGERY  2012    CARDIAC CATHETERIZATION N/A 03/11/2025    Procedure: Left Heart Cath;  Surgeon: Jessika Spencer MD;  Location: [facility] Cardiac Cath Lab;  Service: Cardiovascular;  Laterality: N/A;  no pre auth required    HERNIA REPAIR  2015    HERNIA REPAIR  2013   [3]   Family History  Problem Relation Name Age of Onset    Heart disease Mother      Stroke Son      Autism Son

## 2025-08-06 NOTE — NURSING NOTE
NSTEMI education/handout given to patient.  Discussed importance of taking medication as prescribed/following up with physician appointments. Discussed benefits of heart healthy diet/importance of maintaining a healthy weight. Pre op cardiac surgery education reviewed with patient. Discussed cardiac rehab.

## 2025-08-06 NOTE — INTERVAL H&P NOTE
H&P reviewed. The patient was examined and there are no changes to the H&P.  
Patient expressed no known problems or needs

## 2025-08-06 NOTE — CONSULTS
Subjective   Duane Scott is a 75 y.o. male with PMH of CVA (2019), BPH, HLD, hypertension, NIDDM2 (diet controlled), asthma (follows with pulmonology), CKD (Baseline creatinine of 1.3), and CAD with prior cardiac arrest in 2019 with PCI to diagonal 1 (99% hazy lesion), and a JOSE for the proximal LCX 2/2 acute 100% occlusion, and repeat PTCA stent of proximal LAD March 11, 2025 who presented on 8/4 to [facility] with shortness of breath and fatigue.  He was transferred to [facility] for ongoing management.    Today patient underwent a coronary angiogram which showed 90% eccentric lesion in the prior LAD stent, 90% ostial lesion of the diagonal branch, LCX with patient stent, Mid OM with mild disease and the chronic RCA total occlusion with collateral from the distal left system.  Cardiac surgery was consulted for consideration of CABG.      On discussion with patient, he denies any recurrent angina or shortness of breath since initial presentation on Monday    PMH  Medical History[1]    PSH  Surgical History[2]    Family History  Family History[3]    Social History    Marital Status: , Living situation: with partner / significant other, Tobacco/alcohol/caffeine: no alcohol use and no tobacco use, and Illicit drugs: no history of illicit drug use      Allergies[4]    Current Medications[5]       Objective   Cardiac Studies  Cardiac catheterization revealed ischemia.    EF: >50%  Vessels: Triple vessel disease: RCA, LAD, and Circumflex    STS Score  Procedure Type: Isolated CABG  Perioperative Outcome Estimate %  Operative Mortality 2.54%  Morbidity & Mortality 9.62%  Stroke 1.15%  Renal Failure 3.43%  Reoperation 2.92%  Prolonged Ventilation 4.65%  Deep Sternal Wound Infection 0.191%  Long Hospital Stay (>14 days) 5.49%  Short Hospital Stay (<6 days)* 40.5%    Physical Exam  Physical Exam  Constitutional:       Appearance: Normal appearance.     Eyes:      Extraocular Movements: Extraocular movements  intact.      Conjunctiva/sclera: Conjunctivae normal.       Cardiovascular:      Rate and Rhythm: Normal rate and regular rhythm.   Pulmonary:      Effort: Pulmonary effort is normal.      Breath sounds: Normal breath sounds.   Abdominal:      General: Abdomen is flat.      Palpations: Abdomen is soft.     Skin:     General: Skin is warm and dry.      Capillary Refill: Capillary refill takes less than 2 seconds.     Neurological:      General: No focal deficit present.      Mental Status: He is alert and oriented to person, place, and time.     Psychiatric:         Mood and Affect: Mood normal.          LABS:  CMP:  Results from last 7 days   Lab Units 08/05/25  0516 08/04/25  1046   SODIUM mmol/L 138 139   POTASSIUM mmol/L 4.4 4.7   CHLORIDE mmol/L 110* 107   CO2 mmol/L 24 24   ANION GAP mmol/L 8* 13   BUN mg/dL 39* 46*   CREATININE mg/dL 1.66* 1.61*   EGFR mL/min/1.73m*2 43* 45*   MAGNESIUM mg/dL  --  2.07   ALBUMIN g/dL 3.7 4.2   ALT U/L 21 24   AST U/L 20 23   BILIRUBIN TOTAL mg/dL 1.2 1.1   LIPASE U/L  --  37     CBC:  Results from last 7 days   Lab Units 08/05/25  0516 08/04/25  1133 08/04/25  1046   WBC AUTO x10*3/uL 4.3* 4.1* 4.1*   HEMOGLOBIN g/dL 11.1* 10.9* 11.4*   HEMATOCRIT % 33.2* 32.7* 34.3*   PLATELETS AUTO x10*3/uL 107* 108* 106*   MCV fL 88 88 88     COAG:   Results from last 7 days   Lab Units 08/05/25  0516 08/04/25  1647   INR  1.1 1.1     HEME/ENDO:  Results from last 7 days   Lab Units 08/04/25  1133   HEMOGLOBIN A1C % 5.4      CARDIAC:   Results from last 7 days   Lab Units 08/04/25  2220 08/04/25  1647 08/04/25  1133 08/04/25  1046   TROPHS ng/L 609* 650* 502* 395*      MICRO: .Susceptibility data from last 90 days.  Collected Specimen Info Organism Amoxicillin/Clavulanate Ampicillin Ampicillin/Sulbactam Cefazolin Cefepime Cefotaxime Ceftazidime Ceftriaxone Cefuroxime (oral) Ciprofloxacin Gentamicin Levofloxacin Nitrofurantoin   06/22/25 Urine from Clean Catch/Voided Proteus vulgaris group   S  R  S  R  S  R  S  R  R  S  S  S  R     Collected Specimen Info Organism Piperacillin/Tazobactam Tetracycline Trimethoprim/Sulfamethoxazole   06/22/25 Urine from Clean Catch/Voided Proteus vulgaris group  S  R  S       Cardiac Catheterization Procedure   Final Result           Assessment/Plan   75 y.o. male with PMH of CVA (2019), BPH, HLD, hypertension, NIDDM2 (diet controlled), asthma (follows with pulmonology), CKD (Baseline creatinine of 1.3), and CAD with prior cardiac arrest in 2019 with PCI to diagonal 1 (99% hazy lesion), and a JOSE for the proximal LCX 2/2 acute 100% occlusion, and repeat PTCA stent of proximal LAD March 11, 2025 who presented on 8/4 to [facility] with shortness of breath and fatigue.  He was transferred to [facility] for ongoing management.    CAD (MVD)/HLD/HTN/NSTEMI - CABG evaluation  - Due to plan for possible robotic surgical intervention, will transfer to [facility] for ongoing management   -->Discussed with primary provider Dr. Santana    - TTE 8/5/25: EF 50-55%  - continue asa 81 mg / statin / beta blocker  --> On plavix for PCI JOSE 3/25 last received 8/5/25  - Hold ace/ arb 72 hours prior to surgery  - Preoperative education initiated, questions answered   - A1C  - TSH  - ua & urine culture    Pre Op Imaging:  CT Chest w/o con, Carotids, and Vein Mapping    Plan discussed and formulated with Dr. Dwaine Lorenz  Dispo: Transfer to [facility]     Time spent 90 minutes      ROBIN Garcia-CNP, DNP                    [1]   Past Medical History:  Diagnosis Date    Acute renal failure syndrome 11/14/2023    Angina pectoris 11/17/2023    Arthritis     Asthma     CAD (coronary artery disease)     s/p stent placement    Cardiac arrest 11/17/2023    Chronic obstructive pulmonary disease requiring drug therapy (Multi) 11/17/2023    Coronary artery disease involving native coronary artery of native heart without angina pectoris 11/14/2023    Diabetes mellitus (Multi)     Diabetes mellitus, type 2  (Multi) 11/08/2018    Diastolic heart failure 11/17/2023    Disorder involving thrombocytopenia 11/12/2019    Essential hypertension 11/08/2018    Gout     High blood pressure     High cholesterol     Kidney disease     Migraine headache     Mixed hyperlipidemia 11/08/2018    Palpitations 11/17/2023    Status post coronary artery stent placement 11/14/2023    Status post myocardial infarction 09/24/2019   [2]   Past Surgical History:  Procedure Laterality Date    BACK SURGERY  2012    CARDIAC CATHETERIZATION N/A 3/11/2025    Procedure: Left Heart Cath;  Surgeon: Jessika Spencer MD;  Location: [facility] Cardiac Cath Lab;  Service: Cardiovascular;  Laterality: N/A;  no pre auth required    HERNIA REPAIR  2015    HERNIA REPAIR  2013   [3]   Family History  Problem Relation Name Age of Onset    Heart disease Mother      Stroke Son      Autism Son     [4]   Allergies  Allergen Reactions    Hydromorphone Anaphylaxis    Latex Hives and Rash     Gets ill    Penicillins Hives    Dyphylline-Guaifenesin Swelling    Grass Pollen Other     Makes him sick    Mold Other     Gets sick    Ragweed Other     Affects asthma   [5]   Current Facility-Administered Medications:     acetaminophen (Tylenol) tablet 650 mg, 650 mg, oral, q6h PRN, Candi Hanley APRN-CNP    isosorbide mononitrate ER (Imdur) 24 hr tablet 60 mg, 60 mg, oral, Daily, Candi Hanley APRN-CNP    oxygen (O2) therapy, , inhalation, Continuous - O2/gases, Candi Hanley, APRN-CNP    sodium chloride 0.9% infusion, 50 mL/hr, intravenous, Continuous, Candi Hanley, APRN-CNP    Facility-Administered Medications Ordered in Other Encounters:     [MAR Hold - Suspended Admission] acetaminophen (Tylenol) tablet 650 mg, 650 mg, oral, q4h PRN **OR** [MAR Hold - Suspended Admission] acetaminophen (Tylenol) oral liquid 650 mg, 650 mg, oral, q4h PRN **OR** [MAR Hold - Suspended Admission] acetaminophen (Tylenol) suppository 650 mg, 650 mg, rectal, q4h PRN, Flakita Keys,  MD    [MAR Hold - Suspended Admission] albuterol 2.5 mg /3 mL (0.083 %) nebulizer solution 3 mL, 3 mL, nebulization, q6h PRN, Flakita Keys MD    [MAR Hold - Suspended Admission] aspirin EC tablet 81 mg, 81 mg, oral, Daily, Flakita Keys MD, 81 mg at 08/05/25 1046    [MAR Hold - Suspended Admission] atorvastatin (Lipitor) tablet 80 mg, 80 mg, oral, Daily, Flakita Keys MD, 80 mg at 08/05/25 1046    [MAR Hold - Suspended Admission] carvedilol (Coreg) tablet 12.5 mg, 12.5 mg, oral, BID AC, Flakita Keys MD, 12.5 mg at 08/05/25 1723    [MAR Hold - Suspended Admission] clopidogrel (Plavix) tablet 75 mg, 75 mg, oral, Daily, Davi Mayer DO, 75 mg at 08/05/25 1046    [MAR Hold - Suspended Admission] dextrose 50 % injection 12.5 g, 12.5 g, intravenous, q15 min PRN, Flakita Keys MD    [MAR Hold - Suspended Admission] dextrose 50 % injection 25 g, 25 g, intravenous, q15 min PRN, Flakita Keys MD    [MAR Hold - Suspended Admission] finasteride (Proscar) tablet 5 mg, 5 mg, oral, Daily, Flakita Keys MD, 5 mg at 08/05/25 1046    [MAR Hold - Suspended Admission] glucagon (Glucagen) injection 1 mg, 1 mg, intramuscular, q15 min PRN, Flakita Keys MD    [MAR Hold - Suspended Admission] glucagon (Glucagen) injection 1 mg, 1 mg, intramuscular, q15 min PRN, Flakita Keys MD    [MAR Hold - Suspended Admission] insulin lispro injection 0-5 Units, 0-5 Units, subcutaneous, TID AC, Flakita Keys MD    [MAR Hold - Suspended Admission] ipratropium-albuteroL (Duo-Neb) 0.5-2.5 mg/3 mL nebulizer solution 3 mL, 3 mL, nebulization, q6h PRN, Flakita Keys MD    [MAR Hold - Suspended Admission] isosorbide mononitrate ER (Imdur) 24 hr tablet 60 mg, 60 mg, oral, Daily, ROBIN Benz-CNP    [MAR Hold - Suspended Admission] nitroglycerin (Nitrostat) SL tablet 0.4 mg, 0.4 mg, sublingual, q5 min PRN, Flakita Mcelroy  MD Ludmila    [MAR Hold - Suspended Admission] oxygen (O2) therapy, 1 Dose, inhalation, Continuous - O2/gases, Flakita Keys MD    [MAR Hold - Suspended Admission] perflutren protein A microsphere (Optison) injection 0.5 mL, 0.5 mL, intravenous, Once in imaging, Flakita Keys MD    [MAR Hold - Suspended Admission] polyethylene glycol (Glycolax, Miralax) packet 17 g, 17 g, oral, Daily, Flakita Keys MD, 17 g at 08/04/25 1615    [MAR Hold - Suspended Admission] sodium chloride 0.9% infusion, 100 mL/hr, intravenous, Continuous, Davi Mayer DO, Last Rate: 100 mL/hr at 08/05/25 2350, 100 mL/hr at 08/05/25 2350    [MAR Hold - Suspended Admission] sulfur hexafluoride microsphr (Lumason) injection 24.28 mg, 2 mL, intravenous, Once in imaging, Flakita Keys MD    [MAR Hold - Suspended Admission] tamsulosin (Flomax) 24 hr capsule 0.8 mg, 0.8 mg, oral, Daily, Flakita Keys MD, 0.8 mg at 08/05/25 1046

## 2025-08-06 NOTE — CARE PLAN
The patient's goals for the shift include MAXIMIZE COMFORT     The clinical goals for the shift include REMAIN CHEST PAIN FREE      Problem: Pain - Adult  Goal: Verbalizes/displays adequate comfort level or baseline comfort level  Outcome: Progressing     Problem: Safety - Adult  Goal: Free from fall injury  Outcome: Progressing     Problem: Discharge Planning  Goal: Discharge to home or other facility with appropriate resources  Outcome: Progressing     Problem: Chronic Conditions and Co-morbidities  Goal: Patient's chronic conditions and co-morbidity symptoms are monitored and maintained or improved  Outcome: Progressing     Problem: Nutrition  Goal: Nutrient intake appropriate for maintaining nutritional needs  Outcome: Progressing     Problem: ACS/CP/NSTEMI/STEMI  Goal: Chest pain managed (free from pain or at acceptable level)  Outcome: Progressing     Problem: ACS/CP/NSTEMI/STEMI  Goal: Verbalize understanding of procedures/devices  Outcome: Progressing     Problem: Fall/Injury  Goal: Not fall by end of shift  Outcome: Progressing     Problem: Diabetes  Goal: Achieve decreasing blood glucose levels by end of shift  Outcome: Progressing

## 2025-08-06 NOTE — CARE PLAN
The patient's goals for the shift include get better and go home    The clinical goals for the shift include monitor PTT/ prepare for heart catheterisation

## 2025-08-06 NOTE — POST-PROCEDURE NOTE
Physician Transition of Care Summary  Invasive Cardiovascular Lab    Procedure Date: 8/6/2025  Attending:    * Luis Eduardo Carrillo - Primary  Resident/Fellow/Other Assistant: Surgeons and Role:  * No surgeons found with a matching role *    Indications:   Pre-op Diagnosis      * NSTEMI (non-ST elevated myocardial infarction) (Multi) [I21.4]    Post-procedure diagnosis:   Post-op Diagnosis     * NSTEMI (non-ST elevated myocardial infarction) (Multi) [I21.4]    Procedure(s):   Left Heart Catheterization with Coronaries and Left Ventriculography  91189 - OR CATH PLMT L HRT & ARTS W/NJX & ANGIO IMG S&I    PCI Angioplasty Additional Branch  82367 - OR PRQ TRLUML CORONARY STENT W/ANGIO ONE ART/BRNCH        Procedure Findings:   Patient with a critical proximal LAD lesion with the ostial diagonal branch 90% lesion.  Probably candidate for CABG.    Description of the Procedure:   75-year-old male patient with a multiple PCI in the past.  Patient had a PTCA stent of proximal LAD March 11, 2025.    Now admitted with substernal chest pressure tightness with elevated troponin about 800.  Patient with NSTEMI and FANTA now transferred from [facility] for diagnostic catheterization.  IV hydration was started since last 48-hour.  Patient brought to Cath Lab for evaluation of underlying CAD.  Left main appears to be fair size and long has no critical lesion.  Left anterior descending artery appears to be has patent stents which was placed in the March.  Medium caliber vessel.  Although prior to the stent patient has a proximal 90% eccentric lesion seen.  Diagonal branch also has 90% ostial lesion.  Patent stents in diagonal branch.  Size of LAD and diagonal is a medium caliber vessels.  Probably good vessel for bypass grafting.  Left circumflex artery appears to be codominant vessel has patent stents in mid area.  Mid OM branch has mild disease.  Right coronary total occluded with getting collateral from distal left coronary system.  Left  ventriculogram not done.  Patient creatinine was 1.6 during admission.  Sheath was removed and manual compression was obtained.  Probably plan with consult CABG for 3-vessel bypass graft surgery.  Discussed the patient about treatment plans and goals.  Complications:   None    Stents/Implants:   Implants       No implant documentation for this case.            Anticoagulation/Antiplatelet Plan:   Continue aspirin/Plavix due to recent PCI in March 2025.    Estimated Blood Loss:   40 mL    Anesthesia: Moderate Sedation Anesthesia Staff: No anesthesia staff entered.    Any Specimen(s) Removed:   No specimens collected during this procedure.    Disposition:   [facility] to [facility] [place]      Electronically signed by: Luis Eduardo Carrillo MD, 8/6/2025 9:11 AM

## 2025-08-06 NOTE — DISCHARGE SUMMARY
Discharge Diagnosis  NSTEMI (non-ST elevated myocardial infarction) (Multi)           Issues Requiring Follow-Up  -LAD and severe RCA stenosis. Cardiology decided to keep patient at [facility] for CABG evaluation.     Discharge Meds     Medication List      ASK your doctor about these medications     albuterol 90 mcg/actuation inhaler; Commonly known as: Ventolin HFA;   Inhale 1 puff every 4 hours if needed for wheezing or shortness of breath.   aspirin 81 mg EC tablet; Take 1 tablet (81 mg) by mouth once daily.   atorvastatin 80 mg tablet; Commonly known as: Lipitor; Take 1 tablet (80   mg) by mouth once daily.   carvedilol 25 mg tablet; Commonly known as: Coreg; Take 0.5 tablets   (12.5 mg) by mouth 2 times a day before meals.   clopidogrel 75 mg tablet; Commonly known as: Plavix; Take 1 tablet (75   mg) by mouth once daily.   finasteride 5 mg tablet; Commonly known as: Proscar; Take 1 tablet (5   mg) by mouth once daily. Do not crush, chew, or split.   hydroCHLOROthiazide 25 mg tablet; Commonly known as: HYDRODiuril; Take 1   tablet (25 mg) by mouth once daily.   ipratropium-albuteroL 0.5-2.5 mg/3 mL nebulizer solution; Commonly known   as: Duo-Neb   losartan 100 mg tablet; Commonly known as: Cozaar; Take 1 tablet (100   mg) by mouth once daily.   tamsulosin 0.4 mg 24 hr capsule; Commonly known as: Flomax; Take 2   capsules (0.8 mg) by mouth once daily.       Test Results Pending At Discharge  Pending Labs       No current pending labs.            Hospital Course   Patient came with chest discomfort. Troponing 800. Peaked. Started on heparin drip. Trasferred to angiogram today 8/6 [facility] where he found to have severe LAD and RCA stenosis. Cardiology wanted to keep the patient at [place] for CABG evaluation.     Pertinent Physical Exam At Time of Discharge  Physical Exam  Constitutional:       Appearance: He is ill-appearing.   HENT:      Head: Normocephalic and atraumatic.      Nose: Nose normal.      Mouth/Throat:       Mouth: Mucous membranes are dry.      Pharynx: Oropharynx is clear.      Eyes:      Extraocular Movements: Extraocular movements intact.      Conjunctiva/sclera: Conjunctivae normal.      Pupils: Pupils are equal, round, and reactive to light.         Cardiovascular:      Rate and Rhythm: Normal rate and regular rhythm.      Pulses: Normal pulses.      Heart sounds: Normal heart sounds.   Pulmonary:      Effort: Pulmonary effort is normal.      Breath sounds: Normal breath sounds.   Abdominal:      General: Abdomen is flat.      Musculoskeletal:         General: Normal range of motion.      Cervical back: Normal range of motion and neck supple.      Right lower leg: Edema present.      Left lower leg: Edema present.      Skin:     General: Skin is warm.      Capillary Refill: Capillary refill takes less than 2 seconds.      Neurological:      General: No focal deficit present.      Mental Status: He is alert and oriented to person, place, and time.      Cranial Nerves: No cranial nerve deficit.      Motor: No weakness.      Psychiatric:         Mood and Affect: Mood normal.         Thought Content: Thought content normal.   Outpatient Follow-Up  Future Appointments   Date Time Provider Department [place]   8/8/2025 10:00 AM ROBIN Wilson-CNP [id number] [facility]   9/8/2025 11:20 AM Cullen Waddell MD [id number] [facility]   1/20/2026 10:00 AM Anne Stoddard MD PhD [id number] [facility]         Flakita Keys MD

## 2025-08-07 ENCOUNTER — APPOINTMENT (OUTPATIENT)
Dept: CARDIOLOGY | Facility: HOSPITAL | Age: 75
DRG: 236 | End: 2025-08-07
Payer: MEDICARE

## 2025-08-07 ENCOUNTER — APPOINTMENT (OUTPATIENT)
Dept: RADIOLOGY | Facility: HOSPITAL | Age: 75
DRG: 236 | End: 2025-08-07
Payer: MEDICARE

## 2025-08-07 ENCOUNTER — APPOINTMENT (OUTPATIENT)
Dept: RESPIRATORY THERAPY | Facility: HOSPITAL | Age: 75
DRG: 236 | End: 2025-08-07
Payer: MEDICARE

## 2025-08-07 LAB
ABO GROUP (TYPE) IN BLOOD: NORMAL
ALBUMIN SERPL BCP-MCNC: 3.4 G/DL (ref 3.4–5)
ALBUMIN SERPL BCP-MCNC: 3.4 G/DL (ref 3.4–5)
ALP SERPL-CCNC: 81 U/L (ref 33–136)
ALP SERPL-CCNC: 81 U/L (ref 33–136)
ALT SERPL W P-5'-P-CCNC: 19 U/L (ref 10–52)
ALT SERPL W P-5'-P-CCNC: 19 U/L (ref 10–52)
ANION GAP SERPL CALC-SCNC: 11 MMOL/L (ref 10–20)
APPEARANCE UR: ABNORMAL
APTT PPP: 31 SECONDS (ref 26–36)
AST SERPL W P-5'-P-CCNC: 20 U/L (ref 9–39)
AST SERPL W P-5'-P-CCNC: 20 U/L (ref 9–39)
BASE EXCESS BLDA CALC-SCNC: -0.9 MMOL/L (ref -2–3)
BILIRUB DIRECT SERPL-MCNC: 0.3 MG/DL (ref 0–0.3)
BILIRUB SERPL-MCNC: 1.2 MG/DL (ref 0–1.2)
BILIRUB SERPL-MCNC: 1.2 MG/DL (ref 0–1.2)
BILIRUB UR STRIP.AUTO-MCNC: NEGATIVE MG/DL
BODY TEMPERATURE: 37 DEGREES CELSIUS
BUN SERPL-MCNC: 34 MG/DL (ref 6–23)
CALCIUM SERPL-MCNC: 8.6 MG/DL (ref 8.6–10.6)
CARDIAC TROPONIN I PNL SERPL HS: 306 NG/L (ref 0–53)
CHLORIDE SERPL-SCNC: 109 MMOL/L (ref 98–107)
CO2 SERPL-SCNC: 23 MMOL/L (ref 21–32)
COHGB MFR BLDA: 1.3 %
COLOR UR: ABNORMAL
CREAT SERPL-MCNC: 1.55 MG/DL (ref 0.5–1.3)
DO-HGB MFR BLDA: 2 % (ref 0–5)
EGFRCR SERPLBLD CKD-EPI 2021: 47 ML/MIN/1.73M*2
ERYTHROCYTE [DISTWIDTH] IN BLOOD BY AUTOMATED COUNT: 13.8 % (ref 11.5–14.5)
GLUCOSE BLD MANUAL STRIP-MCNC: 110 MG/DL (ref 74–99)
GLUCOSE BLD MANUAL STRIP-MCNC: 120 MG/DL (ref 74–99)
GLUCOSE BLD MANUAL STRIP-MCNC: 132 MG/DL (ref 74–99)
GLUCOSE BLD MANUAL STRIP-MCNC: 164 MG/DL (ref 74–99)
GLUCOSE SERPL-MCNC: 88 MG/DL (ref 74–99)
GLUCOSE UR STRIP.AUTO-MCNC: NORMAL MG/DL
HCO3 BLDA-SCNC: 23.5 MMOL/L (ref 22–26)
HCT VFR BLD AUTO: 36.8 % (ref 41–52)
HGB BLD-MCNC: 11.6 G/DL (ref 13.5–17.5)
HGB BLDA-MCNC: 11.2 G/DL (ref 13.5–17.5)
KETONES UR STRIP.AUTO-MCNC: NEGATIVE MG/DL
LEUKOCYTE ESTERASE UR QL STRIP.AUTO: ABNORMAL
MCH RBC QN AUTO: 29.7 PG (ref 26–34)
MCHC RBC AUTO-ENTMCNC: 31.5 G/DL (ref 32–36)
MCV RBC AUTO: 94 FL (ref 80–100)
METHGB MFR BLDA: 1.3 % (ref 0–1.5)
MGC ASCENT PFT - FEV1 - PRE: 1.59
MGC ASCENT PFT - FEV1 - PREDICTED: 2.54
MGC ASCENT PFT - FVC - PRE: 2.94
MGC ASCENT PFT - FVC - PREDICTED: 3.36
NITRITE UR QL STRIP.AUTO: NEGATIVE
NRBC BLD-RTO: 0 /100 WBCS (ref 0–0)
OXYHGB MFR BLDA: 95.4 % (ref 94–98)
PCO2 BLDA: 37 MM HG (ref 38–42)
PH BLDA: 7.41 PH (ref 7.38–7.42)
PH UR STRIP.AUTO: 6.5 [PH]
PLATELET # BLD AUTO: 107 X10*3/UL (ref 150–450)
PO2 BLDA: 94 MM HG (ref 85–95)
POTASSIUM SERPL-SCNC: 4.4 MMOL/L (ref 3.5–5.3)
PROT SERPL-MCNC: 6 G/DL (ref 6.4–8.2)
PROT SERPL-MCNC: 6 G/DL (ref 6.4–8.2)
PROT UR STRIP.AUTO-MCNC: ABNORMAL MG/DL
RBC # BLD AUTO: 3.91 X10*6/UL (ref 4.5–5.9)
RBC # UR STRIP.AUTO: ABNORMAL MG/DL
RBC #/AREA URNS AUTO: ABNORMAL /HPF
RH FACTOR (ANTIGEN D): NORMAL
SAO2 % BLDA: 98 % (ref 94–100)
SODIUM SERPL-SCNC: 139 MMOL/L (ref 136–145)
SP GR UR STRIP.AUTO: 1.02
T4 FREE SERPL-MCNC: 1.37 NG/DL (ref 0.78–1.48)
TSH SERPL-ACNC: 5.92 MIU/L (ref 0.44–3.98)
UFH PPP CHRO-ACNC: 0.3 IU/ML (ref ?–1.1)
UFH PPP CHRO-ACNC: 0.4 IU/ML (ref ?–1.1)
UROBILINOGEN UR STRIP.AUTO-MCNC: NORMAL MG/DL
WBC # BLD AUTO: 4.4 X10*3/UL (ref 4.4–11.3)
WBC #/AREA URNS AUTO: >50 /HPF
WBC CLUMPS #/AREA URNS AUTO: ABNORMAL /HPF

## 2025-08-07 PROCEDURE — 1200000002 HC GENERAL ROOM WITH TELEMETRY DAILY

## 2025-08-07 PROCEDURE — 85520 HEPARIN ASSAY: CPT | Performed by: STUDENT IN AN ORGANIZED HEALTH CARE EDUCATION/TRAINING PROGRAM

## 2025-08-07 PROCEDURE — 99223 1ST HOSP IP/OBS HIGH 75: CPT | Performed by: STUDENT IN AN ORGANIZED HEALTH CARE EDUCATION/TRAINING PROGRAM

## 2025-08-07 PROCEDURE — 71046 X-RAY EXAM CHEST 2 VIEWS: CPT | Performed by: RADIOLOGY

## 2025-08-07 PROCEDURE — 93005 ELECTROCARDIOGRAM TRACING: CPT

## 2025-08-07 PROCEDURE — 36415 COLL VENOUS BLD VENIPUNCTURE: CPT | Performed by: STUDENT IN AN ORGANIZED HEALTH CARE EDUCATION/TRAINING PROGRAM

## 2025-08-07 PROCEDURE — 71046 X-RAY EXAM CHEST 2 VIEWS: CPT

## 2025-08-07 PROCEDURE — 94010 BREATHING CAPACITY TEST: CPT | Performed by: STUDENT IN AN ORGANIZED HEALTH CARE EDUCATION/TRAINING PROGRAM

## 2025-08-07 PROCEDURE — 94010 BREATHING CAPACITY TEST: CPT

## 2025-08-07 PROCEDURE — 81001 URINALYSIS AUTO W/SCOPE: CPT | Performed by: PHYSICIAN ASSISTANT

## 2025-08-07 PROCEDURE — 74176 CT ABD & PELVIS W/O CONTRAST: CPT

## 2025-08-07 PROCEDURE — 36600 WITHDRAWAL OF ARTERIAL BLOOD: CPT

## 2025-08-07 PROCEDURE — 87086 URINE CULTURE/COLONY COUNT: CPT | Performed by: PHYSICIAN ASSISTANT

## 2025-08-07 PROCEDURE — 2500000002 HC RX 250 W HCPCS SELF ADMINISTERED DRUGS (ALT 637 FOR MEDICARE OP, ALT 636 FOR OP/ED): Performed by: STUDENT IN AN ORGANIZED HEALTH CARE EDUCATION/TRAINING PROGRAM

## 2025-08-07 PROCEDURE — 2500000001 HC RX 250 WO HCPCS SELF ADMINISTERED DRUGS (ALT 637 FOR MEDICARE OP): Performed by: STUDENT IN AN ORGANIZED HEALTH CARE EDUCATION/TRAINING PROGRAM

## 2025-08-07 PROCEDURE — 2500000005 HC RX 250 GENERAL PHARMACY W/O HCPCS: Performed by: STUDENT IN AN ORGANIZED HEALTH CARE EDUCATION/TRAINING PROGRAM

## 2025-08-07 PROCEDURE — 82375 ASSAY CARBOXYHB QUANT: CPT

## 2025-08-07 PROCEDURE — 82947 ASSAY GLUCOSE BLOOD QUANT: CPT

## 2025-08-07 PROCEDURE — 87081 CULTURE SCREEN ONLY: CPT | Performed by: PHYSICIAN ASSISTANT

## 2025-08-07 PROCEDURE — 2500000004 HC RX 250 GENERAL PHARMACY W/ HCPCS (ALT 636 FOR OP/ED): Performed by: STUDENT IN AN ORGANIZED HEALTH CARE EDUCATION/TRAINING PROGRAM

## 2025-08-07 PROCEDURE — 99223 1ST HOSP IP/OBS HIGH 75: CPT | Performed by: PHYSICIAN ASSISTANT

## 2025-08-07 PROCEDURE — 2500000005 HC RX 250 GENERAL PHARMACY W/O HCPCS: Performed by: PHYSICIAN ASSISTANT

## 2025-08-07 PROCEDURE — 74176 CT ABD & PELVIS W/O CONTRAST: CPT | Performed by: RADIOLOGY

## 2025-08-07 RX ORDER — MUPIROCIN 20 MG/G
OINTMENT TOPICAL 2 TIMES DAILY
Status: DISPENSED | OUTPATIENT
Start: 2025-08-07 | End: 2025-08-12

## 2025-08-07 RX ORDER — CHLORHEXIDINE GLUCONATE ORAL RINSE 1.2 MG/ML
15 SOLUTION DENTAL 2 TIMES DAILY
Status: ACTIVE | OUTPATIENT
Start: 2025-08-11 | End: 2025-08-12

## 2025-08-07 RX ADMIN — HEPARIN SODIUM 1000 UNITS/HR: 10000 INJECTION, SOLUTION INTRAVENOUS at 17:47

## 2025-08-07 RX ADMIN — MUPIROCIN: 20 OINTMENT TOPICAL at 11:53

## 2025-08-07 RX ADMIN — ASPIRIN 81 MG: 81 TABLET, COATED ORAL at 09:32

## 2025-08-07 RX ADMIN — FINASTERIDE 5 MG: 5 TABLET, FILM COATED ORAL at 09:32

## 2025-08-07 RX ADMIN — CARVEDILOL 12.5 MG: 12.5 TABLET, FILM COATED ORAL at 09:32

## 2025-08-07 RX ADMIN — TAMSULOSIN HYDROCHLORIDE 0.8 MG: 0.4 CAPSULE ORAL at 09:32

## 2025-08-07 RX ADMIN — ATORVASTATIN CALCIUM 80 MG: 80 TABLET, FILM COATED ORAL at 09:32

## 2025-08-07 RX ADMIN — MUPIROCIN: 20 OINTMENT TOPICAL at 21:45

## 2025-08-07 RX ADMIN — INSULIN LISPRO 1 UNITS: 100 INJECTION, SOLUTION INTRAVENOUS; SUBCUTANEOUS at 11:55

## 2025-08-07 RX ADMIN — CARVEDILOL 12.5 MG: 12.5 TABLET, FILM COATED ORAL at 17:46

## 2025-08-07 RX ADMIN — HYDROCHLOROTHIAZIDE 25 MG: 25 TABLET ORAL at 09:32

## 2025-08-07 RX ADMIN — Medication 3 MG: at 22:08

## 2025-08-07 ASSESSMENT — COGNITIVE AND FUNCTIONAL STATUS - GENERAL
DRESSING REGULAR LOWER BODY CLOTHING: A LITTLE
DRESSING REGULAR UPPER BODY CLOTHING: A LITTLE
DRESSING REGULAR UPPER BODY CLOTHING: A LITTLE
MOBILITY SCORE: 19
DAILY ACTIVITIY SCORE: 20
MOVING TO AND FROM BED TO CHAIR: A LITTLE
DAILY ACTIVITIY SCORE: 20
STANDING UP FROM CHAIR USING ARMS: A LITTLE
DRESSING REGULAR UPPER BODY CLOTHING: A LITTLE
HELP NEEDED FOR BATHING: A LITTLE
HELP NEEDED FOR BATHING: A LITTLE
WALKING IN HOSPITAL ROOM: A LITTLE
DRESSING REGULAR LOWER BODY CLOTHING: A LITTLE
WALKING IN HOSPITAL ROOM: A LITTLE
DRESSING REGULAR LOWER BODY CLOTHING: A LITTLE
STANDING UP FROM CHAIR USING ARMS: A LITTLE
MOVING TO AND FROM BED TO CHAIR: A LITTLE
TOILETING: A LITTLE
CLIMB 3 TO 5 STEPS WITH RAILING: A LOT
WALKING IN HOSPITAL ROOM: A LITTLE
WALKING IN HOSPITAL ROOM: A LITTLE
TOILETING: A LITTLE
TOILETING: A LITTLE
CLIMB 3 TO 5 STEPS WITH RAILING: A LOT
MOVING TO AND FROM BED TO CHAIR: A LITTLE
MOVING TO AND FROM BED TO CHAIR: A LITTLE
CLIMB 3 TO 5 STEPS WITH RAILING: A LOT
STANDING UP FROM CHAIR USING ARMS: A LITTLE
HELP NEEDED FOR BATHING: A LITTLE
DRESSING REGULAR LOWER BODY CLOTHING: A LITTLE
DAILY ACTIVITIY SCORE: 20
HELP NEEDED FOR BATHING: A LITTLE
STANDING UP FROM CHAIR USING ARMS: A LITTLE
MOBILITY SCORE: 19
TOILETING: A LITTLE
DRESSING REGULAR UPPER BODY CLOTHING: A LITTLE
MOBILITY SCORE: 19
CLIMB 3 TO 5 STEPS WITH RAILING: A LOT

## 2025-08-07 ASSESSMENT — PAIN - FUNCTIONAL ASSESSMENT: PAIN_FUNCTIONAL_ASSESSMENT: 0-10

## 2025-08-07 ASSESSMENT — PAIN SCALES - GENERAL: PAINLEVEL_OUTOF10: 0 - NO PAIN

## 2025-08-07 ASSESSMENT — ENCOUNTER SYMPTOMS
MUSCULOSKELETAL NEGATIVE: 1
GASTROINTESTINAL NEGATIVE: 1
ENDOCRINE NEGATIVE: 1
NEUROLOGICAL NEGATIVE: 1
RESPIRATORY NEGATIVE: 1
CONSTITUTIONAL NEGATIVE: 1
EYES NEGATIVE: 1
CARDIOVASCULAR NEGATIVE: 1

## 2025-08-07 ASSESSMENT — ACTIVITIES OF DAILY LIVING (ADL): LACK_OF_TRANSPORTATION: NO

## 2025-08-07 NOTE — PROGRESS NOTES
Duane Scott is a 75 y.o. male on day 0 of admission presenting with No Principal Problem: There is no principal problem currently on the Problem List. Please update the Problem List and refresh..    Subjective   ***       Objective     Physical Exam    Last Recorded Vitals  There were no vitals taken for this visit.  Intake/Output last 3 Shifts:  I/O last 3 completed shifts:  In: 120 [P.O.:120]  Out: 180 [Urine:180]    Relevant Results  {If you would like to pull in Medications, type .meds     If you would like to pull in Lab results for the last 24 hours, type .[id number]    If you would like to pull in Imaging results, type .imgrslt :99}  {Link to Stroke Scoring tools - Link :99}                        Assessment & Plan    ***    {This patient does not have an ACP note on file for this encounter, please fill one out - Advance Care Planning Activity :99}      Tanya Stokes, RRT

## 2025-08-07 NOTE — PROGRESS NOTES
Duane Scott is a 75 y.o. male on day 0 of admission presenting with No Principal Problem: There is no principal problem currently on the Problem List. Please update the Problem List and refresh..    Subjective   ***       Objective     Physical Exam    Last Recorded Vitals  There were no vitals taken for this visit.  Intake/Output last 3 Shifts:  I/O last 3 completed shifts:  In: 120 [P.O.:120]  Out: 180 [Urine:180]    Relevant Results  {If you would like to pull in Medications, type .meds     If you would like to pull in Lab results for the last 24 hours, type .[id number]    If you would like to pull in Imaging results, type .imgrslt :99}  {Link to Stroke Scoring tools - Link :99}                        Assessment & Plan    ***    {This patient does not have an ACP note on file for this encounter, please fill one out - Advance Care Planning Activity :99}      Tanya Stokes, RRT

## 2025-08-07 NOTE — PROGRESS NOTES
Subjective Data  Transferred for robotic CABG eval. No events overnight. CTS consulted, pre op testing ordered. Tentative plans for OR 8/12.      Objective Data:  Last Recorded Vitals:  Vitals:    08/07/25 0400 08/07/25 0607 08/07/25 0800 08/07/25 0802   BP:  94/58  112/57   BP Location:  Right arm  Right arm   Patient Position:  Lying  Lying   Pulse: 54 59 65    Resp:  18  18   Temp:  36.1 °C (97 °F)  36.6 °C (97.9 °F)   TempSrc:  Temporal  Temporal   SpO2:  96%  96%   Weight:       Height:           Last Labs:  CBC - 8/6/2025:  2:23 PM  4.4 11.6 107    36.8      CMP - 8/6/2025: 11:42 PM  8.6 6.0; 6.0 20; 20 --- 1.2; 1.2   _ 3.4; 3.4 19; 19 81; 81      PTT - 8/6/2025: 11:42 PM  1.1   11.9 31     TROPHS   Date/Time Value Ref Range Status   08/06/2025 11:42  0 - 53 ng/L Final   08/04/2025 10:20  0 - 20 ng/L Final     Comment:     Previous result verified on 8/4/2025 1126 on specimen/case [id number] called with component TRPHS for procedure Troponin I, High Sensitivity, Initial with value 395 ng/L.   08/04/2025 04:47  0 - 20 ng/L Final     Comment:     Previous result verified on 8/4/2025 1126 on specimen/case [id number] called with component TRPHS for procedure Troponin I, High Sensitivity, Initial with value 395 ng/L.   08/04/2025 11:33  0 - 20 ng/L Final     Comment:     Previous result verified on 8/4/2025 1126 on specimen/case [id number] called with component TRPHS for procedure Troponin I, High Sensitivity, Initial with value 395 ng/L.     BNP   Date/Time Value Ref Range Status   08/04/2025 10:46  0 - 99 pg/mL Final   01/09/2025 02:38  0 - 99 pg/mL Final     HGBA1C   Date/Time Value Ref Range Status   08/06/2025 02:23 PM 5.5 See comment % Final   08/04/2025 11:33 AM 5.4 See comment % Final   02/07/2025 11:02 AM 5.6 4.2 - 6.5 % Final     LDLCALC   Date/Time Value Ref Range Status   08/05/2025 05:16 AM 36 <=99 mg/dL Final     Comment:                                  Near   Borderline      AGE      Desirable  Optimal    High     High     Very High     0-19 Y     0 - 109     ---    110-129   >/= 130     ----    20-24 Y     0 - 119     ---    120-159   >/= 160     ----      >24 Y     0 -  99   100-129  130-159   160-189     >/=190    LDL Cholesterol is calculated using the Friedewald equation.   02/10/2025 09:33 AM 52 mg/dL (calc) Final     Comment:     Reference range: <100     Desirable range <100 mg/dL for primary prevention;    <70 mg/dL for patients with CHD or diabetic patients   with > or = 2 CHD risk factors.     LDL-C is now calculated using the Sammy   calculation, which is a validated novel method providing   better accuracy than the Friedewald equation in the   estimation of LDL-C.   Jeremy CUEVAS et al. PABLO. 2013;310(66): 6065-6601   (http://education.KlickEx/faq/QAH496)     07/25/2024 02:03 PM 51 65 - 130 mg/dL Final   05/18/2023 03:45  65 - 130 MG/DL Final   05/03/2022 01:34 PM 96 65 - 130 MG/DL Final   11/05/2019 10:36 AM 41 65 - 130 MG/DL Final     VLDL   Date/Time Value Ref Range Status   08/05/2025 05:16 AM 8 0 - 40 mg/dL Final      Last I/O:  I/O last 3 completed shifts:  In: 120 (1.4 mL/kg) [P.O.:120]  Out: 180 (2.1 mL/kg) [Urine:180 (0.1 mL/kg/hr)]  Weight: 85 kg     Past Cardiology Tests (Last 3 Years):  EKG:  ECG 12 lead 08/04/2025      Electrocardiogram 12 Lead 03/11/2025      ECG 12 lead 01/09/2025      ECG 12 lead 03/13/2024      ECG 12 lead 02/19/2024      ECG 12 lead 11/19/2023    Echo:  Transthoracic Echo Complete 08/05/2025      Transthoracic Echo (TTE) Complete 09/04/2024      Transthoracic Echo (TTE) Complete 02/20/2024    Ejection Fractions:  EF   Date/Time Value Ref Range Status   08/05/2025 09:52 AM 53 %    09/04/2024 10:38 AM 58 %      Cath:  Cardiac Catheterization Procedure 08/06/2025      Cardiac Catheterization Procedure 03/11/2025    Stress Test:  Nuclear Stress Test 02/20/2024    Cardiac Imaging:  No results  found for this or any previous visit from the past 1095 days.      Inpatient Medications:  Scheduled Medications[1]  PRN Medications[2]  Continuous Medications[3]    Physical Exam  General: NAD, lying in bed  Skin: warm and dry   Head/neck: unable to appreciate JVD at 60 degrees  Cardiac: S1S2  Pulm: +inspiratory wheeze  GI: soft, nontender   Extremities: trace LE edema   Neuro: A/Ox3, no focal neuro deficits   Psych: appropriate mood and behavior       Assessment/Plan   Duane Scott is a 75 y.o. male presenting as an OSH transfer for robotic CABG evaluation.     NSTEMI  Hx of CAD, recent PCI 3/2025  - OSH HStrop 395 -> 502 -> 650 -> 609  - OSH LHC 8/5 RCA , LAD prox 90% lesion, diag 90% ostial lesion   - repeat trop here 306  - hold plavix, last dose 8/5  - hold home losartan   - cont heparin drip  - cont aspirin, statin   - CTS consult, plans for CABG 8/12 with Dr. Dwaine Lorenz   - carotid ultrasounds and vein mapping completed at OSH, rest of studies ordered by CTS here      Chronic diastolic heart failure  - OSH TTE 8/5 EF 50-55%, mod conc LVH, DD  - CXR pending   -   - currently euvolemic on exam  - daily standing wts, strict I/Os, cardiac diet      CKD  - baseline Cr appears to be 1.3-1.6   - admit Cr 1.61  - today's Cr 1.55     DM  - HgbA1c 5.5%  - accuchecks, SSI, hypogycemia protocol      HTN  - cont coreg, hydrochlorothiazide  - SBPs ~115       DISPO  Pending CTS eval -> CABG 8/12 with Dr. Dwaine Lorenz      Seen and discussed with ABDULAZIZ Kohli    I conducted a shared visit with ABDULAZIZ Guillaume.  We will continue medical therapy as detailed above, obtain necessary labs and imaging in preparation to cabg, and follow-up with CTS about timing of surgery.        [1]   Scheduled medications   Medication Dose Route Frequency    aspirin  81 mg oral Daily    atorvastatin  80 mg oral Daily    carvedilol  12.5 mg oral BID AC    [Held by provider] clopidogrel  75 mg oral  Daily    finasteride  5 mg oral Daily    hydroCHLOROthiazide  25 mg oral Daily    insulin lispro  0-5 Units subcutaneous q4h    [Held by provider] losartan  100 mg oral Daily    mupirocin   Topical BID    polyethylene glycol  17 g oral Daily    tamsulosin  0.8 mg oral Daily   [2]   PRN medications   Medication    albuterol    dextrose    dextrose    glucagon    glucagon    heparin    ipratropium-albuteroL    melatonin    ondansetron ODT    Or    ondansetron   [3]   Continuous Medications   Medication Dose Last Rate    heparin  0-4,000 Units/hr 1,000 Units/hr (08/06/25 0000)

## 2025-08-07 NOTE — CONSULTS
Reason for Consult: MIDCAB evaluation    CARDIAC SURGERY CONSULT NOTE    HISTORY OF PRESENT ILLNESS  Duane Scott is a 75 y.o. male with a past medical history of CAD with multiple PCIs (most recently LAD in March 2025, hx of cardiac arrest in the setting of AMI in 2009, DM, HTN, HLD, CVA, CKD and HFpEF.     He initially presented to OS with chest pain while walking to [facility]. Workup at that time revealed NSTEMI with elevated troponins and no ischemic changes on EKG. He was then transferred to [facility] where he underwent cardiac cath which revealed severe proximal LAD disease with  of the RCA. CT surgery was consulted for CABG evaluation, but given consideration for possible robotic surgical intervention, patient was transferred to [facility] [place].     Patient is currently chest pain free. Previously on aspirin and plavix for PCI from 3/2025, currently holding plavix given plan for surgery (last dose 8/5).     Cardiac surgery consulted for a CABG evaluation.     Patient states he is independent at baseline - usually uses a cane to ambulate.       Cardiac/Pertinent Imaging  [facility]: 8/6/25:  Left main appears to be fair size and long has no critical lesion.  Left anterior descending artery appears to be has patent stents which was placed in the March.  Medium caliber vessel.  Although prior to the stent patient has a proximal 90% eccentric lesion seen.  Diagonal branch also has 90% ostial lesion.  Patent stents in diagonal branch.  Size of LAD and diagonal is a medium caliber vessels.  Probably good vessel for bypass grafting.  Left circumflex artery appears to be codominant vessel has patent stents in mid area.  Mid OM branch has mild disease.  Right coronary total occluded with getting collateral from distal left coronary system.    TTE: 8/5/25  CONCLUSIONS:   1. Left ventricular ejection fraction is low normal by visual estimate at 50-55%.   2. Spectral Doppler shows a Grade II (pseudonormal pattern) of left ventricular  diastolic filling with an elevated left atrial pressure.   3. There is normal right ventricular global systolic function.   4. There is moderate concentric left ventricular hypertrophy.    Subjective   Medical History[1]  Surgical History[2]  Social History[3]  Family History[4]    Allergies:  Hydromorphone, Latex, Penicillins, Dyphylline-guaifenesin, Grass pollen, Mold, and Ragweed    Prior to Admission medications   Medication Sig Start Date End Date Taking? Authorizing Provider   albuterol (Ventolin HFA) 90 mcg/actuation inhaler Inhale 1 puff every 4 hours if needed for wheezing or shortness of breath. 7/9/24   ROBIN Wilson-CNP   aspirin 81 mg EC tablet Take 1 tablet (81 mg) by mouth once daily. 7/7/25 7/7/26  Luis Eduardo Carrillo MD   atorvastatin (Lipitor) 80 mg tablet Take 1 tablet (80 mg) by mouth once daily. 7/7/25 7/7/26  Luis Eduardo Carrillo MD   carvedilol (Coreg) 25 mg tablet Take 0.5 tablets (12.5 mg) by mouth 2 times a day before meals. 7/7/25   Luis Eduardo Carrillo MD   clopidogrel (Plavix) 75 mg tablet Take 1 tablet (75 mg) by mouth once daily. 7/7/25 7/7/26  Luis Eduardo Carrillo MD   finasteride (Proscar) 5 mg tablet Take 1 tablet (5 mg) by mouth once daily. Do not crush, chew, or split. 12/23/24 12/23/25  Jono Bass MD   hydroCHLOROthiazide (HYDRODiuril) 25 mg tablet Take 1 tablet (25 mg) by mouth once daily. 7/7/25   Luis Eduardo Carrillo MD   ipratropium-albuteroL (Duo-Neb) 0.5-2.5 mg/3 mL nebulizer solution Inhale 3 mL every 6 hours if needed. 7/19/24   Historical Provider, MD   losartan (Cozaar) 100 mg tablet Take 1 tablet (100 mg) by mouth once daily. 7/7/25 7/7/26  Luis Eduardo Carrillo MD   tamsulosin (Flomax) 0.4 mg 24 hr capsule Take 2 capsules (0.8 mg) by mouth once daily. 12/23/24 12/23/25  Jono Bass MD       Review of Systems  Review of Systems   Constitutional: Negative.    HENT: Negative.     Eyes: Negative.    Respiratory: Negative.     Cardiovascular: Negative.    Gastrointestinal:  "Negative.    Endocrine: Negative.    Genitourinary: Negative.    Musculoskeletal: Negative.    Neurological: Negative.            Objective   /60 (BP Location: Right arm, Patient Position: Lying)   Pulse 61   Temp 36.8 °C (98.2 °F) (Temporal)   Resp 18   Ht 1.676 m (5' 6\")   Wt 85 kg (187 lb 6.3 oz)   SpO2 96%   BMI 30.25 kg/m²   0-10 (Numeric) Pain Score: 6   Vitals:    08/07/25 0008   Weight: 85 kg (187 lb 6.3 oz)          Intake/Output Summary (Last 24 hours) at 8/7/2025 1347  Last data filed at 8/7/2025 0945  Gross per 24 hour   Intake 240 ml   Output 180 ml   Net 60 ml       Physical Exam  Physical Exam  Constitutional:       General: He is not in acute distress.     Appearance: He is obese. He is not ill-appearing or toxic-appearing.   HENT:      Head: Normocephalic.      Mouth/Throat:      Mouth: Mucous membranes are moist.     Cardiovascular:      Rate and Rhythm: Normal rate and regular rhythm.   Pulmonary:      Effort: Pulmonary effort is normal.      Comments: On room air    Musculoskeletal:         General: Normal range of motion.      Cervical back: Neck supple.      Right lower leg: No edema.      Left lower leg: No edema.     Skin:     General: Skin is warm and dry.     Neurological:      General: No focal deficit present.      Mental Status: He is alert and oriented to person, place, and time.     Psychiatric:         Mood and Affect: Mood normal.         Behavior: Behavior normal.         Medications  Scheduled medications  Scheduled Medications[5]Continuous medications  Continuous Medications[6]PRN medications  PRN Medications[7]    Labs  Results for orders placed or performed during the hospital encounter of 08/06/25 (from the past 24 hours)   CBC   Result Value Ref Range    WBC 4.4 4.4 - 11.3 x10*3/uL    nRBC 0.0 0.0 - 0.0 /100 WBCs    RBC 3.91 (L) 4.50 - 5.90 x10*6/uL    Hemoglobin 11.6 (L) 13.5 - 17.5 g/dL    Hematocrit 36.8 (L) 41.0 - 52.0 %    MCV 94 80 - 100 fL    MCH 29.7 26.0 " - 34.0 pg    MCHC 31.5 (L) 32.0 - 36.0 g/dL    RDW 13.8 11.5 - 14.5 %    Platelets 107 (L) 150 - 450 x10*3/uL   POCT GLUCOSE   Result Value Ref Range    POCT Glucose 87 74 - 99 mg/dL   Comprehensive metabolic panel   Result Value Ref Range    Glucose 88 74 - 99 mg/dL    Sodium 139 136 - 145 mmol/L    Potassium 4.4 3.5 - 5.3 mmol/L    Chloride 109 (H) 98 - 107 mmol/L    Bicarbonate 23 21 - 32 mmol/L    Anion Gap 11 10 - 20 mmol/L    Urea Nitrogen 34 (H) 6 - 23 mg/dL    Creatinine 1.55 (H) 0.50 - 1.30 mg/dL    eGFR 47 (L) >60 mL/min/1.73m*2    Calcium 8.6 8.6 - 10.6 mg/dL    Albumin 3.4 3.4 - 5.0 g/dL    Alkaline Phosphatase 81 33 - 136 U/L    Total Protein 6.0 (L) 6.4 - 8.2 g/dL    AST 20 9 - 39 U/L    Bilirubin, Total 1.2 0.0 - 1.2 mg/dL    ALT 19 10 - 52 U/L   aPTT - baseline   Result Value Ref Range    aPTT 31 26 - 36 seconds   Troponin I, High Sensitivity   Result Value Ref Range    Troponin I, High Sensitivity (CMC) 306 (HH) 0 - 53 ng/L   Hepatic Function Panel   Result Value Ref Range    Albumin 3.4 3.4 - 5.0 g/dL    Bilirubin, Total 1.2 0.0 - 1.2 mg/dL    Bilirubin, Direct 0.3 0.0 - 0.3 mg/dL    Alkaline Phosphatase 81 33 - 136 U/L    ALT 19 10 - 52 U/L    AST 20 9 - 39 U/L    Total Protein 6.0 (L) 6.4 - 8.2 g/dL   TSH with reflex to Free T4 if abnormal   Result Value Ref Range    Thyroid Stimulating Hormone 5.92 (H) 0.44 - 3.98 mIU/L   Thyroxine, Free   Result Value Ref Range    Thyroxine, Free 1.37 0.78 - 1.48 ng/dL   POCT GLUCOSE   Result Value Ref Range    POCT Glucose 110 (H) 74 - 99 mg/dL   Heparin Assay, UFH   Result Value Ref Range    Heparin Unfractionated 0.4 See Comment Below for Therapeutic Ranges IU/mL   Heparin Assay, UFH   Result Value Ref Range    Heparin Unfractionated 0.3 See Comment Below for Therapeutic Ranges IU/mL   POCT GLUCOSE   Result Value Ref Range    POCT Glucose 164 (H) 74 - 99 mg/dL   Blood Gas Arterial, COOX Unsolicited   Result Value Ref Range    POCT pH, Arterial 7.41 7.38 -  7.42 pH    POCT pCO2, Arterial 37 (L) 38 - 42 mm Hg    POCT pO2, Arterial 94 85 - 95 mm Hg    POCT SO2, Arterial 98 94 - 100 %    POCT Oxy Hemoglobin, Arterial 95.4 94.0 - 98.0 %    POCT Base Excess, Arterial -0.9 -2.0 - 3.0 mmol/L    POCT HCO3 Calculated, Arterial 23.5 22.0 - 26.0 mmol/L    POCT Hemoglobin, Arterial 11.2 (L) 13.5 - 17.5 g/dL    POCT Carboxyhemoglobin, Arterial 1.3 %    POCT Methemoglobin, Arterial 1.3 0.0 - 1.5 %    POCT Deoxy Hemoglobin, Arterial 2.0 0.0 - 5.0 %    Patient Temperature 37.0 degrees Celsius   Pulmonary function testing   Result Value Ref Range    FVC - Predicted 3.36     FEV1 - Predicted 2.54     FVC - PRE 2.94     FEV1 - Pre 1.59      *Note: Due to a large number of results and/or encounters for the requested time period, some results have not been displayed. A complete set of results can be found in Results Review.               ASSESSMENT & PLAN  Duane Scott is a 75 y.o. male with a past medical history of CAD with multiple PCIs (most recently LAD in March 2025, hx of cardiac arrest in the setting of AMI in 2009, DM, HTN, HLD, CVA, CKD and HFpEF.     He initially presented to OSH with chest pain while walking to [employer]. Workup at that time revealed NSTEMI with elevated troponins and no ischemic changes on EKG. He was then transferred to [facility] where he underwent cardiac cath which revealed severe proximal LAD disease with  of the RCA. CT surgery was consulted for CABG evaluation, but given consideration for possible robotic surgical intervention, patient was transferred to [facility] [place].     Patient is currently chest pain free. Previously on aspirin and plavix for PCI from 3/2025, currently holding plavix given plan for surgery (last dose 8/5).     Cardiac surgery consulted for a CABG evaluation.     History of atrial fibrillation: None      RECOMMENDATIONS/PLAN  Dr. Dwaine Lorenz aware of patient, currently reviewing case and available imaging.   - Emergent cardiac surgery not  indicated at this time.   - No OR date established, will need further preoperative testing.   - Ideally, will wait AT LEAST 5 days s/p last dose of Plavix (8/5) to take patient to OR.  - Medical optimization per primary team    Preop risk stratification studies/labs to be ordered:   [X] TTE 8/5 EF 50-55%  [X] LHC done 8/6  [X] CT chest done 8/6  [X] CT abdomen/pelvis without contrast - done 8/7  [X] US Carotids - done 8/6 less than 50% stenosis bilaterally   [X] PFTs (spirometry and room air ABG) - done 8/7 FEV1 1.59 at 62% predicted  [X] 2 view CXR - done 8/7  [X] MRSA - pending  [X] UA/Culture, LFTs, HgbA1c, TSH/T4, Lipid panel, CBC, RFP, Coag  Dental evaluation not indicated for isolated CABG surgeries         Preop orders when/if goes to surgery:  - Continue ASA, high-intensity statin, and BB (or document contraindications for use) for preop CABG patients   - No ACEi/ARBs in the pre-op period (at least 48 hours)  - Hold any SGLT2 inhibitors (Farxiga, Jardiance, etc.) for at least 3 days prior to cardiac surgery to prevent euglycemic DKA  - Hold any daily GLP-1 agonist drugs on the day of surgery. (Patients on GLP1 agonists should be on clear liquids for 24 hrs, and NPO for 2 hrs prior to surgery.)  - No antiplatelets other than ASA, no anticoagulants other than Heparin  - NPO after midnight, blood on hold/T&S, and preop scrubs only to be ordered once OR date is established    Will continue to follow along.  Thank you for the consultation.   Patient educated and all questions answered.  Please page the cardiac surgery consult pager [phone] with any questions or changes in patient condition.    Dary Hernandez PA-C  Cardiac Surgery Consult [facility]  [facility]  Cardiac Surgery Consult Pager [phone]     8/7/2025  1:47 PM           [1]   Past Medical History:  Diagnosis Date    Arthritis     Asthma     CAD (coronary artery disease)     s/p stent placement    Cardiac arrest 11/17/2023    Chronic kidney  disease     Chronic obstructive pulmonary disease requiring drug therapy (Multi) 11/17/2023    Coronary artery disease     Diabetes mellitus, type 2 (Multi) 11/08/2018    Diastolic heart failure 11/17/2023    Gout     Hyperlipidemia     Migraine headache     Myocardial infarction (Multi)     Status post coronary artery stent placement 11/14/2023   [2]   Past Surgical History:  Procedure Laterality Date    BACK SURGERY  2012    CARDIAC CATHETERIZATION N/A 03/11/2025    Procedure: Left Heart Cath;  Surgeon: Jessika Spencer MD;  Location: [facility] Cardiac [facility] Lab;  Service: Cardiovascular;  Laterality: N/A;  no pre auth required    CARDIAC CATHETERIZATION N/A 8/6/2025    Procedure: Left Heart Catheterization with Coronaries and Left Ventriculography;  Surgeon: Luis Eduardo Carrillo MD;  Location: [facility] Cardiac Cath Lab;  Service: Cardiovascular;  Laterality: N/A;    CARDIAC CATHETERIZATION N/A 8/6/2025    Procedure: PCI Angioplasty Additional Branch;  Surgeon: Luis Eduardo Carrillo MD;  Location: [facility] Cardiac Cath Lab;  Service: Cardiovascular;  Laterality: N/A;    HERNIA REPAIR  2015    HERNIA REPAIR  2013   [3]   Social History  Tobacco Use    Smoking status: Never     Passive exposure: Never    Smokeless tobacco: Never   Vaping Use    Vaping status: Never Used   Substance Use Topics    Alcohol use: Never    Drug use: Never   [4]   Family History  Problem Relation Name Age of Onset    Heart disease Mother      Stroke Son      Autism Son     [5] aspirin, 81 mg, oral, Daily  atorvastatin, 80 mg, oral, Daily  carvedilol, 12.5 mg, oral, BID AC  [Held by provider] clopidogrel, 75 mg, oral, Daily  finasteride, 5 mg, oral, Daily  hydroCHLOROthiazide, 25 mg, oral, Daily  insulin lispro, 0-5 Units, subcutaneous, q4h  [Held by provider] losartan, 100 mg, oral, Daily  mupirocin, , Topical, BID  polyethylene glycol, 17 g, oral, Daily  tamsulosin, 0.8 mg, oral, Daily  [6] heparin, 0-4,000 Units/hr, Last Rate: 1,000 Units/hr (08/06/25 9333)  [7] PRN  medications: albuterol, dextrose, dextrose, glucagon, glucagon, heparin, ipratropium-albuteroL, melatonin, ondansetron ODT **OR** ondansetron

## 2025-08-07 NOTE — H&P
"History Of Present Illness:    Duane Scott is a 75 y.o. male presenting with NSTEMI. He has a PMH of CAD with multiple PCIs (most recently LAD in March 2025, hx of cardiac arrest in the setting of AMI in 2009, DM, HTN, HLD, CVA, CKD and HFpEF. He initially presented to OSH with chest pain while walking to [employer]. Workup at that time revealed NSTEMI with elevated troponins and no ischemic changes on EKG. He was then transferred to [facility] where he underwent cardiac cath which revealed severe proximal LAD disease with  of the RCA. CT surgery was consulted for CABG evaluation, but given consideration for possible robotic surgical intervention, patient was [place] [facility] Patient is currently chest pain free. Previously on aspirin and plavix for PCI from 3/2025, currently holding plavix given plan for surgery.     Last Recorded Vitals:  Vitals:    08/06/25 2141 08/06/25 2232   BP: 113/58    BP Location: Right arm    Patient Position: Lying    Pulse: 50    Resp: 16    Temp: 36.6 °C (97.9 °F)    TempSrc: Temporal    SpO2: 96%    Weight: 85 kg (187 lb 6.3 oz)    Height:  1.676 m (5' 6\")       Last Labs:  CBC - 8/5/2025:  5:16 AM  4.3 11.1 107    33.2      CMP - 8/5/2025:  5:16 AM  8.9 6.5 20 --- 1.2   _ 3.7 21 75      PTT - 8/5/2025:  5:16 AM  1.1   11.9 36.8     Troponin I, High Sensitivity   Date/Time Value Ref Range Status   08/04/2025 10:20  (HH) 0 - 20 ng/L Final     Comment:     Previous result verified on 8/4/2025 1126 on specimen/case [id number] called with component TRPHS for procedure Troponin I, High Sensitivity, Initial with value 395 ng/L.   08/04/2025 04:47  (HH) 0 - 20 ng/L Final     Comment:     Previous result verified on 8/4/2025 1126 on specimen/case [id number] called with component TRPHS for procedure Troponin I, High Sensitivity, Initial with value 395 ng/L.   08/04/2025 11:33  (HH) 0 - 20 ng/L Final     Comment:     Previous result verified on 8/4/2025 1126 on " specimen/case [id number] called with component [facility] for procedure Troponin I, High Sensitivity, Initial with value 395 ng/L.     BNP   Date/Time Value Ref Range Status   08/04/2025 10:46  (H) 0 - 99 pg/mL Final   01/09/2025 02:38  (H) 0 - 99 pg/mL Final     POC HEMOGLOBIN A1c   Date/Time Value Ref Range Status   02/07/2025 11:02 AM 5.6 4.2 - 6.5 % Final     Hemoglobin A1C   Date/Time Value Ref Range Status   08/06/2025 02:23 PM 5.5 See comment % Final   08/04/2025 11:33 AM 5.4 See comment % Final     LDL-CHOLESTEROL   Date/Time Value Ref Range Status   02/10/2025 09:33 AM 52 mg/dL (calc) Final     Comment:     Reference range: <100     Desirable range <100 mg/dL for primary prevention;    <70 mg/dL for patients with CHD or diabetic patients   with > or = 2 CHD risk factors.     LDL-C is now calculated using the Sammy   calculation, which is a validated novel method providing   better accuracy than the Friedewald equation in the   estimation of LDL-C.   Jeremy CUEVAS et al. PABLO. 2013;310(19): 0396-7921   (http://education.TuneStars/faq/RXZ553)       LDL Calculated   Date/Time Value Ref Range Status   08/05/2025 05:16 AM 36 <=99 mg/dL Final     Comment:                                 Near   Borderline      AGE      Desirable  Optimal    High     High     Very High     0-19 Y     0 - 109     ---    110-129   >/= 130     ----    20-24 Y     0 - 119     ---    120-159   >/= 160     ----      >24 Y     0 -  99   100-129  130-159   160-189     >/=190    LDL Cholesterol is calculated using the Friedewald equation.   07/25/2024 02:03 PM 51 (L) 65 - 130 mg/dL Final   05/18/2023 03:45  65 - 130 MG/DL Final   05/03/2022 01:34 PM 96 65 - 130 MG/DL Final   11/05/2019 10:36 AM 41 (L) 65 - 130 MG/DL Final     VLDL   Date/Time Value Ref Range Status   08/05/2025 05:16 AM 8 0 - 40 mg/dL Final      Last I/O:  No intake/output data recorded.    Past Cardiology Tests (Last 3 Years):  EKG:  ECG  12 lead 08/04/2025  NSR    Electrocardiogram 12 Lead 03/11/2025      ECG 12 lead 01/09/2025      ECG 12 lead 03/13/2024      ECG 12 lead 02/19/2024      ECG 12 lead 11/19/2023    Echo:  Transthoracic Echo Complete 08/05/2025  CONCLUSIONS:   1. Left ventricular ejection fraction is low normal by visual estimate at 50-55%.   2. Spectral Doppler shows a Grade II (pseudonormal pattern) of left ventricular diastolic filling with an elevated left atrial pressure.   3. There is normal right ventricular global systolic function.   4. There is moderate concentric left ventricular hypertrophy.    Transthoracic Echo (TTE) Complete 09/04/2024      Transthoracic Echo (TTE) Complete 02/20/2024    Ejection Fractions:  EF   Date/Time Value Ref Range Status   08/05/2025 09:52 AM 53 %    09/04/2024 10:38 AM 58 %      Cath:  Cardiac Catheterization Procedure 08/06/2025  Left main appears to be fair size and long has no critical lesion.      Left anterior descending artery appears to be has patent stents which was placed in the March. Medium caliber vessel. Although prior to the stent patient has a proximal 90% eccentric lesion seen.      Diagonal branch also has 90% ostial lesion. Patent stents in diagonal branch. Size of LAD and diagonal is a medium caliber vessels. Probably good vessel for bypass grafting.      Left circumflex artery appears to be codominant vessel has patent stents in mid area. Mid OM branch has mild disease.      Right coronary total occluded with getting collateral from distal left coronary system.    Cardiac Catheterization Procedure 03/11/2025    Stress Test:  Nuclear Stress Test 02/20/2024    Cardiac Imaging:  No results found for this or any previous visit from the past 1095 days.      Past Medical History:  He has a past medical history of Arthritis, Asthma, CAD (coronary artery disease), Cardiac arrest (11/17/2023), Chronic kidney disease, Chronic obstructive pulmonary disease requiring drug therapy (Multi)  (11/17/2023), Coronary artery disease, Diabetes mellitus, type 2 (Multi) (11/08/2018), Diastolic heart failure (11/17/2023), Gout, Hyperlipidemia, Migraine headache, Myocardial infarction (Multi), and Status post coronary artery stent placement (11/14/2023).    Past Surgical History:  He has a past surgical history that includes Back surgery (2012); Hernia repair (2015); Hernia repair (2013); and Cardiac catheterization (N/A, 03/11/2025).      Social History:  He reports that he has never smoked. He has never been exposed to tobacco smoke. He has never used smokeless tobacco. He reports that he does not drink alcohol and does not use drugs.    Family History:  Family History[1]     Allergies:  Hydromorphone, Latex, Penicillins, Dyphylline-guaifenesin, Grass pollen, Mold, and Ragweed    Inpatient Medications:  Scheduled Medications[2]  PRN Medications[3]  Continuous Medications[4]  Outpatient Medications:  Current Outpatient Medications   Medication Instructions    albuterol (Ventolin HFA) 90 mcg/actuation inhaler 1 puff, inhalation, Every 4 hours PRN    aspirin 81 mg, oral, Daily    atorvastatin (LIPITOR) 80 mg, oral, Daily    carvedilol (COREG) 12.5 mg, oral, 2 times daily before meals    clopidogrel (PLAVIX) 75 mg, oral, Daily    finasteride (PROSCAR) 5 mg, oral, Daily, Do not crush, chew, or split.    hydroCHLOROthiazide (HYDRODIURIL) 25 mg, oral, Daily    ipratropium-albuteroL (Duo-Neb) 0.5-2.5 mg/3 mL nebulizer solution 3 mL, Every 6 hours PRN    losartan (COZAAR) 100 mg, oral, Daily    tamsulosin (FLOMAX) 0.8 mg, oral, Daily       Physical Exam:  Vitals:  Vitals:    08/06/25 2141   BP: 113/58   Pulse: 50   Resp: 16   Temp: 36.6 °C (97.9 °F)   SpO2: 96%     General:  Alert, calm, well-developed  HEENT:  Pupils equal, round, reactive to light and accommodation. Extraocular movements   Neck:  Supple, without lymphadenopathy or thyromegaly. No carotid bruits. No JVD  Cardiovascular:  Regular rate and rhythm, with  normal S1 and S2. No murmurs, rubs or gallops. 2+ pulses bilaterally - dorsalis pedis and radial.  Lungs:  lung clear. No wheezes. No accessory muscle use or cyanosis. No tenderness to palpation.  Abdomen:  Normoactive bowel sounds. Soft, flat, non-tender, and non-distended. No hepatosplenomegaly; liver span approximately 10 cm.  Skin:  Warm, dry, well-perfused. No rashes or other lesions.  Extremities:  2+ pulses in upper and lower extremities. No lower extremity pain or edema; legs are symmetric in appearance.  Neuro:  Alert and oriented to person, place, and time. Able to communicate well.      Assessment/Plan   #NSTEMI  #Hx of CAD, recent PCI 3/2025  - holding plavix and losartan given plan for surgery  - heparin drip, aspirin  - consult CTS in AM  - carotid ultrasounds completed, vein mapping completed  - NPO after midnight    #HFpEF  - currently euvolemic on exam    #CKD  - IVF given recent contrast load in cath as well as NPO    #DM  - sliding scale, blood sugar checks    #HTN  - resume home meds, holding losartan        Code Status:  Full Code    I spent 60 minutes in the professional and overall care of this patient.        Josh Casillas MD       [1]   Family History  Problem Relation Name Age of Onset    Heart disease Mother      Stroke Son      Autism Son     [2]   Scheduled medications   Medication Dose Route Frequency    [START ON 8/7/2025] aspirin  81 mg oral Daily    [START ON 8/7/2025] atorvastatin  80 mg oral Daily    [START ON 8/7/2025] carvedilol  12.5 mg oral BID AC    [Held by provider] clopidogrel  75 mg oral Daily    [START ON 8/7/2025] finasteride  5 mg oral Daily    heparin  4,000 Units intravenous Once    [START ON 8/7/2025] hydroCHLOROthiazide  25 mg oral Daily    [Held by provider] losartan  100 mg oral Daily    [START ON 8/7/2025] polyethylene glycol  17 g oral Daily    [START ON 8/7/2025] tamsulosin  0.8 mg oral Daily   [3]   PRN medications   Medication    albuterol    heparin     ipratropium-albuteroL    melatonin    ondansetron ODT    Or    ondansetron   [4]   Continuous Medications   Medication Dose Last Rate    heparin  0-4,000 Units/hr      lactated Ringer's  75 mL/hr

## 2025-08-07 NOTE — H&P (VIEW-ONLY)
Reason for Consult: MIDCAB evaluation    CARDIAC SURGERY CONSULT NOTE    HISTORY OF PRESENT ILLNESS  Duane Scott is a 75 y.o. male with a past medical history of CAD with multiple PCIs (most recently LAD in March 2025, hx of cardiac arrest in the setting of AMI in 2009, DM, HTN, HLD, CVA, CKD and HFpEF.     He initially presented to OS with chest pain while walking to [employer]. Workup at that time revealed NSTEMI with elevated troponins and no ischemic changes on EKG. He was then transferred to [facility] [place], operated by [facility] where he underwent cardiac cath which revealed severe proximal LAD disease with  of the RCA. CT surgery was consulted for CABG evaluation, but given consideration for possible robotic surgical intervention, patient was transferred to [facility] [place].     Patient is currently chest pain free. Previously on aspirin and plavix for PCI from 3/2025, currently holding plavix given plan for surgery (last dose 8/5).     Cardiac surgery consulted for a CABG evaluation.     Patient states he is independent at baseline - usually uses a cane to ambulate.       Cardiac/Pertinent Imaging  [facility]: 8/6/25:  Left main appears to be fair size and long has no critical lesion.  Left anterior descending artery appears to be has patent stents which was placed in the March.  Medium caliber vessel.  Although prior to the stent patient has a proximal 90% eccentric lesion seen.  Diagonal branch also has 90% ostial lesion.  Patent stents in diagonal branch.  Size of LAD and diagonal is a medium caliber vessels.  Probably good vessel for bypass grafting.  Left circumflex artery appears to be codominant vessel has patent stents in mid area.  Mid OM branch has mild disease.  Right coronary total occluded with getting collateral from distal left coronary system.    TTE: 8/5/25  CONCLUSIONS:   1. Left ventricular ejection fraction is low normal by visual estimate at 50-55%.   2. Spectral Doppler shows a Grade II (pseudonormal pattern) of left ventricular  diastolic filling with an elevated left atrial pressure.   3. There is normal right ventricular global systolic function.   4. There is moderate concentric left ventricular hypertrophy.    Subjective   Medical History[1]  Surgical History[2]  Social History[3]  Family History[4]    Allergies:  Hydromorphone, Latex, Penicillins, Dyphylline-guaifenesin, Grass pollen, Mold, and Ragweed    Prior to Admission medications   Medication Sig Start Date End Date Taking? Authorizing Provider   albuterol (Ventolin HFA) 90 mcg/actuation inhaler Inhale 1 puff every 4 hours if needed for wheezing or shortness of breath. 7/9/24   ROBIN Wilson-CNP   aspirin 81 mg EC tablet Take 1 tablet (81 mg) by mouth once daily. 7/7/25 7/7/26  Luis Eduardo Carrillo MD   atorvastatin (Lipitor) 80 mg tablet Take 1 tablet (80 mg) by mouth once daily. 7/7/25 7/7/26  Luis Eduardo Carrillo MD   carvedilol (Coreg) 25 mg tablet Take 0.5 tablets (12.5 mg) by mouth 2 times a day before meals. 7/7/25   Luis Eduardo Carrillo MD   clopidogrel (Plavix) 75 mg tablet Take 1 tablet (75 mg) by mouth once daily. 7/7/25 7/7/26  Luis Eduardo Carrillo MD   finasteride (Proscar) 5 mg tablet Take 1 tablet (5 mg) by mouth once daily. Do not crush, chew, or split. 12/23/24 12/23/25  Jono Bass MD   hydroCHLOROthiazide (HYDRODiuril) 25 mg tablet Take 1 tablet (25 mg) by mouth once daily. 7/7/25   Luis Eduardo Carrillo MD   ipratropium-albuteroL (Duo-Neb) 0.5-2.5 mg/3 mL nebulizer solution Inhale 3 mL every 6 hours if needed. 7/19/24   Historical Provider, MD   losartan (Cozaar) 100 mg tablet Take 1 tablet (100 mg) by mouth once daily. 7/7/25 7/7/26  Luis Eduardo Carrillo MD   tamsulosin (Flomax) 0.4 mg 24 hr capsule Take 2 capsules (0.8 mg) by mouth once daily. 12/23/24 12/23/25  Jono Bass MD       Review of Systems  Review of Systems   Constitutional: Negative.    HENT: Negative.     Eyes: Negative.    Respiratory: Negative.     Cardiovascular: Negative.    Gastrointestinal:  "Negative.    Endocrine: Negative.    Genitourinary: Negative.    Musculoskeletal: Negative.    Neurological: Negative.            Objective   /60 (BP Location: Right arm, Patient Position: Lying)   Pulse 61   Temp 36.8 °C (98.2 °F) (Temporal)   Resp 18   Ht 1.676 m (5' 6\")   Wt 85 kg (187 lb 6.3 oz)   SpO2 96%   BMI 30.25 kg/m²   0-10 (Numeric) Pain Score: 6   Vitals:    08/07/25 0008   Weight: 85 kg (187 lb 6.3 oz)          Intake/Output Summary (Last 24 hours) at 8/7/2025 1347  Last data filed at 8/7/2025 0945  Gross per 24 hour   Intake 240 ml   Output 180 ml   Net 60 ml       Physical Exam  Physical Exam  Constitutional:       General: He is not in acute distress.     Appearance: He is obese. He is not ill-appearing or toxic-appearing.   HENT:      Head: Normocephalic.      Mouth/Throat:      Mouth: Mucous membranes are moist.     Cardiovascular:      Rate and Rhythm: Normal rate and regular rhythm.   Pulmonary:      Effort: Pulmonary effort is normal.      Comments: On room air    Musculoskeletal:         General: Normal range of motion.      Cervical back: Neck supple.      Right lower leg: No edema.      Left lower leg: No edema.     Skin:     General: Skin is warm and dry.     Neurological:      General: No focal deficit present.      Mental Status: He is alert and oriented to person, place, and time.     Psychiatric:         Mood and Affect: Mood normal.         Behavior: Behavior normal.         Medications  Scheduled medications  Scheduled Medications[5]Continuous medications  Continuous Medications[6]PRN medications  PRN Medications[7]    Labs  Results for orders placed or performed during the hospital encounter of 08/06/25 (from the past 24 hours)   CBC   Result Value Ref Range    WBC 4.4 4.4 - 11.3 x10*3/uL    nRBC 0.0 0.0 - 0.0 /100 WBCs    RBC 3.91 (L) 4.50 - 5.90 x10*6/uL    Hemoglobin 11.6 (L) 13.5 - 17.5 g/dL    Hematocrit 36.8 (L) 41.0 - 52.0 %    MCV 94 80 - 100 fL    MCH 29.7 26.0 " - 34.0 pg    MCHC 31.5 (L) 32.0 - 36.0 g/dL    RDW 13.8 11.5 - 14.5 %    Platelets 107 (L) 150 - 450 x10*3/uL   POCT GLUCOSE   Result Value Ref Range    POCT Glucose 87 74 - 99 mg/dL   Comprehensive metabolic panel   Result Value Ref Range    Glucose 88 74 - 99 mg/dL    Sodium 139 136 - 145 mmol/L    Potassium 4.4 3.5 - 5.3 mmol/L    Chloride 109 (H) 98 - 107 mmol/L    Bicarbonate 23 21 - 32 mmol/L    Anion Gap 11 10 - 20 mmol/L    Urea Nitrogen 34 (H) 6 - 23 mg/dL    Creatinine 1.55 (H) 0.50 - 1.30 mg/dL    eGFR 47 (L) >60 mL/min/1.73m*2    Calcium 8.6 8.6 - 10.6 mg/dL    Albumin 3.4 3.4 - 5.0 g/dL    Alkaline Phosphatase 81 33 - 136 U/L    Total Protein 6.0 (L) 6.4 - 8.2 g/dL    AST 20 9 - 39 U/L    Bilirubin, Total 1.2 0.0 - 1.2 mg/dL    ALT 19 10 - 52 U/L   aPTT - baseline   Result Value Ref Range    aPTT 31 26 - 36 seconds   Troponin I, High Sensitivity   Result Value Ref Range    Troponin I, High Sensitivity (CMC) 306 (HH) 0 - 53 ng/L   Hepatic Function Panel   Result Value Ref Range    Albumin 3.4 3.4 - 5.0 g/dL    Bilirubin, Total 1.2 0.0 - 1.2 mg/dL    Bilirubin, Direct 0.3 0.0 - 0.3 mg/dL    Alkaline Phosphatase 81 33 - 136 U/L    ALT 19 10 - 52 U/L    AST 20 9 - 39 U/L    Total Protein 6.0 (L) 6.4 - 8.2 g/dL   TSH with reflex to Free T4 if abnormal   Result Value Ref Range    Thyroid Stimulating Hormone 5.92 (H) 0.44 - 3.98 mIU/L   Thyroxine, Free   Result Value Ref Range    Thyroxine, Free 1.37 0.78 - 1.48 ng/dL   POCT GLUCOSE   Result Value Ref Range    POCT Glucose 110 (H) 74 - 99 mg/dL   Heparin Assay, UFH   Result Value Ref Range    Heparin Unfractionated 0.4 See Comment Below for Therapeutic Ranges IU/mL   Heparin Assay, UFH   Result Value Ref Range    Heparin Unfractionated 0.3 See Comment Below for Therapeutic Ranges IU/mL   POCT GLUCOSE   Result Value Ref Range    POCT Glucose 164 (H) 74 - 99 mg/dL   Blood Gas Arterial, COOX Unsolicited   Result Value Ref Range    POCT pH, Arterial 7.41 7.38 -  7.42 pH    POCT pCO2, Arterial 37 (L) 38 - 42 mm Hg    POCT pO2, Arterial 94 85 - 95 mm Hg    POCT SO2, Arterial 98 94 - 100 %    POCT Oxy Hemoglobin, Arterial 95.4 94.0 - 98.0 %    POCT Base Excess, Arterial -0.9 -2.0 - 3.0 mmol/L    POCT HCO3 Calculated, Arterial 23.5 22.0 - 26.0 mmol/L    POCT Hemoglobin, Arterial 11.2 (L) 13.5 - 17.5 g/dL    POCT Carboxyhemoglobin, Arterial 1.3 %    POCT Methemoglobin, Arterial 1.3 0.0 - 1.5 %    POCT Deoxy Hemoglobin, Arterial 2.0 0.0 - 5.0 %    Patient Temperature 37.0 degrees Celsius   Pulmonary function testing   Result Value Ref Range    FVC - Predicted 3.36     FEV1 - Predicted 2.54     FVC - PRE 2.94     FEV1 - Pre 1.59      *Note: Due to a large number of results and/or encounters for the requested time period, some results have not been displayed. A complete set of results can be found in Results Review.               ASSESSMENT & PLAN  Duane Scott is a 75 y.o. male with a past medical history of CAD with multiple PCIs (most recently LAD in March 2025, hx of cardiac arrest in the setting of AMI in 2009, DM, HTN, HLD, CVA, CKD and HFpEF.     He initially presented to OSH with chest pain while walking to [employer]. Workup at that time revealed NSTEMI with elevated troponins and no ischemic changes on EKG. He was then transferred to [facility] where he underwent cardiac cath which revealed severe proximal LAD disease with  of the RCA. CT surgery was consulted for CABG evaluation, but given consideration for possible robotic surgical intervention, patient was transferred to [facility] [place].     Patient is currently chest pain free. Previously on aspirin and plavix for PCI from 3/2025, currently holding plavix given plan for surgery (last dose 8/5).     Cardiac surgery consulted for a CABG evaluation.     History of atrial fibrillation: None      RECOMMENDATIONS/PLAN  Dr. Dwaine Lorenz aware of patient, currently reviewing case and available imaging.   - Emergent cardiac surgery not  indicated at this time.   - No OR date established, will need further preoperative testing.   - Ideally, will wait AT LEAST 5 days s/p last dose of Plavix (8/5) to take patient to OR.  - Medical optimization per primary team    Preop risk stratification studies/labs to be ordered:   [X] TTE 8/5 EF 50-55%  [X] LHC done 8/6  [X] CT chest done 8/6  [X] CT abdomen/pelvis without contrast - done 8/7  [X] US Carotids - done 8/6 less than 50% stenosis bilaterally   [X] PFTs (spirometry and room air ABG) - done 8/7 FEV1 1.59 at 62% predicted  [X] 2 view CXR - done 8/7  [X] MRSA - pending  [X] UA/Culture, LFTs, HgbA1c, TSH/T4, Lipid panel, CBC, RFP, Coag  Dental evaluation not indicated for isolated CABG surgeries         Preop orders when/if goes to surgery:  - Continue ASA, high-intensity statin, and BB (or document contraindications for use) for preop CABG patients   - No ACEi/ARBs in the pre-op period (at least 48 hours)  - Hold any SGLT2 inhibitors (Farxiga, Jardiance, etc.) for at least 3 days prior to cardiac surgery to prevent euglycemic DKA  - Hold any daily GLP-1 agonist drugs on the day of surgery. (Patients on GLP1 agonists should be on clear liquids for 24 hrs, and NPO for 2 hrs prior to surgery.)  - No antiplatelets other than ASA, no anticoagulants other than Heparin  - NPO after midnight, blood on hold/T&S, and preop scrubs only to be ordered once OR date is established    Will continue to follow along.  Thank you for the consultation.   Patient educated and all questions answered.  Please page the cardiac surgery consult pager [phone] with any questions or changes in patient condition.    Dary Hernandez PA-C  Cardiac Surgery Consult [facility]  [facility]  Cardiac Surgery Consult Pager [phone]     8/7/2025  1:47 PM           [1]   Past Medical History:  Diagnosis Date    Arthritis     Asthma     CAD (coronary artery disease)     s/p stent placement    Cardiac arrest 11/17/2023    Chronic kidney  disease     Chronic obstructive pulmonary disease requiring drug therapy (Multi) 11/17/2023    Coronary artery disease     Diabetes mellitus, type 2 (Multi) 11/08/2018    Diastolic heart failure 11/17/2023    Gout     Hyperlipidemia     Migraine headache     Myocardial infarction (Multi)     Status post coronary artery stent placement 11/14/2023   [2]   Past Surgical History:  Procedure Laterality Date    BACK SURGERY  2012    CARDIAC CATHETERIZATION N/A 03/11/2025    Procedure: Left Heart Cath;  Surgeon: Jessika Spencer MD;  Location: [facility] Cardiac Cath Lab;  Service: Cardiovascular;  Laterality: N/A;  no pre auth required    CARDIAC CATHETERIZATION N/A 8/6/2025    Procedure: Left Heart Catheterization with Coronaries and Left Ventriculography;  Surgeon: Luis Eduardo Carrillo MD;  Location: [facility] Cardiac Cath Lab;  Service: Cardiovascular;  Laterality: N/A;    CARDIAC CATHETERIZATION N/A 8/6/2025    Procedure: PCI Angioplasty Additional Branch;  Surgeon: Luis Eduardo Carrillo MD;  Location: [facility] Cardiac Cath Lab;  Service: Cardiovascular;  Laterality: N/A;    HERNIA REPAIR  2015    HERNIA REPAIR  2013   [3]   Social History  Tobacco Use    Smoking status: Never     Passive exposure: Never    Smokeless tobacco: Never   Vaping Use    Vaping status: Never Used   Substance Use Topics    Alcohol use: Never    Drug use: Never   [4]   Family History  Problem Relation Name Age of Onset    Heart disease Mother      Stroke Son      Autism Son     [5] aspirin, 81 mg, oral, Daily  atorvastatin, 80 mg, oral, Daily  carvedilol, 12.5 mg, oral, BID AC  [Held by provider] clopidogrel, 75 mg, oral, Daily  finasteride, 5 mg, oral, Daily  hydroCHLOROthiazide, 25 mg, oral, Daily  insulin lispro, 0-5 Units, subcutaneous, q4h  [Held by provider] losartan, 100 mg, oral, Daily  mupirocin, , Topical, BID  polyethylene glycol, 17 g, oral, Daily  tamsulosin, 0.8 mg, oral, Daily  [6] heparin, 0-4,000 Units/hr, Last Rate: 1,000 Units/hr (08/06/25 6483)  [7] PRN  medications: albuterol, dextrose, dextrose, glucagon, glucagon, heparin, ipratropium-albuteroL, melatonin, ondansetron ODT **OR** ondansetron

## 2025-08-07 NOTE — PROGRESS NOTES
08/07/25 1457   Discharge Planning   Living Arrangements Spouse/significant other   Support Systems Spouse/significant other   Assistance Needed None   Type of Residence Private residence   Number of Stairs to Enter Residence 1   Number of Stairs Within Residence 0   Do you have animals or pets at home? Yes   Type of Animals or Pets cats and dogs   Who is requesting discharge planning? Provider   Home or Post Acute Services None   Expected Discharge Disposition Home   Does the patient need discharge transport arranged? No   Financial Resource Strain   How hard is it for you to pay for the very basics like food, housing, medical care, and heating? Not very   Housing Stability   In the last 12 months, was there a time when you were not able to pay the mortgage or rent on time? N   At any time in the past 12 months, were you homeless or living in a shelter (including now)? N   Transportation Needs   In the past 12 months, has lack of transportation kept you from medical appointments or from getting medications? no   In the past 12 months, has lack of transportation kept you from meetings, work, or from getting things needed for daily living? No   Patient Choice   Provider Choice list and CMS website (https://medicare.gov/care-compare#search) for post-acute Quality and Resource Measure Data were provided and reviewed with: Patient   Patient / Family choosing to utilize agency / facility established prior to hospitalization No   Stroke Family Assessment   Stroke Family Assessment Needed No   Intensity of Service   Intensity of Service >30 min     Transitional Care Coordination Progress Note:  Patient discussed during interdisciplinary rounds.   Team members present: RN ESPINOZA MCKNIGHT   Plan per Medical/Surgical team:  CABG eval.   Payor: MEDICARE   Discharge disposition: Home   Potential Barriers: None   ADOD: 8-9-2025      Previous Home Care:None    DME: None   Pharmacy: [facility]   Falls: Feb 2025   PCP:  ANGELICA BUCKLEY    Dialysis: None

## 2025-08-08 ENCOUNTER — APPOINTMENT (OUTPATIENT)
Dept: PRIMARY CARE | Facility: CLINIC | Age: 75
End: 2025-08-08
Payer: MEDICARE

## 2025-08-08 LAB
ALBUMIN SERPL BCP-MCNC: 3.9 G/DL (ref 3.4–5)
ANION GAP SERPL CALC-SCNC: 10 MMOL/L (ref 10–20)
BACTERIA UR CULT: NORMAL
BUN SERPL-MCNC: 39 MG/DL (ref 6–23)
CALCIUM SERPL-MCNC: 9.2 MG/DL (ref 8.6–10.6)
CHLORIDE SERPL-SCNC: 105 MMOL/L (ref 98–107)
CO2 SERPL-SCNC: 26 MMOL/L (ref 21–32)
CREAT SERPL-MCNC: 1.61 MG/DL (ref 0.5–1.3)
EGFRCR SERPLBLD CKD-EPI 2021: 45 ML/MIN/1.73M*2
ERYTHROCYTE [DISTWIDTH] IN BLOOD BY AUTOMATED COUNT: 13.5 % (ref 11.5–14.5)
ERYTHROCYTE [DISTWIDTH] IN BLOOD BY AUTOMATED COUNT: 13.9 % (ref 11.5–14.5)
GLUCOSE BLD MANUAL STRIP-MCNC: 100 MG/DL (ref 74–99)
GLUCOSE BLD MANUAL STRIP-MCNC: 121 MG/DL (ref 74–99)
GLUCOSE BLD MANUAL STRIP-MCNC: 140 MG/DL (ref 74–99)
GLUCOSE BLD MANUAL STRIP-MCNC: 174 MG/DL (ref 74–99)
GLUCOSE BLD MANUAL STRIP-MCNC: 82 MG/DL (ref 74–99)
GLUCOSE SERPL-MCNC: 152 MG/DL (ref 74–99)
HCT VFR BLD AUTO: 29.7 % (ref 41–52)
HCT VFR BLD AUTO: 33.6 % (ref 41–52)
HGB BLD-MCNC: 11.2 G/DL (ref 13.5–17.5)
HGB BLD-MCNC: 9.8 G/DL (ref 13.5–17.5)
HOLD SPECIMEN: NORMAL
MCH RBC QN AUTO: 29.9 PG (ref 26–34)
MCH RBC QN AUTO: 29.9 PG (ref 26–34)
MCHC RBC AUTO-ENTMCNC: 33 G/DL (ref 32–36)
MCHC RBC AUTO-ENTMCNC: 33.3 G/DL (ref 32–36)
MCV RBC AUTO: 90 FL (ref 80–100)
MCV RBC AUTO: 91 FL (ref 80–100)
NRBC BLD-RTO: 0 /100 WBCS (ref 0–0)
NRBC BLD-RTO: 0 /100 WBCS (ref 0–0)
PHOSPHATE SERPL-MCNC: 3.9 MG/DL (ref 2.5–4.9)
PLATELET # BLD AUTO: 100 X10*3/UL (ref 150–450)
PLATELET # BLD AUTO: 87 X10*3/UL (ref 150–450)
POTASSIUM SERPL-SCNC: 4.2 MMOL/L (ref 3.5–5.3)
RBC # BLD AUTO: 3.28 X10*6/UL (ref 4.5–5.9)
RBC # BLD AUTO: 3.75 X10*6/UL (ref 4.5–5.9)
SODIUM SERPL-SCNC: 137 MMOL/L (ref 136–145)
STAPHYLOCOCCUS SPEC CULT: ABNORMAL
UFH PPP CHRO-ACNC: 0.2 IU/ML (ref ?–1.1)
UFH PPP CHRO-ACNC: 0.3 IU/ML (ref ?–1.1)
UFH PPP CHRO-ACNC: 0.3 IU/ML (ref ?–1.1)
WBC # BLD AUTO: 4.7 X10*3/UL (ref 4.4–11.3)
WBC # BLD AUTO: 4.9 X10*3/UL (ref 4.4–11.3)

## 2025-08-08 PROCEDURE — 99232 SBSQ HOSP IP/OBS MODERATE 35: CPT | Performed by: PHYSICIAN ASSISTANT

## 2025-08-08 PROCEDURE — 2500000002 HC RX 250 W HCPCS SELF ADMINISTERED DRUGS (ALT 637 FOR MEDICARE OP, ALT 636 FOR OP/ED): Performed by: STUDENT IN AN ORGANIZED HEALTH CARE EDUCATION/TRAINING PROGRAM

## 2025-08-08 PROCEDURE — 2500000004 HC RX 250 GENERAL PHARMACY W/ HCPCS (ALT 636 FOR OP/ED): Performed by: STUDENT IN AN ORGANIZED HEALTH CARE EDUCATION/TRAINING PROGRAM

## 2025-08-08 PROCEDURE — 36415 COLL VENOUS BLD VENIPUNCTURE: CPT | Performed by: STUDENT IN AN ORGANIZED HEALTH CARE EDUCATION/TRAINING PROGRAM

## 2025-08-08 PROCEDURE — 85027 COMPLETE CBC AUTOMATED: CPT | Performed by: STUDENT IN AN ORGANIZED HEALTH CARE EDUCATION/TRAINING PROGRAM

## 2025-08-08 PROCEDURE — 85027 COMPLETE CBC AUTOMATED: CPT | Performed by: NURSE PRACTITIONER

## 2025-08-08 PROCEDURE — 80069 RENAL FUNCTION PANEL: CPT | Performed by: NURSE PRACTITIONER

## 2025-08-08 PROCEDURE — 2500000001 HC RX 250 WO HCPCS SELF ADMINISTERED DRUGS (ALT 637 FOR MEDICARE OP): Performed by: STUDENT IN AN ORGANIZED HEALTH CARE EDUCATION/TRAINING PROGRAM

## 2025-08-08 PROCEDURE — 1200000002 HC GENERAL ROOM WITH TELEMETRY DAILY

## 2025-08-08 PROCEDURE — 2500000002 HC RX 250 W HCPCS SELF ADMINISTERED DRUGS (ALT 637 FOR MEDICARE OP, ALT 636 FOR OP/ED): Performed by: NURSE PRACTITIONER

## 2025-08-08 PROCEDURE — 82947 ASSAY GLUCOSE BLOOD QUANT: CPT

## 2025-08-08 PROCEDURE — 85520 HEPARIN ASSAY: CPT | Performed by: STUDENT IN AN ORGANIZED HEALTH CARE EDUCATION/TRAINING PROGRAM

## 2025-08-08 PROCEDURE — 99233 SBSQ HOSP IP/OBS HIGH 50: CPT | Performed by: STUDENT IN AN ORGANIZED HEALTH CARE EDUCATION/TRAINING PROGRAM

## 2025-08-08 RX ORDER — NITROFURANTOIN 25; 75 MG/1; MG/1
100 CAPSULE ORAL 2 TIMES DAILY
Status: DISPENSED | OUTPATIENT
Start: 2025-08-08 | End: 2025-08-15

## 2025-08-08 RX ORDER — INSULIN LISPRO 100 [IU]/ML
0-5 INJECTION, SOLUTION INTRAVENOUS; SUBCUTANEOUS
Status: ACTIVE | OUTPATIENT
Start: 2025-08-08

## 2025-08-08 RX ADMIN — CARVEDILOL 12.5 MG: 12.5 TABLET, FILM COATED ORAL at 06:23

## 2025-08-08 RX ADMIN — MUPIROCIN: 20 OINTMENT TOPICAL at 20:37

## 2025-08-08 RX ADMIN — TAMSULOSIN HYDROCHLORIDE 0.8 MG: 0.4 CAPSULE ORAL at 09:19

## 2025-08-08 RX ADMIN — HEPARIN SODIUM 1200 UNITS/HR: 10000 INJECTION, SOLUTION INTRAVENOUS at 07:56

## 2025-08-08 RX ADMIN — ATORVASTATIN CALCIUM 80 MG: 80 TABLET, FILM COATED ORAL at 09:19

## 2025-08-08 RX ADMIN — MUPIROCIN: 20 OINTMENT TOPICAL at 12:09

## 2025-08-08 RX ADMIN — HYDROCHLOROTHIAZIDE 25 MG: 25 TABLET ORAL at 09:19

## 2025-08-08 RX ADMIN — NITROFURANTOIN (MONOHYDRATE/MACROCRYSTALS) 100 MG: 25; 75 CAPSULE ORAL at 12:09

## 2025-08-08 RX ADMIN — HEPARIN SODIUM 1200 UNITS/HR: 10000 INJECTION, SOLUTION INTRAVENOUS at 13:59

## 2025-08-08 RX ADMIN — ASPIRIN 81 MG: 81 TABLET, COATED ORAL at 09:19

## 2025-08-08 RX ADMIN — CARVEDILOL 12.5 MG: 12.5 TABLET, FILM COATED ORAL at 16:15

## 2025-08-08 RX ADMIN — FINASTERIDE 5 MG: 5 TABLET, FILM COATED ORAL at 09:19

## 2025-08-08 RX ADMIN — NITROFURANTOIN (MONOHYDRATE/MACROCRYSTALS) 100 MG: 25; 75 CAPSULE ORAL at 20:37

## 2025-08-08 ASSESSMENT — COGNITIVE AND FUNCTIONAL STATUS - GENERAL
CLIMB 3 TO 5 STEPS WITH RAILING: A LOT
DRESSING REGULAR UPPER BODY CLOTHING: A LITTLE
WALKING IN HOSPITAL ROOM: A LITTLE
DAILY ACTIVITIY SCORE: 18
DRESSING REGULAR LOWER BODY CLOTHING: A LITTLE
DAILY ACTIVITIY SCORE: 20
WALKING IN HOSPITAL ROOM: A LITTLE
STANDING UP FROM CHAIR USING ARMS: A LITTLE
WALKING IN HOSPITAL ROOM: A LITTLE
TOILETING: A LITTLE
MOVING TO AND FROM BED TO CHAIR: A LITTLE
MOVING TO AND FROM BED TO CHAIR: A LITTLE
CLIMB 3 TO 5 STEPS WITH RAILING: A LOT
STANDING UP FROM CHAIR USING ARMS: A LITTLE
DRESSING REGULAR LOWER BODY CLOTHING: A LITTLE
MOVING TO AND FROM BED TO CHAIR: A LITTLE
DRESSING REGULAR UPPER BODY CLOTHING: A LITTLE
TOILETING: A LITTLE
CLIMB 3 TO 5 STEPS WITH RAILING: A LITTLE
DAILY ACTIVITIY SCORE: 20
DRESSING REGULAR UPPER BODY CLOTHING: A LITTLE
DRESSING REGULAR UPPER BODY CLOTHING: A LITTLE
CLIMB 3 TO 5 STEPS WITH RAILING: A LOT
DRESSING REGULAR LOWER BODY CLOTHING: A LITTLE
STANDING UP FROM CHAIR USING ARMS: A LITTLE
HELP NEEDED FOR BATHING: A LITTLE
WALKING IN HOSPITAL ROOM: A LITTLE
MOVING TO AND FROM BED TO CHAIR: A LITTLE
STANDING UP FROM CHAIR USING ARMS: A LITTLE
HELP NEEDED FOR BATHING: A LITTLE
MOVING TO AND FROM BED TO CHAIR: A LITTLE
DRESSING REGULAR UPPER BODY CLOTHING: A LITTLE
DRESSING REGULAR UPPER BODY CLOTHING: A LITTLE
MOBILITY SCORE: 19
HELP NEEDED FOR BATHING: A LITTLE
TOILETING: A LITTLE
MOVING TO AND FROM BED TO CHAIR: A LITTLE
STANDING UP FROM CHAIR USING ARMS: A LITTLE
DAILY ACTIVITIY SCORE: 20
STANDING UP FROM CHAIR USING ARMS: A LITTLE
TOILETING: A LITTLE
WALKING IN HOSPITAL ROOM: A LITTLE
DRESSING REGULAR UPPER BODY CLOTHING: A LITTLE
HELP NEEDED FOR BATHING: A LITTLE
DRESSING REGULAR LOWER BODY CLOTHING: A LITTLE
HELP NEEDED FOR BATHING: A LITTLE
DRESSING REGULAR LOWER BODY CLOTHING: A LITTLE
HELP NEEDED FOR BATHING: A LITTLE
MOBILITY SCORE: 19
TOILETING: A LITTLE
HELP NEEDED FOR BATHING: A LITTLE
CLIMB 3 TO 5 STEPS WITH RAILING: A LOT
TOILETING: A LITTLE
MOBILITY SCORE: 19
DRESSING REGULAR LOWER BODY CLOTHING: A LITTLE
MOBILITY SCORE: 19
DAILY ACTIVITIY SCORE: 20
EATING MEALS: A LITTLE
CLIMB 3 TO 5 STEPS WITH RAILING: A LOT
MOVING TO AND FROM BED TO CHAIR: A LITTLE
DRESSING REGULAR LOWER BODY CLOTHING: A LITTLE
TOILETING: A LITTLE
CLIMB 3 TO 5 STEPS WITH RAILING: A LOT
STANDING UP FROM CHAIR USING ARMS: A LITTLE
WALKING IN HOSPITAL ROOM: A LITTLE
PERSONAL GROOMING: A LITTLE
MOBILITY SCORE: 20
WALKING IN HOSPITAL ROOM: A LITTLE

## 2025-08-08 ASSESSMENT — PAIN SCALES - GENERAL
PAINLEVEL_OUTOF10: 0 - NO PAIN

## 2025-08-08 ASSESSMENT — PAIN - FUNCTIONAL ASSESSMENT
PAIN_FUNCTIONAL_ASSESSMENT: 0-10

## 2025-08-08 NOTE — PROGRESS NOTES
CARDIAC SURGERY CONSULT PROGRESS NOTE    SUBJECTIVE  Duane Scott is a 75 y.o. male with a past medical history of CAD with multiple PCIs (most recently LAD in March 2025, hx of cardiac arrest in the setting of AMI in 2009, DM, HTN, HLD, CVA, CKD and HFpEF.      He initially presented to OSH with chest pain while walking to [place]. Workup at that time revealed NSTEMI with elevated troponins and no ischemic changes on EKG. He was then transferred to [facility] where he underwent cardiac cath which revealed severe proximal LAD disease with  of the RCA. CT surgery was consulted for CABG evaluation, but given consideration for possible robotic surgical intervention, patient was transferred to [facility].      Patient is currently chest pain free. Previously on aspirin and plavix for PCI from 3/2025, currently holding plavix given plan for surgery (last dose 8/5).      Cardiac surgery consulted for a CABG evaluation.      Patient states he is independent at baseline - usually uses a cane to ambulate.         Cardiac/Pertinent Imaging  [facility]: 8/6/25:  Left main appears to be fair size and long has no critical lesion.  Left anterior descending artery appears to be has patent stents which was placed in the March.  Medium caliber vessel.  Although prior to the stent patient has a proximal 90% eccentric lesion seen.  Diagonal branch also has 90% ostial lesion.  Patent stents in diagonal branch.  Size of LAD and diagonal is a medium caliber vessels.  Probably good vessel for bypass grafting.  Left circumflex artery appears to be codominant vessel has patent stents in mid area.  Mid OM branch has mild disease.  Right coronary total occluded with getting collateral from distal left coronary system.     TTE: 8/5/25  CONCLUSIONS:   1. Left ventricular ejection fraction is low normal by visual estimate at 50-55%.   2. Spectral Doppler shows a Grade II (pseudonormal pattern) of left ventricular diastolic filling with an elevated left  "atrial pressure.   3. There is normal right ventricular global systolic function.   4. There is moderate concentric left ventricular hypertrophy.    Objective   /71 (BP Location: Right arm, Patient Position: Sitting)   Pulse 75   Temp 36.1 °C (97 °F) (Temporal)   Resp 18   Ht 1.676 m (5' 6\")   Wt 85.1 kg (187 lb 9.8 oz)   SpO2 98%   BMI 30.28 kg/m²   0-10 (Numeric) Pain Score: 0 - No pain   Vitals:    08/08/25 0359   Weight: 85.1 kg (187 lb 9.8 oz)          Intake/Output Summary (Last 24 hours) at 8/8/2025 0837  Last data filed at 8/8/2025 0622  Gross per 24 hour   Intake 1138.66 ml   Output 1500 ml   Net -361.34 ml       Physical Exam  Physical Exam  Constitutional:       General: He is not in acute distress.     Appearance: He is not ill-appearing or toxic-appearing.   HENT:      Head: Normocephalic.      Mouth/Throat:      Mouth: Mucous membranes are moist.     Cardiovascular:      Rate and Rhythm: Normal rate and regular rhythm.   Pulmonary:      Effort: Pulmonary effort is normal.      Comments: On room air    Musculoskeletal:         General: Normal range of motion.      Cervical back: Neck supple.      Right lower leg: No edema.      Left lower leg: No edema.     Skin:     General: Skin is warm and dry.     Neurological:      General: No focal deficit present.      Mental Status: He is alert and oriented to person, place, and time.     Psychiatric:         Mood and Affect: Mood normal.         Behavior: Behavior normal.         Medications  Scheduled medications  Scheduled Medications[1]Continuous medications  Continuous Medications[2]PRN medications  PRN Medications[3]    Labs  Results for orders placed or performed during the hospital encounter of 08/06/25 (from the past 24 hours)   POCT GLUCOSE   Result Value Ref Range    POCT Glucose 164 (H) 74 - 99 mg/dL   Blood Gas Arterial, COOX Unsolicited   Result Value Ref Range    POCT pH, Arterial 7.41 7.38 - 7.42 pH    POCT pCO2, Arterial 37 (L) 38 - " 42 mm Hg    POCT pO2, Arterial 94 85 - 95 mm Hg    POCT SO2, Arterial 98 94 - 100 %    POCT Oxy Hemoglobin, Arterial 95.4 94.0 - 98.0 %    POCT Base Excess, Arterial -0.9 -2.0 - 3.0 mmol/L    POCT HCO3 Calculated, Arterial 23.5 22.0 - 26.0 mmol/L    POCT Hemoglobin, Arterial 11.2 (L) 13.5 - 17.5 g/dL    POCT Carboxyhemoglobin, Arterial 1.3 %    POCT Methemoglobin, Arterial 1.3 0.0 - 1.5 %    POCT Deoxy Hemoglobin, Arterial 2.0 0.0 - 5.0 %    Patient Temperature 37.0 degrees Celsius   Pulmonary function testing   Result Value Ref Range    FVC - Predicted 3.36     FEV1 - Predicted 2.54     FVC - PRE 2.94     FEV1 - Pre 1.59    Urinalysis with Reflex Culture and Microscopic   Result Value Ref Range    Color, Urine Light-Orange (N) Light-Yellow, Yellow, Dark-Yellow    Appearance, Urine Ex.Turbid (N) Clear    Specific Gravity, Urine 1.016 1.005 - 1.035    pH, Urine 6.5 5.0, 5.5, 6.0, 6.5, 7.0, 7.5, 8.0    Protein, Urine 70 (1+) (A) NEGATIVE, 10 (TRACE), 20 (TRACE) mg/dL    Glucose, Urine Normal Normal mg/dL    Blood, Urine 0.2 (2+) (A) NEGATIVE mg/dL    Ketones, Urine NEGATIVE NEGATIVE mg/dL    Bilirubin, Urine NEGATIVE NEGATIVE mg/dL    Urobilinogen, Urine Normal Normal mg/dL    Nitrite, Urine NEGATIVE NEGATIVE    Leukocyte Esterase, Urine 500 Emily/uL (A) NEGATIVE   Extra Urine Gray Tube   Result Value Ref Range    Extra Tube     Microscopic Only, Urine   Result Value Ref Range    WBC, Urine >50 (A) 1-5, NONE /HPF    WBC Clumps, Urine FEW Reference range not established. /HPF    RBC, Urine 11-20 (A) NONE, 1-2, 3-5 /HPF   POCT GLUCOSE   Result Value Ref Range    POCT Glucose 132 (H) 74 - 99 mg/dL   Abo/Rh Group Test - STAT (VERAB)   Result Value Ref Range    ABO TYPE A     Rh TYPE POS    POCT GLUCOSE   Result Value Ref Range    POCT Glucose 120 (H) 74 - 99 mg/dL   CBC   Result Value Ref Range    WBC 4.9 4.4 - 11.3 x10*3/uL    nRBC 0.0 0.0 - 0.0 /100 WBCs    RBC 3.28 (L) 4.50 - 5.90 x10*6/uL    Hemoglobin 9.8 (L) 13.5 -  17.5 g/dL    Hematocrit 29.7 (L) 41.0 - 52.0 %    MCV 91 80 - 100 fL    MCH 29.9 26.0 - 34.0 pg    MCHC 33.0 32.0 - 36.0 g/dL    RDW 13.5 11.5 - 14.5 %    Platelets 87 (L) 150 - 450 x10*3/uL   Heparin Assay   Result Value Ref Range    Heparin Unfractionated 0.2 See Comment Below for Therapeutic Ranges IU/mL   POCT GLUCOSE   Result Value Ref Range    POCT Glucose 121 (H) 74 - 99 mg/dL               ASSESSMENT & PLAN  Duane Scott is a 75 y.o. male with a past medical history of CAD with multiple PCIs (most recently LAD in March 2025, hx of cardiac arrest in the setting of AMI in 2009, DM, HTN, HLD, CVA, CKD and HFpEF.      He initially presented to OSH with chest pain while walking to [facility]. Workup at that time revealed NSTEMI with elevated troponins and no ischemic changes on EKG. He was then transferred to [facility] where he underwent cardiac cath which revealed severe proximal LAD disease with  of the RCA. CT surgery was consulted for CABG evaluation, but given consideration for possible robotic surgical intervention, patient was transferred to [facility].      Patient is currently chest pain free. Previously on aspirin and plavix for PCI from 3/2025, currently holding plavix given plan for surgery (last dose 8/5).      Cardiac surgery consulted for a CABG evaluation.      History of atrial fibrillation: None        RECOMMENDATIONS/PLAN  Dr. Dwaine Lorenz aware of patient, currently reviewing case and available imaging.   - Emergent cardiac surgery not indicated at this time.   - Plan for OR 8/12 Tuesday for Robotic MIDCAB  - Ideally, will wait AT LEAST 5 days s/p last dose of Plavix (8/5) to take patient to OR.  - Medical optimization per primary team     Preop risk stratification studies/labs to be ordered:   [X] TTE 8/5 EF 50-55%  [X] LHC done 8/6  [X] CT chest done 8/6  [X] CT abdomen/pelvis without contrast - done 8/7  [X] US Carotids - done 8/6 less than 50% stenosis bilaterally   [X] PFTs (spirometry and room  air ABG) - done 8/7 FEV1 1.59 at 62% predicted  [X] 2 view CXR - done 8/7  [X] MRSA - pending  [X] UA/Culture, LFTs, HgbA1c, TSH/T4, Lipid panel, CBC, RFP, Coag  Dental evaluation not indicated for isolated CABG surgeries         Preop orders when/if goes to surgery:  - Continue ASA, high-intensity statin, and BB (or document contraindications for use) for preop CABG patients   - No ACEi/ARBs in the pre-op period (at least 48 hours)  - Hold any SGLT2 inhibitors (Farxiga, Jardiance, etc.) for at least 3 days prior to cardiac surgery to prevent euglycemic DKA  - Hold any daily GLP-1 agonist drugs on the day of surgery. (Patients on GLP1 agonists should be on clear liquids for 24 hrs, and NPO for 2 hrs prior to surgery.)  - No antiplatelets other than ASA, no anticoagulants other than Heparin  - NPO after midnight, blood on hold/T&S, and preop scrubs ordered for OR 8/12     Will continue to follow along.  Thank you for the consultation.   Patient educated and all questions answered.  Please page the cardiac surgery consult pager [phone] with any questions or changes in patient condition.    Dary Hernandez PA-C  Cardiac Surgery Consult KATIE  [facility]  Cardiac Surgery Consult Pager [phone]     8/8/2025  8:37 AM            [1] aspirin, 81 mg, oral, Daily  atorvastatin, 80 mg, oral, Daily  carvedilol, 12.5 mg, oral, BID AC  [START ON 8/11/2025] chlorhexidine, 15 mL, Mouth/Throat, BID  finasteride, 5 mg, oral, Daily  hydroCHLOROthiazide, 25 mg, oral, Daily  insulin lispro, 0-5 Units, subcutaneous, q4h  mupirocin, , Topical, BID  polyethylene glycol, 17 g, oral, Daily  tamsulosin, 0.8 mg, oral, Daily    [2] heparin, 0-4,000 Units/hr, Last Rate: 1,200 Units/hr (08/08/25 8766)    [3] PRN medications: albuterol, dextrose, dextrose, glucagon, glucagon, heparin, ipratropium-albuteroL, melatonin, ondansetron ODT **OR** ondansetron

## 2025-08-08 NOTE — CARE PLAN
Problem: Pain - Adult  Goal: Verbalizes/displays adequate comfort level or baseline comfort level  Outcome: Progressing     Problem: Safety - Adult  Goal: Free from fall injury  Outcome: Progressing     Problem: Chronic Conditions and Co-morbidities  Goal: Patient's chronic conditions and co-morbidity symptoms are monitored and maintained or improved  Outcome: Progressing       The clinical goals for the shift include Maintain Heparin Drip per protocol throughout shift.

## 2025-08-08 NOTE — PROGRESS NOTES
Subjective Data  Patient seen this AM, sitting in chair. Denies CP, SOB, dizziness, lightheadedness currently. Reported some chest pressure overnight that resolved on own, patient thinks he may have slept wrong. UA dirty, Cx pending, does report dysuria this morning.     - start macrobid x7 days for UTI prior to surgery  - encouraged OOB and ambulation      Objective Data:  Last Recorded Vitals:  Vitals:    08/08/25 0359 08/08/25 0400 08/08/25 0754 08/08/25 0800   BP: 119/70  131/71    BP Location: Right arm  Right arm    Patient Position: Lying  Sitting    Pulse: 63 69 65 75   Resp: 18  18    Temp: 36.6 °C (97.9 °F)  36.1 °C (97 °F)    TempSrc: Temporal  Temporal    SpO2: 99%  98%    Weight: 85.1 kg (187 lb 9.8 oz)      Height:           Last Labs:  CBC - 8/8/2025: 12:14 AM  4.9 9.8 87    29.7      CMP - 8/6/2025: 11:42 PM  8.6 6.0; 6.0 20; 20 --- 1.2; 1.2   _ 3.4; 3.4 19; 19 81; 81      PTT - 8/6/2025: 11:42 PM  1.1   11.9 31     TROPHS   Date/Time Value Ref Range Status   08/06/2025 11:42  0 - 53 ng/L Final   08/04/2025 10:20  0 - 20 ng/L Final     Comment:     Previous result verified on 8/4/2025 1126 on specimen/case [id number] called with component TRPHS for procedure Troponin I, High Sensitivity, Initial with value 395 ng/L.   08/04/2025 04:47  0 - 20 ng/L Final     Comment:     Previous result verified on 8/4/2025 1126 on specimen/case [id number] called with component TRPHS for procedure Troponin I, High Sensitivity, Initial with value 395 ng/L.   08/04/2025 11:33  0 - 20 ng/L Final     Comment:     Previous result verified on 8/4/2025 1126 on specimen/case [id number] called with component TRPHS for procedure Troponin I, High Sensitivity, Initial with value 395 ng/L.     BNP   Date/Time Value Ref Range Status   08/04/2025 10:46  0 - 99 pg/mL Final   01/09/2025 02:38  0 - 99 pg/mL Final     HGBA1C   Date/Time Value Ref Range Status   08/06/2025 02:23 PM 5.5  See comment % Final   08/04/2025 11:33 AM 5.4 See comment % Final   02/07/2025 11:02 AM 5.6 4.2 - 6.5 % Final     LDLCALC   Date/Time Value Ref Range Status   08/05/2025 05:16 AM 36 <=99 mg/dL Final     Comment:                                 Near   Borderline      AGE      Desirable  Optimal    High     High     Very High     0-19 Y     0 - 109     ---    110-129   >/= 130     ----    20-24 Y     0 - 119     ---    120-159   >/= 160     ----      >24 Y     0 -  99   100-129  130-159   160-189     >/=190    LDL Cholesterol is calculated using the Friedewald equation.   02/10/2025 09:33 AM 52 mg/dL (calc) Final     Comment:     Reference range: <100     Desirable range <100 mg/dL for primary prevention;    <70 mg/dL for patients with CHD or diabetic patients   with > or = 2 CHD risk factors.     LDL-C is now calculated using the Sammy   calculation, which is a validated novel method providing   better accuracy than the Friedewald equation in the   estimation of LDL-C.   Jeremy CUEVAS et al. PABLO. 2013;310(19): 3359-0240   (http://education.YuMe.com/faq/QLK716)     07/25/2024 02:03 PM 51 65 - 130 mg/dL Final   05/18/2023 03:45  65 - 130 MG/DL Final   05/03/2022 01:34 PM 96 65 - 130 MG/DL Final   11/05/2019 10:36 AM 41 65 - 130 MG/DL Final     VLDL   Date/Time Value Ref Range Status   08/05/2025 05:16 AM 8 0 - 40 mg/dL Final      Last I/O:  I/O last 3 completed shifts:  In: 1258.7 (14.8 mL/kg) [P.O.:960; I.V.:298.7 (3.5 mL/kg)]  Out: 1680 (19.7 mL/kg) [Urine:1680 (0.5 mL/kg/hr)]  Weight: 85.1 kg     Past Cardiology Tests (Last 3 Years):  EKG:  ECG 12 lead 08/04/2025      Electrocardiogram 12 Lead 03/11/2025      ECG 12 lead 01/09/2025      ECG 12 lead 03/13/2024      ECG 12 lead 02/19/2024      ECG 12 lead 11/19/2023    Echo:  Transthoracic Echo Complete 08/05/2025      Transthoracic Echo (TTE) Complete 09/04/2024      Transthoracic Echo (TTE) Complete 02/20/2024    Ejection Fractions:  EF    Date/Time Value Ref Range Status   08/05/2025 09:52 AM 53 %    09/04/2024 10:38 AM 58 %      Cath:  Cardiac Catheterization Procedure 08/06/2025      Cardiac Catheterization Procedure 03/11/2025    Stress Test:  Nuclear Stress Test 02/20/2024    Cardiac Imaging:  No results found for this or any previous visit from the past 1095 days.      Inpatient Medications:  Scheduled Medications[1]  PRN Medications[2]  Continuous Medications[3]    Physical Exam  General: NAD, sitting in chair  Skin: warm and dry   Head/neck: no JVD at 60 degrees  Cardiac: S1S2, RRR  Pulm: CTAB, room air   GI: soft, nontender   Extremities: no LE edema   Neuro: A/Ox3, no focal neuro deficits   Psych: appropriate mood and behavior       Assessment/Plan   Duane Scott is a 75 y.o. male presenting as an OSH transfer for robotic CABG evaluation.     NSTEMI  Hx of CAD, recent PCI 3/2025  - OSH HStrop 395 -> 502 -> 650 -> 609  - OSH LHC 8/5 RCA , LAD prox 90% lesion, diag 90% ostial lesion   - repeat trop here 306  - hold plavix, last dose 8/5  - hold home losartan   - cont heparin drip  - cont aspirin, statin   - CTS consult, plans for CABG 8/12 with Dr. Dwaine Lorenz   - pre-surgical work up complete    UTI  - UA per surgical work up dirty, 8/8 patient does report dysuria  - start macrobid 100mg BID x7 days   - f/u urine culture for sensitivity      Chronic diastolic heart failure  - OSH TTE 8/5 EF 50-55%, mod conc LVH, DD  - CXR pending   -   - currently euvolemic on exam  - daily standing wts, strict I/Os, cardiac diet      CKD  - baseline Cr appears to be 1.3-1.6   - admit Cr 1.61  - today's Cr 1.55     DM  - HgbA1c 5.5%  - accuchecks, SSI, hypogycemia protocol      HTN  - cont coreg, hydrochlorothiazide  - SBPs ~115       DISPO  Pending CTS eval -> CABG 8/12 with Dr. Dwaine Lorenz    Seen and discussed with ROBIN Thao-CNP    I conducted a shared visit with MIHIR MathewsGABRIEL.    Mr. Scott is a 75 y.o M  with hs of CAD s/p multiple PCIs, most recent in March 2025 and multiple commorbidities including CVA, cardiac arrest in setting AMI in 2009, HFpEF, and chronic kidney disease. He presented with chest pain, noted ot have elevated troponin. LHC at [facility] revealed severe pLAD disease with RCA  and He is referred for CABG evaluation. He has no chest pain. Has abnormal UA with dysuria & will start empiric nitrofurantoin for UTI & f/up with urine cultures     P/Exam:  Patient is in no acute distress  No neck vein distention  Heart: Regular rhythm, normal S1-S2, no murmur or gallop  Lungs clear bilaterally  Abdomen soft and non-tender  Extremities are warm without edema     A/Plan:  Patient with CAD prior revascularizations most recent March 2025 presents with NSTEMI with cath findings of severe proximal pleural LAD disease and chronic total occlusion of her coronary artery.  We will continue Heparin drip, continue aspirin, hold Plavix (in preparation for surgery), and follow-up with CT surgery for surgery plan-He is scheduled for CABG on 8/12/25.       [1]   Scheduled medications   Medication Dose Route Frequency    aspirin  81 mg oral Daily    atorvastatin  80 mg oral Daily    carvedilol  12.5 mg oral BID AC    [START ON 8/11/2025] chlorhexidine  15 mL Mouth/Throat BID    finasteride  5 mg oral Daily    hydroCHLOROthiazide  25 mg oral Daily    insulin lispro  0-5 Units subcutaneous TID AC    mupirocin   Topical BID    polyethylene glycol  17 g oral Daily    tamsulosin  0.8 mg oral Daily   [2]   PRN medications   Medication    albuterol    dextrose    dextrose    glucagon    glucagon    heparin    ipratropium-albuteroL    melatonin    ondansetron ODT    Or    ondansetron   [3]   Continuous Medications   Medication Dose Last Rate    heparin  0-4,000 Units/hr 1,200 Units/hr (08/08/25 5153)

## 2025-08-08 NOTE — PROGRESS NOTES
Pharmacy Medication History Review    Duane Scott is a 75 y.o. male admitted for NSTEMI (non-ST elevated myocardial infarction) ([facility]). Pharmacy reviewed the patient's [id number] medications and allergies for accuracy.    Medications ADDED:  None  Medications CHANGED:  None  Medications REMOVED:   None     The list below reflects the updated PTA list.   Prior to Admission Medications   Prescriptions Last Dose Informant   albuterol (Ventolin HFA) 90 mcg/actuation inhaler  Other   Sig: Inhale 1 puff every 4 hours if needed for wheezing or shortness of breath.   aspirin 81 mg EC tablet  Other   Sig: Take 1 tablet (81 mg) by mouth once daily.   atorvastatin (Lipitor) 80 mg tablet  Other   Sig: Take 1 tablet (80 mg) by mouth once daily.   carvedilol (Coreg) 25 mg tablet  Other   Sig: Take 0.5 tablets (12.5 mg) by mouth 2 times a day before meals.  Last fill date 1/28/25 for 90 days    clopidogrel (Plavix) 75 mg tablet  Other   Sig: Take 1 tablet (75 mg) by mouth once daily.   finasteride (Proscar) 5 mg tablet  Other   Sig: Take 1 tablet (5 mg) by mouth once daily. Do not crush, chew, or split.  Last fill date 4/16/25 for 30 days    hydroCHLOROthiazide (HYDRODiuril) 25 mg tablet  Other   Sig: Take 1 tablet (25 mg) by mouth once daily.  Last fill date 4/17/25 for 90 days    ipratropium-albuteroL (Duo-Neb) 0.5-2.5 mg/3 mL nebulizer solution  Other   Sig: Inhale 3 mL every 6 hours if needed.   losartan (Cozaar) 100 mg tablet  Other   Sig: Take 1 tablet (100 mg) by mouth once daily.   tamsulosin (Flomax) 0.4 mg 24 hr capsule  Other   Sig: Take 2 capsules (0.8 mg) by mouth once daily.      Facility-Administered Medications: None        The list below reflects the updated allergy list. Please review each documented allergy for additional clarification and justification.  Allergies  Indicated as Unable to Assess by Malvin Bower on 8/8/2025 (Patient unreliable)        Severity Reactions Comments    Hydromorphone  "High Anaphylaxis     Latex Medium Hives, Rash Gets ill    Penicillins Medium Hives     Dyphylline-guaifenesin Not Specified Swelling     Grass Pollen Not Specified Other Makes him sick    Mold Not Specified Other Gets sick    Ragweed Not Specified Other Affects asthma            Patient was unable to be assessed for M2B at discharge.     Sources:   [facility]  Pharmacy dispense history  Patient Interview Poor historian  Chart Review  Care Everywhere  8/6/25 Discharge Summary     Additional Comments:  Patient was unable to provide and medication details but was confident in the amount of medications that he taks at home   Attempted to contact patients spouse   Med rec was completed using the patients chart review may reach out to Med Rec Team if second attempt is needed        NINA MEDRANO  Pharmacy Technician  08/08/25     Secure Chat preferred   If no response call [phone] or [employer] \"Med Rec\"    "

## 2025-08-09 LAB
ALBUMIN SERPL BCP-MCNC: 3.7 G/DL (ref 3.4–5)
ANION GAP SERPL CALC-SCNC: 11 MMOL/L (ref 10–20)
BUN SERPL-MCNC: 41 MG/DL (ref 6–23)
CALCIUM SERPL-MCNC: 9.2 MG/DL (ref 8.6–10.6)
CHLORIDE SERPL-SCNC: 104 MMOL/L (ref 98–107)
CO2 SERPL-SCNC: 29 MMOL/L (ref 21–32)
CREAT SERPL-MCNC: 1.63 MG/DL (ref 0.5–1.3)
EGFRCR SERPLBLD CKD-EPI 2021: 44 ML/MIN/1.73M*2
ERYTHROCYTE [DISTWIDTH] IN BLOOD BY AUTOMATED COUNT: 14.1 % (ref 11.5–14.5)
GLUCOSE BLD MANUAL STRIP-MCNC: 114 MG/DL (ref 74–99)
GLUCOSE BLD MANUAL STRIP-MCNC: 146 MG/DL (ref 74–99)
GLUCOSE BLD MANUAL STRIP-MCNC: 179 MG/DL (ref 74–99)
GLUCOSE SERPL-MCNC: 131 MG/DL (ref 74–99)
HCT VFR BLD AUTO: 33.4 % (ref 41–52)
HGB BLD-MCNC: 10.7 G/DL (ref 13.5–17.5)
MCH RBC QN AUTO: 30.1 PG (ref 26–34)
MCHC RBC AUTO-ENTMCNC: 32 G/DL (ref 32–36)
MCV RBC AUTO: 94 FL (ref 80–100)
NRBC BLD-RTO: 0 /100 WBCS (ref 0–0)
PHOSPHATE SERPL-MCNC: 4.2 MG/DL (ref 2.5–4.9)
PLATELET # BLD AUTO: 97 X10*3/UL (ref 150–450)
POTASSIUM SERPL-SCNC: 4.1 MMOL/L (ref 3.5–5.3)
RBC # BLD AUTO: 3.56 X10*6/UL (ref 4.5–5.9)
SODIUM SERPL-SCNC: 140 MMOL/L (ref 136–145)
UFH PPP CHRO-ACNC: 0.3 IU/ML (ref ?–1.1)
WBC # BLD AUTO: 4.6 X10*3/UL (ref 4.4–11.3)

## 2025-08-09 PROCEDURE — 2500000004 HC RX 250 GENERAL PHARMACY W/ HCPCS (ALT 636 FOR OP/ED): Performed by: STUDENT IN AN ORGANIZED HEALTH CARE EDUCATION/TRAINING PROGRAM

## 2025-08-09 PROCEDURE — 82947 ASSAY GLUCOSE BLOOD QUANT: CPT

## 2025-08-09 PROCEDURE — 99233 SBSQ HOSP IP/OBS HIGH 50: CPT | Performed by: STUDENT IN AN ORGANIZED HEALTH CARE EDUCATION/TRAINING PROGRAM

## 2025-08-09 PROCEDURE — 1200000002 HC GENERAL ROOM WITH TELEMETRY DAILY

## 2025-08-09 PROCEDURE — 85027 COMPLETE CBC AUTOMATED: CPT | Performed by: NURSE PRACTITIONER

## 2025-08-09 PROCEDURE — 36415 COLL VENOUS BLD VENIPUNCTURE: CPT | Performed by: STUDENT IN AN ORGANIZED HEALTH CARE EDUCATION/TRAINING PROGRAM

## 2025-08-09 PROCEDURE — 85520 HEPARIN ASSAY: CPT | Performed by: STUDENT IN AN ORGANIZED HEALTH CARE EDUCATION/TRAINING PROGRAM

## 2025-08-09 PROCEDURE — 2500000001 HC RX 250 WO HCPCS SELF ADMINISTERED DRUGS (ALT 637 FOR MEDICARE OP): Performed by: STUDENT IN AN ORGANIZED HEALTH CARE EDUCATION/TRAINING PROGRAM

## 2025-08-09 PROCEDURE — 36415 COLL VENOUS BLD VENIPUNCTURE: CPT | Performed by: NURSE PRACTITIONER

## 2025-08-09 PROCEDURE — 2500000002 HC RX 250 W HCPCS SELF ADMINISTERED DRUGS (ALT 637 FOR MEDICARE OP, ALT 636 FOR OP/ED): Performed by: STUDENT IN AN ORGANIZED HEALTH CARE EDUCATION/TRAINING PROGRAM

## 2025-08-09 PROCEDURE — 80069 RENAL FUNCTION PANEL: CPT | Performed by: NURSE PRACTITIONER

## 2025-08-09 PROCEDURE — 2500000002 HC RX 250 W HCPCS SELF ADMINISTERED DRUGS (ALT 637 FOR MEDICARE OP, ALT 636 FOR OP/ED): Performed by: NURSE PRACTITIONER

## 2025-08-09 RX ADMIN — MUPIROCIN: 20 OINTMENT TOPICAL at 08:29

## 2025-08-09 RX ADMIN — ATORVASTATIN CALCIUM 80 MG: 80 TABLET, FILM COATED ORAL at 08:28

## 2025-08-09 RX ADMIN — TAMSULOSIN HYDROCHLORIDE 0.8 MG: 0.4 CAPSULE ORAL at 08:28

## 2025-08-09 RX ADMIN — MUPIROCIN: 20 OINTMENT TOPICAL at 21:19

## 2025-08-09 RX ADMIN — FINASTERIDE 5 MG: 5 TABLET, FILM COATED ORAL at 08:28

## 2025-08-09 RX ADMIN — HEPARIN SODIUM 1200 UNITS/HR: 10000 INJECTION, SOLUTION INTRAVENOUS at 09:35

## 2025-08-09 RX ADMIN — HYDROCHLOROTHIAZIDE 25 MG: 25 TABLET ORAL at 08:28

## 2025-08-09 RX ADMIN — NITROFURANTOIN (MONOHYDRATE/MACROCRYSTALS) 100 MG: 25; 75 CAPSULE ORAL at 08:28

## 2025-08-09 RX ADMIN — INSULIN LISPRO 1 UNITS: 100 INJECTION, SOLUTION INTRAVENOUS; SUBCUTANEOUS at 16:53

## 2025-08-09 RX ADMIN — CARVEDILOL 12.5 MG: 12.5 TABLET, FILM COATED ORAL at 06:19

## 2025-08-09 RX ADMIN — ASPIRIN 81 MG: 81 TABLET, COATED ORAL at 08:28

## 2025-08-09 RX ADMIN — CARVEDILOL 12.5 MG: 12.5 TABLET, FILM COATED ORAL at 16:53

## 2025-08-09 RX ADMIN — NITROFURANTOIN (MONOHYDRATE/MACROCRYSTALS) 100 MG: 25; 75 CAPSULE ORAL at 21:18

## 2025-08-09 ASSESSMENT — COGNITIVE AND FUNCTIONAL STATUS - GENERAL
TOILETING: A LITTLE
DAILY ACTIVITIY SCORE: 20
HELP NEEDED FOR BATHING: A LITTLE
CLIMB 3 TO 5 STEPS WITH RAILING: A LOT
TOILETING: A LITTLE
TOILETING: A LITTLE
DRESSING REGULAR UPPER BODY CLOTHING: A LITTLE
CLIMB 3 TO 5 STEPS WITH RAILING: A LOT
DRESSING REGULAR UPPER BODY CLOTHING: A LITTLE
HELP NEEDED FOR BATHING: A LITTLE
TOILETING: A LITTLE
DRESSING REGULAR LOWER BODY CLOTHING: A LITTLE
STANDING UP FROM CHAIR USING ARMS: A LITTLE
MOBILITY SCORE: 19
WALKING IN HOSPITAL ROOM: A LITTLE
DRESSING REGULAR LOWER BODY CLOTHING: A LITTLE
DAILY ACTIVITIY SCORE: 20
STANDING UP FROM CHAIR USING ARMS: A LITTLE
HELP NEEDED FOR BATHING: A LITTLE
DRESSING REGULAR UPPER BODY CLOTHING: A LITTLE
DAILY ACTIVITIY SCORE: 20
HELP NEEDED FOR BATHING: A LITTLE
TOILETING: A LITTLE
MOVING TO AND FROM BED TO CHAIR: A LITTLE
DAILY ACTIVITIY SCORE: 20
MOBILITY SCORE: 19
WALKING IN HOSPITAL ROOM: A LITTLE
MOVING TO AND FROM BED TO CHAIR: A LITTLE
MOVING TO AND FROM BED TO CHAIR: A LITTLE
MOBILITY SCORE: 19
DRESSING REGULAR UPPER BODY CLOTHING: A LITTLE
DRESSING REGULAR LOWER BODY CLOTHING: A LITTLE
WALKING IN HOSPITAL ROOM: A LITTLE
MOVING TO AND FROM BED TO CHAIR: A LITTLE
DAILY ACTIVITIY SCORE: 20
MOBILITY SCORE: 19
CLIMB 3 TO 5 STEPS WITH RAILING: A LOT
WALKING IN HOSPITAL ROOM: A LITTLE
CLIMB 3 TO 5 STEPS WITH RAILING: A LOT
STANDING UP FROM CHAIR USING ARMS: A LITTLE
STANDING UP FROM CHAIR USING ARMS: A LITTLE
DRESSING REGULAR LOWER BODY CLOTHING: A LITTLE
HELP NEEDED FOR BATHING: A LITTLE
MOBILITY SCORE: 19
STANDING UP FROM CHAIR USING ARMS: A LITTLE
DRESSING REGULAR UPPER BODY CLOTHING: A LITTLE
CLIMB 3 TO 5 STEPS WITH RAILING: A LOT
WALKING IN HOSPITAL ROOM: A LITTLE
MOVING TO AND FROM BED TO CHAIR: A LITTLE
DRESSING REGULAR LOWER BODY CLOTHING: A LITTLE

## 2025-08-09 ASSESSMENT — PAIN SCALES - GENERAL
PAINLEVEL_OUTOF10: 0 - NO PAIN

## 2025-08-09 ASSESSMENT — PAIN - FUNCTIONAL ASSESSMENT
PAIN_FUNCTIONAL_ASSESSMENT: 0-10

## 2025-08-09 NOTE — CARE PLAN
Problem: Pain - Adult  Goal: Verbalizes/displays adequate comfort level or baseline comfort level  Outcome: Progressing     Problem: Safety - Adult  Goal: Free from fall injury  Outcome: Progressing     Problem: Chronic Conditions and Co-morbidities  Goal: Patient's chronic conditions and co-morbidity symptoms are monitored and maintained or improved  Outcome: Progressing     Problem: Fall/Injury  Goal: Be free from injury by end of the shift  Outcome: Progressing       The clinical goals for the shift include Maintain Heparin Drip per protocol throughout shift.

## 2025-08-09 NOTE — CARE PLAN
Problem: Pain - Adult  Goal: Verbalizes/displays adequate comfort level or baseline comfort level  Outcome: Progressing     Problem: Safety - Adult  Goal: Free from fall injury  Outcome: Progressing     Problem: Discharge Planning  Goal: Discharge to home or other facility with appropriate resources  Outcome: Progressing     Problem: Chronic Conditions and Co-morbidities  Goal: Patient's chronic conditions and co-morbidity symptoms are monitored and maintained or improved  Outcome: Progressing     Problem: Nutrition  Goal: Nutrient intake appropriate for maintaining nutritional needs  Outcome: Progressing     Problem: Fall/Injury  Goal: Not fall by end of shift  Outcome: Progressing  Goal: Be free from injury by end of the shift  Outcome: Progressing  Goal: Verbalize understanding of personal risk factors for fall in the hospital  Outcome: Progressing  Goal: Verbalize understanding of risk factor reduction measures to prevent injury from fall in the home  Outcome: Progressing  Goal: Use assistive devices by end of the shift  Outcome: Progressing  Goal: Pace activities to prevent fatigue by end of the shift  Outcome: Progressing   The patient's goals for the shift include shower and sit up to the chair    The clinical goals for the shift include therapeutic on heparin    Over the shift, the patient remained therapeutic on heparin gtt; ambulate to and from the bathroom with walker; remained hds; continue to monitor.

## 2025-08-09 NOTE — PROGRESS NOTES
Subjective Data  Patient seen this AM,   Denies CP, SOB, dizziness, lightheadedness currently.      - start macrobid x7 days for UTI prior to surgery  - encouraged OOB and ambulation      Objective Data:  Last Recorded Vitals:  Vitals:    08/09/25 0800 08/09/25 0809 08/09/25 1142 08/09/25 1200   BP:  125/69 122/67    BP Location:  Right arm Right arm    Patient Position:  Lying Lying    Pulse: 66   56   Resp:  16 16    Temp:  36.4 °C (97.5 °F) 36.3 °C (97.3 °F)    TempSrc:  Temporal Temporal    SpO2:  97% 98%    Weight:       Height:           Last Labs:  CBC - 8/8/2025:  4:15 PM  4.7 11.2 100    33.6      CMP - 8/8/2025:  4:15 PM  9.2 6.0; 6.0 20; 20 --- 1.2; 1.2   3.9 3.9 19; 19 81; 81      PTT - 8/6/2025: 11:42 PM  1.1   11.9 31        Last I/O:  I/O last 3 completed shifts:  In: 1782.5 (21 mL/kg) [P.O.:1200; I.V.:582.5 (6.9 mL/kg)]  Out: 1750 (20.6 mL/kg) [Urine:1750 (0.6 mL/kg/hr)]  Weight: 85 kg       Ejection Fractions:  EF   Date/Time Value Ref Range Status   08/05/2025 09:52 AM 53 %    09/04/2024 10:38 AM 58 %        Inpatient Medications:  Scheduled Medications[1]  PRN Medications[2]  Continuous Medications[3]    Physical Exam  General: NAD  Skin: warm and dry   Head/neck: no JVD at 60 degrees  Cardiac: S1S2, RRR  Pulm: CTAB, room air   GI: soft, nontender   Extremities: no LE edema   Neuro: A/Ox3, no focal neuro deficits   Psych: appropriate mood and behavior       Assessment/Plan   Duane Scott is a 75 y.o. male presenting as an OSH transfer for robotic CABG evaluation.     NSTEMI  Hx of CAD, recent PCI 3/2025  - OSH HStrop 395 -> 502 -> 650 -> 609  - OSH C 8/5 RCA , LAD prox 90% lesion, diag 90% ostial lesion   - repeat trop here 306  - hold plavix, last dose 8/5  - hold home losartan   - cont heparin drip  - cont aspirin, statin   - CTS consult, plans for CABG 8/12 with Dr. Dwaine Lorenz   - pre-surgical work up complete    UTI  - UA per surgical work up dirty, 8/8 patient does report dysuria  -  start macrobid 100mg BID x7 days   - f/u urine culture for sensitivity      Chronic diastolic heart failure  - OSH TTE 8/5 EF 50-55%, mod conc LVH, DD  - CXR pending   -   - currently euvolemic on exam  - daily standing wts, strict I/Os, cardiac diet      CKD  - baseline Cr appears to be 1.3-1.6   - admit Cr 1.61  - today's Cr 1.55     DM  - HgbA1c 5.5%  - accuchecks, SSI, hypogycemia protocol      HTN  - cont coreg, hydrochlorothiazide  - SBPs ~115       DISPO  Pending CTS eval -> CABG 8/12 with Dr. Dwaine Lorenz    Seen and discussed with ABDULAZIZ Epps    I conducted a shared visit with ABDULAZIZ Shearer  Mr. Scott is a 75 y.o M with hs of CAD s/p multiple PCIs, most recent in March 2025 and multiple commorbidities including CVA, cardiac arrest in setting AMI in 2009, HFpEF, and chronic kidney disease. He presented with chest pain, noted ot have elevated troponin. LHC at [facility] revealed severe pLAD disease with RCA  and He is referred for CABG evaluation. He has no chest pain. Also on empiric nitrofurantoin for suspected UTI. He feels well this morning.     P/Exam:  Patient is in no acute distress  No neck vein distention  Heart: Regular rhythm, normal S1-S2, no murmur or gallop  Lungs clear bilaterally  Abdomen soft and non-tender  Extremities are warm without edema     A/Plan:  Patient with CAD prior revascularizations most recent March 2025 presents with NSTEMI with cath findings of severe proximal pleural LAD disease and chronic total occlusion of her coronary artery.  We will continue Heparin drip, continue aspirin, hold Plavix (in preparation for surgery), and follow-up with CT surgery for surgery plan-He is scheduled for CABG on 8/12/25.         [1]   Scheduled medications   Medication Dose Route Frequency    aspirin  81 mg oral Daily    atorvastatin  80 mg oral Daily    carvedilol  12.5 mg oral BID AC    [START ON 8/11/2025] chlorhexidine  15 mL Mouth/Throat  BID    finasteride  5 mg oral Daily    hydroCHLOROthiazide  25 mg oral Daily    insulin lispro  0-5 Units subcutaneous TID AC    mupirocin   Topical BID    nitrofurantoin (macrocrystal-monohydrate)  100 mg oral BID    polyethylene glycol  17 g oral Daily    tamsulosin  0.8 mg oral Daily   [2]   PRN medications   Medication    albuterol    dextrose    dextrose    glucagon    glucagon    heparin    ipratropium-albuteroL    melatonin    ondansetron ODT    Or    ondansetron   [3]   Continuous Medications   Medication Dose Last Rate    heparin  0-4,000 Units/hr 1,200 Units/hr (08/09/25 1065)

## 2025-08-09 NOTE — PROGRESS NOTES
08/09/25        Transitional Care Coordination Progress Note:   Patient discussed during interdisciplinary rounds.   Team members present: RN TCC NP  Plan per Medical/Surgical team: Plan for OR 8/12 Tuesday for Robotic MIDCAB   Discharge disposition: Home with home care   Status-Inpatient   Payer-MEDICARE   Potential Barriers: None   ADOD: 8-   Cory Karimi RN [facility] [phone]

## 2025-08-10 LAB
ABO GROUP (TYPE) IN BLOOD: NORMAL
ALBUMIN SERPL BCP-MCNC: 3.8 G/DL (ref 3.4–5)
ANION GAP SERPL CALC-SCNC: 12 MMOL/L (ref 10–20)
ANTIBODY SCREEN: NORMAL
BUN SERPL-MCNC: 39 MG/DL (ref 6–23)
CALCIUM SERPL-MCNC: 9.4 MG/DL (ref 8.6–10.6)
CHLORIDE SERPL-SCNC: 102 MMOL/L (ref 98–107)
CO2 SERPL-SCNC: 26 MMOL/L (ref 21–32)
CREAT SERPL-MCNC: 1.53 MG/DL (ref 0.5–1.3)
EGFRCR SERPLBLD CKD-EPI 2021: 47 ML/MIN/1.73M*2
ERYTHROCYTE [DISTWIDTH] IN BLOOD BY AUTOMATED COUNT: 14.2 % (ref 11.5–14.5)
GLUCOSE BLD MANUAL STRIP-MCNC: 112 MG/DL (ref 74–99)
GLUCOSE BLD MANUAL STRIP-MCNC: 127 MG/DL (ref 74–99)
GLUCOSE BLD MANUAL STRIP-MCNC: 190 MG/DL (ref 74–99)
GLUCOSE BLD MANUAL STRIP-MCNC: 96 MG/DL (ref 74–99)
GLUCOSE SERPL-MCNC: 104 MG/DL (ref 74–99)
HCT VFR BLD AUTO: 32.9 % (ref 41–52)
HGB BLD-MCNC: 10.9 G/DL (ref 13.5–17.5)
MCH RBC QN AUTO: 30.4 PG (ref 26–34)
MCHC RBC AUTO-ENTMCNC: 33.1 G/DL (ref 32–36)
MCV RBC AUTO: 92 FL (ref 80–100)
NRBC BLD-RTO: 0 /100 WBCS (ref 0–0)
PHOSPHATE SERPL-MCNC: 4.3 MG/DL (ref 2.5–4.9)
PLATELET # BLD AUTO: 99 X10*3/UL (ref 150–450)
POTASSIUM SERPL-SCNC: 4 MMOL/L (ref 3.5–5.3)
RBC # BLD AUTO: 3.59 X10*6/UL (ref 4.5–5.9)
RH FACTOR (ANTIGEN D): NORMAL
SODIUM SERPL-SCNC: 136 MMOL/L (ref 136–145)
UFH PPP CHRO-ACNC: 0.3 IU/ML (ref ?–1.1)
WBC # BLD AUTO: 4.4 X10*3/UL (ref 4.4–11.3)

## 2025-08-10 PROCEDURE — 2500000001 HC RX 250 WO HCPCS SELF ADMINISTERED DRUGS (ALT 637 FOR MEDICARE OP): Performed by: NURSE PRACTITIONER

## 2025-08-10 PROCEDURE — 36415 COLL VENOUS BLD VENIPUNCTURE: CPT | Performed by: STUDENT IN AN ORGANIZED HEALTH CARE EDUCATION/TRAINING PROGRAM

## 2025-08-10 PROCEDURE — 86906 BLD TYPING SEROLOGIC RH PHNT: CPT

## 2025-08-10 PROCEDURE — 85027 COMPLETE CBC AUTOMATED: CPT | Performed by: NURSE PRACTITIONER

## 2025-08-10 PROCEDURE — 80069 RENAL FUNCTION PANEL: CPT | Performed by: NURSE PRACTITIONER

## 2025-08-10 PROCEDURE — 2500000002 HC RX 250 W HCPCS SELF ADMINISTERED DRUGS (ALT 637 FOR MEDICARE OP, ALT 636 FOR OP/ED): Performed by: NURSE PRACTITIONER

## 2025-08-10 PROCEDURE — 86900 BLOOD TYPING SEROLOGIC ABO: CPT | Performed by: PHYSICIAN ASSISTANT

## 2025-08-10 PROCEDURE — 86901 BLOOD TYPING SEROLOGIC RH(D): CPT | Performed by: PHYSICIAN ASSISTANT

## 2025-08-10 PROCEDURE — 86922 COMPATIBILITY TEST ANTIGLOB: CPT

## 2025-08-10 PROCEDURE — 36415 COLL VENOUS BLD VENIPUNCTURE: CPT | Performed by: NURSE PRACTITIONER

## 2025-08-10 PROCEDURE — 2500000002 HC RX 250 W HCPCS SELF ADMINISTERED DRUGS (ALT 637 FOR MEDICARE OP, ALT 636 FOR OP/ED): Performed by: STUDENT IN AN ORGANIZED HEALTH CARE EDUCATION/TRAINING PROGRAM

## 2025-08-10 PROCEDURE — 86870 RBC ANTIBODY IDENTIFICATION: CPT

## 2025-08-10 PROCEDURE — 82947 ASSAY GLUCOSE BLOOD QUANT: CPT

## 2025-08-10 PROCEDURE — 2500000004 HC RX 250 GENERAL PHARMACY W/ HCPCS (ALT 636 FOR OP/ED): Performed by: STUDENT IN AN ORGANIZED HEALTH CARE EDUCATION/TRAINING PROGRAM

## 2025-08-10 PROCEDURE — 86885 COOMBS TEST INDIRECT QUAL: CPT

## 2025-08-10 PROCEDURE — 1100000001 HC PRIVATE ROOM DAILY

## 2025-08-10 PROCEDURE — 86077 PHYS BLOOD BANK SERV XMATCH: CPT | Performed by: PHYSICIAN ASSISTANT

## 2025-08-10 PROCEDURE — 99233 SBSQ HOSP IP/OBS HIGH 50: CPT | Performed by: STUDENT IN AN ORGANIZED HEALTH CARE EDUCATION/TRAINING PROGRAM

## 2025-08-10 PROCEDURE — 2500000001 HC RX 250 WO HCPCS SELF ADMINISTERED DRUGS (ALT 637 FOR MEDICARE OP): Performed by: STUDENT IN AN ORGANIZED HEALTH CARE EDUCATION/TRAINING PROGRAM

## 2025-08-10 PROCEDURE — 85520 HEPARIN ASSAY: CPT | Performed by: STUDENT IN AN ORGANIZED HEALTH CARE EDUCATION/TRAINING PROGRAM

## 2025-08-10 RX ORDER — ACETAMINOPHEN 325 MG/1
975 TABLET ORAL ONCE
Status: COMPLETED | OUTPATIENT
Start: 2025-08-10 | End: 2025-08-10

## 2025-08-10 RX ADMIN — HYDROCHLOROTHIAZIDE 25 MG: 25 TABLET ORAL at 08:16

## 2025-08-10 RX ADMIN — FINASTERIDE 5 MG: 5 TABLET, FILM COATED ORAL at 08:16

## 2025-08-10 RX ADMIN — ACETAMINOPHEN 975 MG: 325 TABLET ORAL at 08:21

## 2025-08-10 RX ADMIN — ASPIRIN 81 MG: 81 TABLET, COATED ORAL at 08:16

## 2025-08-10 RX ADMIN — MUPIROCIN: 20 OINTMENT TOPICAL at 20:38

## 2025-08-10 RX ADMIN — CARVEDILOL 12.5 MG: 12.5 TABLET, FILM COATED ORAL at 06:33

## 2025-08-10 RX ADMIN — HEPARIN SODIUM 1200 UNITS/HR: 10000 INJECTION, SOLUTION INTRAVENOUS at 06:39

## 2025-08-10 RX ADMIN — NITROFURANTOIN (MONOHYDRATE/MACROCRYSTALS) 100 MG: 25; 75 CAPSULE ORAL at 20:38

## 2025-08-10 RX ADMIN — TAMSULOSIN HYDROCHLORIDE 0.8 MG: 0.4 CAPSULE ORAL at 08:16

## 2025-08-10 RX ADMIN — ATORVASTATIN CALCIUM 80 MG: 80 TABLET, FILM COATED ORAL at 08:16

## 2025-08-10 RX ADMIN — MUPIROCIN: 20 OINTMENT TOPICAL at 08:15

## 2025-08-10 RX ADMIN — NITROFURANTOIN (MONOHYDRATE/MACROCRYSTALS) 100 MG: 25; 75 CAPSULE ORAL at 08:16

## 2025-08-10 RX ADMIN — CARVEDILOL 12.5 MG: 12.5 TABLET, FILM COATED ORAL at 16:57

## 2025-08-10 ASSESSMENT — COGNITIVE AND FUNCTIONAL STATUS - GENERAL
CLIMB 3 TO 5 STEPS WITH RAILING: A LOT
STANDING UP FROM CHAIR USING ARMS: A LITTLE
DRESSING REGULAR LOWER BODY CLOTHING: A LITTLE
DRESSING REGULAR UPPER BODY CLOTHING: A LITTLE
HELP NEEDED FOR BATHING: A LITTLE
HELP NEEDED FOR BATHING: A LITTLE
STANDING UP FROM CHAIR USING ARMS: A LITTLE
TOILETING: A LITTLE
DAILY ACTIVITIY SCORE: 20
TOILETING: A LITTLE
MOVING TO AND FROM BED TO CHAIR: A LITTLE
WALKING IN HOSPITAL ROOM: A LITTLE
DRESSING REGULAR UPPER BODY CLOTHING: A LITTLE
MOBILITY SCORE: 19
CLIMB 3 TO 5 STEPS WITH RAILING: A LOT
WALKING IN HOSPITAL ROOM: A LITTLE
MOVING TO AND FROM BED TO CHAIR: A LITTLE
DRESSING REGULAR LOWER BODY CLOTHING: A LITTLE

## 2025-08-10 ASSESSMENT — PAIN DESCRIPTION - LOCATION: LOCATION: BACK

## 2025-08-10 ASSESSMENT — PAIN DESCRIPTION - DESCRIPTORS: DESCRIPTORS: ACHING

## 2025-08-10 ASSESSMENT — PAIN SCALES - GENERAL
PAINLEVEL_OUTOF10: 0 - NO PAIN
PAINLEVEL_OUTOF10: 5 - MODERATE PAIN
PAINLEVEL_OUTOF10: 0 - NO PAIN

## 2025-08-10 ASSESSMENT — PAIN - FUNCTIONAL ASSESSMENT
PAIN_FUNCTIONAL_ASSESSMENT: 0-10

## 2025-08-10 ASSESSMENT — PAIN DESCRIPTION - ORIENTATION: ORIENTATION: LOWER

## 2025-08-10 NOTE — CARE PLAN
The patient's goals for the shift include shower and sit up to the chair    The clinical goals for the shift include patient will he HDS throughout this shift    Over the shift, the patient remained alert and oriented x4, on room air. The patient voided -590 during this shift.    Patient involved with and aware of plan of care, has remained free of falls, call bell within reach, bed locked in the lowest position with nonskid socks on. Purposeful rounding performed. Patient calls appropriately for assistance. See chart for further details.

## 2025-08-10 NOTE — CARE PLAN
Problem: Pain - Adult  Goal: Verbalizes/displays adequate comfort level or baseline comfort level  Outcome: Progressing     Problem: Safety - Adult  Goal: Free from fall injury  Outcome: Progressing     Problem: Discharge Planning  Goal: Discharge to home or other facility with appropriate resources  Outcome: Progressing     Problem: Chronic Conditions and Co-morbidities  Goal: Patient's chronic conditions and co-morbidity symptoms are monitored and maintained or improved  Outcome: Progressing     Problem: Nutrition  Goal: Nutrient intake appropriate for maintaining nutritional needs  Outcome: Progressing     Problem: Fall/Injury  Goal: Not fall by end of shift  Outcome: Progressing  Goal: Be free from injury by end of the shift  Outcome: Progressing  Goal: Verbalize understanding of personal risk factors for fall in the hospital  Outcome: Progressing  Goal: Verbalize understanding of risk factor reduction measures to prevent injury from fall in the home  Outcome: Progressing  Goal: Use assistive devices by end of the shift  Outcome: Progressing  Goal: Pace activities to prevent fatigue by end of the shift  Outcome: Progressing   The patient's goals for the shift include sit up to the chair for meals    The clinical goals for the shift include therapeutic on heparin    Over the shift, the patient remained hds; therapeutic on heparin gtt; surgery scheduled for 8/12/25; continue to monitor pt response to treatment.

## 2025-08-10 NOTE — PROGRESS NOTES
Subjective Data  Patient seen this AM,   Denies CP, SOB, dizziness, lightheadedness currently.      - start macrobid x7 days for UTI prior to surgery  - encouraged OOB and ambulation   - thrombocytopenia stable since Gibran was 93 average 100     Objective Data:  Last Recorded Vitals:  Vitals:    08/10/25 0750 08/10/25 0800 08/10/25 1141 08/10/25 1200   BP: 113/65  129/73    BP Location: Left arm  Left arm    Patient Position: Lying  Lying    Pulse:  58  61   Resp: 16  16    Temp: 36.4 °C (97.5 °F)  36.3 °C (97.3 °F)    TempSrc: Temporal  Temporal    SpO2: 97%  96%    Weight:       Height:           Last Labs:  CBC - 8/9/2025:  7:23 PM  4.6 10.7 97    33.4      CMP - 8/9/2025:  7:23 PM  9.2 6.0; 6.0 20; 20 --- 1.2; 1.2   4.2 3.7 19; 19 81; 81      PTT - 8/6/2025: 11:42 PM  1.1   11.9 31        Last I/O:  I/O last 3 completed shifts:  In: 1526.8 (18.1 mL/kg) [P.O.:1080; I.V.:446.8 (5.3 mL/kg)]  Out: 2000 (23.7 mL/kg) [Urine:2000 (0.7 mL/kg/hr)]  Weight: 84.3 kg       Ejection Fractions:  EF   Date/Time Value Ref Range Status   08/05/2025 09:52 AM 53 %    09/04/2024 10:38 AM 58 %        Inpatient Medications:  Scheduled Medications[1]  PRN Medications[2]  Continuous Medications[3]    Physical Exam  General: NAD  Skin: warm and dry   Head/neck: no JVD at 60 degrees  Cardiac: S1S2, RRR  Pulm: CTAB, room air   GI: soft, nontender   Extremities: no LE edema   Neuro: A/Ox3, no focal neuro deficits   Psych: appropriate mood and behavior       Assessment/Plan   Duane Scott is a 75 y.o. male presenting as an OSH transfer for robotic CABG evaluation.     NSTEMI  Hx of CAD, recent PCI 3/2025  - OSH HStrop 395 -> 502 -> 650 -> 609  - OSH [facility] 8/5 RCA , LAD prox 90% lesion, diag 90% ostial lesion   - repeat trop here 306  - hold plavix, last dose 8/5  - hold home losartan   - cont heparin drip  - cont aspirin, statin   - CTS consult, plans for CABG 8/12 with Dr. Dwaine Lorenz   - pre-surgical work up complete    - thrombocytopenia    -stable since Gibran was 93 average 100  - watch with heparin drip     UTI  - UA per surgical work up dirty, 8/8 patient does report dysuria  - start macrobid 100mg BID x7 days   - f/u urine culture for sensitivity      Chronic diastolic heart failure  - OSH TTE 8/5 EF 50-55%, mod conc LVH, DD  - CXR pending   -   - currently euvolemic on exam  - daily standing wts, strict I/Os, cardiac diet      CKD  - baseline Cr appears to be 1.3-1.6   - admit Cr 1.61  - today's Cr 1.55     DM  - HgbA1c 5.5%  - accuchecks, SSI, hypogycemia protocol      HTN  - cont coreg, hydrochlorothiazide  - SBPs ~115       DISPO  Pending CTS eval -> CABG 8/12 with Dr. Dwaine Lorenz    Seen and discussed with ROBIN Epps-CNP    I conducted a shared visit with ABDULAZIZ Shearer  Mr. Scott is a 75 y.o M with hs of CAD s/p multiple PCIs, most recent in March 2025 and multiple commorbidities including CVA, cardiac arrest in setting AMI in 2009, HFpEF, and chronic kidney disease. He presented with chest pain, noted ot have elevated troponin. LHC at [facility] revealed severe pLAD disease with RCA  and He is referred for CABG evaluation. He does not have no chest pain.     P/Exam:  Patient is in no acute distress  No neck vein distention  Heart: Regular rhythm, normal S1-S2, no murmur or gallop  Lungs clear bilaterally  Abdomen soft and non-tender  Extremities are warm without edema     A/Plan:  Patient with CAD prior revascularizations (most recent March 2025) who presents with NSTEMI with cath findings of severe proximal LAD disease and chronic total occlusion right coronary artery. He has been evaluated by CT surgery and He is planned for vabg nevt week.  We will continue Heparin drip, continue aspirin, hold Plavix (in preparation for surgery), and follow-up with CT surgery for CABG which is scheduled on 8/12/25.         [1]   Scheduled medications   Medication Dose Route Frequency    aspirin  81 mg oral  Daily    atorvastatin  80 mg oral Daily    carvedilol  12.5 mg oral BID AC    [START ON 8/11/2025] chlorhexidine  15 mL Mouth/Throat BID    finasteride  5 mg oral Daily    hydroCHLOROthiazide  25 mg oral Daily    insulin lispro  0-5 Units subcutaneous TID AC    mupirocin   Topical BID    nitrofurantoin (macrocrystal-monohydrate)  100 mg oral BID    polyethylene glycol  17 g oral Daily    tamsulosin  0.8 mg oral Daily   [2]   PRN medications   Medication    albuterol    dextrose    dextrose    glucagon    glucagon    heparin    ipratropium-albuteroL    melatonin    ondansetron ODT    Or    ondansetron   [3]   Continuous Medications   Medication Dose Last Rate    heparin  0-4,000 Units/hr 1,200 Units/hr (08/10/25 0639)

## 2025-08-10 NOTE — PROGRESS NOTES
Subjective Data  Patient seen this AM,   Denies CP, SOB, dizziness, lightheadedness currently.      - start macrobid x7 days for UTI prior to surgery  - encouraged OOB and ambulation      Objective Data:  Last Recorded Vitals:  Vitals:    08/10/25 0750 08/10/25 0800 08/10/25 1141 08/10/25 1200   BP: 113/65  129/73    BP Location: Left arm  Left arm    Patient Position: Lying  Lying    Pulse:  58  61   Resp: 16  16    Temp: 36.4 °C (97.5 °F)  36.3 °C (97.3 °F)    TempSrc: Temporal  Temporal    SpO2: 97%  96%    Weight:       Height:           Last Labs:  CBC - 8/9/2025:  7:23 PM  4.6 10.7 97    33.4      CMP - 8/9/2025:  7:23 PM  9.2 6.0; 6.0 20; 20 --- 1.2; 1.2   4.2 3.7 19; 19 81; 81      PTT - 8/6/2025: 11:42 PM  1.1   11.9 31        Last I/O:  I/O last 3 completed shifts:  In: 1526.8 (18.1 mL/kg) [P.O.:1080; I.V.:446.8 (5.3 mL/kg)]  Out: 2000 (23.7 mL/kg) [Urine:2000 (0.7 mL/kg/hr)]  Weight: 84.3 kg       Ejection Fractions:  EF   Date/Time Value Ref Range Status   08/05/2025 09:52 AM 53 %    09/04/2024 10:38 AM 58 %        Inpatient Medications:  Scheduled Medications[1]  PRN Medications[2]  Continuous Medications[3]    Physical Exam  General: NAD  Skin: warm and dry   Head/neck: no JVD at 60 degrees  Cardiac: S1S2, RRR  Pulm: CTAB, room air   GI: soft, nontender   Extremities: no LE edema   Neuro: A/Ox3, no focal neuro deficits   Psych: appropriate mood and behavior       Assessment/Plan   Duane Scott is a 75 y.o. male presenting as an OSH transfer for robotic CABG evaluation.     NSTEMI  Hx of CAD, recent PCI 3/2025  - OSH HStrop 395 -> 502 -> 650 -> 609  - OSH C 8/5 RCA , LAD prox 90% lesion, diag 90% ostial lesion   - repeat trop here 306  - hold plavix, last dose 8/5  - hold home losartan   - cont heparin drip  - cont aspirin, statin   - CTS consult, plans for CABG 8/12 with Dr. Dwaine Lorenz   - pre-surgical work up complete    UTI  - UA per surgical work up dirty, 8/8 patient does report dysuria  -  start macrobid 100mg BID x7 days   - f/u urine culture for sensitivity      Chronic diastolic heart failure  - OSH TTE 8/5 EF 50-55%, mod conc LVH, DD  - CXR pending   -   - currently euvolemic on exam  - daily standing wts, strict I/Os, cardiac diet      CKD  - baseline Cr appears to be 1.3-1.6   - admit Cr 1.61  - today's Cr 1.55     DM  - HgbA1c 5.5%  - accuchecks, SSI, hypogycemia protocol      HTN  - cont coreg, hydrochlorothiazide  - SBPs ~115       DISPO  Pending CTS eval -> CABG 8/12 with Dr. Dwaine Lorenz    Seen and discussed with ABDULAZIZ Epps    I conducted a shared visit with ABDULAZIZ Shearer  Mr. Scott is a 75 y.o M with hs of CAD s/p multiple PCIs, most recent in March 2025 and multiple commorbidities including CVA, cardiac arrest in setting AMI in 2009, HFpEF, and chronic kidney disease. He presented with chest pain, noted ot have elevated troponin. LHC at [facility] revealed severe pLAD disease with RCA  and He is referred for CABG evaluation. He has no chest pain. Also on empiric nitrofurantoin for suspected UTI. He feels well this morning.     P/Exam:  Patient is in no acute distress  No neck vein distention  Heart: Regular rhythm, normal S1-S2, no murmur or gallop  Lungs clear bilaterally  Abdomen soft and non-tender  Extremities are warm without edema     A/Plan:  Patient with CAD prior revascularizations most recent March 2025 presents with NSTEMI with cath findings of severe proximal pleural LAD disease and chronic total occlusion of her coronary artery.  We will continue Heparin drip, continue aspirin, hold Plavix (in preparation for surgery), and follow-up with CT surgery for surgery plan-He is scheduled for CABG on 8/12/25.         [1]   Scheduled medications   Medication Dose Route Frequency    aspirin  81 mg oral Daily    atorvastatin  80 mg oral Daily    carvedilol  12.5 mg oral BID AC    [START ON 8/11/2025] chlorhexidine  15 mL Mouth/Throat  BID    finasteride  5 mg oral Daily    hydroCHLOROthiazide  25 mg oral Daily    insulin lispro  0-5 Units subcutaneous TID AC    mupirocin   Topical BID    nitrofurantoin (macrocrystal-monohydrate)  100 mg oral BID    polyethylene glycol  17 g oral Daily    tamsulosin  0.8 mg oral Daily   [2]   PRN medications   Medication    albuterol    dextrose    dextrose    glucagon    glucagon    heparin    ipratropium-albuteroL    melatonin    ondansetron ODT    Or    ondansetron   [3]   Continuous Medications   Medication Dose Last Rate    heparin  0-4,000 Units/hr 1,200 Units/hr (08/10/25 0639)

## 2025-08-11 ENCOUNTER — ANESTHESIA EVENT (OUTPATIENT)
Dept: OPERATING ROOM | Facility: HOSPITAL | Age: 75
End: 2025-08-11
Payer: MEDICARE

## 2025-08-11 VITALS
TEMPERATURE: 98.1 F | OXYGEN SATURATION: 98 % | RESPIRATION RATE: 18 BRPM | HEIGHT: 66 IN | BODY MASS INDEX: 29.87 KG/M2 | SYSTOLIC BLOOD PRESSURE: 120 MMHG | WEIGHT: 185.85 LBS | HEART RATE: 51 BPM | DIASTOLIC BLOOD PRESSURE: 70 MMHG

## 2025-08-11 PROBLEM — N18.31 CHRONIC KIDNEY DISEASE, STAGE 3A (MULTI): Status: ACTIVE | Noted: 2025-08-11

## 2025-08-11 PROBLEM — I21.4 NSTEMI (NON-ST ELEVATED MYOCARDIAL INFARCTION) (MULTI): Chronic | Status: ACTIVE | Noted: 2025-08-04

## 2025-08-11 PROBLEM — I50.32 CHRONIC HEART FAILURE WITH PRESERVED EJECTION FRACTION (HFPEF): Status: ACTIVE | Noted: 2025-08-11

## 2025-08-11 LAB
ALBUMIN SERPL BCP-MCNC: 3.8 G/DL (ref 3.4–5)
ANION GAP SERPL CALC-SCNC: 12 MMOL/L (ref 10–20)
BB ANTIBODY IDENTIFICATION: NORMAL
BUN SERPL-MCNC: 47 MG/DL (ref 6–23)
CALCIUM SERPL-MCNC: 9.1 MG/DL (ref 8.6–10.6)
CASE #: NORMAL
CHLORIDE SERPL-SCNC: 100 MMOL/L (ref 98–107)
CO2 SERPL-SCNC: 29 MMOL/L (ref 21–32)
CREAT SERPL-MCNC: 1.79 MG/DL (ref 0.5–1.3)
EGFRCR SERPLBLD CKD-EPI 2021: 39 ML/MIN/1.73M*2
ERYTHROCYTE [DISTWIDTH] IN BLOOD BY AUTOMATED COUNT: 14.4 % (ref 11.5–14.5)
GLUCOSE BLD MANUAL STRIP-MCNC: 108 MG/DL (ref 74–99)
GLUCOSE BLD MANUAL STRIP-MCNC: 126 MG/DL (ref 74–99)
GLUCOSE BLD MANUAL STRIP-MCNC: 223 MG/DL (ref 74–99)
GLUCOSE BLD MANUAL STRIP-MCNC: 99 MG/DL (ref 74–99)
GLUCOSE SERPL-MCNC: 191 MG/DL (ref 74–99)
HCT VFR BLD AUTO: 33.7 % (ref 41–52)
HGB BLD-MCNC: 10.8 G/DL (ref 13.5–17.5)
MCH RBC QN AUTO: 29.6 PG (ref 26–34)
MCHC RBC AUTO-ENTMCNC: 32 G/DL (ref 32–36)
MCV RBC AUTO: 92 FL (ref 80–100)
NRBC BLD-RTO: 0 /100 WBCS (ref 0–0)
PATH REV-IMMUNOHEMATOLOGY-PR30: NORMAL
PHOSPHATE SERPL-MCNC: 3.6 MG/DL (ref 2.5–4.9)
PLATELET # BLD AUTO: 103 X10*3/UL (ref 150–450)
POTASSIUM SERPL-SCNC: 4 MMOL/L (ref 3.5–5.3)
RBC # BLD AUTO: 3.65 X10*6/UL (ref 4.5–5.9)
SODIUM SERPL-SCNC: 137 MMOL/L (ref 136–145)
UFH PPP CHRO-ACNC: 0.3 IU/ML (ref ?–1.1)
WBC # BLD AUTO: 4.7 X10*3/UL (ref 4.4–11.3)

## 2025-08-11 PROCEDURE — 86902 BLOOD TYPE ANTIGEN DONOR EA: CPT

## 2025-08-11 PROCEDURE — 2500000002 HC RX 250 W HCPCS SELF ADMINISTERED DRUGS (ALT 637 FOR MEDICARE OP, ALT 636 FOR OP/ED): Performed by: NURSE PRACTITIONER

## 2025-08-11 PROCEDURE — 2500000001 HC RX 250 WO HCPCS SELF ADMINISTERED DRUGS (ALT 637 FOR MEDICARE OP): Performed by: STUDENT IN AN ORGANIZED HEALTH CARE EDUCATION/TRAINING PROGRAM

## 2025-08-11 PROCEDURE — 99232 SBSQ HOSP IP/OBS MODERATE 35: CPT | Performed by: PHYSICIAN ASSISTANT

## 2025-08-11 PROCEDURE — 1200000002 HC GENERAL ROOM WITH TELEMETRY DAILY

## 2025-08-11 PROCEDURE — 82947 ASSAY GLUCOSE BLOOD QUANT: CPT

## 2025-08-11 PROCEDURE — 36415 COLL VENOUS BLD VENIPUNCTURE: CPT | Performed by: STUDENT IN AN ORGANIZED HEALTH CARE EDUCATION/TRAINING PROGRAM

## 2025-08-11 PROCEDURE — 85027 COMPLETE CBC AUTOMATED: CPT | Performed by: NURSE PRACTITIONER

## 2025-08-11 PROCEDURE — 36415 COLL VENOUS BLD VENIPUNCTURE: CPT | Performed by: NURSE PRACTITIONER

## 2025-08-11 PROCEDURE — 2500000001 HC RX 250 WO HCPCS SELF ADMINISTERED DRUGS (ALT 637 FOR MEDICARE OP): Performed by: PHYSICIAN ASSISTANT

## 2025-08-11 PROCEDURE — 80069 RENAL FUNCTION PANEL: CPT | Performed by: NURSE PRACTITIONER

## 2025-08-11 PROCEDURE — 2500000004 HC RX 250 GENERAL PHARMACY W/ HCPCS (ALT 636 FOR OP/ED): Performed by: STUDENT IN AN ORGANIZED HEALTH CARE EDUCATION/TRAINING PROGRAM

## 2025-08-11 PROCEDURE — 2500000002 HC RX 250 W HCPCS SELF ADMINISTERED DRUGS (ALT 637 FOR MEDICARE OP, ALT 636 FOR OP/ED): Performed by: STUDENT IN AN ORGANIZED HEALTH CARE EDUCATION/TRAINING PROGRAM

## 2025-08-11 PROCEDURE — 85520 HEPARIN ASSAY: CPT | Performed by: STUDENT IN AN ORGANIZED HEALTH CARE EDUCATION/TRAINING PROGRAM

## 2025-08-11 PROCEDURE — 99232 SBSQ HOSP IP/OBS MODERATE 35: CPT | Performed by: INTERNAL MEDICINE

## 2025-08-11 RX ADMIN — MUPIROCIN: 20 OINTMENT TOPICAL at 08:27

## 2025-08-11 RX ADMIN — CARVEDILOL 12.5 MG: 12.5 TABLET, FILM COATED ORAL at 17:07

## 2025-08-11 RX ADMIN — CHLORHEXIDINE GLUCONATE 0.12% ORAL RINSE 15 ML: 1.2 LIQUID ORAL at 08:29

## 2025-08-11 RX ADMIN — HEPARIN SODIUM 1200 UNITS/HR: 10000 INJECTION, SOLUTION INTRAVENOUS at 06:00

## 2025-08-11 RX ADMIN — FINASTERIDE 5 MG: 5 TABLET, FILM COATED ORAL at 08:29

## 2025-08-11 RX ADMIN — ASPIRIN 81 MG: 81 TABLET, COATED ORAL at 08:29

## 2025-08-11 RX ADMIN — HYDROCHLOROTHIAZIDE 25 MG: 25 TABLET ORAL at 08:29

## 2025-08-11 RX ADMIN — TAMSULOSIN HYDROCHLORIDE 0.8 MG: 0.4 CAPSULE ORAL at 08:29

## 2025-08-11 RX ADMIN — NITROFURANTOIN (MONOHYDRATE/MACROCRYSTALS) 100 MG: 25; 75 CAPSULE ORAL at 21:09

## 2025-08-11 RX ADMIN — ATORVASTATIN CALCIUM 80 MG: 80 TABLET, FILM COATED ORAL at 08:29

## 2025-08-11 RX ADMIN — NITROFURANTOIN (MONOHYDRATE/MACROCRYSTALS) 100 MG: 25; 75 CAPSULE ORAL at 08:29

## 2025-08-11 RX ADMIN — MUPIROCIN: 20 OINTMENT TOPICAL at 21:09

## 2025-08-11 RX ADMIN — CHLORHEXIDINE GLUCONATE 0.12% ORAL RINSE 15 ML: 1.2 LIQUID ORAL at 21:09

## 2025-08-11 RX ADMIN — CARVEDILOL 12.5 MG: 12.5 TABLET, FILM COATED ORAL at 08:29

## 2025-08-11 ASSESSMENT — COGNITIVE AND FUNCTIONAL STATUS - GENERAL
DRESSING REGULAR LOWER BODY CLOTHING: A LITTLE
STANDING UP FROM CHAIR USING ARMS: A LITTLE
CLIMB 3 TO 5 STEPS WITH RAILING: A LOT
MOBILITY SCORE: 19
MOVING TO AND FROM BED TO CHAIR: A LITTLE
HELP NEEDED FOR BATHING: A LITTLE
TOILETING: A LITTLE
HELP NEEDED FOR BATHING: A LITTLE
WALKING IN HOSPITAL ROOM: A LITTLE
CLIMB 3 TO 5 STEPS WITH RAILING: A LOT
DRESSING REGULAR UPPER BODY CLOTHING: A LITTLE
MOVING TO AND FROM BED TO CHAIR: A LITTLE
DRESSING REGULAR LOWER BODY CLOTHING: A LITTLE
DAILY ACTIVITIY SCORE: 20
MOBILITY SCORE: 19
WALKING IN HOSPITAL ROOM: A LITTLE
STANDING UP FROM CHAIR USING ARMS: A LITTLE
TOILETING: A LITTLE
DRESSING REGULAR UPPER BODY CLOTHING: A LITTLE
DAILY ACTIVITIY SCORE: 20

## 2025-08-11 ASSESSMENT — PAIN SCALES - GENERAL
PAINLEVEL_OUTOF10: 0 - NO PAIN
PAINLEVEL_OUTOF10: 0 - NO PAIN

## 2025-08-11 ASSESSMENT — PAIN - FUNCTIONAL ASSESSMENT: PAIN_FUNCTIONAL_ASSESSMENT: 0-10

## 2025-08-11 NOTE — PROGRESS NOTES
Subjective Data  Patient seen and assessed this morning, lying in bed, NAD. Denies any CP or SOB, dizziness or lightheadedness. Updated on plan of care, questions answered.     Interval Events:  No acute events overnight.     Today's Plan/Updates:   - hemodynamically stable, no complaints, cont current regimen  - [facility] for 2nd case for Robotic MIDCAB with Dr. Dwaine Lorenz      Objective Data:  Last Recorded Vitals:  Vitals:    08/11/25 0800 08/11/25 0821 08/11/25 1139 08/11/25 1200   BP:  124/76 122/70    BP Location:  Left arm Left arm    Patient Position:  Lying Lying    Pulse: 72 71 56 64   Resp:  18 18    Temp:  36.2 °C (97.2 °F) 36.5 °C (97.7 °F)    TempSrc:  Temporal Temporal    SpO2:  95% 97%    Weight:       Height:         Last Labs:  Results for orders placed or performed during the hospital encounter of 08/06/25 (from the past 24 hours)   POCT GLUCOSE   Result Value Ref Range    POCT Glucose 127 (H) 74 - 99 mg/dL   CBC   Result Value Ref Range    WBC 4.4 4.4 - 11.3 x10*3/uL    nRBC 0.0 0.0 - 0.0 /100 WBCs    RBC 3.59 (L) 4.50 - 5.90 x10*6/uL    Hemoglobin 10.9 (L) 13.5 - 17.5 g/dL    Hematocrit 32.9 (L) 41.0 - 52.0 %    MCV 92 80 - 100 fL    MCH 30.4 26.0 - 34.0 pg    MCHC 33.1 32.0 - 36.0 g/dL    RDW 14.2 11.5 - 14.5 %    Platelets 99 (L) 150 - 450 x10*3/uL   Renal Function Panel   Result Value Ref Range    Glucose 104 (H) 74 - 99 mg/dL    Sodium 136 136 - 145 mmol/L    Potassium 4.0 3.5 - 5.3 mmol/L    Chloride 102 98 - 107 mmol/L    Bicarbonate 26 21 - 32 mmol/L    Anion Gap 12 10 - 20 mmol/L    Urea Nitrogen 39 (H) 6 - 23 mg/dL    Creatinine 1.53 (H) 0.50 - 1.30 mg/dL    eGFR 47 (L) >60 mL/min/1.73m*2    Calcium 9.4 8.6 - 10.6 mg/dL    Phosphorus 4.3 2.5 - 4.9 mg/dL    Albumin 3.8 3.4 - 5.0 g/dL   POCT GLUCOSE   Result Value Ref Range    POCT Glucose 190 (H) 74 - 99 mg/dL   POCT GLUCOSE   Result Value Ref Range    POCT Glucose 99 74 - 99 mg/dL   Heparin Assay, UFH   Result Value Ref Range    Heparin  Unfractionated 0.3 See Comment Below for Therapeutic Ranges IU/mL   POCT GLUCOSE   Result Value Ref Range    POCT Glucose 108 (H) 74 - 99 mg/dL     *Note: Due to a large number of results and/or encounters for the requested time period, some results have not been displayed. A complete set of results can be found in Results Review.       Last I/O:  I/O last 3 completed shifts:  In: 1411 (17 mL/kg) [P.O.:960; I.V.:451 (5.4 mL/kg)]  Out: 2780 (33.4 mL/kg) [Urine:2780 (0.9 mL/kg/hr)]  Weight: 83.2 kg     Ejection Fractions:  EF   Date/Time Value Ref Range Status   08/05/2025 09:52 AM 53 %    09/04/2024 10:38 AM 58 %      Inpatient Medications:  Scheduled Medications[1]  PRN Medications[2]  Continuous Medications[3]    Physical Exam  General: NAD  Skin: warm and dry   Head/neck: no JVD at 60 degrees  Cardiac: S1S2, RRR  Pulm: CTAB, room air   GI: soft, nontender   Extremities: no LE edema   Neuro: A/Ox3, no focal neuro deficits   Psych: appropriate mood and behavior       Assessment/Plan   Duane Scott is a 75 y.o. male presenting as an OSH transfer for robotic CABG evaluation.     NSTEMI  Hx of CAD, recent PCI 3/2025  hx of Cardiac arrest in the setting of AMI in 2009   - presented to OSH with chest pain while walking to [place], workup at that time revealed NSTEMI with elevated troponins and no ischemic changes on EKG. Transferred to [facility] for cardiac catheterization  - [facility] 8/5: RCA , LAD prox 90% lesion, diag 90% ostial lesion   - CT surgery was consulted for CABG evaluation, but given consideration for possible robotic surgical intervention, [place] [facility] [place] HS trop 395 -> 502 -> 650 -> 609, repeat here 306  - Cardiac Surgery consulted/following: NPO at [facility] for 2nd case for Robotic MIDCAB 8/12 with Dr. Dwaine Lorenz   - hold plavix, last dose 8/5  - hold home losartan   - cont heparin drip  - cont aspirin 81, atorvastatin 80    Thrombocytopenia, chronic  - stable since Jan was 93 average  ~100  - has bee, chronically low back to 2018  - monitor while on heparin drip, stable    UTI  - UA per surgical work up +500 leukocyts, 8/8 patient reports dysuria  - UCclinically insignificant growth  - cont macrobid 100mg BID x7 days     Chronic diastolic heart failure  - OSH TTE 8/5: EF 50-55%, mod conc LVH, no WMA, Grade II (pseudonormal) DD, normal RV function  - CXR 8/7: no focal airspace disease or edema   - 8/4   - currently euvolemic on exam  - daily standing wts, strict I/Os, cardiac diet      CKD  - baseline Cr ~1.3-1.6   - admit Cr 1.61  - today's Cr 1.53  - Avoid nephrotoxins and hypotension, trend daily RFP      DM  - HgbA1c 5.5%  - accuchecks, SSI, hypogycemia protocol      HTN  - SBP last 24 hrs: 113-131  - cont coreg 12.5 mg BID, hydrochlorothiazide 25 daily    DVT ppx: Heparin drip    DISPO  - pending MIDCAB 8/12 with Dr. Dwaine Lorenz  - lives with spouse and special needs children, independent    Code Status: Full Code confirmed upon admission  NOK: Danii Scott, spouse: [phone]    All labs, vital signs, tests & imaging results, and medications were reviewed.    Patient seen and discussed with Dr. Flora Francisco, APRN-CNP       [1]   Scheduled medications   Medication Dose Route Frequency    aspirin  81 mg oral Daily    atorvastatin  80 mg oral Daily    carvedilol  12.5 mg oral BID AC    chlorhexidine  15 mL Mouth/Throat BID    finasteride  5 mg oral Daily    hydroCHLOROthiazide  25 mg oral Daily    insulin lispro  0-5 Units subcutaneous TID AC    mupirocin   Topical BID    nitrofurantoin (macrocrystal-monohydrate)  100 mg oral BID    polyethylene glycol  17 g oral Daily    tamsulosin  0.8 mg oral Daily   [2]   PRN medications   Medication    albuterol    dextrose    dextrose    glucagon    glucagon    heparin    ipratropium-albuteroL    melatonin    ondansetron ODT    Or    ondansetron   [3]   Continuous Medications   Medication Dose Last Rate    heparin  0-4,000  Units/hr 1,200 Units/hr (08/11/25 0600)

## 2025-08-11 NOTE — PROGRESS NOTES
CARDIAC SURGERY CONSULT PROGRESS NOTE    SUBJECTIVE  Duane Scott is a 75 y.o. male with a past medical history of CAD with multiple PCIs (most recently LAD in March 2025, hx of cardiac arrest in the setting of AMI in 2009, DM, HTN, HLD, CVA, CKD and HFpEF.      He initially presented to OSH with chest pain while walking to [employer]. Workup at that time revealed NSTEMI with elevated troponins and no ischemic changes on EKG. He was then transferred to [facility] where he underwent cardiac cath which revealed severe proximal LAD disease with  of the RCA. CT surgery was consulted for CABG evaluation, but given consideration for possible robotic surgical intervention, patient was transferred to [facility].      Patient is currently chest pain free. Previously on aspirin and plavix for PCI from 3/2025, currently holding plavix given plan for surgery (last dose 8/5).      Cardiac surgery consulted for a CABG evaluation.      Patient states he is independent at baseline - usually uses a cane to ambulate.         Cardiac/Pertinent Imaging  [facility]: 8/6/25:  Left main appears to be fair size and long has no critical lesion.  Left anterior descending artery appears to be has patent stents which was placed in the March.  Medium caliber vessel.  Although prior to the stent patient has a proximal 90% eccentric lesion seen.  Diagonal branch also has 90% ostial lesion.  Patent stents in diagonal branch.  Size of LAD and diagonal is a medium caliber vessels.  Probably good vessel for bypass grafting.  Left circumflex artery appears to be codominant vessel has patent stents in mid area.  Mid OM branch has mild disease.  Right coronary total occluded with getting collateral from distal left coronary system.     TTE: 8/5/25  CONCLUSIONS:   1. Left ventricular ejection fraction is low normal by visual estimate at 50-55%.   2. Spectral Doppler shows a Grade II (pseudonormal pattern) of left ventricular diastolic filling with an elevated left  "atrial pressure.   3. There is normal right ventricular global systolic function.   4. There is moderate concentric left ventricular hypertrophy.    Objective   /70 (BP Location: Left arm, Patient Position: Lying)   Pulse 64   Temp 36.5 °C (97.7 °F) (Temporal)   Resp 18   Ht 1.676 m (5' 6\")   Wt 83.2 kg (183 lb 6.8 oz)   SpO2 97%   BMI 29.61 kg/m²   0-10 (Numeric) Pain Score: 0 - No pain   Vitals:    08/11/25 0440   Weight: 83.2 kg (183 lb 6.8 oz)          Intake/Output Summary (Last 24 hours) at 8/11/2025 1330  Last data filed at 8/11/2025 0700  Gross per 24 hour   Intake 288 ml   Output 1630 ml   Net -1342 ml       Physical Exam  Physical Exam  Constitutional:       General: He is not in acute distress.     Appearance: He is not ill-appearing or toxic-appearing.   HENT:      Head: Normocephalic.      Mouth/Throat:      Mouth: Mucous membranes are moist.     Cardiovascular:      Rate and Rhythm: Normal rate and regular rhythm.   Pulmonary:      Effort: Pulmonary effort is normal.      Comments: On room air    Musculoskeletal:         General: Normal range of motion.      Cervical back: Neck supple.      Right lower leg: No edema.      Left lower leg: No edema.     Skin:     General: Skin is warm and dry.     Neurological:      General: No focal deficit present.      Mental Status: He is alert and oriented to person, place, and time.     Psychiatric:         Mood and Affect: Mood normal.         Behavior: Behavior normal.         Medications  Scheduled medications  Scheduled Medications[1]Continuous medications  Continuous Medications[2]PRN medications  PRN Medications[3]    Labs  Results for orders placed or performed during the hospital encounter of 08/06/25 (from the past 24 hours)   POCT GLUCOSE   Result Value Ref Range    POCT Glucose 127 (H) 74 - 99 mg/dL   CBC   Result Value Ref Range    WBC 4.4 4.4 - 11.3 x10*3/uL    nRBC 0.0 0.0 - 0.0 /100 WBCs    RBC 3.59 (L) 4.50 - 5.90 x10*6/uL    Hemoglobin " 10.9 (L) 13.5 - 17.5 g/dL    Hematocrit 32.9 (L) 41.0 - 52.0 %    MCV 92 80 - 100 fL    MCH 30.4 26.0 - 34.0 pg    MCHC 33.1 32.0 - 36.0 g/dL    RDW 14.2 11.5 - 14.5 %    Platelets 99 (L) 150 - 450 x10*3/uL   Renal Function Panel   Result Value Ref Range    Glucose 104 (H) 74 - 99 mg/dL    Sodium 136 136 - 145 mmol/L    Potassium 4.0 3.5 - 5.3 mmol/L    Chloride 102 98 - 107 mmol/L    Bicarbonate 26 21 - 32 mmol/L    Anion Gap 12 10 - 20 mmol/L    Urea Nitrogen 39 (H) 6 - 23 mg/dL    Creatinine 1.53 (H) 0.50 - 1.30 mg/dL    eGFR 47 (L) >60 mL/min/1.73m*2    Calcium 9.4 8.6 - 10.6 mg/dL    Phosphorus 4.3 2.5 - 4.9 mg/dL    Albumin 3.8 3.4 - 5.0 g/dL   POCT GLUCOSE   Result Value Ref Range    POCT Glucose 190 (H) 74 - 99 mg/dL   POCT GLUCOSE   Result Value Ref Range    POCT Glucose 99 74 - 99 mg/dL   Heparin Assay, UFH   Result Value Ref Range    Heparin Unfractionated 0.3 See Comment Below for Therapeutic Ranges IU/mL   POCT GLUCOSE   Result Value Ref Range    POCT Glucose 108 (H) 74 - 99 mg/dL     *Note: Due to a large number of results and/or encounters for the requested time period, some results have not been displayed. A complete set of results can be found in Results Review.               ASSESSMENT & PLAN  Duane Scott is a 75 y.o. male with a past medical history of CAD with multiple PCIs (most recently LAD in March 2025, hx of cardiac arrest in the setting of AMI in 2009, DM, HTN, HLD, CVA, CKD and HFpEF.      He initially presented to OSH with chest pain while walking to [employer]. Workup at that time revealed NSTEMI with elevated troponins and no ischemic changes on EKG. He was then transferred to [facility] where he underwent cardiac cath which revealed severe proximal LAD disease with  of the RCA. CT surgery was consulted for CABG evaluation, but given consideration for possible robotic surgical intervention, [place] [facility]      Patient is currently chest pain free. Previously on aspirin  and plavix for PCI from 3/2025, currently holding plavix given plan for surgery (last dose 8/5).      Cardiac surgery consulted for a CABG evaluation.      History of atrial fibrillation: None        RECOMMENDATIONS/PLAN  Dr. Dwaine Lorenz aware of patient, currently reviewing case and available imaging.   - Plan for OR 8/12 Tuesday 2nd case for Robotic MIDCAB  - Ideally, will wait AT LEAST 5 days s/p last dose of Plavix (8/5) to take patient to OR.  - Medical optimization per primary team     Preop risk stratification studies/labs to be ordered:   [X] TTE 8/5 EF 50-55%  [X] LHC done 8/6  [X] CT chest done 8/6  [X] CT abdomen/pelvis without contrast - done 8/7  [X] US Carotids - done 8/6 less than 50% stenosis bilaterally   [X] PFTs (spirometry and room air ABG) - done 8/7 FEV1 1.59 at 62% predicted  [X] 2 view CXR - done 8/7  [X] MRSA - positive. Mupirocin given  [X] UA/Culture, LFTs, HgbA1c, TSH/T4, Lipid panel, CBC, RFP, Coag  Dental evaluation not indicated for isolated CABG surgeries         Preop orders when/if goes to surgery:  - Continue ASA, high-intensity statin, and BB (or document contraindications for use) for preop CABG patients   - No ACEi/ARBs in the pre-op period (at least 48 hours)  - Hold any SGLT2 inhibitors (Farxiga, Jardiance, etc.) for at least 3 days prior to cardiac surgery to prevent euglycemic DKA  - Hold any daily GLP-1 agonist drugs on the day of surgery. (Patients on GLP1 agonists should be on clear liquids for 24 hrs, and NPO for 2 hrs prior to surgery.)  - No antiplatelets other than ASA, no anticoagulants other than Heparin  - NPO after midnight, blood on hold/T&S, and preop scrubs ordered for OR 8/12     Will continue to follow along.  Thank you for the consultation.   Patient educated and all questions answered.  Please page the cardiac surgery consult pager [phone] with any questions or changes in patient condition.    Dary Hernandez PA-C  Cardiac Surgery Consult [facility]  [facility]  [facility]  Cardiac Surgery Consult Pager [phone]     8/11/2025  1:30 PM              [1] aspirin, 81 mg, oral, Daily  atorvastatin, 80 mg, oral, Daily  carvedilol, 12.5 mg, oral, BID AC  chlorhexidine, 15 mL, Mouth/Throat, BID  finasteride, 5 mg, oral, Daily  hydroCHLOROthiazide, 25 mg, oral, Daily  insulin lispro, 0-5 Units, subcutaneous, TID AC  mupirocin, , Topical, BID  nitrofurantoin (macrocrystal-monohydrate), 100 mg, oral, BID  polyethylene glycol, 17 g, oral, Daily  tamsulosin, 0.8 mg, oral, Daily     [2] heparin, 0-4,000 Units/hr, Last Rate: 1,200 Units/hr (08/11/25 0600)     [3] PRN medications: albuterol, dextrose, dextrose, glucagon, glucagon, heparin, ipratropium-albuteroL, melatonin, ondansetron ODT **OR** ondansetron

## 2025-08-12 ENCOUNTER — APPOINTMENT (OUTPATIENT)
Dept: CARDIOLOGY | Facility: HOSPITAL | Age: 75
DRG: 236 | End: 2025-08-12
Payer: MEDICARE

## 2025-08-12 ENCOUNTER — TELEPHONE (OUTPATIENT)
Dept: PRIMARY CARE | Facility: CLINIC | Age: 75
End: 2025-08-12
Payer: MEDICARE

## 2025-08-12 ENCOUNTER — APPOINTMENT (OUTPATIENT)
Dept: RADIOLOGY | Facility: HOSPITAL | Age: 75
DRG: 236 | End: 2025-08-12
Payer: MEDICARE

## 2025-08-12 ENCOUNTER — HOSPITAL ENCOUNTER (OUTPATIENT)
Dept: OPERATING ROOM | Facility: HOSPITAL | Age: 75
Discharge: HOME | End: 2025-08-12
Payer: MEDICARE

## 2025-08-12 ENCOUNTER — ANESTHESIA (OUTPATIENT)
Dept: OPERATING ROOM | Facility: HOSPITAL | Age: 75
End: 2025-08-12
Payer: MEDICARE

## 2025-08-12 PROBLEM — N18.9 CHRONIC RENAL INSUFFICIENCY: Status: ACTIVE | Noted: 2025-08-12

## 2025-08-12 PROBLEM — M19.90 OSTEOARTHRITIS: Status: ACTIVE | Noted: 2025-08-12

## 2025-08-12 PROBLEM — N40.1 BENIGN PROSTATIC HYPERPLASIA WITH URINARY RETENTION: Status: ACTIVE | Noted: 2025-08-12

## 2025-08-12 PROBLEM — Z92.89 HISTORY OF BLOOD TRANSFUSION: Status: ACTIVE | Noted: 2025-08-12

## 2025-08-12 PROBLEM — M48.061 SPINAL STENOSIS OF LUMBAR REGION: Status: ACTIVE | Noted: 2025-08-12

## 2025-08-12 PROBLEM — D64.9 ANEMIA: Status: ACTIVE | Noted: 2025-08-12

## 2025-08-12 PROBLEM — H26.9 CATARACT PRESENT: Status: ACTIVE | Noted: 2025-08-12

## 2025-08-12 PROBLEM — R73.9 HYPERGLYCEMIA: Status: ACTIVE | Noted: 2025-08-12

## 2025-08-12 PROBLEM — E11.40 DIABETIC NEUROPATHY (MULTI): Status: ACTIVE | Noted: 2025-08-12

## 2025-08-12 PROBLEM — Z86.69 HISTORY OF MIGRAINE HEADACHES: Status: ACTIVE | Noted: 2025-08-12

## 2025-08-12 PROBLEM — Z79.01 ANTICOAGULANT LONG-TERM USE: Status: ACTIVE | Noted: 2025-08-12

## 2025-08-12 PROBLEM — R93.1 DECREASED CARDIAC EJECTION FRACTION: Status: ACTIVE | Noted: 2025-08-12

## 2025-08-12 PROBLEM — M54.50 CHRONIC LOW BACK PAIN: Status: ACTIVE | Noted: 2023-07-13

## 2025-08-12 PROBLEM — G89.29 CHRONIC LOW BACK PAIN: Status: ACTIVE | Noted: 2023-07-13

## 2025-08-12 PROBLEM — G62.9 PERIPHERAL NEUROPATHY: Status: ACTIVE | Noted: 2025-08-12

## 2025-08-12 PROBLEM — R33.8 BENIGN PROSTATIC HYPERPLASIA WITH URINARY RETENTION: Status: ACTIVE | Noted: 2025-08-12

## 2025-08-12 PROBLEM — M51.9 LUMBAR DISC DISEASE: Status: ACTIVE | Noted: 2025-08-12

## 2025-08-12 PROBLEM — N18.32 CHRONIC RENAL IMPAIRMENT, STAGE 3B (MULTI): Status: ACTIVE | Noted: 2025-08-12

## 2025-08-12 LAB
ACT BLD: 104 SEC (ref 82–174)
ACT BLD: 109 SEC (ref 82–174)
ACT BLD: 383 SEC (ref 82–174)
ACT BLD: 400 SEC (ref 82–174)
ALBUMIN SERPL BCP-MCNC: 3.9 G/DL (ref 3.4–5)
ANION GAP BLDA CALCULATED.4IONS-SCNC: 10 MMO/L (ref 10–25)
ANION GAP BLDA CALCULATED.4IONS-SCNC: 10 MMO/L (ref 10–25)
ANION GAP BLDA CALCULATED.4IONS-SCNC: 11 MMO/L (ref 10–25)
ANION GAP BLDA CALCULATED.4IONS-SCNC: 11 MMO/L (ref 10–25)
ANION GAP BLDA CALCULATED.4IONS-SCNC: 12 MMO/L (ref 10–25)
ANION GAP BLDA CALCULATED.4IONS-SCNC: 12 MMO/L (ref 10–25)
ANION GAP BLDA CALCULATED.4IONS-SCNC: 14 MMO/L (ref 10–25)
ANION GAP BLDV CALCULATED.4IONS-SCNC: ABNORMAL MMOL/L
ANION GAP SERPL CALC-SCNC: 16 MMOL/L (ref 10–20)
APTT PPP: 27 SECONDS (ref 26–36)
BASE EXCESS BLDA CALC-SCNC: -1 MMOL/L (ref -2–3)
BASE EXCESS BLDA CALC-SCNC: -1.2 MMOL/L (ref -2–3)
BASE EXCESS BLDA CALC-SCNC: -1.5 MMOL/L (ref -2–3)
BASE EXCESS BLDA CALC-SCNC: -1.8 MMOL/L (ref -2–3)
BASE EXCESS BLDA CALC-SCNC: -2 MMOL/L (ref -2–3)
BASE EXCESS BLDA CALC-SCNC: -2.6 MMOL/L (ref -2–3)
BASE EXCESS BLDA CALC-SCNC: 0 MMOL/L (ref -2–3)
BASE EXCESS BLDV CALC-SCNC: -7.1 MMOL/L (ref -2–3)
BODY TEMPERATURE: 37 DEGREES CELSIUS
BUN SERPL-MCNC: 44 MG/DL (ref 6–23)
CA-I BLD-SCNC: 1.2 MMOL/L (ref 1.1–1.33)
CA-I BLDA-SCNC: 1.12 MMOL/L (ref 1.1–1.33)
CA-I BLDA-SCNC: 1.12 MMOL/L (ref 1.1–1.33)
CA-I BLDA-SCNC: 1.14 MMOL/L (ref 1.1–1.33)
CA-I BLDA-SCNC: 1.15 MMOL/L (ref 1.1–1.33)
CA-I BLDA-SCNC: 1.16 MMOL/L (ref 1.1–1.33)
CA-I BLDA-SCNC: 1.2 MMOL/L (ref 1.1–1.33)
CA-I BLDA-SCNC: 1.2 MMOL/L (ref 1.1–1.33)
CA-I BLDV-SCNC: 1.21 MMOL/L (ref 1.1–1.33)
CALCIUM SERPL-MCNC: 9 MG/DL (ref 8.6–10.6)
CFT FORM KAOLIN IND BLD RES TEG: 1.8 MIN (ref 0.8–2.1)
CHLORIDE BLDA-SCNC: 102 MMOL/L (ref 98–107)
CHLORIDE BLDA-SCNC: 103 MMOL/L (ref 98–107)
CHLORIDE BLDA-SCNC: 104 MMOL/L (ref 98–107)
CHLORIDE BLDA-SCNC: 105 MMOL/L (ref 98–107)
CHLORIDE BLDA-SCNC: 106 MMOL/L (ref 98–107)
CHLORIDE BLDV-SCNC: ABNORMAL MMOL/L
CHLORIDE SERPL-SCNC: 102 MMOL/L (ref 98–107)
CLOT ANGLE.KAOLIN INDUCED BLD RES TEG: 68 DEG (ref 63–78)
CLOT INIT KAO IND P HEP NEUT BLD RES TEG: 6.2 MIN (ref 4.3–8.3)
CLOT INIT KAO IND P HEP NEUT BLD RES TEG: 7.2 MIN (ref 4.6–9.1)
CO2 SERPL-SCNC: 23 MMOL/L (ref 21–32)
COHGB MFR BLDA: 0.7 %
COHGB MFR BLDA: 1.3 %
COHGB MFR BLDA: 1.6 %
CREAT SERPL-MCNC: 1.62 MG/DL (ref 0.5–1.3)
DO-HGB MFR BLDA: 0 % (ref 0–5)
DO-HGB MFR BLDA: 0.4 % (ref 0–5)
DO-HGB MFR BLDA: 3 % (ref 0–5)
EGFRCR SERPLBLD CKD-EPI 2021: 44 ML/MIN/1.73M*2
EJECTION FRACTION: 58 %
ERYTHROCYTE [DISTWIDTH] IN BLOOD BY AUTOMATED COUNT: 14.6 % (ref 11.5–14.5)
FIBRINOGEN BLD CALC-MCNC: 453 MG/DL (ref 278–581)
FIBRINOGEN PPP-MCNC: 252 MG/DL (ref 200–400)
GLUCOSE BLD MANUAL STRIP-MCNC: 114 MG/DL (ref 74–99)
GLUCOSE BLD MANUAL STRIP-MCNC: 125 MG/DL (ref 74–99)
GLUCOSE BLDA-MCNC: 110 MG/DL (ref 74–99)
GLUCOSE BLDA-MCNC: 133 MG/DL (ref 74–99)
GLUCOSE BLDA-MCNC: 144 MG/DL (ref 74–99)
GLUCOSE BLDA-MCNC: 154 MG/DL (ref 74–99)
GLUCOSE BLDA-MCNC: 157 MG/DL (ref 74–99)
GLUCOSE BLDA-MCNC: 163 MG/DL (ref 74–99)
GLUCOSE BLDA-MCNC: 174 MG/DL (ref 74–99)
GLUCOSE BLDV-MCNC: 96 MG/DL (ref 74–99)
GLUCOSE SERPL-MCNC: 155 MG/DL (ref 74–99)
HCO3 BLDA-SCNC: 22.2 MMOL/L (ref 22–26)
HCO3 BLDA-SCNC: 23.1 MMOL/L (ref 22–26)
HCO3 BLDA-SCNC: 23.2 MMOL/L (ref 22–26)
HCO3 BLDA-SCNC: 24.3 MMOL/L (ref 22–26)
HCO3 BLDA-SCNC: 24.8 MMOL/L (ref 22–26)
HCO3 BLDA-SCNC: 24.8 MMOL/L (ref 22–26)
HCO3 BLDA-SCNC: 25.2 MMOL/L (ref 22–26)
HCO3 BLDV-SCNC: 19.3 MMOL/L (ref 22–26)
HCT VFR BLD AUTO: 29 % (ref 41–52)
HCT VFR BLD EST: 18 % (ref 41–52)
HCT VFR BLD EST: 31 % (ref 41–52)
HCT VFR BLD EST: 32 % (ref 41–52)
HCT VFR BLD EST: 32 % (ref 41–52)
HCT VFR BLD EST: 34 % (ref 41–52)
HCT VFR BLD EST: 34 % (ref 41–52)
HCT VFR BLD EST: 35 % (ref 41–52)
HCT VFR BLD EST: 38 % (ref 41–52)
HGB BLD-MCNC: 10 G/DL (ref 13.5–17.5)
HGB BLDA-MCNC: 10.2 G/DL (ref 13.5–17.5)
HGB BLDA-MCNC: 10.7 G/DL (ref 13.5–17.5)
HGB BLDA-MCNC: 10.8 G/DL (ref 13.5–17.5)
HGB BLDA-MCNC: 11.3 G/DL (ref 13.5–17.5)
HGB BLDA-MCNC: 11.3 G/DL (ref 13.5–17.5)
HGB BLDA-MCNC: 11.4 G/DL (ref 13.5–17.5)
HGB BLDA-MCNC: 11.4 G/DL (ref 13.5–17.5)
HGB BLDA-MCNC: 11.6 G/DL (ref 13.5–17.5)
HGB BLDA-MCNC: 12.6 G/DL (ref 13.5–17.5)
HGB BLDA-MCNC: 12.6 G/DL (ref 13.5–17.5)
HGB BLDV-MCNC: 6.1 G/DL (ref 13.5–17.5)
INHALED O2 CONCENTRATION: 100 %
INHALED O2 CONCENTRATION: 21 %
INHALED O2 CONCENTRATION: 40 %
INHALED O2 CONCENTRATION: 40 %
INHALED O2 CONCENTRATION: 50 %
INR PPP: 1.3 (ref 0.9–1.1)
LACTATE BLDA-SCNC: 0.6 MMOL/L (ref 0.4–2)
LACTATE BLDA-SCNC: 1 MMOL/L (ref 0.4–2)
LACTATE BLDA-SCNC: 1 MMOL/L (ref 0.4–2)
LACTATE BLDV-SCNC: 1.1 MMOL/L (ref 0.4–2)
MA KAOLIN BLD RES TEG: 58 MM (ref 52–69)
MA KAOLIN+TF BLD RES TEG: 65 MM (ref 52–70)
MA TF IND+IIB-IIIA INH BLD RES TEG: 25 MM (ref 15–32)
MAGNESIUM SERPL-MCNC: 2.11 MG/DL (ref 1.6–2.4)
MCH RBC QN AUTO: 29.9 PG (ref 26–34)
MCHC RBC AUTO-ENTMCNC: 34.5 G/DL (ref 32–36)
MCV RBC AUTO: 87 FL (ref 80–100)
METHGB MFR BLDA: 0.8 % (ref 0–1.5)
METHGB MFR BLDA: 0.9 % (ref 0–1.5)
METHGB MFR BLDA: 1.2 % (ref 0–1.5)
NRBC BLD-RTO: 0 /100 WBCS (ref 0–0)
OXYHGB MFR BLDA: 95.5 % (ref 94–98)
OXYHGB MFR BLDA: 95.5 % (ref 94–98)
OXYHGB MFR BLDA: 96.1 % (ref 94–98)
OXYHGB MFR BLDA: 96.4 % (ref 94–98)
OXYHGB MFR BLDA: 97 % (ref 94–98)
OXYHGB MFR BLDA: 97 % (ref 94–98)
OXYHGB MFR BLDA: 97.1 % (ref 94–98)
OXYHGB MFR BLDA: 97.3 % (ref 94–98)
OXYHGB MFR BLDA: 97.6 % (ref 94–98)
OXYHGB MFR BLDA: 97.6 % (ref 94–98)
OXYHGB MFR BLDV: 97.2 % (ref 45–75)
PCO2 BLDA: 35 MM HG (ref 38–42)
PCO2 BLDA: 39 MM HG (ref 38–42)
PCO2 BLDA: 40 MM HG (ref 38–42)
PCO2 BLDA: 42 MM HG (ref 38–42)
PCO2 BLDA: 43 MM HG (ref 38–42)
PCO2 BLDA: 46 MM HG (ref 38–42)
PCO2 BLDA: 50 MM HG (ref 38–42)
PCO2 BLDV: 43 MM HG (ref 41–51)
PH BLDA: 7.31 PH (ref 7.38–7.42)
PH BLDA: 7.34 PH (ref 7.38–7.42)
PH BLDA: 7.34 PH (ref 7.38–7.42)
PH BLDA: 7.37 PH (ref 7.38–7.42)
PH BLDA: 7.38 PH (ref 7.38–7.42)
PH BLDA: 7.4 PH (ref 7.38–7.42)
PH BLDA: 7.41 PH (ref 7.38–7.42)
PH BLDV: 7.26 PH (ref 7.33–7.43)
PHOSPHATE SERPL-MCNC: 3.6 MG/DL (ref 2.5–4.9)
PLATELET # BLD AUTO: 89 X10*3/UL (ref 150–450)
PO2 BLDA: 152 MM HG (ref 85–95)
PO2 BLDA: 154 MM HG (ref 85–95)
PO2 BLDA: 182 MM HG (ref 85–95)
PO2 BLDA: 191 MM HG (ref 85–95)
PO2 BLDA: 199 MM HG (ref 85–95)
PO2 BLDA: 203 MM HG (ref 85–95)
PO2 BLDA: 84 MM HG (ref 85–95)
PO2 BLDV: 191 MM HG (ref 35–45)
POTASSIUM BLDA-SCNC: 3.8 MMOL/L (ref 3.5–5.3)
POTASSIUM BLDA-SCNC: 4.2 MMOL/L (ref 3.5–5.3)
POTASSIUM BLDA-SCNC: 4.3 MMOL/L (ref 3.5–5.3)
POTASSIUM BLDA-SCNC: 4.7 MMOL/L (ref 3.5–5.3)
POTASSIUM BLDA-SCNC: 5 MMOL/L (ref 3.5–5.3)
POTASSIUM BLDA-SCNC: 5 MMOL/L (ref 3.5–5.3)
POTASSIUM BLDA-SCNC: 5.1 MMOL/L (ref 3.5–5.3)
POTASSIUM BLDV-SCNC: 3.7 MMOL/L (ref 3.5–5.3)
POTASSIUM SERPL-SCNC: 4.1 MMOL/L (ref 3.5–5.3)
PROTHROMBIN TIME: 14.1 SECONDS (ref 9.8–12.4)
RBC # BLD AUTO: 3.35 X10*6/UL (ref 4.5–5.9)
SAO2 % BLDA: 100 % (ref 94–100)
SAO2 % BLDA: 97 % (ref 94–100)
SAO2 % BLDA: 99 % (ref 94–100)
SAO2 % BLDA: 99 % (ref 94–100)
SAO2 % BLDV: 98 % (ref 45–75)
SODIUM BLDA-SCNC: 134 MMOL/L (ref 136–145)
SODIUM BLDA-SCNC: 135 MMOL/L (ref 136–145)
SODIUM BLDA-SCNC: 135 MMOL/L (ref 136–145)
SODIUM BLDA-SCNC: 136 MMOL/L (ref 136–145)
SODIUM BLDV-SCNC: 134 MMOL/L (ref 136–145)
SODIUM SERPL-SCNC: 137 MMOL/L (ref 136–145)
TEST COMMENT: NORMAL
WBC # BLD AUTO: 8.9 X10*3/UL (ref 4.4–11.3)

## 2025-08-12 PROCEDURE — 2500000002 HC RX 250 W HCPCS SELF ADMINISTERED DRUGS (ALT 637 FOR MEDICARE OP, ALT 636 FOR OP/ED): Performed by: ANESTHESIOLOGY

## 2025-08-12 PROCEDURE — 85384 FIBRINOGEN ACTIVITY: CPT

## 2025-08-12 PROCEDURE — 85347 COAGULATION TIME ACTIVATED: CPT

## 2025-08-12 PROCEDURE — 3700000002 HC GENERAL ANESTHESIA TIME - EACH INCREMENTAL 1 MINUTE: Performed by: THORACIC SURGERY (CARDIOTHORACIC VASCULAR SURGERY)

## 2025-08-12 PROCEDURE — 84132 ASSAY OF SERUM POTASSIUM: CPT

## 2025-08-12 PROCEDURE — P9045 ALBUMIN (HUMAN), 5%, 250 ML: HCPCS | Mod: JZ,TB | Performed by: NURSE ANESTHETIST, CERTIFIED REGISTERED

## 2025-08-12 PROCEDURE — 2500000002 HC RX 250 W HCPCS SELF ADMINISTERED DRUGS (ALT 637 FOR MEDICARE OP, ALT 636 FOR OP/ED)

## 2025-08-12 PROCEDURE — 85027 COMPLETE CBC AUTOMATED: CPT

## 2025-08-12 PROCEDURE — 2500000004 HC RX 250 GENERAL PHARMACY W/ HCPCS (ALT 636 FOR OP/ED): Performed by: STUDENT IN AN ORGANIZED HEALTH CARE EDUCATION/TRAINING PROGRAM

## 2025-08-12 PROCEDURE — 2720000007 HC OR 272 NO HCPCS: Performed by: THORACIC SURGERY (CARDIOTHORACIC VASCULAR SURGERY)

## 2025-08-12 PROCEDURE — 33533 CABG ARTERIAL SINGLE: CPT | Performed by: THORACIC SURGERY (CARDIOTHORACIC VASCULAR SURGERY)

## 2025-08-12 PROCEDURE — 2500000004 HC RX 250 GENERAL PHARMACY W/ HCPCS (ALT 636 FOR OP/ED): Performed by: THORACIC SURGERY (CARDIOTHORACIC VASCULAR SURGERY)

## 2025-08-12 PROCEDURE — B24BZZ4 ULTRASONOGRAPHY OF HEART WITH AORTA, TRANSESOPHAGEAL: ICD-10-PCS | Performed by: THORACIC SURGERY (CARDIOTHORACIC VASCULAR SURGERY)

## 2025-08-12 PROCEDURE — 82947 ASSAY GLUCOSE BLOOD QUANT: CPT

## 2025-08-12 PROCEDURE — 93312 ECHO TRANSESOPHAGEAL: CPT | Performed by: ANESTHESIOLOGY

## 2025-08-12 PROCEDURE — 2500000001 HC RX 250 WO HCPCS SELF ADMINISTERED DRUGS (ALT 637 FOR MEDICARE OP)

## 2025-08-12 PROCEDURE — 83735 ASSAY OF MAGNESIUM: CPT

## 2025-08-12 PROCEDURE — 36620 INSERTION CATHETER ARTERY: CPT | Performed by: NURSE ANESTHETIST, CERTIFIED REGISTERED

## 2025-08-12 PROCEDURE — 93325 DOPPLER ECHO COLOR FLOW MAPG: CPT | Performed by: ANESTHESIOLOGY

## 2025-08-12 PROCEDURE — 76937 US GUIDE VASCULAR ACCESS: CPT | Performed by: NURSE ANESTHETIST, CERTIFIED REGISTERED

## 2025-08-12 PROCEDURE — 93010 ELECTROCARDIOGRAM REPORT: CPT | Performed by: INTERNAL MEDICINE

## 2025-08-12 PROCEDURE — 2500000005 HC RX 250 GENERAL PHARMACY W/O HCPCS: Performed by: NURSE ANESTHETIST, CERTIFIED REGISTERED

## 2025-08-12 PROCEDURE — 02100Z9 BYPASS CORONARY ARTERY, ONE ARTERY FROM LEFT INTERNAL MAMMARY, OPEN APPROACH: ICD-10-PCS | Performed by: THORACIC SURGERY (CARDIOTHORACIC VASCULAR SURGERY)

## 2025-08-12 PROCEDURE — 2500000002 HC RX 250 W HCPCS SELF ADMINISTERED DRUGS (ALT 637 FOR MEDICARE OP, ALT 636 FOR OP/ED): Performed by: NURSE PRACTITIONER

## 2025-08-12 PROCEDURE — 2500000004 HC RX 250 GENERAL PHARMACY W/ HCPCS (ALT 636 FOR OP/ED)

## 2025-08-12 PROCEDURE — 2500000005 HC RX 250 GENERAL PHARMACY W/O HCPCS

## 2025-08-12 PROCEDURE — 2500000004 HC RX 250 GENERAL PHARMACY W/ HCPCS (ALT 636 FOR OP/ED): Performed by: ANESTHESIOLOGY

## 2025-08-12 PROCEDURE — 2500000004 HC RX 250 GENERAL PHARMACY W/ HCPCS (ALT 636 FOR OP/ED): Performed by: NURSE ANESTHETIST, CERTIFIED REGISTERED

## 2025-08-12 PROCEDURE — 99100 ANES PT EXTEME AGE<1 YR&>70: CPT | Performed by: ANESTHESIOLOGY

## 2025-08-12 PROCEDURE — A4312 CATH W/O BAG 2-WAY SILICONE: HCPCS | Performed by: THORACIC SURGERY (CARDIOTHORACIC VASCULAR SURGERY)

## 2025-08-12 PROCEDURE — 82330 ASSAY OF CALCIUM: CPT

## 2025-08-12 PROCEDURE — 93005 ELECTROCARDIOGRAM TRACING: CPT

## 2025-08-12 PROCEDURE — 3600000017 HC OR TIME - EACH INCREMENTAL 1 MINUTE - PROCEDURE LEVEL SIX: Performed by: THORACIC SURGERY (CARDIOTHORACIC VASCULAR SURGERY)

## 2025-08-12 PROCEDURE — 3600000018 HC OR TIME - INITIAL BASE CHARGE - PROCEDURE LEVEL SIX: Performed by: THORACIC SURGERY (CARDIOTHORACIC VASCULAR SURGERY)

## 2025-08-12 PROCEDURE — 2500000005 HC RX 250 GENERAL PHARMACY W/O HCPCS: Performed by: THORACIC SURGERY (CARDIOTHORACIC VASCULAR SURGERY)

## 2025-08-12 PROCEDURE — 93325 DOPPLER ECHO COLOR FLOW MAPG: CPT

## 2025-08-12 PROCEDURE — 71045 X-RAY EXAM CHEST 1 VIEW: CPT | Performed by: RADIOLOGY

## 2025-08-12 PROCEDURE — 82375 ASSAY CARBOXYHB QUANT: CPT

## 2025-08-12 PROCEDURE — 8E0W4CZ ROBOTIC ASSISTED PROCEDURE OF TRUNK REGION, PERCUTANEOUS ENDOSCOPIC APPROACH: ICD-10-PCS | Performed by: THORACIC SURGERY (CARDIOTHORACIC VASCULAR SURGERY)

## 2025-08-12 PROCEDURE — 2500000002 HC RX 250 W HCPCS SELF ADMINISTERED DRUGS (ALT 637 FOR MEDICARE OP, ALT 636 FOR OP/ED): Performed by: STUDENT IN AN ORGANIZED HEALTH CARE EDUCATION/TRAINING PROGRAM

## 2025-08-12 PROCEDURE — 93321 DOPPLER ECHO F-UP/LMTD STD: CPT | Performed by: ANESTHESIOLOGY

## 2025-08-12 PROCEDURE — 3700000001 HC GENERAL ANESTHESIA TIME - INITIAL BASE CHARGE: Performed by: THORACIC SURGERY (CARDIOTHORACIC VASCULAR SURGERY)

## 2025-08-12 PROCEDURE — 94002 VENT MGMT INPAT INIT DAY: CPT

## 2025-08-12 PROCEDURE — 36556 INSERT NON-TUNNEL CV CATH: CPT | Performed by: NURSE ANESTHETIST, CERTIFIED REGISTERED

## 2025-08-12 PROCEDURE — 37799 UNLISTED PX VASCULAR SURGERY: CPT

## 2025-08-12 PROCEDURE — 85610 PROTHROMBIN TIME: CPT

## 2025-08-12 PROCEDURE — 71045 X-RAY EXAM CHEST 1 VIEW: CPT

## 2025-08-12 PROCEDURE — 2020000001 HC ICU ROOM DAILY

## 2025-08-12 PROCEDURE — 2500000001 HC RX 250 WO HCPCS SELF ADMINISTERED DRUGS (ALT 637 FOR MEDICARE OP): Performed by: STUDENT IN AN ORGANIZED HEALTH CARE EDUCATION/TRAINING PROGRAM

## 2025-08-12 PROCEDURE — A33533 PR CABG, ARTERIAL, SINGLE: Performed by: ANESTHESIOLOGY

## 2025-08-12 PROCEDURE — 99291 CRITICAL CARE FIRST HOUR: CPT | Performed by: ANESTHESIOLOGY

## 2025-08-12 PROCEDURE — 99233 SBSQ HOSP IP/OBS HIGH 50: CPT | Performed by: INTERNAL MEDICINE

## 2025-08-12 RX ORDER — CEFAZOLIN SODIUM 2 G/100ML
2 INJECTION, SOLUTION INTRAVENOUS EVERY 8 HOURS
Status: COMPLETED | OUTPATIENT
Start: 2025-08-12 | End: 2025-08-14

## 2025-08-12 RX ORDER — NEOSTIGMINE METHYLSULFATE 1 MG/ML
INJECTION INTRAVENOUS AS NEEDED
Status: DISCONTINUED | OUTPATIENT
Start: 2025-08-12 | End: 2025-08-12

## 2025-08-12 RX ORDER — SODIUM CHLORIDE, SODIUM GLUCONATE, SODIUM ACETATE, POTASSIUM CHLORIDE AND MAGNESIUM CHLORIDE 30; 37; 368; 526; 502 MG/100ML; MG/100ML; MG/100ML; MG/100ML; MG/100ML
INJECTION, SOLUTION INTRAVENOUS CONTINUOUS PRN
Status: DISCONTINUED | OUTPATIENT
Start: 2025-08-12 | End: 2025-08-12

## 2025-08-12 RX ORDER — LIDOCAINE HYDROCHLORIDE 20 MG/ML
INJECTION, SOLUTION INFILTRATION; PERINEURAL AS NEEDED
Status: DISCONTINUED | OUTPATIENT
Start: 2025-08-12 | End: 2025-08-12

## 2025-08-12 RX ORDER — SODIUM CHLORIDE 0.9 G/100ML
INJECTION, SOLUTION IRRIGATION AS NEEDED
Status: DISCONTINUED | OUTPATIENT
Start: 2025-08-12 | End: 2025-08-12 | Stop reason: HOSPADM

## 2025-08-12 RX ORDER — NALOXONE HYDROCHLORIDE 0.4 MG/ML
0.2 INJECTION, SOLUTION INTRAMUSCULAR; INTRAVENOUS; SUBCUTANEOUS EVERY 5 MIN PRN
Status: DISCONTINUED | OUTPATIENT
Start: 2025-08-12 | End: 2025-08-20 | Stop reason: HOSPADM

## 2025-08-12 RX ORDER — LIDOCAINE HYDROCHLORIDE 10 MG/ML
INJECTION, SOLUTION INFILTRATION; PERINEURAL
Status: COMPLETED | OUTPATIENT
Start: 2025-08-12 | End: 2025-08-12

## 2025-08-12 RX ORDER — BUPIVACAINE HYDROCHLORIDE AND EPINEPHRINE 2.5; 5 MG/ML; UG/ML
INJECTION, SOLUTION EPIDURAL; INFILTRATION; INTRACAUDAL; PERINEURAL AS NEEDED
Status: DISCONTINUED | OUTPATIENT
Start: 2025-08-12 | End: 2025-08-12 | Stop reason: HOSPADM

## 2025-08-12 RX ORDER — POTASSIUM CHLORIDE 14.9 MG/ML
20 INJECTION INTRAVENOUS EVERY 6 HOURS PRN
Status: DISCONTINUED | OUTPATIENT
Start: 2025-08-12 | End: 2025-08-13

## 2025-08-12 RX ORDER — ACETAMINOPHEN 325 MG/1
650 TABLET ORAL EVERY 6 HOURS
Status: DISCONTINUED | OUTPATIENT
Start: 2025-08-12 | End: 2025-08-20 | Stop reason: HOSPADM

## 2025-08-12 RX ORDER — PROPOFOL 10 MG/ML
INJECTION, EMULSION INTRAVENOUS AS NEEDED
Status: DISCONTINUED | OUTPATIENT
Start: 2025-08-12 | End: 2025-08-12

## 2025-08-12 RX ORDER — FENTANYL CITRATE 50 UG/ML
INJECTION, SOLUTION INTRAMUSCULAR; INTRAVENOUS AS NEEDED
Status: DISCONTINUED | OUTPATIENT
Start: 2025-08-12 | End: 2025-08-12

## 2025-08-12 RX ORDER — NOREPINEPHRINE BITARTRATE/D5W 8 MG/250ML
0-.5 PLASTIC BAG, INJECTION (ML) INTRAVENOUS CONTINUOUS
Status: DISCONTINUED | OUTPATIENT
Start: 2025-08-12 | End: 2025-08-13

## 2025-08-12 RX ORDER — LIDOCAINE HCL/PF 100 MG/5ML
SYRINGE (ML) INTRAVENOUS AS NEEDED
Status: DISCONTINUED | OUTPATIENT
Start: 2025-08-12 | End: 2025-08-12

## 2025-08-12 RX ORDER — PANTOPRAZOLE SODIUM 40 MG/10ML
40 INJECTION, POWDER, LYOPHILIZED, FOR SOLUTION INTRAVENOUS
Status: DISCONTINUED | OUTPATIENT
Start: 2025-08-13 | End: 2025-08-13

## 2025-08-12 RX ORDER — ROCURONIUM BROMIDE 10 MG/ML
INJECTION, SOLUTION INTRAVENOUS AS NEEDED
Status: DISCONTINUED | OUTPATIENT
Start: 2025-08-12 | End: 2025-08-12

## 2025-08-12 RX ORDER — PROPOFOL 10 MG/ML
0-50 INJECTION, EMULSION INTRAVENOUS CONTINUOUS
Status: DISCONTINUED | OUTPATIENT
Start: 2025-08-12 | End: 2025-08-13

## 2025-08-12 RX ORDER — PROTAMINE SULFATE 10 MG/ML
INJECTION, SOLUTION INTRAVENOUS AS NEEDED
Status: DISCONTINUED | OUTPATIENT
Start: 2025-08-12 | End: 2025-08-12

## 2025-08-12 RX ORDER — POTASSIUM CHLORIDE 1.5 G/1.58G
40 POWDER, FOR SOLUTION ORAL EVERY 6 HOURS PRN
Status: DISCONTINUED | OUTPATIENT
Start: 2025-08-12 | End: 2025-08-13

## 2025-08-12 RX ORDER — ONDANSETRON 4 MG/1
4 TABLET, FILM COATED ORAL EVERY 8 HOURS PRN
Status: DISCONTINUED | OUTPATIENT
Start: 2025-08-12 | End: 2025-08-17

## 2025-08-12 RX ORDER — NAPROXEN SODIUM 220 MG/1
81 TABLET, FILM COATED ORAL DAILY
Status: DISCONTINUED | OUTPATIENT
Start: 2025-08-12 | End: 2025-08-20 | Stop reason: HOSPADM

## 2025-08-12 RX ORDER — CEFAZOLIN 1 G/1
INJECTION, POWDER, FOR SOLUTION INTRAVENOUS AS NEEDED
Status: DISCONTINUED | OUTPATIENT
Start: 2025-08-12 | End: 2025-08-12

## 2025-08-12 RX ORDER — SODIUM CHLORIDE, SODIUM LACTATE, POTASSIUM CHLORIDE, CALCIUM CHLORIDE 600; 310; 30; 20 MG/100ML; MG/100ML; MG/100ML; MG/100ML
30 INJECTION, SOLUTION INTRAVENOUS CONTINUOUS
Status: DISCONTINUED | OUTPATIENT
Start: 2025-08-12 | End: 2025-08-13

## 2025-08-12 RX ORDER — NOREPINEPHRINE BITARTRATE 0.03 MG/ML
INJECTION, SOLUTION INTRAVENOUS CONTINUOUS PRN
Status: DISCONTINUED | OUTPATIENT
Start: 2025-08-12 | End: 2025-08-12

## 2025-08-12 RX ORDER — MAGNESIUM SULFATE HEPTAHYDRATE 40 MG/ML
2 INJECTION, SOLUTION INTRAVENOUS EVERY 6 HOURS PRN
Status: DISCONTINUED | OUTPATIENT
Start: 2025-08-12 | End: 2025-08-13

## 2025-08-12 RX ORDER — POTASSIUM CHLORIDE 20 MEQ/1
40 TABLET, EXTENDED RELEASE ORAL EVERY 6 HOURS PRN
Status: DISCONTINUED | OUTPATIENT
Start: 2025-08-12 | End: 2025-08-13

## 2025-08-12 RX ORDER — POTASSIUM CHLORIDE 20 MEQ/1
20 TABLET, EXTENDED RELEASE ORAL EVERY 6 HOURS PRN
Status: DISCONTINUED | OUTPATIENT
Start: 2025-08-12 | End: 2025-08-13

## 2025-08-12 RX ORDER — MIDAZOLAM HYDROCHLORIDE 2 MG/2ML
INJECTION, SOLUTION INTRAMUSCULAR; INTRAVENOUS AS NEEDED
Status: DISCONTINUED | OUTPATIENT
Start: 2025-08-12 | End: 2025-08-12

## 2025-08-12 RX ORDER — GLYCOPYRROLATE 0.6MG/3ML
SYRINGE (ML) INTRAVENOUS AS NEEDED
Status: DISCONTINUED | OUTPATIENT
Start: 2025-08-12 | End: 2025-08-12

## 2025-08-12 RX ORDER — POLYETHYLENE GLYCOL 3350 17 G/17G
17 POWDER, FOR SOLUTION ORAL 2 TIMES DAILY
Status: DISCONTINUED | OUTPATIENT
Start: 2025-08-12 | End: 2025-08-20 | Stop reason: HOSPADM

## 2025-08-12 RX ORDER — SODIUM CHLORIDE, SODIUM LACTATE, POTASSIUM CHLORIDE, CALCIUM CHLORIDE 600; 310; 30; 20 MG/100ML; MG/100ML; MG/100ML; MG/100ML
INJECTION, SOLUTION INTRAVENOUS CONTINUOUS PRN
Status: DISCONTINUED | OUTPATIENT
Start: 2025-08-12 | End: 2025-08-12

## 2025-08-12 RX ORDER — AMOXICILLIN 250 MG
2 CAPSULE ORAL 2 TIMES DAILY
Status: DISCONTINUED | OUTPATIENT
Start: 2025-08-12 | End: 2025-08-20 | Stop reason: HOSPADM

## 2025-08-12 RX ORDER — CALCIUM GLUCONATE 20 MG/ML
2 INJECTION, SOLUTION INTRAVENOUS EVERY 6 HOURS PRN
Status: DISCONTINUED | OUTPATIENT
Start: 2025-08-12 | End: 2025-08-13

## 2025-08-12 RX ORDER — ONDANSETRON HYDROCHLORIDE 2 MG/ML
4 INJECTION, SOLUTION INTRAVENOUS EVERY 8 HOURS PRN
Status: DISCONTINUED | OUTPATIENT
Start: 2025-08-12 | End: 2025-08-17

## 2025-08-12 RX ORDER — POTASSIUM CHLORIDE 29.8 MG/ML
40 INJECTION INTRAVENOUS EVERY 6 HOURS PRN
Status: DISCONTINUED | OUTPATIENT
Start: 2025-08-12 | End: 2025-08-13

## 2025-08-12 RX ORDER — DEXAMETHASONE SODIUM PHOSPHATE 10 MG/ML
INJECTION INTRAMUSCULAR; INTRAVENOUS AS NEEDED
Status: DISCONTINUED | OUTPATIENT
Start: 2025-08-12 | End: 2025-08-12

## 2025-08-12 RX ORDER — PAPAVERINE HYDROCHLORIDE 30 MG/ML
INJECTION INTRAMUSCULAR; INTRAVENOUS AS NEEDED
Status: DISCONTINUED | OUTPATIENT
Start: 2025-08-12 | End: 2025-08-12 | Stop reason: HOSPADM

## 2025-08-12 RX ORDER — LIDOCAINE HYDROCHLORIDE AND EPINEPHRINE 10; 10 UG/ML; MG/ML
INJECTION, SOLUTION INFILTRATION; PERINEURAL AS NEEDED
Status: DISCONTINUED | OUTPATIENT
Start: 2025-08-12 | End: 2025-08-12 | Stop reason: HOSPADM

## 2025-08-12 RX ORDER — ATORVASTATIN CALCIUM 80 MG/1
80 TABLET, FILM COATED ORAL NIGHTLY
Status: DISCONTINUED | OUTPATIENT
Start: 2025-08-13 | End: 2025-08-13

## 2025-08-12 RX ORDER — PHENYLEPHRINE HCL IN 0.9% NACL 0.4MG/10ML
SYRINGE (ML) INTRAVENOUS AS NEEDED
Status: DISCONTINUED | OUTPATIENT
Start: 2025-08-12 | End: 2025-08-12

## 2025-08-12 RX ORDER — POTASSIUM CHLORIDE 1.5 G/1.58G
20 POWDER, FOR SOLUTION ORAL EVERY 6 HOURS PRN
Status: DISCONTINUED | OUTPATIENT
Start: 2025-08-12 | End: 2025-08-13

## 2025-08-12 RX ORDER — ALBUMIN HUMAN 50 G/1000ML
SOLUTION INTRAVENOUS AS NEEDED
Status: DISCONTINUED | OUTPATIENT
Start: 2025-08-12 | End: 2025-08-12

## 2025-08-12 RX ORDER — INSULIN LISPRO 100 [IU]/ML
0-15 INJECTION, SOLUTION INTRAVENOUS; SUBCUTANEOUS EVERY 4 HOURS
Status: DISCONTINUED | OUTPATIENT
Start: 2025-08-12 | End: 2025-08-13

## 2025-08-12 RX ORDER — NITROGLYCERIN 40 MG/100ML
INJECTION INTRAVENOUS AS NEEDED
Status: DISCONTINUED | OUTPATIENT
Start: 2025-08-12 | End: 2025-08-12

## 2025-08-12 RX ORDER — CALCIUM CHLORIDE INJECTION 100 MG/ML
INJECTION, SOLUTION INTRAVENOUS AS NEEDED
Status: DISCONTINUED | OUTPATIENT
Start: 2025-08-12 | End: 2025-08-12

## 2025-08-12 RX ORDER — SODIUM CHLORIDE, SODIUM LACTATE, POTASSIUM CHLORIDE, CALCIUM CHLORIDE 600; 310; 30; 20 MG/100ML; MG/100ML; MG/100ML; MG/100ML
5 INJECTION, SOLUTION INTRAVENOUS CONTINUOUS
Status: ACTIVE | OUTPATIENT
Start: 2025-08-12 | End: 2025-08-13

## 2025-08-12 RX ORDER — ATROPINE SULFATE 0.1 MG/ML
INJECTION INTRAVENOUS
Status: DISPENSED
Start: 2025-08-12 | End: 2025-08-13

## 2025-08-12 RX ORDER — CALCIUM GLUCONATE 20 MG/ML
1 INJECTION, SOLUTION INTRAVENOUS EVERY 6 HOURS PRN
Status: DISCONTINUED | OUTPATIENT
Start: 2025-08-12 | End: 2025-08-13

## 2025-08-12 RX ORDER — PANTOPRAZOLE SODIUM 40 MG/1
40 TABLET, DELAYED RELEASE ORAL
Status: DISCONTINUED | OUTPATIENT
Start: 2025-08-13 | End: 2025-08-13

## 2025-08-12 RX ORDER — ALBUTEROL SULFATE 0.83 MG/ML
2.5 SOLUTION RESPIRATORY (INHALATION) ONCE
Status: COMPLETED | OUTPATIENT
Start: 2025-08-12 | End: 2025-08-12

## 2025-08-12 RX ORDER — FENTANYL CITRATE 50 UG/ML
25 INJECTION, SOLUTION INTRAMUSCULAR; INTRAVENOUS
Status: DISCONTINUED | OUTPATIENT
Start: 2025-08-12 | End: 2025-08-13

## 2025-08-12 RX ORDER — METHADONE HYDROCHLORIDE 10 MG/ML
INJECTION, SOLUTION INTRAMUSCULAR; INTRAVENOUS; SUBCUTANEOUS AS NEEDED
Status: DISCONTINUED | OUTPATIENT
Start: 2025-08-12 | End: 2025-08-12

## 2025-08-12 RX ORDER — MAGNESIUM SULFATE HEPTAHYDRATE 40 MG/ML
4 INJECTION, SOLUTION INTRAVENOUS EVERY 6 HOURS PRN
Status: DISCONTINUED | OUTPATIENT
Start: 2025-08-12 | End: 2025-08-13

## 2025-08-12 RX ADMIN — CEFAZOLIN 2 G: 1 INJECTION, POWDER, FOR SOLUTION INTRAMUSCULAR; INTRAVENOUS at 13:52

## 2025-08-12 RX ADMIN — NITROGLYCERIN 50 MCG: 10 INJECTION INTRAVENOUS at 14:13

## 2025-08-12 RX ADMIN — ATORVASTATIN CALCIUM 80 MG: 80 TABLET, FILM COATED ORAL at 08:23

## 2025-08-12 RX ADMIN — LIDOCAINE HYDROCHLORIDE 100 MG: 20 INJECTION INTRAVENOUS at 13:15

## 2025-08-12 RX ADMIN — SODIUM CHLORIDE, SODIUM GLUCONATE, SODIUM ACETATE, POTASSIUM CHLORIDE AND MAGNESIUM CHLORIDE: 30; 37; 368; 526; 502 INJECTION, SOLUTION INTRAVENOUS at 13:47

## 2025-08-12 RX ADMIN — ACETAMINOPHEN 650 MG: 325 TABLET ORAL at 23:14

## 2025-08-12 RX ADMIN — ALBUMIN HUMAN 250 ML: 0.05 INJECTION, SOLUTION INTRAVENOUS at 17:46

## 2025-08-12 RX ADMIN — SODIUM CHLORIDE, SODIUM GLUCONATE, SODIUM ACETATE, POTASSIUM CHLORIDE AND MAGNESIUM CHLORIDE: 526; 502; 368; 37; 30 INJECTION, SOLUTION INTRAVENOUS at 12:55

## 2025-08-12 RX ADMIN — ALBUTEROL SULFATE 2.5 MG: 2.5 SOLUTION RESPIRATORY (INHALATION) at 12:36

## 2025-08-12 RX ADMIN — INSULIN LISPRO 5 UNITS: 100 INJECTION, SOLUTION INTRAVENOUS; SUBCUTANEOUS at 21:35

## 2025-08-12 RX ADMIN — HEPARIN SODIUM 1200 UNITS/HR: 10000 INJECTION, SOLUTION INTRAVENOUS at 01:25

## 2025-08-12 RX ADMIN — FENTANYL CITRATE 100 MCG: 50 INJECTION, SOLUTION INTRAMUSCULAR; INTRAVENOUS at 14:10

## 2025-08-12 RX ADMIN — FENTANYL CITRATE 100 MCG: 50 INJECTION, SOLUTION INTRAMUSCULAR; INTRAVENOUS at 14:12

## 2025-08-12 RX ADMIN — CEFAZOLIN SODIUM 2 G: 2 INJECTION, SOLUTION INTRAVENOUS at 18:46

## 2025-08-12 RX ADMIN — ROCURONIUM BROMIDE 10 MG: 10 INJECTION INTRAVENOUS at 15:57

## 2025-08-12 RX ADMIN — CALCIUM CHLORIDE 1 G: 100 INJECTION, SOLUTION INTRAVENOUS at 17:26

## 2025-08-12 RX ADMIN — CEFAZOLIN 2 G: 1 INJECTION, POWDER, FOR SOLUTION INTRAMUSCULAR; INTRAVENOUS at 17:51

## 2025-08-12 RX ADMIN — NITROFURANTOIN (MONOHYDRATE/MACROCRYSTALS) 100 MG: 25; 75 CAPSULE ORAL at 23:14

## 2025-08-12 RX ADMIN — PROTAMINE SULFATE 150 MG: 10 INJECTION, SOLUTION INTRAVENOUS at 16:50

## 2025-08-12 RX ADMIN — NOREPINEPHRINE BITARTRATE 8 MCG: 1 INJECTION, SOLUTION, CONCENTRATE INTRAVENOUS at 14:26

## 2025-08-12 RX ADMIN — ALBUMIN HUMAN 250 ML: 0.05 INJECTION, SOLUTION INTRAVENOUS at 14:29

## 2025-08-12 RX ADMIN — METHADONE HYDROCHLORIDE 10 MG: 10 INJECTION, SOLUTION INTRAMUSCULAR; INTRAVENOUS; SUBCUTANEOUS at 14:13

## 2025-08-12 RX ADMIN — NEOSTIGMINE METHYLSULFATE 4 MG: 1 INJECTION INTRAVENOUS at 18:26

## 2025-08-12 RX ADMIN — ALBUMIN HUMAN 250 ML: 0.05 INJECTION, SOLUTION INTRAVENOUS at 16:47

## 2025-08-12 RX ADMIN — ROCURONIUM BROMIDE 100 MG: 10 INJECTION INTRAVENOUS at 13:16

## 2025-08-12 RX ADMIN — SODIUM CHLORIDE 2.5 UNITS/HR: 9 INJECTION, SOLUTION INTRAVENOUS at 15:30

## 2025-08-12 RX ADMIN — SODIUM CHLORIDE, SODIUM LACTATE, POTASSIUM CHLORIDE, AND CALCIUM CHLORIDE 500 ML: .6; .31; .03; .02 INJECTION, SOLUTION INTRAVENOUS at 18:47

## 2025-08-12 RX ADMIN — LIDOCAINE HYDROCHLORIDE 3 ML: 10 INJECTION, SOLUTION INFILTRATION; PERINEURAL at 13:14

## 2025-08-12 RX ADMIN — MIDAZOLAM HYDROCHLORIDE 0.5 MG: 2 INJECTION, SOLUTION INTRAMUSCULAR; INTRAVENOUS at 13:00

## 2025-08-12 RX ADMIN — Medication 1 DOSE: at 21:17

## 2025-08-12 RX ADMIN — Medication 0.4 MG: at 18:26

## 2025-08-12 RX ADMIN — SODIUM CHLORIDE, SODIUM LACTATE, POTASSIUM CHLORIDE, AND CALCIUM CHLORIDE 500 ML: 600; 310; 30; 20 INJECTION, SOLUTION INTRAVENOUS at 19:18

## 2025-08-12 RX ADMIN — NITROFURANTOIN (MONOHYDRATE/MACROCRYSTALS) 100 MG: 25; 75 CAPSULE ORAL at 08:23

## 2025-08-12 RX ADMIN — NOREPINEPHRINE BITARTRATE 8 MCG: 1 INJECTION, SOLUTION, CONCENTRATE INTRAVENOUS at 14:35

## 2025-08-12 RX ADMIN — INSULIN LISPRO 5 UNITS: 100 INJECTION, SOLUTION INTRAVENOUS; SUBCUTANEOUS at 18:46

## 2025-08-12 RX ADMIN — FENTANYL CITRATE 150 MCG: 50 INJECTION, SOLUTION INTRAMUSCULAR; INTRAVENOUS at 18:03

## 2025-08-12 RX ADMIN — FENTANYL CITRATE 100 MCG: 50 INJECTION, SOLUTION INTRAMUSCULAR; INTRAVENOUS at 17:24

## 2025-08-12 RX ADMIN — Medication 0.04 MCG/KG/MIN: at 14:30

## 2025-08-12 RX ADMIN — NOREPINEPHRINE BITARTRATE 8 MCG: 1 INJECTION, SOLUTION, CONCENTRATE INTRAVENOUS at 14:31

## 2025-08-12 RX ADMIN — HYDROCHLOROTHIAZIDE 25 MG: 25 TABLET ORAL at 08:23

## 2025-08-12 RX ADMIN — ROCURONIUM BROMIDE 20 MG: 10 INJECTION INTRAVENOUS at 15:01

## 2025-08-12 RX ADMIN — PROPOFOL 30 MG: 10 INJECTION, EMULSION INTRAVENOUS at 14:11

## 2025-08-12 RX ADMIN — CARVEDILOL 12.5 MG: 12.5 TABLET, FILM COATED ORAL at 06:39

## 2025-08-12 RX ADMIN — FENTANYL CITRATE 25 MCG: 50 INJECTION INTRAMUSCULAR; INTRAVENOUS at 20:27

## 2025-08-12 RX ADMIN — CALCIUM CHLORIDE 0.2 G: 100 INJECTION, SOLUTION INTRAVENOUS at 17:44

## 2025-08-12 RX ADMIN — FINASTERIDE 5 MG: 5 TABLET, FILM COATED ORAL at 08:23

## 2025-08-12 RX ADMIN — MUPIROCIN: 20 OINTMENT TOPICAL at 08:23

## 2025-08-12 RX ADMIN — CALCIUM CHLORIDE 0.2 G: 100 INJECTION, SOLUTION INTRAVENOUS at 17:39

## 2025-08-12 RX ADMIN — ASPIRIN 81 MG CHEWABLE TABLET 81 MG: 81 TABLET CHEWABLE at 19:58

## 2025-08-12 RX ADMIN — DEXAMETHASONE SODIUM PHOSPHATE 10 MG: 10 INJECTION INTRAMUSCULAR; INTRAVENOUS at 13:15

## 2025-08-12 RX ADMIN — Medication 200 MCG: at 13:16

## 2025-08-12 RX ADMIN — ASPIRIN 81 MG: 81 TABLET, COATED ORAL at 08:23

## 2025-08-12 RX ADMIN — PROPOFOL 50 MCG/KG/MIN: 10 INJECTION, EMULSION INTRAVENOUS at 17:49

## 2025-08-12 RX ADMIN — Medication 120 MCG: at 13:32

## 2025-08-12 RX ADMIN — TAMSULOSIN HYDROCHLORIDE 0.8 MG: 0.4 CAPSULE ORAL at 08:23

## 2025-08-12 RX ADMIN — FENTANYL CITRATE 50 MCG: 50 INJECTION, SOLUTION INTRAMUSCULAR; INTRAVENOUS at 13:15

## 2025-08-12 RX ADMIN — PROPOFOL 90 MG: 10 INJECTION, EMULSION INTRAVENOUS at 13:15

## 2025-08-12 RX ADMIN — SODIUM CHLORIDE, POTASSIUM CHLORIDE, SODIUM LACTATE AND CALCIUM CHLORIDE: 600; 310; 30; 20 INJECTION, SOLUTION INTRAVENOUS at 13:46

## 2025-08-12 SDOH — HEALTH STABILITY: MENTAL HEALTH: CURRENT SMOKER: 0

## 2025-08-12 ASSESSMENT — COGNITIVE AND FUNCTIONAL STATUS - GENERAL
CLIMB 3 TO 5 STEPS WITH RAILING: A LOT
TOILETING: A LITTLE
MOBILITY SCORE: 19
HELP NEEDED FOR BATHING: A LITTLE
WALKING IN HOSPITAL ROOM: A LITTLE
MOVING TO AND FROM BED TO CHAIR: A LITTLE
DRESSING REGULAR LOWER BODY CLOTHING: A LITTLE
DAILY ACTIVITIY SCORE: 20
STANDING UP FROM CHAIR USING ARMS: A LITTLE
DRESSING REGULAR UPPER BODY CLOTHING: A LITTLE

## 2025-08-12 ASSESSMENT — PAIN SCALES - GENERAL
PAIN_LEVEL: 0
PAINLEVEL_OUTOF10: 6

## 2025-08-12 ASSESSMENT — PAIN - FUNCTIONAL ASSESSMENT
PAIN_FUNCTIONAL_ASSESSMENT: CPOT (CRITICAL CARE PAIN OBSERVATION TOOL)
PAIN_FUNCTIONAL_ASSESSMENT: 0-10
PAIN_FUNCTIONAL_ASSESSMENT: CPOT (CRITICAL CARE PAIN OBSERVATION TOOL)
PAIN_FUNCTIONAL_ASSESSMENT: CPOT (CRITICAL CARE PAIN OBSERVATION TOOL)

## 2025-08-12 NOTE — CARE PLAN
Problem: Pain - Adult  Goal: Verbalizes/displays adequate comfort level or baseline comfort level  Outcome: Progressing     Problem: Safety - Adult  Goal: Free from fall injury  Outcome: Progressing   The patient's goals for the shift include sit up to chair    The clinical goals for the shift include pt will remain safe and stable    Over the shift, the patient remained safe and HDS. Pt was NPO at midnight due to scheduled procedure today.

## 2025-08-12 NOTE — CARE PLAN
Problem: Pain - Adult  Goal: Verbalizes/displays adequate comfort level or baseline comfort level  Outcome: Progressing     Problem: Safety - Adult  Goal: Free from fall injury  Outcome: Progressing     Problem: Discharge Planning  Goal: Discharge to home or other facility with appropriate resources  Outcome: Progressing     Problem: Chronic Conditions and Co-morbidities  Goal: Patient's chronic conditions and co-morbidity symptoms are monitored and maintained or improved  Outcome: Progressing     Problem: Nutrition  Goal: Nutrient intake appropriate for maintaining nutritional needs  Outcome: Progressing     Problem: Fall/Injury  Goal: Not fall by end of shift  Outcome: Progressing  Goal: Be free from injury by end of the shift  Outcome: Progressing  Goal: Verbalize understanding of personal risk factors for fall in the hospital  Outcome: Progressing  Goal: Verbalize understanding of risk factor reduction measures to prevent injury from fall in the home  Outcome: Progressing  Goal: Use assistive devices by end of the shift  Outcome: Progressing  Goal: Pace activities to prevent fatigue by end of the shift  Outcome: Progressing     Problem: Heart Failure  Goal: Improved gas exchange this shift  Outcome: Progressing  Goal: Improved urinary output this shift  Outcome: Progressing  Goal: Reduction in peripheral edema within 24 hours  Outcome: Progressing  Goal: Report improvement of dyspnea/breathlessness this shift  Outcome: Progressing  Goal: Weight from fluid excess reduced over 2-3 days, then stabilize  Outcome: Progressing  Goal: Increase self care and/or family involvement in 24 hours  Outcome: Progressing     Problem: Safety - Medical Restraint  Goal: Remains free of injury from restraints (Restraint for Interference with Medical Device)  Outcome: Progressing  Goal: Free from restraint(s) (Restraint for Interference with Medical Device)  Outcome: Progressing     Problem: Skin  Goal: Decreased wound  size/increased tissue granulation at next dressing change  Outcome: Progressing  Goal: Participates in plan/prevention/treatment measures  Outcome: Progressing  Goal: Prevent/manage excess moisture  Outcome: Progressing  Goal: Prevent/minimize sheer/friction injuries  Outcome: Progressing  Flowsheets (Taken 8/12/2025 1941)  Prevent/minimize sheer/friction injuries:   HOB 30 degrees or less   Turn/reposition every 2 hours/use positioning/transfer devices  Goal: Promote/optimize nutrition  Outcome: Progressing  Goal: Promote skin healing  Outcome: Progressing   The patient's goals for the shift include remain hds

## 2025-08-12 NOTE — PROGRESS NOTES
Subjective Data  Patient seen and assessed this morning, lying in bed, NAD. Reports mild chest discomfort and nervousness regarding upcoming procedure Denies any SOB, dizziness or lightheadedness. Updated on plan of care, questions answered.     Interval Events:  No acute events overnight.     Today's Plan/Updates:   - hemodynamically stable, no complaints, cont current regimen  - NPO for OR today, 2nd case Robotic MIDCAB with Dr. Dwaine Lorenz      Objective Data:  Last Recorded Vitals:  Vitals:    08/12/25 0400 08/12/25 0700 08/12/25 0736 08/12/25 0800   BP:  145/75 106/58    BP Location:   Left arm    Patient Position:   Lying    Pulse: 53   61   Resp:  16 16    Temp:  36.1 °C (97 °F) 36.2 °C (97.2 °F)    TempSrc:   Temporal    SpO2:  98% 97%    Weight:       Height:         Last Labs:  Results for orders placed or performed during the hospital encounter of 08/06/25 (from the past 24 hours)   POCT GLUCOSE   Result Value Ref Range    POCT Glucose 108 (H) 74 - 99 mg/dL   POCT GLUCOSE   Result Value Ref Range    POCT Glucose 126 (H) 74 - 99 mg/dL   CBC   Result Value Ref Range    WBC 4.7 4.4 - 11.3 x10*3/uL    nRBC 0.0 0.0 - 0.0 /100 WBCs    RBC 3.65 (L) 4.50 - 5.90 x10*6/uL    Hemoglobin 10.8 (L) 13.5 - 17.5 g/dL    Hematocrit 33.7 (L) 41.0 - 52.0 %    MCV 92 80 - 100 fL    MCH 29.6 26.0 - 34.0 pg    MCHC 32.0 32.0 - 36.0 g/dL    RDW 14.4 11.5 - 14.5 %    Platelets 103 (L) 150 - 450 x10*3/uL   Renal Function Panel   Result Value Ref Range    Glucose 191 (H) 74 - 99 mg/dL    Sodium 137 136 - 145 mmol/L    Potassium 4.0 3.5 - 5.3 mmol/L    Chloride 100 98 - 107 mmol/L    Bicarbonate 29 21 - 32 mmol/L    Anion Gap 12 10 - 20 mmol/L    Urea Nitrogen 47 (H) 6 - 23 mg/dL    Creatinine 1.79 (H) 0.50 - 1.30 mg/dL    eGFR 39 (L) >60 mL/min/1.73m*2    Calcium 9.1 8.6 - 10.6 mg/dL    Phosphorus 3.6 2.5 - 4.9 mg/dL    Albumin 3.8 3.4 - 5.0 g/dL   POCT GLUCOSE   Result Value Ref Range    POCT Glucose 223 (H) 74 - 99 mg/dL   POCT  GLUCOSE   Result Value Ref Range    POCT Glucose 114 (H) 74 - 99 mg/dL     *Note: Due to a large number of results and/or encounters for the requested time period, some results have not been displayed. A complete set of results can be found in Results Review.       Last I/O:  I/O last 3 completed shifts:  In: 768 (9.2 mL/kg) [P.O.:480; I.V.:288 (3.4 mL/kg)]  Out: 1780 (21.3 mL/kg) [Urine:1780 (0.6 mL/kg/hr)]  Weight: 83.5 kg     Ejection Fractions:  EF   Date/Time Value Ref Range Status   08/05/2025 09:52 AM 53 %    09/04/2024 10:38 AM 58 %      Inpatient Medications:  Scheduled Medications[1]  PRN Medications[2]  Continuous Medications[3]    Physical Exam  General: NAD  Skin: warm and dry   Head/neck: no JVD at 60 degrees  Cardiac: S1S2, RRR  Pulm: CTAB, room air   GI: soft, nontender   Extremities: no LE edema   Neuro: A/Ox3, no focal neuro deficits   Psych: appropriate mood and behavior       Assessment/Plan   Duane Scott is a 75 y.o. male presenting as an OSH transfer for robotic CABG evaluation.     NSTEMI  Hx of CAD, recent PCI 3/2025  hx of Cardiac arrest in the setting of AMI in 2009   - presented to OSH with chest pain while [place] [employer], workup at that time revealed NSTEMI with elevated troponins and no ischemic changes on EKG. Transferred to [facility] for cardiac catheterization  - [facility] 8/5: RCA , LAD prox 90% lesion, diag 90% ostial lesion   - CT surgery was consulted for CABG evaluation, but given consideration for possible robotic surgical intervention, [place] [facility] [place] OSH HS trop 395 -> 502 -> 650 -> 609, repeat here 306  - Cardiac Surgery consulted/following: NPO for OR today, 2nd case Robotic MIDCAB 8/12 with Dr. Dwaine Lorenz   - hold plavix, last dose 8/5  - hold home losartan   - cont heparin drip  - cont aspirin 81, atorvastatin 80    Thrombocytopenia, chronic  - stable since Jan was 93 average ~100  - has been, chronically low back to 2018  - Cardiac Surgery aware, ok to  proceed  - monitor while on heparin drip, stable    UTI  - UA per surgical work up +500 leukocyts, 8/8 patient reported dysuria  - UC clinically insignificant growth  - cont macrobid 100mg BID x7 days     Chronic diastolic heart failure  - OSH TTE 8/5: EF 50-55%, mod conc LVH, no WMA, Grade II (pseudonormal) DD, normal RV function  - CXR 8/7: no focal airspace disease or edema   - 8/4   - currently euvolemic on exam  - daily standing wts, strict I/Os, cardiac diet      CKD  - baseline Cr ~1.3-1.6   - admit Cr 1.61  - today's Cr 1.79 (1.53, 1.63)  - Avoid nephrotoxins and hypotension, trend daily RFP      DM  - HgbA1c 5.5%  - Accuchecks PRN,  hypogycemia protocol PRN     HTN  - SBP last 24 hrs: 113-131  - cont coreg 12.5 mg BID, hydrochlorothiazide 25 daily    DVT ppx: Heparin drip    DISPO  - pending MIDCAB 8/12 with Dr. Dwaine Lorenz  - lives with spouse and special needs children, independent    Code Status: Full Code confirmed upon admission  NOK: Danii Scott, spouse: [phone]    All labs, vital signs, tests & imaging results, and medications were reviewed.    Patient seen and discussed with Dr. Flora Francisco, APRN-CNP         [1]   Scheduled medications   Medication Dose Route Frequency    aspirin  81 mg oral Daily    atorvastatin  80 mg oral Daily    carvedilol  12.5 mg oral BID AC    finasteride  5 mg oral Daily    hydroCHLOROthiazide  25 mg oral Daily    nitrofurantoin (macrocrystal-monohydrate)  100 mg oral BID    polyethylene glycol  17 g oral Daily    tamsulosin  0.8 mg oral Daily   [2]   PRN medications   Medication    albuterol    dextrose    dextrose    glucagon    glucagon    heparin    ipratropium-albuteroL    melatonin    ondansetron ODT    Or    ondansetron   [3]   Continuous Medications   Medication Dose Last Rate    heparin  0-4,000 Units/hr 1,200 Units/hr (08/12/25 0125)

## 2025-08-12 NOTE — OP NOTE
ROBOTIC MIDCAB REYNOSO TO LAD Operative Note     Date: 2025 - 2025  OR Location: [facility] OR    Name: Duane Scott, : 1950, Age: 75 y.o., MRN: [id number], Sex: male    Diagnosis  Pre-op Diagnosis      * NSTEMI (non-ST elevated myocardial infarction) (Multi) [I21.4] Post-op Diagnosis     * NSTEMI (non-ST elevated myocardial infarction) (Multi) [I21.4]     Procedures  ROBOTIC MIDCAB LIMA TO LAD  04923 - MO CABG W/ARTERIAL GRAFT SINGLE ARTERIAL GRAFT  1.  Robotic assisted off-pump CABG x 1 with a LIMA to LAD.    Surgeons      * Hair Lorenz - Primary    Resident/Fellow/Other Assistant:  Surgeons and Role:     * Brian Santoyo MD - Fellow he performed the anastomosis and close the chest.    Staff:   Relief Circulator: Kell  Scrub Person: Tariq  Circulator: Jayshree  Scrub Person: Sarah  Surgical Assistant: Jeff  Surgical Assistant: Sanchez  Relief Scrub: Luis Antonio  Surgical Assistant: Priya    Anesthesia Staff: Anesthesiologist: Isidra Dao MD; Den Chang MD; Ray Collado MD  CRNA: ROBIN Faustin-CRNA  Anesthesia Resident: Kyung Lees MD    Procedure Summary  Anesthesia: General  ASA: IV  Estimated Blood Loss: 200 mL  Intra-op Medications:   Administrations occurring from 1240 to 1830 on 25:   Medication Name Total Dose   sodium chloride 0.9 % irrigation solution 2,000 mL   papaverine injection 180 mg   albumin human 5 % 500 mL   carvedilol (Coreg) tablet 12.5 mg Cannot be calculated   ceFAZolin (Ancef) vial 1 g 2 g   dexAMETHasone (Decadron) 10 mg/mL 10 mg   electrolyte-A (Plasmalyte-A) Cannot be calculated   electrolyte-A (Plasmalyte-A) Cannot be calculated   fentaNYL (Sublimaze) injection 50 mcg/mL 250 mcg   insulin regular (HumuLIN R,NovoLIN R) 100 Units in sodium chloride 0.9% 100 mL (1 Units/mL) infusion 8.94 Units   LR infusion Cannot be calculated   lidocaine (cardiac) injection 2% prefilled syringe 100 mg   methadone (Dolophine) injection 10 mg    midazolam PF (Versed) injection 1 mg/mL 0.5 mg   nitroglycerin 1 mg in 10 mL D5W IV bolus 50 mcg   norepinephrine (Levophed) 8 mg in sodium chloride 0.9% 250 mL (0.032 mg/mL) infusion (premix) 0.45 mg   norepinephrine (Levophed) 16 mcg/mL syringe for Anesthesia 24 mcg   phenylephrine 40 mcg/mL syringe 10 mL 320 mcg   propofol (Diprivan) injection 10 mg/mL 120 mg   protamine 10 mg/mL 150 mg   rocuronium (ZeMuron) 50 mg/5 mL injection 130 mg   lidocaine (Xylocaine) injection 1 % 3 mL              Anesthesia Record               Intraprocedure I/O Totals          Intake    Norepinephrine Drip 0.00 mL    The total shown is the total volume documented since Anesthesia Start was filed.    Insulin Drip 0.00 mL    The total shown is the total volume documented since Anesthesia Start was filed.    Total Intake 0 mL       Output    Urine 510 mL    Total Output 510 mL       Net    Net Volume -510 mL          Specimen: No specimens collected              Drains and/or Catheters:   Urethral Catheter Temperature probe 14 Fr. (Active)       Tourniquet Times:         Implants:     Findings: 1.There were no pericardial adhesions or effusions.   2. The ascending aorta was soft without evidence of atherosclerosis.   3. The heart was normal sinus rhythm and demonstrated normal left ventricular function.    4.The patient had severe diffuse coronary artery disease, however we were able to find locations on the LAD that was suitable for grafting.  5.  The conduits were suitable for grafting.  6.  The flow of the graft after the protamine were acceptable.      Indications: Duane Scott is an 75 y.o. male who is having surgery for NSTEMI (non-ST elevated myocardial infarction) (Multi) [I21.4]. Duane Scott is a 75 y.o. male with a past medical history of CAD with multiple PCIs (most recently LAD in March 2025, hx of cardiac arrest in the setting of AMI in 2009, DM, HTN, HLD, CVA, CKD and HFpEF.   He initially presented to OSH with  chest pain while walking to [place]. Workup at that time revealed NSTEMI with elevated troponins and no ischemic changes on EKG. He was then transferred to [facility] where he underwent cardiac cath which revealed severe proximal LAD disease with  of the RCA. CT surgery was consulted for CABG evaluation, but given consideration for possible robotic surgical intervention, patient was transferred to [facility].   We extensively discussed with the patient the risk of the surgery, including the risk of mortality and morbidity especially stroke,  renal failure and infection but also the benefit in terms of long-term prognosis and symptoms relief.  After that the patient agreed to proceed with the surgery.    The patient was seen in the preoperative area. The risks, benefits, complications, treatment options, non-operative alternatives, expected recovery and outcomes were discussed with the patient. The possibilities of reaction to medication, pulmonary aspiration, injury to surrounding structures, bleeding, recurrent infection, the need for additional procedures, failure to diagnose a condition, and creating a complication requiring transfusion or operation were discussed with the patient. The patient concurred with the proposed plan, giving informed consent.  The site of surgery was properly noted/marked if necessary per policy. The patient has been actively warmed in preoperative area. Preoperative antibiotics have been ordered and given within 1 hours of incision. Venous thrombosis prophylaxis are not indicated.    Procedure Details: The patient was taken to the operating room by anesthesia, placed supine on the operating table, intubated  with a double lumen tube and placed under general anesthesia.  A central line and luz marina were placed. LEYLA was performed which confirmed the preoperative findings of reduced EF without significant valvular disease.  The patient was prepped and draped in the usual sterile fashion.  A time  out was performed. The patient was placed on single right lung ventilation. Three 8mm robotic ports were placed at the 2nd, 5th and 7th intercostal spaces under direct visualization.  Pneumothorax was induced to 8mmHg and the robot was docked.  The mammary was harvested from the second intercostal space distal to the bifurcation.  Heparin was administered and the mammary divided distally.  It was tacked in placed along the medial aspect of the pericardium.  The pericardium was opened and the LAD identified. The robot was then undocked.  The trocar site at the 5th intercostal space was extended several centimeters and the thoratrack retractor was placed. The stabilizer device was inserted from a sub-xyphoid position and placed around the LAD. The mammary was prepared. It was noted to have excellent flow.  The LAD was opened and a the right  size shunt was placed.  The LIMA was anastomosed using a running 7-0 prolene suture.  The LIMA to LAD flows were assess with the flow probe.  There was good flow with a low PI.  The stabilizer was removed and the flows were confirmed and noted to still be appropriate.  Protamine was administered.  Hemostasis was meticulously secured.  A chest tube was placed through the track where the stabilizer device had been inserted.  The ribs were reapproximated with #5 vicryl sutures.  The fascia was closed with running 0-vicryl suture.  The subcutaneous tissue was closed in layers with the skin closed with a running 4-0 vicryl suture.  A dry sterile dressing was applied.  All instrument, needle and sponge counts were correct at the end of the case.  The patient was left intubated and transferred to the ICU in stable condition.     Evidence of Infection: No   Complications:  None; patient tolerated the procedure well.    Disposition: ICU - intubated and hemodynamically stable.  Condition: stable         Task Performed by RNFA or Surgical Assistant:  Jeff Lopez was the first assistant.   He dock and undock the robot,        Additional Details:     Attending Attestation: I was present and scrubbed for the entire procedure.    Hair Lorenz  Phone Number: [phone]
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
## 2025-08-12 NOTE — BRIEF OP NOTE
Date: 2025 - 2025  OR Location: [facility] OR    Name: Duane Scott, : 1950, Age: 75 y.o., MRN: [id number], Sex: male    Diagnosis  Pre-op Diagnosis      * NSTEMI (non-ST elevated myocardial infarction) (Multi) [I21.4] Post-op Diagnosis     * NSTEMI (non-ST elevated myocardial infarction) (Multi) [I21.4]     Procedures  ROBOTIC MIDCAB LIMA TO LAD  87580 - ME CABG W/ARTERIAL GRAFT SINGLE ARTERIAL GRAFT  Procedure Summary  Robotic Assisted MIDCAB x1 (LIMA-LAD)   Flow 71cc/min, PI 1.8\     Chest Tubes/Drains: Chest Tubes X  (Left Pleural)       Temporary Pacing Wires: N/A     Permanent pacer/ICD: No              -Preoperative settings:               -Intra-op/ Postoperative settings:     Conduit Harvested by: Dr. Dwaine Lorenz     Cardio Pulmonary Bypass Time: 0 mins  Cross-clamp Time: 0 mins  Circulatory Arrest: No Time:      Is patient candidate for Emergency Re-sternotomy? No      Surgeons      * Hair Lorenz - Primary    Resident/Fellow/Other Assistant:  Surgeons and Role:     * Brian Santoyo MD - Fellow  John, GUNJAN Garza, GUNJAN Mondragon CSA  Staff:   Relief Circulator: Kell  Scrub Person: Tariq  Circulator: Jayshree Lomasub Person: Sarah  Surgical Assistant: Jeff  Surgical Assistant: Sanchez Piper Scrub: Luis Antonio  Surgical Assistant: Priya Piper Circulator: Janelle    Anesthesia Staff: Anesthesiologist: Isidra Dao MD; Den Chang MD; Ray Collado MD  CRNA: ROBIN Faustin-CRNA  Anesthesia Resident: Kyung Lees MD    Procedure Summary  Anesthesia: General  ASA: IV  Estimated Blood Loss: 100mL  Intra-op Medications:   Administrations occurring from 1240 to 1830 on 25:   Medication Name Total Dose   sodium chloride 0.9 % irrigation solution 2,000 mL   papaverine injection 180 mg   albumin human 5 % 500 mL   calcium chloride 10% 1 g   carvedilol (Coreg) tablet 12.5 mg Cannot be calculated   ceFAZolin (Ancef) vial 1 g 2 g   dexAMETHasone (Decadron) 10  mg/mL 10 mg   electrolyte-A (Plasmalyte-A) Cannot be calculated   electrolyte-A (Plasmalyte-A) Cannot be calculated   fentaNYL (Sublimaze) injection 50 mcg/mL 350 mcg   insulin regular (HumuLIN R,NovoLIN R) 100 Units in sodium chloride 0.9% 100 mL (1 Units/mL) infusion 4.6 Units   LR infusion Cannot be calculated   lidocaine (cardiac) injection 2% prefilled syringe 100 mg   methadone (Dolophine) injection 10 mg   midazolam PF (Versed) injection 1 mg/mL 0.5 mg   nitroglycerin 1 mg in 10 mL D5W IV bolus 50 mcg   norepinephrine (Levophed) 8 mg in sodium chloride 0.9% 250 mL (0.032 mg/mL) infusion (premix) 0.45 mg   norepinephrine (Levophed) 16 mcg/mL syringe for Anesthesia 24 mcg   phenylephrine 40 mcg/mL syringe 10 mL 320 mcg   propofol (Diprivan) injection 10 mg/mL 120 mg   protamine 10 mg/mL 150 mg   rocuronium (ZeMuron) 50 mg/5 mL injection 130 mg   lidocaine (Xylocaine) injection 1 % 3 mL              Anesthesia Record               Intraprocedure I/O Totals          Intake    Norepinephrine Drip 0.00 mL    The total shown is the total volume documented since Anesthesia Start was filed.    Insulin Drip 0.00 mL    The total shown is the total volume documented since Anesthesia Start was filed.    Cell Saver 243 mL    Total Intake 243 mL       Output    Urine 570 mL    Total Output 570 mL       Net    Net Volume -327 mL          Specimen: No specimens collected               Findings: The conduit and target were suitable for grafting. The Coronary was a 1.75 mm vessel with some plaque. The graft had excellent flow and low resistance.     Complications:  None; patient tolerated the procedure well.     Disposition: ICU - intubated and hemodynamically stable.  Condition: stable  Specimens Collected: No specimens collected  Attending Attestation:     Hair Lorenz  Phone Number: [phone]

## 2025-08-13 ENCOUNTER — APPOINTMENT (OUTPATIENT)
Dept: RADIOLOGY | Facility: HOSPITAL | Age: 75
DRG: 236 | End: 2025-08-13
Payer: MEDICARE

## 2025-08-13 LAB
ALBUMIN SERPL BCP-MCNC: 4.1 G/DL (ref 3.4–5)
ANION GAP BLDA CALCULATED.4IONS-SCNC: 10 MMO/L (ref 10–25)
ANION GAP BLDA CALCULATED.4IONS-SCNC: 11 MMO/L (ref 10–25)
ANION GAP SERPL CALC-SCNC: 14 MMOL/L (ref 10–20)
BASE EXCESS BLDA CALC-SCNC: -0.1 MMOL/L (ref -2–3)
BASE EXCESS BLDA CALC-SCNC: -1.2 MMOL/L (ref -2–3)
BLOOD EXPIRATION DATE: NORMAL
BODY TEMPERATURE: 37 DEGREES CELSIUS
BODY TEMPERATURE: 37 DEGREES CELSIUS
BUN SERPL-MCNC: 41 MG/DL (ref 6–23)
CA-I BLD-SCNC: 1.19 MMOL/L (ref 1.1–1.33)
CA-I BLDA-SCNC: 1.2 MMOL/L (ref 1.1–1.33)
CA-I BLDA-SCNC: 1.2 MMOL/L (ref 1.1–1.33)
CALCIUM SERPL-MCNC: 9 MG/DL (ref 8.6–10.6)
CHLORIDE BLDA-SCNC: 104 MMOL/L (ref 98–107)
CHLORIDE BLDA-SCNC: 104 MMOL/L (ref 98–107)
CHLORIDE SERPL-SCNC: 101 MMOL/L (ref 98–107)
CO2 SERPL-SCNC: 25 MMOL/L (ref 21–32)
CREAT SERPL-MCNC: 1.61 MG/DL (ref 0.5–1.3)
DISPENSE STATUS: NORMAL
EGFRCR SERPLBLD CKD-EPI 2021: 45 ML/MIN/1.73M*2
ERYTHROCYTE [DISTWIDTH] IN BLOOD BY AUTOMATED COUNT: 14.5 % (ref 11.5–14.5)
GLUCOSE BLD MANUAL STRIP-MCNC: 135 MG/DL (ref 74–99)
GLUCOSE BLD MANUAL STRIP-MCNC: 190 MG/DL (ref 74–99)
GLUCOSE BLD MANUAL STRIP-MCNC: 219 MG/DL (ref 74–99)
GLUCOSE BLD MANUAL STRIP-MCNC: 222 MG/DL (ref 74–99)
GLUCOSE BLD MANUAL STRIP-MCNC: 271 MG/DL (ref 74–99)
GLUCOSE BLDA-MCNC: 130 MG/DL (ref 74–99)
GLUCOSE BLDA-MCNC: 143 MG/DL (ref 74–99)
GLUCOSE SERPL-MCNC: 127 MG/DL (ref 74–99)
HCO3 BLDA-SCNC: 23.6 MMOL/L (ref 22–26)
HCO3 BLDA-SCNC: 25.4 MMOL/L (ref 22–26)
HCT VFR BLD AUTO: 29.8 % (ref 41–52)
HCT VFR BLD EST: 33 % (ref 41–52)
HCT VFR BLD EST: 33 % (ref 41–52)
HGB BLD-MCNC: 10.5 G/DL (ref 13.5–17.5)
HGB BLDA-MCNC: 11 G/DL (ref 13.5–17.5)
HGB BLDA-MCNC: 11.1 G/DL (ref 13.5–17.5)
INHALED O2 CONCENTRATION: 36 %
INHALED O2 CONCENTRATION: 40 %
LACTATE BLDA-SCNC: 0.6 MMOL/L (ref 0.4–2)
LACTATE BLDA-SCNC: 0.8 MMOL/L (ref 0.4–2)
MAGNESIUM SERPL-MCNC: 2.08 MG/DL (ref 1.6–2.4)
MCH RBC QN AUTO: 30 PG (ref 26–34)
MCHC RBC AUTO-ENTMCNC: 35.2 G/DL (ref 32–36)
MCV RBC AUTO: 85 FL (ref 80–100)
NRBC BLD-RTO: 0 /100 WBCS (ref 0–0)
OXYHGB MFR BLDA: 96.9 % (ref 94–98)
OXYHGB MFR BLDA: 97.5 % (ref 94–98)
PCO2 BLDA: 39 MM HG (ref 38–42)
PCO2 BLDA: 44 MM HG (ref 38–42)
PH BLDA: 7.37 PH (ref 7.38–7.42)
PH BLDA: 7.39 PH (ref 7.38–7.42)
PHOSPHATE SERPL-MCNC: 3.7 MG/DL (ref 2.5–4.9)
PLATELET # BLD AUTO: 88 X10*3/UL (ref 150–450)
PO2 BLDA: 156 MM HG (ref 85–95)
PO2 BLDA: 160 MM HG (ref 85–95)
POTASSIUM BLDA-SCNC: 4.2 MMOL/L (ref 3.5–5.3)
POTASSIUM BLDA-SCNC: 4.3 MMOL/L (ref 3.5–5.3)
POTASSIUM SERPL-SCNC: 4 MMOL/L (ref 3.5–5.3)
PRODUCT BLOOD TYPE: 6200
PRODUCT CODE: NORMAL
RBC # BLD AUTO: 3.5 X10*6/UL (ref 4.5–5.9)
SAO2 % BLDA: 100 % (ref 94–100)
SAO2 % BLDA: 99 % (ref 94–100)
SODIUM BLDA-SCNC: 134 MMOL/L (ref 136–145)
SODIUM BLDA-SCNC: 135 MMOL/L (ref 136–145)
SODIUM SERPL-SCNC: 136 MMOL/L (ref 136–145)
UNIT ABO: NORMAL
UNIT NUMBER: NORMAL
UNIT RH: NORMAL
UNIT VOLUME: 350
WBC # BLD AUTO: 6.9 X10*3/UL (ref 4.4–11.3)
XM INTEP: NORMAL

## 2025-08-13 PROCEDURE — 94003 VENT MGMT INPAT SUBQ DAY: CPT

## 2025-08-13 PROCEDURE — 2500000002 HC RX 250 W HCPCS SELF ADMINISTERED DRUGS (ALT 637 FOR MEDICARE OP, ALT 636 FOR OP/ED): Performed by: NURSE PRACTITIONER

## 2025-08-13 PROCEDURE — 97530 THERAPEUTIC ACTIVITIES: CPT | Mod: GP

## 2025-08-13 PROCEDURE — 82947 ASSAY GLUCOSE BLOOD QUANT: CPT

## 2025-08-13 PROCEDURE — 82330 ASSAY OF CALCIUM: CPT

## 2025-08-13 PROCEDURE — 2500000002 HC RX 250 W HCPCS SELF ADMINISTERED DRUGS (ALT 637 FOR MEDICARE OP, ALT 636 FOR OP/ED): Performed by: INTERNAL MEDICINE

## 2025-08-13 PROCEDURE — 71045 X-RAY EXAM CHEST 1 VIEW: CPT

## 2025-08-13 PROCEDURE — 84132 ASSAY OF SERUM POTASSIUM: CPT

## 2025-08-13 PROCEDURE — 37799 UNLISTED PX VASCULAR SURGERY: CPT

## 2025-08-13 PROCEDURE — 99232 SBSQ HOSP IP/OBS MODERATE 35: CPT

## 2025-08-13 PROCEDURE — 2500000004 HC RX 250 GENERAL PHARMACY W/ HCPCS (ALT 636 FOR OP/ED)

## 2025-08-13 PROCEDURE — 2500000001 HC RX 250 WO HCPCS SELF ADMINISTERED DRUGS (ALT 637 FOR MEDICARE OP): Performed by: INTERNAL MEDICINE

## 2025-08-13 PROCEDURE — 71045 X-RAY EXAM CHEST 1 VIEW: CPT | Performed by: RADIOLOGY

## 2025-08-13 PROCEDURE — 1200000002 HC GENERAL ROOM WITH TELEMETRY DAILY

## 2025-08-13 PROCEDURE — 2500000001 HC RX 250 WO HCPCS SELF ADMINISTERED DRUGS (ALT 637 FOR MEDICARE OP)

## 2025-08-13 PROCEDURE — 2500000002 HC RX 250 W HCPCS SELF ADMINISTERED DRUGS (ALT 637 FOR MEDICARE OP, ALT 636 FOR OP/ED)

## 2025-08-13 PROCEDURE — 97161 PT EVAL LOW COMPLEX 20 MIN: CPT | Mod: GP

## 2025-08-13 PROCEDURE — 85027 COMPLETE CBC AUTOMATED: CPT

## 2025-08-13 PROCEDURE — 2500000004 HC RX 250 GENERAL PHARMACY W/ HCPCS (ALT 636 FOR OP/ED): Performed by: NURSE PRACTITIONER

## 2025-08-13 PROCEDURE — 99223 1ST HOSP IP/OBS HIGH 75: CPT | Performed by: STUDENT IN AN ORGANIZED HEALTH CARE EDUCATION/TRAINING PROGRAM

## 2025-08-13 PROCEDURE — 2500000001 HC RX 250 WO HCPCS SELF ADMINISTERED DRUGS (ALT 637 FOR MEDICARE OP): Performed by: NURSE PRACTITIONER

## 2025-08-13 PROCEDURE — 2500000005 HC RX 250 GENERAL PHARMACY W/O HCPCS

## 2025-08-13 PROCEDURE — 2500000004 HC RX 250 GENERAL PHARMACY W/ HCPCS (ALT 636 FOR OP/ED): Performed by: INTERNAL MEDICINE

## 2025-08-13 PROCEDURE — 83735 ASSAY OF MAGNESIUM: CPT

## 2025-08-13 RX ORDER — IPRATROPIUM BROMIDE AND ALBUTEROL SULFATE 2.5; .5 MG/3ML; MG/3ML
3 SOLUTION RESPIRATORY (INHALATION) EVERY 6 HOURS PRN
Status: DISCONTINUED | OUTPATIENT
Start: 2025-08-13 | End: 2025-08-20 | Stop reason: HOSPADM

## 2025-08-13 RX ORDER — FINASTERIDE 5 MG/1
5 TABLET, FILM COATED ORAL DAILY
Status: DISCONTINUED | OUTPATIENT
Start: 2025-08-13 | End: 2025-08-20 | Stop reason: HOSPADM

## 2025-08-13 RX ORDER — HYDRALAZINE HYDROCHLORIDE 20 MG/ML
10 INJECTION INTRAMUSCULAR; INTRAVENOUS ONCE
Status: COMPLETED | OUTPATIENT
Start: 2025-08-13 | End: 2025-08-13

## 2025-08-13 RX ORDER — MAGNESIUM SULFATE HEPTAHYDRATE 40 MG/ML
2 INJECTION, SOLUTION INTRAVENOUS ONCE
Status: COMPLETED | OUTPATIENT
Start: 2025-08-13 | End: 2025-08-13

## 2025-08-13 RX ORDER — OXYCODONE HYDROCHLORIDE 5 MG/1
5 TABLET ORAL EVERY 4 HOURS PRN
Refills: 0 | Status: DISCONTINUED | OUTPATIENT
Start: 2025-08-13 | End: 2025-08-20

## 2025-08-13 RX ORDER — CLOPIDOGREL BISULFATE 75 MG/1
75 TABLET ORAL DAILY
Status: DISCONTINUED | OUTPATIENT
Start: 2025-08-13 | End: 2025-08-20 | Stop reason: HOSPADM

## 2025-08-13 RX ORDER — FENTANYL CITRATE 50 UG/ML
25 INJECTION, SOLUTION INTRAMUSCULAR; INTRAVENOUS EVERY 2 HOUR PRN
Status: DISCONTINUED | OUTPATIENT
Start: 2025-08-13 | End: 2025-08-13

## 2025-08-13 RX ORDER — METHOCARBAMOL 500 MG/1
500 TABLET, FILM COATED ORAL EVERY 8 HOURS SCHEDULED
Status: COMPLETED | OUTPATIENT
Start: 2025-08-13 | End: 2025-08-15

## 2025-08-13 RX ORDER — ACETAMINOPHEN 10 MG/ML
1000 INJECTION, SOLUTION INTRAVENOUS ONCE
Status: COMPLETED | OUTPATIENT
Start: 2025-08-13 | End: 2025-08-13

## 2025-08-13 RX ORDER — COLCHICINE 0.6 MG/1
0.6 TABLET ORAL 2 TIMES DAILY
Status: DISCONTINUED | OUTPATIENT
Start: 2025-08-13 | End: 2025-08-20 | Stop reason: HOSPADM

## 2025-08-13 RX ORDER — ATORVASTATIN CALCIUM 40 MG/1
40 TABLET, FILM COATED ORAL NIGHTLY
Status: DISCONTINUED | OUTPATIENT
Start: 2025-08-13 | End: 2025-08-19

## 2025-08-13 RX ORDER — TAMSULOSIN HYDROCHLORIDE 0.4 MG/1
0.4 CAPSULE ORAL DAILY
Status: DISCONTINUED | OUTPATIENT
Start: 2025-08-13 | End: 2025-08-19

## 2025-08-13 RX ORDER — LIDOCAINE 560 MG/1
1 PATCH PERCUTANEOUS; TOPICAL; TRANSDERMAL EVERY 12 HOURS PRN
Status: DISCONTINUED | OUTPATIENT
Start: 2025-08-13 | End: 2025-08-20 | Stop reason: HOSPADM

## 2025-08-13 RX ORDER — FENTANYL CITRATE 50 UG/ML
25 INJECTION, SOLUTION INTRAMUSCULAR; INTRAVENOUS
Status: DISCONTINUED | OUTPATIENT
Start: 2025-08-13 | End: 2025-08-13

## 2025-08-13 RX ORDER — INSULIN LISPRO 100 [IU]/ML
0-10 INJECTION, SOLUTION INTRAVENOUS; SUBCUTANEOUS
Status: DISCONTINUED | OUTPATIENT
Start: 2025-08-13 | End: 2025-08-19

## 2025-08-13 RX ORDER — DEXTROSE 50 % IN WATER (D50W) INTRAVENOUS SYRINGE
25
Status: DISCONTINUED | OUTPATIENT
Start: 2025-08-13 | End: 2025-08-20 | Stop reason: HOSPADM

## 2025-08-13 RX ORDER — CARVEDILOL 3.12 MG/1
6.25 TABLET ORAL 2 TIMES DAILY
Status: DISCONTINUED | OUTPATIENT
Start: 2025-08-13 | End: 2025-08-14

## 2025-08-13 RX ORDER — OXYCODONE HYDROCHLORIDE 10 MG/1
10 TABLET ORAL EVERY 4 HOURS PRN
Refills: 0 | Status: DISCONTINUED | OUTPATIENT
Start: 2025-08-13 | End: 2025-08-20

## 2025-08-13 RX ORDER — HEPARIN SODIUM 5000 [USP'U]/ML
5000 INJECTION, SOLUTION INTRAVENOUS; SUBCUTANEOUS EVERY 8 HOURS
Status: DISCONTINUED | OUTPATIENT
Start: 2025-08-13 | End: 2025-08-20 | Stop reason: HOSPADM

## 2025-08-13 RX ORDER — INSULIN LISPRO 100 [IU]/ML
0-15 INJECTION, SOLUTION INTRAVENOUS; SUBCUTANEOUS
Status: DISCONTINUED | OUTPATIENT
Start: 2025-08-13 | End: 2025-08-13

## 2025-08-13 RX ORDER — DEXTROSE 50 % IN WATER (D50W) INTRAVENOUS SYRINGE
12.5
Status: DISCONTINUED | OUTPATIENT
Start: 2025-08-13 | End: 2025-08-20 | Stop reason: HOSPADM

## 2025-08-13 RX ADMIN — POLYETHYLENE GLYCOL 3350 17 G: 17 POWDER, FOR SOLUTION ORAL at 10:16

## 2025-08-13 RX ADMIN — SENNOSIDES, DOCUSATE SODIUM 2 TABLET: 50; 8.6 TABLET, FILM COATED ORAL at 21:28

## 2025-08-13 RX ADMIN — ATORVASTATIN CALCIUM 40 MG: 40 TABLET, FILM COATED ORAL at 21:28

## 2025-08-13 RX ADMIN — ACETAMINOPHEN 650 MG: 325 TABLET ORAL at 17:35

## 2025-08-13 RX ADMIN — HEPARIN SODIUM 5000 UNITS: 5000 INJECTION INTRAVENOUS; SUBCUTANEOUS at 10:16

## 2025-08-13 RX ADMIN — CLOPIDOGREL BISULFATE 75 MG: 75 TABLET, FILM COATED ORAL at 10:17

## 2025-08-13 RX ADMIN — METHOCARBAMOL TABLETS 500 MG: 500 TABLET, COATED ORAL at 09:19

## 2025-08-13 RX ADMIN — FENTANYL CITRATE 25 MCG: 50 INJECTION INTRAMUSCULAR; INTRAVENOUS at 10:31

## 2025-08-13 RX ADMIN — METHOCARBAMOL TABLETS 500 MG: 500 TABLET, COATED ORAL at 15:59

## 2025-08-13 RX ADMIN — LIDOCAINE 4% 1 PATCH: 40 PATCH TOPICAL at 11:30

## 2025-08-13 RX ADMIN — MAGNESIUM SULFATE HEPTAHYDRATE 2 G: 40 INJECTION, SOLUTION INTRAVENOUS at 12:40

## 2025-08-13 RX ADMIN — ASPIRIN 81 MG CHEWABLE TABLET 81 MG: 81 TABLET CHEWABLE at 10:17

## 2025-08-13 RX ADMIN — ACETAMINOPHEN 650 MG: 325 TABLET ORAL at 06:00

## 2025-08-13 RX ADMIN — CARVEDILOL 6.25 MG: 3.12 TABLET, FILM COATED ORAL at 10:17

## 2025-08-13 RX ADMIN — CEFAZOLIN SODIUM 2 G: 2 INJECTION, SOLUTION INTRAVENOUS at 17:35

## 2025-08-13 RX ADMIN — COLCHICINE 0.6 MG: 0.6 TABLET, FILM COATED ORAL at 21:28

## 2025-08-13 RX ADMIN — FENTANYL CITRATE 25 MCG: 50 INJECTION INTRAMUSCULAR; INTRAVENOUS at 01:02

## 2025-08-13 RX ADMIN — CARVEDILOL 6.25 MG: 3.12 TABLET, FILM COATED ORAL at 21:27

## 2025-08-13 RX ADMIN — FENTANYL CITRATE 25 MCG: 50 INJECTION INTRAMUSCULAR; INTRAVENOUS at 05:59

## 2025-08-13 RX ADMIN — METHOCARBAMOL TABLETS 500 MG: 500 TABLET, COATED ORAL at 21:28

## 2025-08-13 RX ADMIN — CEFAZOLIN SODIUM 2 G: 2 INJECTION, SOLUTION INTRAVENOUS at 01:04

## 2025-08-13 RX ADMIN — OXYCODONE HYDROCHLORIDE 10 MG: 10 TABLET ORAL at 09:16

## 2025-08-13 RX ADMIN — ACETAMINOPHEN 650 MG: 325 TABLET ORAL at 12:16

## 2025-08-13 RX ADMIN — COLCHICINE 0.6 MG: 0.6 TABLET, FILM COATED ORAL at 10:17

## 2025-08-13 RX ADMIN — INSULIN LISPRO 4 UNITS: 100 INJECTION, SOLUTION INTRAVENOUS; SUBCUTANEOUS at 12:15

## 2025-08-13 RX ADMIN — ACETAMINOPHEN 1000 MG: 10 INJECTION INTRAVENOUS at 02:27

## 2025-08-13 RX ADMIN — FINASTERIDE 5 MG: 5 TABLET, FILM COATED ORAL at 10:17

## 2025-08-13 RX ADMIN — NITROFURANTOIN (MONOHYDRATE/MACROCRYSTALS) 100 MG: 25; 75 CAPSULE ORAL at 10:17

## 2025-08-13 RX ADMIN — ONDANSETRON 4 MG: 2 INJECTION INTRAMUSCULAR; INTRAVENOUS at 11:23

## 2025-08-13 RX ADMIN — FENTANYL CITRATE 25 MCG: 50 INJECTION INTRAMUSCULAR; INTRAVENOUS at 02:27

## 2025-08-13 RX ADMIN — HYDRALAZINE HYDROCHLORIDE 10 MG: 20 INJECTION INTRAMUSCULAR; INTRAVENOUS at 06:38

## 2025-08-13 RX ADMIN — Medication: at 00:01

## 2025-08-13 RX ADMIN — TAMSULOSIN HYDROCHLORIDE 0.4 MG: 0.4 CAPSULE ORAL at 10:17

## 2025-08-13 RX ADMIN — CEFAZOLIN SODIUM 2 G: 2 INJECTION, SOLUTION INTRAVENOUS at 10:16

## 2025-08-13 RX ADMIN — SENNOSIDES, DOCUSATE SODIUM 2 TABLET: 50; 8.6 TABLET, FILM COATED ORAL at 10:16

## 2025-08-13 RX ADMIN — HEPARIN SODIUM 5000 UNITS: 5000 INJECTION INTRAVENOUS; SUBCUTANEOUS at 17:35

## 2025-08-13 RX ADMIN — Medication: at 00:59

## 2025-08-13 RX ADMIN — INSULIN LISPRO 2 UNITS: 100 INJECTION, SOLUTION INTRAVENOUS; SUBCUTANEOUS at 17:35

## 2025-08-13 ASSESSMENT — COGNITIVE AND FUNCTIONAL STATUS - GENERAL
DRESSING REGULAR LOWER BODY CLOTHING: A LITTLE
MOVING FROM LYING ON BACK TO SITTING ON SIDE OF FLAT BED WITH BEDRAILS: A LOT
CLIMB 3 TO 5 STEPS WITH RAILING: A LITTLE
HELP NEEDED FOR BATHING: A LITTLE
MOVING FROM LYING ON BACK TO SITTING ON SIDE OF FLAT BED WITH BEDRAILS: A LITTLE
TOILETING: A LITTLE
TURNING FROM BACK TO SIDE WHILE IN FLAT BAD: A LITTLE
STANDING UP FROM CHAIR USING ARMS: A LITTLE
MOVING FROM LYING ON BACK TO SITTING ON SIDE OF FLAT BED WITH BEDRAILS: A LITTLE
CLIMB 3 TO 5 STEPS WITH RAILING: A LITTLE
TURNING FROM BACK TO SIDE WHILE IN FLAT BAD: A LITTLE
STANDING UP FROM CHAIR USING ARMS: A LITTLE
CLIMB 3 TO 5 STEPS WITH RAILING: TOTAL
WALKING IN HOSPITAL ROOM: A LITTLE
TURNING FROM BACK TO SIDE WHILE IN FLAT BAD: A LOT
MOVING TO AND FROM BED TO CHAIR: A LITTLE
MOBILITY SCORE: 12
WALKING IN HOSPITAL ROOM: A LITTLE
MOVING TO AND FROM BED TO CHAIR: A LOT
MOBILITY SCORE: 18
MOVING TO AND FROM BED TO CHAIR: A LITTLE
STANDING UP FROM CHAIR USING ARMS: A LOT
DRESSING REGULAR UPPER BODY CLOTHING: A LITTLE
DAILY ACTIVITIY SCORE: 20
WALKING IN HOSPITAL ROOM: A LITTLE
MOBILITY SCORE: 18

## 2025-08-13 ASSESSMENT — PAIN - FUNCTIONAL ASSESSMENT
PAIN_FUNCTIONAL_ASSESSMENT: 0-10
PAIN_FUNCTIONAL_ASSESSMENT: CPOT (CRITICAL CARE PAIN OBSERVATION TOOL)
PAIN_FUNCTIONAL_ASSESSMENT: CPOT (CRITICAL CARE PAIN OBSERVATION TOOL)
PAIN_FUNCTIONAL_ASSESSMENT: 0-10

## 2025-08-13 ASSESSMENT — PAIN SCALES - GENERAL
PAINLEVEL_OUTOF10: 7
PAINLEVEL_OUTOF10: 8
PAINLEVEL_OUTOF10: 5 - MODERATE PAIN
PAINLEVEL_OUTOF10: 8
PAINLEVEL_OUTOF10: 6
PAINLEVEL_OUTOF10: 9
PAINLEVEL_OUTOF10: 9
PAINLEVEL_OUTOF10: 5 - MODERATE PAIN
PAINLEVEL_OUTOF10: 0 - NO PAIN
PAINLEVEL_OUTOF10: 8
PAINLEVEL_OUTOF10: 9
PAINLEVEL_OUTOF10: 5 - MODERATE PAIN

## 2025-08-13 ASSESSMENT — ACTIVITIES OF DAILY LIVING (ADL): ADL_ASSISTANCE: INDEPENDENT

## 2025-08-13 ASSESSMENT — PAIN DESCRIPTION - DESCRIPTORS: DESCRIPTORS: ACHING;SHARP

## 2025-08-13 ASSESSMENT — PAIN DESCRIPTION - ORIENTATION
ORIENTATION: LOWER;LEFT
ORIENTATION: LEFT

## 2025-08-13 ASSESSMENT — PAIN DESCRIPTION - LOCATION
LOCATION: CHEST

## 2025-08-13 NOTE — CARE PLAN
Problem: Skin  Goal: Decreased wound size/increased tissue granulation at next dressing change  Outcome: Progressing  Goal: Participates in plan/prevention/treatment measures  Outcome: Progressing  Goal: Prevent/manage excess moisture  Outcome: Progressing  Flowsheets (Taken 8/13/2025 1124)  Prevent/manage excess moisture:   Cleanse incontinence/protect with barrier cream   Monitor for/manage infection if present   Use wicking fabric (obtain order)   Follow provider orders for dressing changes   Moisturize dry skin  Goal: Prevent/minimize sheer/friction injuries  Outcome: Progressing  Goal: Promote/optimize nutrition  Outcome: Progressing  Flowsheets (Taken 8/13/2025 1124)  Promote/optimize nutrition:   Monitor/record intake including meals   Assist with feeding   Consume > 50% meals/supplements   Offer water/supplements/favorite foods  Goal: Promote skin healing  Outcome: Progressing  Flowsheets (Taken 8/13/2025 1124)  Promote skin healing:   Assess skin/pad under line(s)/device(s)   Protective dressings over bony prominences   Turn/reposition every 2 hours/use positioning/transfer devices   Ensure correct size (line/device) and apply per  instructions   Rotate device position/do not position patient on device
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
## 2025-08-13 NOTE — SIGNIFICANT EVENT
08/13/25 0103   Daily Screen   Total RSBI 69   Weaning Parameters   Weaning Vital Capacity 713 mL   Negative Inspiratory Force (NIF) -36   Spontaneous Minute Volume (MV) 7.8   Weaning Tidal Volume 435 mL   Respiratory Depth/Rhythm Regular  (RR 21)   Weaning Tolerance Fair

## 2025-08-13 NOTE — PROGRESS NOTES
Robotic assisted midcab by Dr. Dwaine Lorenz on 8/12 (los 6d). If no higher loc needed, HHC is required per [facility]. Ins: Medicare A/B.     Vaishnavi Ta (LSW, MSW)

## 2025-08-13 NOTE — CARE PLAN
Problem: Safety - Medical Restraint  Goal: Remains free of injury from restraints (Restraint for Interference with Medical Device)  Outcome: Met  Goal: Free from restraint(s) (Restraint for Interference with Medical Device)  Outcome: Met

## 2025-08-13 NOTE — CONSULTS
Duane Scott is a 75 y.o. year old male patient who presents POD1 after robotic MIDCAB with Dr. Dwaine Lorenz on 8/12/25. PMHx of CAD s/p multiple PCI's, hx of cardiac arrest, DM, HTN, HLD, CVA, CKD, HFpEF. Acute Pain consulted for post op pain recs.    Of note, patient received Plavix this morning on 8/13/25.    Postop Pain Issues -   Palliative: mildly relieved by pain medications  Provocative: with movement  Quality: burning and aching  Severity: patient reports 5/10 pain (reduced from 9/10 pain after lidocaine patch application)  Timing: typically constant    24 hr opioid:  - Oxycodone 10mg and Fentanyl 25mcg since surgery    Medical History[1]     Surgical History[2]     Family History[3]     Social History     Socioeconomic History    Marital status:      Spouse name: Not on file    Number of children: Not on file    Years of education: Not on file    Highest education level: Not on file   Occupational History    Not on file   Tobacco Use    Smoking status: Never     Passive exposure: Never    Smokeless tobacco: Never   Vaping Use    Vaping status: Never Used   Substance and Sexual Activity    Alcohol use: Never    Drug use: Never    Sexual activity: Defer   Other Topics Concern    Not on file   Social History Narrative    Not on file     Social Drivers of Health     Financial Resource Strain: Low Risk  (8/7/2025)    Overall Financial Resource Strain (CARDIA)     Difficulty of Paying Living Expenses: Not very hard   Recent Concern: Financial Resource Strain - High Risk (8/6/2025)    Overall Financial Resource Strain (CARDIA)     Difficulty of Paying Living Expenses: Hard   Food Insecurity: No Food Insecurity (8/6/2025)    Hunger Vital Sign     Worried About Running Out of Food in the Last Year: Never true     Ran Out of Food in the Last Year: Never true   Recent Concern: Food Insecurity - Food Insecurity Present (8/4/2025)    Hunger Vital Sign     Worried About Running Out of Food in the Last Year: Often  true     Ran Out of Food in the Last Year: Sometimes true   Transportation Needs: No Transportation Needs (8/7/2025)    PRAPARE - Transportation     Lack of Transportation (Medical): No     Lack of Transportation (Non-Medical): No   Recent Concern: Transportation Needs - Unmet Transportation Needs (8/6/2025)    PRAPARE - Transportation     Lack of Transportation (Medical): Yes     Lack of Transportation (Non-Medical): No   Physical Activity: Not on file   Stress: Stress Concern Present (8/6/2025)    [employer] - Occupational Stress Questionnaire     Feeling of Stress: To some extent   Social Connections: Not on file   Intimate Partner Violence: Not At Risk (8/6/2025)    Humiliation, Afraid, Rape, and Kick questionnaire     Fear of Current or Ex-Partner: No     Emotionally Abused: No     Physically Abused: No     Sexually Abused: No   Housing Stability: Unknown (8/7/2025)    Housing Stability Vital Sign     Unable to Pay for Housing in the Last Year: No     Number of Times Moved in the Last Year: Not on file     Homeless in the Last Year: No        RX Allergies[4]      Review of Systems  Gen: No fatigue, anorexia, insomnia, fever.   Eyes: No vision loss, double vision, drainage, eye pain.   ENT: No pharyngitis, dry mouth, no hearing changes or ear discharge  Cardiac: No chest pain, palpitations, syncope, near syncope.   Pulmonary: No shortness of breath, cough, hemoptysis.   Heme/lymph: No swollen glands, fever, bleeding.   GI: No abdominal pain, change in bowel habits, melena, hematemesis, hematochezia, nausea, vomiting, diarrhea.   : No discharge, dysuria, frequency, urgency, hematuria.  Endo: No polyuria or weight loss.   Musculoskeletal: Negative for any pain or loss of ROM/weakness  Skin: No rashes or lesions  Neuro: Normal speech, no numbness or weakness. No gait difficulties  Review of systems is otherwise negative unless stated above or in history of present  illness.    Physical Exam:  Constitutional:  no distress, alert and cooperative  Eyes: clear sclera  Head/Neck: No apparent injury, trachea midline  Respiratory/Thorax: Patent airways, thorax symmetric, breathing comfortably  Cardiovascular: no pitting edema  Gastrointestinal: Nondistended  Musculoskeletal: ROM intact  Extremities: no clubbing  Neurological: alert, dueñas x4  Psychological: Appropriate affect    Results for orders placed or performed during the hospital encounter of 08/06/25 (from the past 24 hours)   Blood Gas Arterial Full Panel Unsolicited   Result Value Ref Range    POCT pH, Arterial 7.40 7.38 - 7.42 pH    POCT pCO2, Arterial 40 38 - 42 mm Hg    POCT pO2, Arterial 84 (L) 85 - 95 mm Hg    POCT SO2, Arterial 97 94 - 100 %    POCT Oxy Hemoglobin, Arterial 95.5 94.0 - 98.0 %    POCT Hematocrit Calculated, Arterial 38.0 (L) 41.0 - 52.0 %    POCT Sodium, Arterial 136 136 - 145 mmol/L    POCT Potassium, Arterial 4.2 3.5 - 5.3 mmol/L    POCT Chloride, Arterial 103 98 - 107 mmol/L    POCT Ionized Calcium, Arterial 1.20 1.10 - 1.33 mmol/L    POCT Glucose, Arterial 110 (H) 74 - 99 mg/dL    POCT Lactate, Arterial 0.6 0.4 - 2.0 mmol/L    POCT Base Excess, Arterial 0.0 -2.0 - 3.0 mmol/L    POCT HCO3 Calculated, Arterial 24.8 22.0 - 26.0 mmol/L    POCT Hemoglobin, Arterial 12.6 (L) 13.5 - 17.5 g/dL    POCT Anion Gap, Arterial 12 10 - 25 mmo/L    Patient Temperature 37.0 degrees Celsius    FiO2 21 %   Coox Panel, Arterial Unsolicited   Result Value Ref Range    POCT Hemoglobin, Arterial 12.6 (L) 13.5 - 17.5 g/dL    POCT Oxy Hemoglobin, Arterial 95.5 94.0 - 98.0 %    POCT Carboxyhemoglobin, Arterial 0.7 %    POCT Methemoglobin, Arterial 0.9 0.0 - 1.5 %    POCT Deoxy Hemoglobin, Arterial 3.0 0.0 - 5.0 %   Blood Gas Arterial Full Panel Unsolicited   Result Value Ref Range    POCT pH, Arterial 7.34 (L) 7.38 - 7.42 pH    POCT pCO2, Arterial 46 (H) 38 - 42 mm Hg    POCT pO2, Arterial 203 (H) 85 - 95 mm Hg    POCT SO2,  Arterial 100 94 - 100 %    POCT Oxy Hemoglobin, Arterial 97.6 94.0 - 98.0 %    POCT Hematocrit Calculated, Arterial 34.0 (L) 41.0 - 52.0 %    POCT Sodium, Arterial 134 (L) 136 - 145 mmol/L    POCT Potassium, Arterial 5.1 3.5 - 5.3 mmol/L    POCT Chloride, Arterial 103 98 - 107 mmol/L    POCT Ionized Calcium, Arterial 1.12 1.10 - 1.33 mmol/L    POCT Glucose, Arterial 157 (H) 74 - 99 mg/dL    POCT Lactate, Arterial 0.6 0.4 - 2.0 mmol/L    POCT Base Excess, Arterial -1.2 -2.0 - 3.0 mmol/L    POCT HCO3 Calculated, Arterial 24.8 22.0 - 26.0 mmol/L    POCT Hemoglobin, Arterial 11.4 (L) 13.5 - 17.5 g/dL    POCT Anion Gap, Arterial 11 10 - 25 mmo/L    Patient Temperature 37.0 degrees Celsius    FiO2 100 %   Coox Panel, Arterial Unsolicited   Result Value Ref Range    POCT Hemoglobin, Arterial 11.4 (L) 13.5 - 17.5 g/dL    POCT Oxy Hemoglobin, Arterial 97.6 94.0 - 98.0 %    POCT Carboxyhemoglobin, Arterial 1.6 %    POCT Methemoglobin, Arterial 0.8 0.0 - 1.5 %    POCT Deoxy Hemoglobin, Arterial 0.0 0.0 - 5.0 %   ACTIVATED CLOTTING TIME HIGH   Result Value Ref Range    POCT Activated Clotting Time High Range 383 (H) 82 - 174 sec   Blood Gas Arterial Full Panel Unsolicited   Result Value Ref Range    POCT pH, Arterial 7.34 (L) 7.38 - 7.42 pH    POCT pCO2, Arterial 43 (H) 38 - 42 mm Hg    POCT pO2, Arterial 152 (H) 85 - 95 mm Hg    POCT SO2, Arterial 100 94 - 100 %    POCT Oxy Hemoglobin, Arterial 97.0 94.0 - 98.0 %    POCT Hematocrit Calculated, Arterial 34.0 (L) 41.0 - 52.0 %    POCT Sodium, Arterial 135 (L) 136 - 145 mmol/L    POCT Potassium, Arterial 5.0 3.5 - 5.3 mmol/L    POCT Chloride, Arterial 103 98 - 107 mmol/L    POCT Ionized Calcium, Arterial 1.15 1.10 - 1.33 mmol/L    POCT Glucose, Arterial 144 (H) 74 - 99 mg/dL    POCT Lactate, Arterial 0.6 0.4 - 2.0 mmol/L    POCT Base Excess, Arterial -2.6 (L) -2.0 - 3.0 mmol/L    POCT HCO3 Calculated, Arterial 23.2 22.0 - 26.0 mmol/L    POCT Hemoglobin, Arterial 11.3 (L) 13.5 -  17.5 g/dL    POCT Anion Gap, Arterial 14 10 - 25 mmo/L    Patient Temperature 37.0 degrees Celsius    FiO2 100 %   Coox Panel, Arterial Unsolicited   Result Value Ref Range    POCT Hemoglobin, Arterial 11.3 (L) 13.5 - 17.5 g/dL    POCT Oxy Hemoglobin, Arterial 97.0 94.0 - 98.0 %    POCT Carboxyhemoglobin, Arterial 1.3 %    POCT Methemoglobin, Arterial 1.2 0.0 - 1.5 %    POCT Deoxy Hemoglobin, Arterial 0.4 0.0 - 5.0 %   Blood Gas Arterial Full Panel Unsolicited   Result Value Ref Range    POCT pH, Arterial 7.37 (L) 7.38 - 7.42 pH    POCT pCO2, Arterial 42 38 - 42 mm Hg    POCT pO2, Arterial 191 (H) 85 - 95 mm Hg    POCT SO2, Arterial 100 94 - 100 %    POCT Oxy Hemoglobin, Arterial 97.3 94.0 - 98.0 %    POCT Hematocrit Calculated, Arterial 32.0 (L) 41.0 - 52.0 %    POCT Sodium, Arterial 135 (L) 136 - 145 mmol/L    POCT Potassium, Arterial 4.7 3.5 - 5.3 mmol/L    POCT Chloride, Arterial 104 98 - 107 mmol/L    POCT Ionized Calcium, Arterial 1.12 1.10 - 1.33 mmol/L    POCT Glucose, Arterial 133 (H) 74 - 99 mg/dL    POCT Lactate, Arterial 0.6 0.4 - 2.0 mmol/L    POCT Base Excess, Arterial -1.0 -2.0 - 3.0 mmol/L    POCT HCO3 Calculated, Arterial 24.3 22.0 - 26.0 mmol/L    POCT Hemoglobin, Arterial 10.8 (L) 13.5 - 17.5 g/dL    POCT Anion Gap, Arterial 11 10 - 25 mmo/L    Patient Temperature 37.0 degrees Celsius    FiO2 100 %   Magnesium   Result Value Ref Range    Magnesium 2.11 1.60 - 2.40 mg/dL   Coagulation Screen   Result Value Ref Range    Protime 14.1 (H) 9.8 - 12.4 seconds    INR 1.3 (H) 0.9 - 1.1    aPTT 27 26 - 36 seconds   Fibrinogen   Result Value Ref Range    Fibrinogen 252 200 - 400 mg/dL   CBC   Result Value Ref Range    WBC 8.9 4.4 - 11.3 x10*3/uL    nRBC 0.0 0.0 - 0.0 /100 WBCs    RBC 3.35 (L) 4.50 - 5.90 x10*6/uL    Hemoglobin 10.0 (L) 13.5 - 17.5 g/dL    Hematocrit 29.0 (L) 41.0 - 52.0 %    MCV 87 80 - 100 fL    MCH 29.9 26.0 - 34.0 pg    MCHC 34.5 32.0 - 36.0 g/dL    RDW 14.6 (H) 11.5 - 14.5 %     Platelets 89 (L) 150 - 450 x10*3/uL   Renal Function Panel   Result Value Ref Range    Glucose 155 (H) 74 - 99 mg/dL    Sodium 137 136 - 145 mmol/L    Potassium 4.1 3.5 - 5.3 mmol/L    Chloride 102 98 - 107 mmol/L    Bicarbonate 23 21 - 32 mmol/L    Anion Gap 16 10 - 20 mmol/L    Urea Nitrogen 44 (H) 6 - 23 mg/dL    Creatinine 1.62 (H) 0.50 - 1.30 mg/dL    eGFR 44 (L) >60 mL/min/1.73m*2    Calcium 9.0 8.6 - 10.6 mg/dL    Phosphorus 3.6 2.5 - 4.9 mg/dL    Albumin 3.9 3.4 - 5.0 g/dL   Calcium, Ionized   Result Value Ref Range    POCT Calcium, Ionized 1.20 1.1 - 1.33 mmol/L   Blood Gas Arterial Full Panel   Result Value Ref Range    POCT pH, Arterial 7.38 7.38 - 7.42 pH    POCT pCO2, Arterial 39 38 - 42 mm Hg    POCT pO2, Arterial 182 (H) 85 - 95 mm Hg    POCT SO2, Arterial 99 94 - 100 %    POCT Oxy Hemoglobin, Arterial 96.4 94.0 - 98.0 %    POCT Hematocrit Calculated, Arterial 32.0 (L) 41.0 - 52.0 %    POCT Sodium, Arterial 134 (L) 136 - 145 mmol/L    POCT Potassium, Arterial 4.3 3.5 - 5.3 mmol/L    POCT Chloride, Arterial 105 98 - 107 mmol/L    POCT Ionized Calcium, Arterial 1.20 1.10 - 1.33 mmol/L    POCT Glucose, Arterial 163 (H) 74 - 99 mg/dL    POCT Lactate, Arterial 1.0 0.4 - 2.0 mmol/L    POCT Base Excess, Arterial -1.8 -2.0 - 3.0 mmol/L    POCT HCO3 Calculated, Arterial 23.1 22.0 - 26.0 mmol/L    POCT Hemoglobin, Arterial 10.7 (L) 13.5 - 17.5 g/dL    POCT Anion Gap, Arterial 10 10 - 25 mmo/L    Patient Temperature 37.0 degrees Celsius    FiO2 50 %   Blood Gas Arterial Full Panel   Result Value Ref Range    POCT pH, Arterial 7.41 7.38 - 7.42 pH    POCT pCO2, Arterial 35 (L) 38 - 42 mm Hg    POCT pO2, Arterial 154 (H) 85 - 95 mm Hg    POCT SO2, Arterial 99 94 - 100 %    POCT Oxy Hemoglobin, Arterial 96.1 94.0 - 98.0 %    POCT Hematocrit Calculated, Arterial 31.0 (L) 41.0 - 52.0 %    POCT Sodium, Arterial 134 (L) 136 - 145 mmol/L    POCT Potassium, Arterial 3.8 3.5 - 5.3 mmol/L    POCT Chloride, Arterial 106 98 -  107 mmol/L    POCT Ionized Calcium, Arterial 1.16 1.10 - 1.33 mmol/L    POCT Glucose, Arterial 174 (H) 74 - 99 mg/dL    POCT Lactate, Arterial 1.0 0.4 - 2.0 mmol/L    POCT Base Excess, Arterial -2.0 -2.0 - 3.0 mmol/L    POCT HCO3 Calculated, Arterial 22.2 22.0 - 26.0 mmol/L    POCT Hemoglobin, Arterial 10.2 (L) 13.5 - 17.5 g/dL    POCT Anion Gap, Arterial 10 10 - 25 mmo/L    Patient Temperature 37.0 degrees Celsius    FiO2 40 %   Blood Gas Arterial Full Panel   Result Value Ref Range    POCT pH, Arterial 7.37 (L) 7.38 - 7.42 pH    POCT pCO2, Arterial 44 (H) 38 - 42 mm Hg    POCT pO2, Arterial 156 (H) 85 - 95 mm Hg    POCT SO2, Arterial 100 94 - 100 %    POCT Oxy Hemoglobin, Arterial 97.5 94.0 - 98.0 %    POCT Hematocrit Calculated, Arterial 33.0 (L) 41.0 - 52.0 %    POCT Sodium, Arterial 135 (L) 136 - 145 mmol/L    POCT Potassium, Arterial 4.2 3.5 - 5.3 mmol/L    POCT Chloride, Arterial 104 98 - 107 mmol/L    POCT Ionized Calcium, Arterial 1.20 1.10 - 1.33 mmol/L    POCT Glucose, Arterial 130 (H) 74 - 99 mg/dL    POCT Lactate, Arterial 0.8 0.4 - 2.0 mmol/L    POCT Base Excess, Arterial -0.1 -2.0 - 3.0 mmol/L    POCT HCO3 Calculated, Arterial 25.4 22.0 - 26.0 mmol/L    POCT Hemoglobin, Arterial 11.1 (L) 13.5 - 17.5 g/dL    POCT Anion Gap, Arterial 10 10 - 25 mmo/L    Patient Temperature 37.0 degrees Celsius    FiO2 40 %   CBC   Result Value Ref Range    WBC 6.9 4.4 - 11.3 x10*3/uL    nRBC 0.0 0.0 - 0.0 /100 WBCs    RBC 3.50 (L) 4.50 - 5.90 x10*6/uL    Hemoglobin 10.5 (L) 13.5 - 17.5 g/dL    Hematocrit 29.8 (L) 41.0 - 52.0 %    MCV 85 80 - 100 fL    MCH 30.0 26.0 - 34.0 pg    MCHC 35.2 32.0 - 36.0 g/dL    RDW 14.5 11.5 - 14.5 %    Platelets 88 (L) 150 - 450 x10*3/uL   Calcium, Ionized   Result Value Ref Range    POCT Calcium, Ionized 1.19 1.1 - 1.33 mmol/L   Magnesium   Result Value Ref Range    Magnesium 2.08 1.60 - 2.40 mg/dL   Renal Function Panel   Result Value Ref Range    Glucose 127 (H) 74 - 99 mg/dL    Sodium  136 136 - 145 mmol/L    Potassium 4.0 3.5 - 5.3 mmol/L    Chloride 101 98 - 107 mmol/L    Bicarbonate 25 21 - 32 mmol/L    Anion Gap 14 10 - 20 mmol/L    Urea Nitrogen 41 (H) 6 - 23 mg/dL    Creatinine 1.61 (H) 0.50 - 1.30 mg/dL    eGFR 45 (L) >60 mL/min/1.73m*2    Calcium 9.0 8.6 - 10.6 mg/dL    Phosphorus 3.7 2.5 - 4.9 mg/dL    Albumin 4.1 3.4 - 5.0 g/dL   Blood Gas Arterial Full Panel   Result Value Ref Range    POCT pH, Arterial 7.39 7.38 - 7.42 pH    POCT pCO2, Arterial 39 38 - 42 mm Hg    POCT pO2, Arterial 160 (H) 85 - 95 mm Hg    POCT SO2, Arterial 99 94 - 100 %    POCT Oxy Hemoglobin, Arterial 96.9 94.0 - 98.0 %    POCT Hematocrit Calculated, Arterial 33.0 (L) 41.0 - 52.0 %    POCT Sodium, Arterial 134 (L) 136 - 145 mmol/L    POCT Potassium, Arterial 4.3 3.5 - 5.3 mmol/L    POCT Chloride, Arterial 104 98 - 107 mmol/L    POCT Ionized Calcium, Arterial 1.20 1.10 - 1.33 mmol/L    POCT Glucose, Arterial 143 (H) 74 - 99 mg/dL    POCT Lactate, Arterial 0.6 0.4 - 2.0 mmol/L    POCT Base Excess, Arterial -1.2 -2.0 - 3.0 mmol/L    POCT HCO3 Calculated, Arterial 23.6 22.0 - 26.0 mmol/L    POCT Hemoglobin, Arterial 11.0 (L) 13.5 - 17.5 g/dL    POCT Anion Gap, Arterial 11 10 - 25 mmo/L    Patient Temperature 37.0 degrees Celsius    FiO2 36 %   POCT GLUCOSE   Result Value Ref Range    POCT Glucose 135 (H) 74 - 99 mg/dL   POCT GLUCOSE   Result Value Ref Range    POCT Glucose 271 (H) 74 - 99 mg/dL   POCT GLUCOSE   Result Value Ref Range    POCT Glucose 219 (H) 74 - 99 mg/dL        Duane Scott is a 75 y.o. year old male patient who presents POD1 after robotic MIDCAB with Dr. Dwaine Lorenz on 8/12/25. PMHx of CAD s/p multiple PCI's, hx of cardiac arrest, DM, HTN, HLD, CVA, CKD, HFpEF. Acute Pain consulted for multimodal pain recs    Plan:  - No regional block given that patient received Plavix this morning  - Medication recommendations listed below:   - Continue Scheduled tylenol 650mg q6h   - Consider Toradol 15mg q8h for  24hr if appropriate per primary team   - Continue Robaxin 500mg q8h   - Gabapentin 300mg at bedtime   - Lidocaine patch daily - may put 2 patches to cover surgical sites. Do not cover dressing.   - PRN Oxycodone for breakthrough pain  - Rest of care per primary team  - Acute pain team will sign off    Acute Pain Team  pg [phone] ph [phone].          [1]   Past Medical History:  Diagnosis Date    Arthritis     Asthma     CAD (coronary artery disease)     s/p stent placement    Cardiac arrest 11/17/2023    Chronic kidney disease     Chronic obstructive pulmonary disease requiring drug therapy (Multi) 11/17/2023    Coronary artery disease     Diabetes mellitus, type 2 (Multi) 11/08/2018    Diastolic heart failure 11/17/2023    Gout     Hyperlipidemia     Migraine headache     Myocardial infarction (Multi)     Status post coronary artery stent placement 11/14/2023   [2]   Past Surgical History:  Procedure Laterality Date    BACK SURGERY  2012    CARDIAC CATHETERIZATION N/A 03/11/2025    Procedure: Left Heart Cath;  Surgeon: Jessika Spencer MD;  Location: [facility] Cardiac Cath Lab;  Service: Cardiovascular;  Laterality: N/A;  no pre auth required    CARDIAC CATHETERIZATION N/A 8/6/2025    Procedure: Left Heart Catheterization with Coronaries and Left Ventriculography;  Surgeon: Luis Eduardo Carrillo MD;  Location: [facility] Cardiac Cath Lab;  Service: Cardiovascular;  Laterality: N/A;    CARDIAC CATHETERIZATION N/A 8/6/2025    Procedure: PCI Angioplasty Additional Branch;  Surgeon: Luis Eduardo Carrillo MD;  Location: [facility] Cardiac Cath Lab;  Service: Cardiovascular;  Laterality: N/A;    HERNIA REPAIR  2015    HERNIA REPAIR  2013   [3]   Family History  Problem Relation Name Age of Onset    Heart disease Mother      Stroke Son      Autism Son     [4]   Allergies  Allergen Reactions    Hydromorphone Anaphylaxis    Latex Hives and Rash     Gets ill    Penicillins Hives    Dyphylline-Guaifenesin Swelling    Grass Pollen Other     Makes him sick    Mold  Other     Gets sick    Ragweed Other     Affects asthma

## 2025-08-14 ENCOUNTER — APPOINTMENT (OUTPATIENT)
Dept: RADIOLOGY | Facility: HOSPITAL | Age: 75
DRG: 236 | End: 2025-08-14
Payer: MEDICARE

## 2025-08-14 LAB
ALBUMIN SERPL BCP-MCNC: 4 G/DL (ref 3.4–5)
ANION GAP SERPL CALC-SCNC: 14 MMOL/L (ref 10–20)
BUN SERPL-MCNC: 49 MG/DL (ref 6–23)
CALCIUM SERPL-MCNC: 9 MG/DL (ref 8.6–10.6)
CHLORIDE SERPL-SCNC: 100 MMOL/L (ref 98–107)
CO2 SERPL-SCNC: 22 MMOL/L (ref 21–32)
CREAT SERPL-MCNC: 1.91 MG/DL (ref 0.5–1.3)
EGFRCR SERPLBLD CKD-EPI 2021: 36 ML/MIN/1.73M*2
ERYTHROCYTE [DISTWIDTH] IN BLOOD BY AUTOMATED COUNT: 15.7 % (ref 11.5–14.5)
GLUCOSE BLD MANUAL STRIP-MCNC: 143 MG/DL (ref 74–99)
GLUCOSE BLD MANUAL STRIP-MCNC: 177 MG/DL (ref 74–99)
GLUCOSE BLD MANUAL STRIP-MCNC: 186 MG/DL (ref 74–99)
GLUCOSE BLD MANUAL STRIP-MCNC: 262 MG/DL (ref 74–99)
GLUCOSE SERPL-MCNC: 178 MG/DL (ref 74–99)
HCT VFR BLD AUTO: 36.9 % (ref 41–52)
HGB BLD-MCNC: 12.1 G/DL (ref 13.5–17.5)
MAGNESIUM SERPL-MCNC: 2.45 MG/DL (ref 1.6–2.4)
MCH RBC QN AUTO: 29.9 PG (ref 26–34)
MCHC RBC AUTO-ENTMCNC: 32.8 G/DL (ref 32–36)
MCV RBC AUTO: 91 FL (ref 80–100)
NRBC BLD-RTO: 0 /100 WBCS (ref 0–0)
PHOSPHATE SERPL-MCNC: 3.6 MG/DL (ref 2.5–4.9)
PLATELET # BLD AUTO: 112 X10*3/UL (ref 150–450)
POTASSIUM SERPL-SCNC: 4.4 MMOL/L (ref 3.5–5.3)
RBC # BLD AUTO: 4.05 X10*6/UL (ref 4.5–5.9)
SODIUM SERPL-SCNC: 132 MMOL/L (ref 136–145)
WBC # BLD AUTO: 12.8 X10*3/UL (ref 4.4–11.3)

## 2025-08-14 PROCEDURE — 99233 SBSQ HOSP IP/OBS HIGH 50: CPT | Performed by: INTERNAL MEDICINE

## 2025-08-14 PROCEDURE — 97110 THERAPEUTIC EXERCISES: CPT | Mod: GP,CQ

## 2025-08-14 PROCEDURE — 83735 ASSAY OF MAGNESIUM: CPT | Performed by: INTERNAL MEDICINE

## 2025-08-14 PROCEDURE — 2500000004 HC RX 250 GENERAL PHARMACY W/ HCPCS (ALT 636 FOR OP/ED): Performed by: INTERNAL MEDICINE

## 2025-08-14 PROCEDURE — 1200000002 HC GENERAL ROOM WITH TELEMETRY DAILY

## 2025-08-14 PROCEDURE — 80069 RENAL FUNCTION PANEL: CPT | Performed by: INTERNAL MEDICINE

## 2025-08-14 PROCEDURE — 85027 COMPLETE CBC AUTOMATED: CPT | Performed by: INTERNAL MEDICINE

## 2025-08-14 PROCEDURE — 2500000002 HC RX 250 W HCPCS SELF ADMINISTERED DRUGS (ALT 637 FOR MEDICARE OP, ALT 636 FOR OP/ED): Performed by: INTERNAL MEDICINE

## 2025-08-14 PROCEDURE — 2500000001 HC RX 250 WO HCPCS SELF ADMINISTERED DRUGS (ALT 637 FOR MEDICARE OP): Performed by: INTERNAL MEDICINE

## 2025-08-14 PROCEDURE — 97530 THERAPEUTIC ACTIVITIES: CPT | Mod: GP,CQ

## 2025-08-14 PROCEDURE — 36415 COLL VENOUS BLD VENIPUNCTURE: CPT | Performed by: INTERNAL MEDICINE

## 2025-08-14 PROCEDURE — 71045 X-RAY EXAM CHEST 1 VIEW: CPT

## 2025-08-14 PROCEDURE — 82947 ASSAY GLUCOSE BLOOD QUANT: CPT

## 2025-08-14 PROCEDURE — 2500000004 HC RX 250 GENERAL PHARMACY W/ HCPCS (ALT 636 FOR OP/ED)

## 2025-08-14 PROCEDURE — 97165 OT EVAL LOW COMPLEX 30 MIN: CPT | Mod: GO

## 2025-08-14 PROCEDURE — 71045 X-RAY EXAM CHEST 1 VIEW: CPT | Performed by: RADIOLOGY

## 2025-08-14 RX ORDER — ADHESIVE BANDAGE
30 BANDAGE TOPICAL ONCE
Status: COMPLETED | OUTPATIENT
Start: 2025-08-14 | End: 2025-08-14

## 2025-08-14 RX ORDER — CARVEDILOL 12.5 MG/1
12.5 TABLET ORAL 2 TIMES DAILY
Status: DISCONTINUED | OUTPATIENT
Start: 2025-08-14 | End: 2025-08-16

## 2025-08-14 RX ORDER — BISACODYL 10 MG/1
10 SUPPOSITORY RECTAL ONCE
Status: COMPLETED | OUTPATIENT
Start: 2025-08-14 | End: 2025-08-14

## 2025-08-14 RX ADMIN — HEPARIN SODIUM 5000 UNITS: 5000 INJECTION INTRAVENOUS; SUBCUTANEOUS at 00:17

## 2025-08-14 RX ADMIN — ACETAMINOPHEN 650 MG: 325 TABLET ORAL at 18:41

## 2025-08-14 RX ADMIN — METHOCARBAMOL TABLETS 500 MG: 500 TABLET, COATED ORAL at 05:24

## 2025-08-14 RX ADMIN — CARVEDILOL 12.5 MG: 12.5 TABLET, FILM COATED ORAL at 20:29

## 2025-08-14 RX ADMIN — HEPARIN SODIUM 5000 UNITS: 5000 INJECTION INTRAVENOUS; SUBCUTANEOUS at 10:11

## 2025-08-14 RX ADMIN — ASPIRIN 81 MG CHEWABLE TABLET 81 MG: 81 TABLET CHEWABLE at 10:11

## 2025-08-14 RX ADMIN — POLYETHYLENE GLYCOL 3350 17 G: 17 POWDER, FOR SOLUTION ORAL at 20:29

## 2025-08-14 RX ADMIN — ACETAMINOPHEN 650 MG: 325 TABLET ORAL at 05:24

## 2025-08-14 RX ADMIN — OXYCODONE HYDROCHLORIDE 10 MG: 10 TABLET ORAL at 00:17

## 2025-08-14 RX ADMIN — ATORVASTATIN CALCIUM 40 MG: 40 TABLET, FILM COATED ORAL at 20:29

## 2025-08-14 RX ADMIN — METHOCARBAMOL TABLETS 500 MG: 500 TABLET, COATED ORAL at 13:44

## 2025-08-14 RX ADMIN — CEFAZOLIN SODIUM 2 G: 2 INJECTION, SOLUTION INTRAVENOUS at 10:11

## 2025-08-14 RX ADMIN — BISACODYL 10 MG: 10 SUPPOSITORY RECTAL at 10:12

## 2025-08-14 RX ADMIN — NITROFURANTOIN (MONOHYDRATE/MACROCRYSTALS) 100 MG: 25; 75 CAPSULE ORAL at 10:10

## 2025-08-14 RX ADMIN — CLOPIDOGREL BISULFATE 75 MG: 75 TABLET, FILM COATED ORAL at 10:11

## 2025-08-14 RX ADMIN — SENNOSIDES, DOCUSATE SODIUM 2 TABLET: 50; 8.6 TABLET, FILM COATED ORAL at 10:11

## 2025-08-14 RX ADMIN — NITROFURANTOIN (MONOHYDRATE/MACROCRYSTALS) 100 MG: 25; 75 CAPSULE ORAL at 00:17

## 2025-08-14 RX ADMIN — COLCHICINE 0.6 MG: 0.6 TABLET, FILM COATED ORAL at 10:10

## 2025-08-14 RX ADMIN — ACETAMINOPHEN 650 MG: 325 TABLET ORAL at 10:53

## 2025-08-14 RX ADMIN — CEFAZOLIN SODIUM 2 G: 2 INJECTION, SOLUTION INTRAVENOUS at 03:43

## 2025-08-14 RX ADMIN — FINASTERIDE 5 MG: 5 TABLET, FILM COATED ORAL at 10:11

## 2025-08-14 RX ADMIN — INSULIN LISPRO 6 UNITS: 100 INJECTION, SOLUTION INTRAVENOUS; SUBCUTANEOUS at 13:44

## 2025-08-14 RX ADMIN — OXYCODONE HYDROCHLORIDE 10 MG: 10 TABLET ORAL at 10:51

## 2025-08-14 RX ADMIN — COLCHICINE 0.6 MG: 0.6 TABLET, FILM COATED ORAL at 20:29

## 2025-08-14 RX ADMIN — NITROFURANTOIN (MONOHYDRATE/MACROCRYSTALS) 100 MG: 25; 75 CAPSULE ORAL at 21:45

## 2025-08-14 RX ADMIN — INSULIN LISPRO 2 UNITS: 100 INJECTION, SOLUTION INTRAVENOUS; SUBCUTANEOUS at 10:12

## 2025-08-14 RX ADMIN — INSULIN LISPRO 10 UNITS: 100 INJECTION, SOLUTION INTRAVENOUS; SUBCUTANEOUS at 18:41

## 2025-08-14 RX ADMIN — OXYCODONE HYDROCHLORIDE 10 MG: 10 TABLET ORAL at 20:36

## 2025-08-14 RX ADMIN — TAMSULOSIN HYDROCHLORIDE 0.4 MG: 0.4 CAPSULE ORAL at 10:10

## 2025-08-14 RX ADMIN — METHOCARBAMOL TABLETS 500 MG: 500 TABLET, COATED ORAL at 22:38

## 2025-08-14 RX ADMIN — CARVEDILOL 6.25 MG: 3.12 TABLET, FILM COATED ORAL at 10:11

## 2025-08-14 RX ADMIN — POLYETHYLENE GLYCOL 3350 17 G: 17 POWDER, FOR SOLUTION ORAL at 10:12

## 2025-08-14 RX ADMIN — ONDANSETRON 4 MG: 2 INJECTION INTRAMUSCULAR; INTRAVENOUS at 10:11

## 2025-08-14 RX ADMIN — ACETAMINOPHEN 650 MG: 325 TABLET ORAL at 00:17

## 2025-08-14 RX ADMIN — HEPARIN SODIUM 5000 UNITS: 5000 INJECTION INTRAVENOUS; SUBCUTANEOUS at 18:41

## 2025-08-14 RX ADMIN — MAGNESIUM HYDROXIDE 30 ML: 400 SUSPENSION ORAL at 10:11

## 2025-08-14 RX ADMIN — SENNOSIDES, DOCUSATE SODIUM 2 TABLET: 50; 8.6 TABLET, FILM COATED ORAL at 20:29

## 2025-08-14 ASSESSMENT — ACTIVITIES OF DAILY LIVING (ADL)
ADL_ASSISTANCE: INDEPENDENT
BATHING_ASSISTANCE: MODERATE

## 2025-08-14 ASSESSMENT — COGNITIVE AND FUNCTIONAL STATUS - GENERAL
MOVING FROM LYING ON BACK TO SITTING ON SIDE OF FLAT BED WITH BEDRAILS: A LITTLE
DAILY ACTIVITIY SCORE: 16
WALKING IN HOSPITAL ROOM: A LITTLE
TOILETING: A LOT
STANDING UP FROM CHAIR USING ARMS: A LITTLE
CLIMB 3 TO 5 STEPS WITH RAILING: A LOT
PERSONAL GROOMING: A LITTLE
WALKING IN HOSPITAL ROOM: A LITTLE
DRESSING REGULAR UPPER BODY CLOTHING: A LITTLE
MOBILITY SCORE: 17
TURNING FROM BACK TO SIDE WHILE IN FLAT BAD: A LOT
MOBILITY SCORE: 15
STANDING UP FROM CHAIR USING ARMS: A LITTLE
MOVING FROM LYING ON BACK TO SITTING ON SIDE OF FLAT BED WITH BEDRAILS: A LITTLE
TURNING FROM BACK TO SIDE WHILE IN FLAT BAD: A LITTLE
MOVING TO AND FROM BED TO CHAIR: A LITTLE
MOVING TO AND FROM BED TO CHAIR: A LITTLE
CLIMB 3 TO 5 STEPS WITH RAILING: TOTAL
DRESSING REGULAR LOWER BODY CLOTHING: A LOT
HELP NEEDED FOR BATHING: A LOT

## 2025-08-14 ASSESSMENT — PAIN - FUNCTIONAL ASSESSMENT
PAIN_FUNCTIONAL_ASSESSMENT: 0-10

## 2025-08-14 ASSESSMENT — PAIN DESCRIPTION - LOCATION: LOCATION: CHEST

## 2025-08-14 ASSESSMENT — PAIN SCALES - GENERAL
PAINLEVEL_OUTOF10: 4
PAINLEVEL_OUTOF10: 8
PAINLEVEL_OUTOF10: 4
PAINLEVEL_OUTOF10: 8
PAINLEVEL_OUTOF10: 8
PAINLEVEL_OUTOF10: 6

## 2025-08-14 ASSESSMENT — PAIN DESCRIPTION - DESCRIPTORS
DESCRIPTORS: ACHING
DESCRIPTORS: ACHING
DESCRIPTORS: ACHING;SHARP
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
## 2025-08-14 NOTE — CARE PLAN
The patient's goals for the shift include rest    The clinical goals for the shift include rest    Problem: Pain - Adult  Goal: Verbalizes/displays adequate comfort level or baseline comfort level  Outcome: Progressing     Problem: Discharge Planning  Goal: Discharge to home or other facility with appropriate resources  Outcome: Progressing     Problem: Chronic Conditions and Co-morbidities  Goal: Patient's chronic conditions and co-morbidity symptoms are monitored and maintained or improved  Outcome: Progressing     Problem: Nutrition  Goal: Nutrient intake appropriate for maintaining nutritional needs  Outcome: Progressing     Problem: Fall/Injury  Goal: Not fall by end of shift  Outcome: Progressing  Goal: Be free from injury by end of the shift  Outcome: Progressing  Goal: Verbalize understanding of personal risk factors for fall in the hospital  Outcome: Progressing  Goal: Verbalize understanding of risk factor reduction measures to prevent injury from fall in the home  Outcome: Progressing  Goal: Use assistive devices by end of the shift  Outcome: Progressing  Goal: Pace activities to prevent fatigue by end of the shift  Outcome: Progressing     Problem: Heart Failure  Goal: Improved gas exchange this shift  Outcome: Progressing  Goal: Improved urinary output this shift  Outcome: Progressing  Goal: Reduction in peripheral edema within 24 hours  Outcome: Progressing  Goal: Report improvement of dyspnea/breathlessness this shift  Outcome: Progressing  Goal: Weight from fluid excess reduced over 2-3 days, then stabilize  Outcome: Progressing  Goal: Increase self care and/or family involvement in 24 hours  Outcome: Progressing     Problem: Skin  Goal: Decreased wound size/increased tissue granulation at next dressing change  Outcome: Progressing  Goal: Participates in plan/prevention/treatment measures  Outcome: Progressing  Goal: Prevent/manage excess moisture  Outcome: Progressing  Goal: Prevent/minimize  sheer/friction injuries  Outcome: Progressing  Goal: Promote/optimize nutrition  Outcome: Progressing  Goal: Promote skin healing  Outcome: Progressing

## 2025-08-14 NOTE — PROGRESS NOTES
Physical Therapy    Physical Therapy Treatment    Patient Name: Duane Scott  MRN: [id number]  Department: [facility] [place] [id number]  Room: [address] [place] [address]  Today's Date: 8/14/2025  Time Calculation  Start Time: 1416  Stop Time: 1439  Time Calculation (min): 23 min         Assessment/Plan   PT Assessment  PT Assessment Results: Decreased strength, Decreased endurance, Impaired balance, Decreased mobility, Pain, Decreased safety awareness  Rehab Prognosis: Good  Barriers to Discharge Home: Physical needs, Caregiver assistance  Caregiver Assistance: Caregiver assistance needed per identified barriers - however, level of patient's required assistance exceeds assistance available at home  Physical Needs: Stair navigation into home limited by function/safety, Ambulating household distances limited by function/safety, Stair navigation to access bath limited by function/safety, Intermittent mobility assistance needed, Intermittent ADL assistance needed, High falls risk due to function or environment  Evaluation/Treatment Tolerance: Patient tolerated treatment well  Medical Staff Made Aware: Yes  End of Session Communication: Bedside nurse  Assessment Comment: Pt progressing with therapy, able to transfer with Conrado amb 20' with CGA. Limited functional endurance and strength. Pt remains appropriate for mod intensity therapy  End of Session Patient Position: Up in chair, Alarm off, not on at start of session     PT Plan  Treatment/Interventions: Bed mobility, Transfer training, Gait training, Stair training, Balance training, Strengthening, Endurance training, Therapeutic exercise, Therapeutic activity, Home exercise program  PT Plan: Ongoing PT  PT Frequency for Current Admission: 4 times per week during this acute inpatient hospitalization  PT Discharge Recommendations: Moderate intensity level of continued care, Based on current functional status and rehab potential, patient is anticipated to tolerate and benefit from 5 or more days per week  of skilled rehabilitative therapy after discharge from this acute inpatient hospitalization  Equipment Recommended upon Discharge: Wheeled walker  PT Recommended Transfer Status: Assist x1, Assistive device  PT - OK to Discharge: Yes    PT Visit Info:  PT Received On: 08/14/25  Response to Previous Treatment: Patient with no complaints from previous session.     General Visit Information:   General  Prior to Session Communication: Bedside nurse  Patient Position Received: Up in chair, Alarm off, not on at start of session  General Comment: Pt up in chair, agreeable to therapy  Per handoff with RN, pt is appropriate for therapy, vitals are stable and pain is controlled. Other concerns prior to tx are: none    Subjective   Precautions:  Precautions  Medical Precautions: Fall precautions, Cardiac precautions, Chest tube     Date/Time Vitals Session Patient Position Pulse Resp SpO2 BP MAP (mmHg)    08/14/25 1532 --  --  77  18  97 %  118/64  82      Objective   Pain:  Pain Assessment  Pain Assessment: 0-10  0-10 (Numeric) Pain Score: 4  Pain Type: Surgical pain  Pain Location: Chest  Pain Interventions: Distraction, Emotional support, Cold pack  Cognition:  Cognition  Overall Cognitive Status: Within Functional Limits  Orientation Level: Oriented X4    Treatments:  Therapeutic Exercise  Therapeutic Exercise Performed: Yes  Therapeutic Exercise Activity 1: Seated AP, LAQs, hip flex 2x10/LE, IS 10x ~500mL    Ambulation/Gait Training  Ambulation/Gait Training Performed: Yes  Ambulation/Gait Training 1  Surface 1: Level tile  Device 1: Rolling walker  Assistance 1: Contact guard  Quality of Gait 1: Wide base of support, Diminished heel strike, Decreased step length, Forward flexed posture, Soft knee(s)  Comments/Distance (ft) 1: 20'x1  Transfers  Transfer: Yes  Transfer 1  Transfer From 1: Sit to, Stand to  Transfer to 1: Sit  Technique 1: Sit to stand, Stand to sit  Transfer Device 1: Walker  Transfer Level of Assistance  1: Minimum assistance  Trials/Comments 1: 4x    Outcome Measures:     [facility] Basic Mobility  Turning from your back to your side while in a flat bed without using bedrails: A little  Moving from lying on your back to sitting on the side of a flat bed without using bedrails: A lot  Moving to and from bed to chair (including a wheelchair): A little  Standing up from a chair using your arms (e.g. wheelchair or bedside chair): A little  To walk in hospital room: A little  Climbing 3-5 steps with railing: Total  Basic Mobility - Total Score: 15    Education Documentation  Precautions, taught by Kim De La O PTA at 8/14/2025  3:40 PM.  Learner: Patient  Readiness: Acceptance  Method: Explanation, Demonstration  Response: Verbalizes Understanding, Demonstrated Understanding  Comment: HEP, precautions, safe mobility    Body Mechanics, taught by Kim De La O PTA at 8/14/2025  3:40 PM.  Learner: Patient  Readiness: Acceptance  Method: Explanation, Demonstration  Response: Verbalizes Understanding, Demonstrated Understanding  Comment: HEP, precautions, safe mobility    Home Exercise Program, taught by Kim De La O PTA at 8/14/2025  3:40 PM.  Learner: Patient  Readiness: Acceptance  Method: Explanation, Demonstration  Response: Verbalizes Understanding, Demonstrated Understanding  Comment: HEP, precautions, safe mobility    Mobility Training, taught by Kim De La O PTA at 8/14/2025  3:40 PM.  Learner: Patient  Readiness: Acceptance  Method: Explanation, Demonstration  Response: Verbalizes Understanding, Demonstrated Understanding  Comment: HEP, precautions, safe mobility    Education Comments  No comments found.        OP EDUCATION:       Encounter Problems       Encounter Problems (Active)       Balance       Patient to demo static standing with unilateral UE support, performing single UE task with SBA, no sway or LOB x 2 mins for functional carryover  (Progressing)       Start:  08/13/25    Expected End:   08/27/25            Pt will complete TUG in </=14 seconds with LRAD (Progressing)       Start:  08/13/25    Expected End:  08/27/25               Mobility       STG - Patient will ambulate >/= 100 ft Sami with LRAD and no acute LOB (Progressing)       Start:  08/13/25    Expected End:  08/27/25            Patient to ascend/descend >/= 2 stairs without HR and SBA to demo ability to safely traverse ROBERTO CARLOS and within home (Progressing)       Start:  08/13/25    Expected End:  08/27/25            Pt. will tolerate >/= 20 minutes of OOB mobility without seated rest break and VSS to demo improved activity tolerance/endurance.  (Progressing)       Start:  08/13/25    Expected End:  08/27/25               PT Transfers       STG - Patient will perform bed mobility IND (Progressing)       Start:  08/13/25    Expected End:  08/27/25            STG - Patient will transfer sit to and from stand Sami with LRAD (Progressing)       Start:  08/13/25    Expected End:  08/27/25                 ELIANA Robbins
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
## 2025-08-14 NOTE — PROGRESS NOTES
Met with patient and introduced myself as Care Coordinator and member of the discharge planning team.  Patient is planning to discharge to a rehab facility. He did not have a specific place. He referred me to his wife. I spoke with her by phone. She said he had been to [facility] in the past but she did not have a choice of facility and she did not want a list to review. She allowed referrals to be placed. I will follow up with her regarding accepting facilities. Care Coordinator will continue to follow for home going needs.  Lindsey York RN  Add: Spoke to patient's wife regarding accepting facilities. She selected [facility].

## 2025-08-15 ENCOUNTER — APPOINTMENT (OUTPATIENT)
Dept: CARDIOLOGY | Facility: HOSPITAL | Age: 75
DRG: 236 | End: 2025-08-15
Payer: MEDICARE

## 2025-08-15 ENCOUNTER — APPOINTMENT (OUTPATIENT)
Dept: RADIOLOGY | Facility: HOSPITAL | Age: 75
DRG: 236 | End: 2025-08-15
Payer: MEDICARE

## 2025-08-15 LAB
ALBUMIN SERPL BCP-MCNC: 3.9 G/DL (ref 3.4–5)
ANION GAP SERPL CALC-SCNC: 12 MMOL/L (ref 10–20)
ATRIAL RATE: 61 BPM
BUN SERPL-MCNC: 52 MG/DL (ref 6–23)
CALCIUM SERPL-MCNC: 9.1 MG/DL (ref 8.6–10.6)
CHLORIDE SERPL-SCNC: 97 MMOL/L (ref 98–107)
CO2 SERPL-SCNC: 26 MMOL/L (ref 21–32)
CREAT SERPL-MCNC: 1.66 MG/DL (ref 0.5–1.3)
EGFRCR SERPLBLD CKD-EPI 2021: 43 ML/MIN/1.73M*2
ERYTHROCYTE [DISTWIDTH] IN BLOOD BY AUTOMATED COUNT: 15.4 % (ref 11.5–14.5)
GLUCOSE BLD MANUAL STRIP-MCNC: 208 MG/DL (ref 74–99)
GLUCOSE BLD MANUAL STRIP-MCNC: 221 MG/DL (ref 74–99)
GLUCOSE BLD MANUAL STRIP-MCNC: 226 MG/DL (ref 74–99)
GLUCOSE BLD MANUAL STRIP-MCNC: 253 MG/DL (ref 74–99)
GLUCOSE SERPL-MCNC: 225 MG/DL (ref 74–99)
HCT VFR BLD AUTO: 33.4 % (ref 41–52)
HGB BLD-MCNC: 10.7 G/DL (ref 13.5–17.5)
MAGNESIUM SERPL-MCNC: 2.53 MG/DL (ref 1.6–2.4)
MCH RBC QN AUTO: 29.7 PG (ref 26–34)
MCHC RBC AUTO-ENTMCNC: 32 G/DL (ref 32–36)
MCV RBC AUTO: 93 FL (ref 80–100)
NRBC BLD-RTO: 0 /100 WBCS (ref 0–0)
P AXIS: 88 DEGREES
P OFFSET: 170 MS
P ONSET: 131 MS
PHOSPHATE SERPL-MCNC: 2.4 MG/DL (ref 2.5–4.9)
PLATELET # BLD AUTO: 97 X10*3/UL (ref 150–450)
POTASSIUM SERPL-SCNC: 4.4 MMOL/L (ref 3.5–5.3)
PR INTERVAL: 172 MS
Q ONSET: 217 MS
QRS COUNT: 10 BEATS
QRS DURATION: 108 MS
QT INTERVAL: 504 MS
QTC CALCULATION(BAZETT): 507 MS
QTC FREDERICIA: 506 MS
R AXIS: -19 DEGREES
RBC # BLD AUTO: 3.6 X10*6/UL (ref 4.5–5.9)
SODIUM SERPL-SCNC: 131 MMOL/L (ref 136–145)
T AXIS: 35 DEGREES
T OFFSET: 469 MS
VENTRICULAR RATE: 61 BPM
WBC # BLD AUTO: 10 X10*3/UL (ref 4.4–11.3)

## 2025-08-15 PROCEDURE — 83735 ASSAY OF MAGNESIUM: CPT | Performed by: INTERNAL MEDICINE

## 2025-08-15 PROCEDURE — 93010 ELECTROCARDIOGRAM REPORT: CPT | Performed by: INTERNAL MEDICINE

## 2025-08-15 PROCEDURE — 93005 ELECTROCARDIOGRAM TRACING: CPT

## 2025-08-15 PROCEDURE — 2500000001 HC RX 250 WO HCPCS SELF ADMINISTERED DRUGS (ALT 637 FOR MEDICARE OP): Performed by: INTERNAL MEDICINE

## 2025-08-15 PROCEDURE — 85027 COMPLETE CBC AUTOMATED: CPT | Performed by: INTERNAL MEDICINE

## 2025-08-15 PROCEDURE — 2500000002 HC RX 250 W HCPCS SELF ADMINISTERED DRUGS (ALT 637 FOR MEDICARE OP, ALT 636 FOR OP/ED): Performed by: INTERNAL MEDICINE

## 2025-08-15 PROCEDURE — 2500000004 HC RX 250 GENERAL PHARMACY W/ HCPCS (ALT 636 FOR OP/ED): Performed by: INTERNAL MEDICINE

## 2025-08-15 PROCEDURE — 80069 RENAL FUNCTION PANEL: CPT | Performed by: INTERNAL MEDICINE

## 2025-08-15 PROCEDURE — 1200000002 HC GENERAL ROOM WITH TELEMETRY DAILY

## 2025-08-15 PROCEDURE — 36415 COLL VENOUS BLD VENIPUNCTURE: CPT | Performed by: INTERNAL MEDICINE

## 2025-08-15 PROCEDURE — 82947 ASSAY GLUCOSE BLOOD QUANT: CPT

## 2025-08-15 PROCEDURE — 71045 X-RAY EXAM CHEST 1 VIEW: CPT

## 2025-08-15 PROCEDURE — 99233 SBSQ HOSP IP/OBS HIGH 50: CPT | Performed by: INTERNAL MEDICINE

## 2025-08-15 RX ORDER — FUROSEMIDE 10 MG/ML
40 INJECTION INTRAMUSCULAR; INTRAVENOUS ONCE
Status: COMPLETED | OUTPATIENT
Start: 2025-08-15 | End: 2025-08-15

## 2025-08-15 RX ORDER — SCOPOLAMINE 1 MG/3D
1 PATCH, EXTENDED RELEASE TRANSDERMAL
Status: DISCONTINUED | OUTPATIENT
Start: 2025-08-15 | End: 2025-08-16

## 2025-08-15 RX ORDER — SODIUM,POTASSIUM PHOSPHATES 280-250MG
1 POWDER IN PACKET (EA) ORAL 4 TIMES DAILY
Status: DISPENSED | OUTPATIENT
Start: 2025-08-15 | End: 2025-08-15

## 2025-08-15 RX ADMIN — COLCHICINE 0.6 MG: 0.6 TABLET, FILM COATED ORAL at 08:24

## 2025-08-15 RX ADMIN — OXYCODONE HYDROCHLORIDE 10 MG: 10 TABLET ORAL at 10:06

## 2025-08-15 RX ADMIN — ASPIRIN 81 MG CHEWABLE TABLET 81 MG: 81 TABLET CHEWABLE at 08:24

## 2025-08-15 RX ADMIN — SCOPOLAMINE 1 PATCH: 1.5 PATCH, EXTENDED RELEASE TRANSDERMAL at 21:10

## 2025-08-15 RX ADMIN — METHOCARBAMOL TABLETS 500 MG: 500 TABLET, COATED ORAL at 21:30

## 2025-08-15 RX ADMIN — ACETAMINOPHEN 650 MG: 325 TABLET ORAL at 17:56

## 2025-08-15 RX ADMIN — INSULIN LISPRO 4 UNITS: 100 INJECTION, SOLUTION INTRAVENOUS; SUBCUTANEOUS at 17:57

## 2025-08-15 RX ADMIN — CLOPIDOGREL BISULFATE 75 MG: 75 TABLET, FILM COATED ORAL at 08:24

## 2025-08-15 RX ADMIN — ACETAMINOPHEN 650 MG: 325 TABLET ORAL at 05:35

## 2025-08-15 RX ADMIN — ACETAMINOPHEN 650 MG: 325 TABLET ORAL at 13:36

## 2025-08-15 RX ADMIN — INSULIN LISPRO 4 UNITS: 100 INJECTION, SOLUTION INTRAVENOUS; SUBCUTANEOUS at 09:52

## 2025-08-15 RX ADMIN — METHOCARBAMOL TABLETS 500 MG: 500 TABLET, COATED ORAL at 05:35

## 2025-08-15 RX ADMIN — COLCHICINE 0.6 MG: 0.6 TABLET, FILM COATED ORAL at 21:11

## 2025-08-15 RX ADMIN — INSULIN LISPRO 4 UNITS: 100 INJECTION, SOLUTION INTRAVENOUS; SUBCUTANEOUS at 14:04

## 2025-08-15 RX ADMIN — CARVEDILOL 12.5 MG: 12.5 TABLET, FILM COATED ORAL at 08:24

## 2025-08-15 RX ADMIN — FINASTERIDE 5 MG: 5 TABLET, FILM COATED ORAL at 08:24

## 2025-08-15 RX ADMIN — SENNOSIDES, DOCUSATE SODIUM 2 TABLET: 50; 8.6 TABLET, FILM COATED ORAL at 21:10

## 2025-08-15 RX ADMIN — ONDANSETRON 4 MG: 2 INJECTION INTRAMUSCULAR; INTRAVENOUS at 06:57

## 2025-08-15 RX ADMIN — ATORVASTATIN CALCIUM 40 MG: 40 TABLET, FILM COATED ORAL at 21:10

## 2025-08-15 RX ADMIN — METHOCARBAMOL TABLETS 500 MG: 500 TABLET, COATED ORAL at 13:36

## 2025-08-15 RX ADMIN — POLYETHYLENE GLYCOL 3350 17 G: 17 POWDER, FOR SOLUTION ORAL at 21:11

## 2025-08-15 RX ADMIN — OXYCODONE HYDROCHLORIDE 10 MG: 10 TABLET ORAL at 17:57

## 2025-08-15 RX ADMIN — TAMSULOSIN HYDROCHLORIDE 0.4 MG: 0.4 CAPSULE ORAL at 08:23

## 2025-08-15 RX ADMIN — CARVEDILOL 12.5 MG: 12.5 TABLET, FILM COATED ORAL at 21:11

## 2025-08-15 RX ADMIN — POTASSIUM & SODIUM PHOSPHATES POWDER PACK 280-160-250 MG 1 PACKET: 280-160-250 PACK at 13:36

## 2025-08-15 RX ADMIN — FUROSEMIDE 40 MG: 10 INJECTION, SOLUTION INTRAMUSCULAR; INTRAVENOUS at 18:41

## 2025-08-15 RX ADMIN — HEPARIN SODIUM 5000 UNITS: 5000 INJECTION INTRAVENOUS; SUBCUTANEOUS at 17:04

## 2025-08-15 RX ADMIN — ONDANSETRON 4 MG: 2 INJECTION INTRAMUSCULAR; INTRAVENOUS at 17:00

## 2025-08-15 RX ADMIN — HEPARIN SODIUM 5000 UNITS: 5000 INJECTION INTRAVENOUS; SUBCUTANEOUS at 09:45

## 2025-08-15 ASSESSMENT — PAIN SCALES - GENERAL
PAINLEVEL_OUTOF10: 8
PAINLEVEL_OUTOF10: 6
PAINLEVEL_OUTOF10: 0 - NO PAIN
PAINLEVEL_OUTOF10: 4
PAINLEVEL_OUTOF10: 8
PAINLEVEL_OUTOF10: 10 - WORST POSSIBLE PAIN

## 2025-08-15 ASSESSMENT — PAIN - FUNCTIONAL ASSESSMENT
PAIN_FUNCTIONAL_ASSESSMENT: 0-10

## 2025-08-15 ASSESSMENT — COGNITIVE AND FUNCTIONAL STATUS - GENERAL
DAILY ACTIVITIY SCORE: 20
MOBILITY SCORE: 17
DRESSING REGULAR UPPER BODY CLOTHING: A LITTLE
TURNING FROM BACK TO SIDE WHILE IN FLAT BAD: A LITTLE
CLIMB 3 TO 5 STEPS WITH RAILING: A LOT
STANDING UP FROM CHAIR USING ARMS: A LITTLE
TOILETING: A LITTLE
TURNING FROM BACK TO SIDE WHILE IN FLAT BAD: A LITTLE
CLIMB 3 TO 5 STEPS WITH RAILING: A LOT
MOVING FROM LYING ON BACK TO SITTING ON SIDE OF FLAT BED WITH BEDRAILS: A LITTLE
MOVING TO AND FROM BED TO CHAIR: A LITTLE
STANDING UP FROM CHAIR USING ARMS: A LITTLE
WALKING IN HOSPITAL ROOM: A LITTLE
MOVING TO AND FROM BED TO CHAIR: A LITTLE
DRESSING REGULAR LOWER BODY CLOTHING: A LITTLE
DRESSING REGULAR LOWER BODY CLOTHING: A LITTLE
HELP NEEDED FOR BATHING: A LITTLE
MOVING FROM LYING ON BACK TO SITTING ON SIDE OF FLAT BED WITH BEDRAILS: A LITTLE
HELP NEEDED FOR BATHING: A LITTLE

## 2025-08-15 ASSESSMENT — PAIN DESCRIPTION - DESCRIPTORS
DESCRIPTORS: ACHING;DISCOMFORT
DESCRIPTORS: ACHING;DISCOMFORT

## 2025-08-15 ASSESSMENT — PAIN DESCRIPTION - ORIENTATION: ORIENTATION: LEFT

## 2025-08-15 ASSESSMENT — PAIN DESCRIPTION - LOCATION
LOCATION: INCISION
LOCATION: INCISION

## 2025-08-15 NOTE — PROGRESS NOTES
Patient has a confirmed  time of 12:30 on Saturday 8/16 by [facility] ([phone]) to go to [facility]. Patient was notified and his wife and MD and nurse and . Nurse to Nurse [phone] Lindsey York RN
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
## 2025-08-15 NOTE — CARE PLAN
Problem: Pain - Adult  Goal: Verbalizes/displays adequate comfort level or baseline comfort level  Outcome: Progressing     Problem: Discharge Planning  Goal: Discharge to home or other facility with appropriate resources  Outcome: Progressing     Problem: Chronic Conditions and Co-morbidities  Goal: Patient's chronic conditions and co-morbidity symptoms are monitored and maintained or improved  Outcome: Progressing     Problem: Nutrition  Goal: Nutrient intake appropriate for maintaining nutritional needs  Outcome: Progressing     Problem: Fall/Injury  Goal: Not fall by end of shift  Outcome: Progressing  Goal: Be free from injury by end of the shift  Outcome: Progressing  Goal: Verbalize understanding of personal risk factors for fall in the hospital  Outcome: Progressing  Goal: Verbalize understanding of risk factor reduction measures to prevent injury from fall in the home  Outcome: Progressing  Goal: Use assistive devices by end of the shift  Outcome: Progressing  Goal: Pace activities to prevent fatigue by end of the shift  Outcome: Progressing     Problem: Heart Failure  Goal: Improved gas exchange this shift  Outcome: Progressing  Goal: Improved urinary output this shift  Outcome: Progressing  Goal: Reduction in peripheral edema within 24 hours  Outcome: Progressing  Goal: Report improvement of dyspnea/breathlessness this shift  Outcome: Progressing  Goal: Weight from fluid excess reduced over 2-3 days, then stabilize  Outcome: Progressing  Goal: Increase self care and/or family involvement in 24 hours  Outcome: Progressing     Problem: Skin  Goal: Decreased wound size/increased tissue granulation at next dressing change  Outcome: Progressing  Goal: Participates in plan/prevention/treatment measures  Outcome: Progressing  Goal: Prevent/manage excess moisture  Outcome: Progressing  Goal: Prevent/minimize sheer/friction injuries  Outcome: Progressing  Goal: Promote/optimize nutrition  Outcome:  Progressing  Goal: Promote skin healing  Outcome: Progressing   The patient's goals for the shift include remain hds    The clinical goals for the shift include rest

## 2025-08-16 ENCOUNTER — APPOINTMENT (OUTPATIENT)
Dept: CARDIOLOGY | Facility: HOSPITAL | Age: 75
DRG: 236 | End: 2025-08-16
Payer: MEDICARE

## 2025-08-16 LAB
ALBUMIN SERPL BCP-MCNC: 3.7 G/DL (ref 3.4–5)
ALBUMIN SERPL BCP-MCNC: 3.7 G/DL (ref 3.4–5)
ANION GAP SERPL CALC-SCNC: 13 MMOL/L (ref 10–20)
ANION GAP SERPL CALC-SCNC: 16 MMOL/L (ref 10–20)
APPEARANCE UR: ABNORMAL
BASOPHILS # BLD AUTO: 0.01 X10*3/UL (ref 0–0.1)
BASOPHILS NFR BLD AUTO: 0.2 %
BILIRUB UR STRIP.AUTO-MCNC: NEGATIVE MG/DL
BUN SERPL-MCNC: 62 MG/DL (ref 6–23)
BUN SERPL-MCNC: 62 MG/DL (ref 6–23)
CALCIUM SERPL-MCNC: 8.8 MG/DL (ref 8.6–10.6)
CALCIUM SERPL-MCNC: 8.8 MG/DL (ref 8.6–10.6)
CHLORIDE SERPL-SCNC: 96 MMOL/L (ref 98–107)
CHLORIDE SERPL-SCNC: 99 MMOL/L (ref 98–107)
CK SERPL-CCNC: 246 U/L (ref 0–325)
CO2 SERPL-SCNC: 21 MMOL/L (ref 21–32)
CO2 SERPL-SCNC: 30 MMOL/L (ref 21–32)
COLOR UR: ABNORMAL
CREAT SERPL-MCNC: 1.86 MG/DL (ref 0.5–1.3)
CREAT SERPL-MCNC: 1.87 MG/DL (ref 0.5–1.3)
EGFRCR SERPLBLD CKD-EPI 2021: 37 ML/MIN/1.73M*2
EGFRCR SERPLBLD CKD-EPI 2021: 38 ML/MIN/1.73M*2
EOSINOPHIL # BLD AUTO: 0.39 X10*3/UL (ref 0–0.4)
EOSINOPHIL NFR BLD AUTO: 9.6 %
ERYTHROCYTE [DISTWIDTH] IN BLOOD BY AUTOMATED COUNT: 15.2 % (ref 11.5–14.5)
ERYTHROCYTE [DISTWIDTH] IN BLOOD BY AUTOMATED COUNT: 15.2 % (ref 11.5–14.5)
GLUCOSE BLD MANUAL STRIP-MCNC: 147 MG/DL (ref 74–99)
GLUCOSE BLD MANUAL STRIP-MCNC: 225 MG/DL (ref 74–99)
GLUCOSE BLD MANUAL STRIP-MCNC: 230 MG/DL (ref 74–99)
GLUCOSE BLD MANUAL STRIP-MCNC: 236 MG/DL (ref 74–99)
GLUCOSE SERPL-MCNC: 187 MG/DL (ref 74–99)
GLUCOSE SERPL-MCNC: 234 MG/DL (ref 74–99)
GLUCOSE UR STRIP.AUTO-MCNC: NORMAL MG/DL
HCT VFR BLD AUTO: 31.3 % (ref 41–52)
HCT VFR BLD AUTO: 32.6 % (ref 41–52)
HGB BLD-MCNC: 9.6 G/DL (ref 13.5–17.5)
HGB BLD-MCNC: 9.9 G/DL (ref 13.5–17.5)
IMM GRANULOCYTES # BLD AUTO: 0.02 X10*3/UL (ref 0–0.5)
IMM GRANULOCYTES NFR BLD AUTO: 0.5 % (ref 0–0.9)
KETONES UR STRIP.AUTO-MCNC: NEGATIVE MG/DL
LACTATE SERPL-SCNC: 1.2 MMOL/L (ref 0.4–2)
LEUKOCYTE ESTERASE UR QL STRIP.AUTO: ABNORMAL
LYMPHOCYTES # BLD AUTO: 0.76 X10*3/UL (ref 0.8–3)
LYMPHOCYTES NFR BLD AUTO: 18.8 %
MAGNESIUM SERPL-MCNC: 2.66 MG/DL (ref 1.6–2.4)
MAGNESIUM SERPL-MCNC: 2.93 MG/DL (ref 1.6–2.4)
MCH RBC QN AUTO: 30.1 PG (ref 26–34)
MCH RBC QN AUTO: 30.2 PG (ref 26–34)
MCHC RBC AUTO-ENTMCNC: 29.4 G/DL (ref 32–36)
MCHC RBC AUTO-ENTMCNC: 31.6 G/DL (ref 32–36)
MCV RBC AUTO: 102 FL (ref 80–100)
MCV RBC AUTO: 95 FL (ref 80–100)
MONOCYTES # BLD AUTO: 0.53 X10*3/UL (ref 0.05–0.8)
MONOCYTES NFR BLD AUTO: 13.1 %
NEUTROPHILS # BLD AUTO: 2.34 X10*3/UL (ref 1.6–5.5)
NEUTROPHILS NFR BLD AUTO: 57.8 %
NITRITE UR QL STRIP.AUTO: ABNORMAL
NRBC BLD-RTO: 0 /100 WBCS (ref 0–0)
NRBC BLD-RTO: 0 /100 WBCS (ref 0–0)
PH UR STRIP.AUTO: 8.5 [PH]
PHOSPHATE SERPL-MCNC: 2.7 MG/DL (ref 2.5–4.9)
PHOSPHATE SERPL-MCNC: 3.2 MG/DL (ref 2.5–4.9)
PLATELET # BLD AUTO: 114 X10*3/UL (ref 150–450)
PLATELET # BLD AUTO: 87 X10*3/UL (ref 150–450)
POTASSIUM SERPL-SCNC: 4.8 MMOL/L (ref 3.5–5.3)
POTASSIUM SERPL-SCNC: 5 MMOL/L (ref 3.5–5.3)
PROT UR STRIP.AUTO-MCNC: ABNORMAL MG/DL
RBC # BLD AUTO: 3.19 X10*6/UL (ref 4.5–5.9)
RBC # BLD AUTO: 3.28 X10*6/UL (ref 4.5–5.9)
RBC # UR STRIP.AUTO: ABNORMAL MG/DL
RBC #/AREA URNS AUTO: >20 /HPF
SODIUM SERPL-SCNC: 131 MMOL/L (ref 136–145)
SODIUM SERPL-SCNC: 134 MMOL/L (ref 136–145)
SP GR UR STRIP.AUTO: 1.02
UROBILINOGEN UR STRIP.AUTO-MCNC: NORMAL MG/DL
WBC # BLD AUTO: 4.1 X10*3/UL (ref 4.4–11.3)
WBC # BLD AUTO: 4.6 X10*3/UL (ref 4.4–11.3)
WBC #/AREA URNS AUTO: >50 /HPF
WBC CLUMPS #/AREA URNS AUTO: ABNORMAL /HPF
YEAST BUDDING #/AREA UR COMP ASSIST: PRESENT /HPF

## 2025-08-16 PROCEDURE — 93005 ELECTROCARDIOGRAM TRACING: CPT

## 2025-08-16 PROCEDURE — 2500000001 HC RX 250 WO HCPCS SELF ADMINISTERED DRUGS (ALT 637 FOR MEDICARE OP): Performed by: INTERNAL MEDICINE

## 2025-08-16 PROCEDURE — 82550 ASSAY OF CK (CPK): CPT | Performed by: INTERNAL MEDICINE

## 2025-08-16 PROCEDURE — 93010 ELECTROCARDIOGRAM REPORT: CPT | Performed by: INTERNAL MEDICINE

## 2025-08-16 PROCEDURE — 80069 RENAL FUNCTION PANEL: CPT | Performed by: NURSE PRACTITIONER

## 2025-08-16 PROCEDURE — 81001 URINALYSIS AUTO W/SCOPE: CPT | Performed by: STUDENT IN AN ORGANIZED HEALTH CARE EDUCATION/TRAINING PROGRAM

## 2025-08-16 PROCEDURE — 80069 RENAL FUNCTION PANEL: CPT | Performed by: INTERNAL MEDICINE

## 2025-08-16 PROCEDURE — 85027 COMPLETE CBC AUTOMATED: CPT | Performed by: INTERNAL MEDICINE

## 2025-08-16 PROCEDURE — 99222 1ST HOSP IP/OBS MODERATE 55: CPT

## 2025-08-16 PROCEDURE — 2500000004 HC RX 250 GENERAL PHARMACY W/ HCPCS (ALT 636 FOR OP/ED): Performed by: INTERNAL MEDICINE

## 2025-08-16 PROCEDURE — 2500000002 HC RX 250 W HCPCS SELF ADMINISTERED DRUGS (ALT 637 FOR MEDICARE OP, ALT 636 FOR OP/ED): Performed by: INTERNAL MEDICINE

## 2025-08-16 PROCEDURE — 83735 ASSAY OF MAGNESIUM: CPT | Performed by: INTERNAL MEDICINE

## 2025-08-16 PROCEDURE — 83605 ASSAY OF LACTIC ACID: CPT | Performed by: NURSE PRACTITIONER

## 2025-08-16 PROCEDURE — 87077 CULTURE AEROBIC IDENTIFY: CPT | Performed by: STUDENT IN AN ORGANIZED HEALTH CARE EDUCATION/TRAINING PROGRAM

## 2025-08-16 PROCEDURE — 99233 SBSQ HOSP IP/OBS HIGH 50: CPT | Performed by: INTERNAL MEDICINE

## 2025-08-16 PROCEDURE — 36415 COLL VENOUS BLD VENIPUNCTURE: CPT | Performed by: NURSE PRACTITIONER

## 2025-08-16 PROCEDURE — 85025 COMPLETE CBC W/AUTO DIFF WBC: CPT | Performed by: NURSE PRACTITIONER

## 2025-08-16 PROCEDURE — 36415 COLL VENOUS BLD VENIPUNCTURE: CPT | Performed by: INTERNAL MEDICINE

## 2025-08-16 PROCEDURE — 1200000002 HC GENERAL ROOM WITH TELEMETRY DAILY

## 2025-08-16 PROCEDURE — 84443 ASSAY THYROID STIM HORMONE: CPT | Performed by: INTERNAL MEDICINE

## 2025-08-16 PROCEDURE — 82947 ASSAY GLUCOSE BLOOD QUANT: CPT

## 2025-08-16 RX ORDER — METOPROLOL TARTRATE 25 MG/1
25 TABLET, FILM COATED ORAL 2 TIMES DAILY
Status: DISCONTINUED | OUTPATIENT
Start: 2025-08-16 | End: 2025-08-16

## 2025-08-16 RX ORDER — CARVEDILOL 25 MG/1
12.5 TABLET ORAL
Qty: 30 TABLET | Refills: 5 | Status: CANCELLED | OUTPATIENT
Start: 2025-08-16 | End: 2026-02-12

## 2025-08-16 RX ORDER — ASPIRIN 81 MG/1
81 TABLET ORAL DAILY
Qty: 90 TABLET | Refills: 3 | Status: SHIPPED | OUTPATIENT
Start: 2025-08-16 | End: 2026-08-16

## 2025-08-16 RX ORDER — METOPROLOL TARTRATE 25 MG/1
12.5 TABLET, FILM COATED ORAL 2 TIMES DAILY
Status: DISCONTINUED | OUTPATIENT
Start: 2025-08-16 | End: 2025-08-20 | Stop reason: HOSPADM

## 2025-08-16 RX ORDER — ACETAMINOPHEN 325 MG/1
650 TABLET ORAL EVERY 6 HOURS
Start: 2025-08-16 | End: 2025-08-21

## 2025-08-16 RX ORDER — OXYCODONE HYDROCHLORIDE 5 MG/1
5 TABLET ORAL EVERY 4 HOURS PRN
Start: 2025-08-16 | End: 2025-08-20 | Stop reason: HOSPADM

## 2025-08-16 RX ORDER — COLCHICINE 0.6 MG/1
0.6 TABLET ORAL 2 TIMES DAILY
Qty: 60 TABLET | Refills: 0 | Status: SHIPPED | OUTPATIENT
Start: 2025-08-16 | End: 2025-08-19

## 2025-08-16 RX ORDER — METOPROLOL TARTRATE 25 MG/1
25 TABLET, FILM COATED ORAL 2 TIMES DAILY
Qty: 60 TABLET | Refills: 0 | Status: SHIPPED | OUTPATIENT
Start: 2025-08-16 | End: 2025-08-19 | Stop reason: HOSPADM

## 2025-08-16 RX ADMIN — CLOPIDOGREL BISULFATE 75 MG: 75 TABLET, FILM COATED ORAL at 09:09

## 2025-08-16 RX ADMIN — ACETAMINOPHEN 650 MG: 325 TABLET ORAL at 20:25

## 2025-08-16 RX ADMIN — COLCHICINE 0.6 MG: 0.6 TABLET, FILM COATED ORAL at 09:09

## 2025-08-16 RX ADMIN — ACETAMINOPHEN 650 MG: 325 TABLET ORAL at 06:02

## 2025-08-16 RX ADMIN — METOPROLOL TARTRATE 12.5 MG: 25 TABLET, FILM COATED ORAL at 09:10

## 2025-08-16 RX ADMIN — INSULIN LISPRO 4 UNITS: 100 INJECTION, SOLUTION INTRAVENOUS; SUBCUTANEOUS at 13:18

## 2025-08-16 RX ADMIN — POLYETHYLENE GLYCOL 3350 17 G: 17 POWDER, FOR SOLUTION ORAL at 20:28

## 2025-08-16 RX ADMIN — HEPARIN SODIUM 5000 UNITS: 5000 INJECTION INTRAVENOUS; SUBCUTANEOUS at 00:35

## 2025-08-16 RX ADMIN — ACETAMINOPHEN 650 MG: 325 TABLET ORAL at 00:35

## 2025-08-16 RX ADMIN — HEPARIN SODIUM 5000 UNITS: 5000 INJECTION INTRAVENOUS; SUBCUTANEOUS at 20:25

## 2025-08-16 RX ADMIN — TAMSULOSIN HYDROCHLORIDE 0.4 MG: 0.4 CAPSULE ORAL at 09:09

## 2025-08-16 RX ADMIN — HEPARIN SODIUM 5000 UNITS: 5000 INJECTION INTRAVENOUS; SUBCUTANEOUS at 09:09

## 2025-08-16 RX ADMIN — COLCHICINE 0.6 MG: 0.6 TABLET, FILM COATED ORAL at 20:24

## 2025-08-16 RX ADMIN — OXYCODONE HYDROCHLORIDE 10 MG: 10 TABLET ORAL at 17:43

## 2025-08-16 RX ADMIN — INSULIN LISPRO 4 UNITS: 100 INJECTION, SOLUTION INTRAVENOUS; SUBCUTANEOUS at 09:09

## 2025-08-16 RX ADMIN — OXYCODONE HYDROCHLORIDE 10 MG: 10 TABLET ORAL at 04:51

## 2025-08-16 RX ADMIN — METOPROLOL TARTRATE 12.5 MG: 25 TABLET, FILM COATED ORAL at 20:24

## 2025-08-16 RX ADMIN — ONDANSETRON 4 MG: 2 INJECTION INTRAMUSCULAR; INTRAVENOUS at 17:43

## 2025-08-16 RX ADMIN — ACETAMINOPHEN 650 MG: 325 TABLET ORAL at 13:18

## 2025-08-16 RX ADMIN — ATORVASTATIN CALCIUM 40 MG: 40 TABLET, FILM COATED ORAL at 20:25

## 2025-08-16 RX ADMIN — FINASTERIDE 5 MG: 5 TABLET, FILM COATED ORAL at 09:09

## 2025-08-16 RX ADMIN — ASPIRIN 81 MG CHEWABLE TABLET 81 MG: 81 TABLET CHEWABLE at 09:08

## 2025-08-16 ASSESSMENT — COGNITIVE AND FUNCTIONAL STATUS - GENERAL
DRESSING REGULAR LOWER BODY CLOTHING: A LITTLE
DAILY ACTIVITIY SCORE: 20
HELP NEEDED FOR BATHING: A LITTLE
MOVING TO AND FROM BED TO CHAIR: A LITTLE
TURNING FROM BACK TO SIDE WHILE IN FLAT BAD: A LITTLE
STANDING UP FROM CHAIR USING ARMS: A LITTLE
MOVING FROM LYING ON BACK TO SITTING ON SIDE OF FLAT BED WITH BEDRAILS: A LITTLE
WALKING IN HOSPITAL ROOM: A LITTLE
CLIMB 3 TO 5 STEPS WITH RAILING: A LOT
TOILETING: A LITTLE
DRESSING REGULAR UPPER BODY CLOTHING: A LITTLE
MOBILITY SCORE: 17

## 2025-08-16 ASSESSMENT — PAIN SCALES - GENERAL
PAINLEVEL_OUTOF10: 0 - NO PAIN
PAINLEVEL_OUTOF10: 7
PAINLEVEL_OUTOF10: 4
PAINLEVEL_OUTOF10: 8
PAINLEVEL_OUTOF10: 7
PAINLEVEL_OUTOF10: 0 - NO PAIN

## 2025-08-16 ASSESSMENT — PAIN DESCRIPTION - DESCRIPTORS
DESCRIPTORS: ACHING
DESCRIPTORS: ACHING;DISCOMFORT
DESCRIPTORS: ACHING

## 2025-08-16 ASSESSMENT — PAIN - FUNCTIONAL ASSESSMENT
PAIN_FUNCTIONAL_ASSESSMENT: 0-10

## 2025-08-16 ASSESSMENT — PAIN DESCRIPTION - ORIENTATION: ORIENTATION: LEFT

## 2025-08-16 ASSESSMENT — PAIN DESCRIPTION - LOCATION: LOCATION: INCISION

## 2025-08-16 NOTE — CONSULTS
"Inpatient consult to Neurology  Consult performed by: Anthony Westfall MD  Consult ordered by: Adán Back MD      History Of Present Illness  uDane Scott is a 75-year-old right-handed male with past medical history significant for coronary artery disease s/p multiple PCIs (most recently LAD, Mar 2025), history of cardiac arrest due to acute myocardial infarction, type 2 diabetes mellitus, hypertension, hyperlipidemia, stroke, CKD, and HFpEF admitted for non-ST elevation myocardial infarction. Neurology consulted for whole-body tremors.    The event occurred at approximately 1600 while the patient was in his hospital room. Patient is post-op day 4 from \"robotic [place] LIMA to LAD\". He reports that he stood up independently to use the bathroom, during which he experienced difficulty urinating, describing a sensation of \"two stones\" in his penis and an inability to void further. Upon returning from the bathroom, he went for a walk with his nurse, during which he began to feel weak and subsequently developed shaking involving his entire body, which he describes as \"crazy\" and \"weird.\" He denies any prior similar episodes.    During the episode, the patient remained conscious and was able to communicate. Denied falling, and he was able to ambulate back to bed with assistance. He reports that the shaking involved both his arms and legs, with significant rigidity, particularly in the lower extremities, to the point that he could not lift his legs off the bed. He also noted muscle fasciculations throughout his arms and legs. The episode was witnessed by staff, who describe the movements as rhythmic, small-amplitude, and involving the entire body, resembling rigors. The patient was noted to be in pain and nauseated during the event.    The only medication change was the recent initiation of a scopolamine patch the previous evening, which was removed during the episode. No other new medications or changes were " "identified. Laboratory studies from the morning were unremarkable.    Following removal of the scopolamine patch, the patient's symptoms improved within approximately 10 minutes, though he continued to experience some residual muscle twitching. He denies chest pain, shortness of breath, or other acute symptoms aside from the described episode. He reports left-sided chest and flank pain, which he attributes to recent surgical procedure (bypass). Denies recent head trauma.     Per recent Significant Event note (8/16/2025)  Patient stiffened while walking and noted to have tremors involving UE and LE this evening. Brought back to bed. His UE stiffening improved however UE continued to have fasiculations for 30 mins. We removed the scopolamine patch. LE remained stiffened for around 45 mins, unable to perform exam due to rigidity however fasiculations could be visualized and still ongoing for now. Neuro was consulted, discussed with resident at bedside and they will update us with plan of care.    During the events above his HR was >110 however a lot of artifact. Discussed with cards fellow new EKG and tele from overnight and all through today. Sinus rhythm and atrial pauses noted. Ok to continue metop for now. Patient through out alert and oriented x4. No syncope. No chest pain or SOB. Was complaining of pain in his LE. Reports no hx of seizures or tremors.    Past Medical History  Medical History[1]    Surgical History  Surgical History[2]    Social History  Social History[3]    Allergies  Hydromorphone, Latex, Penicillins, Dyphylline-guaifenesin, Grass pollen, Mold, and Ragweed    Prescriptions Prior to Admission[4]    Last Recorded Vitals  Blood pressure 122/61, pulse 70, temperature 36.5 °C (97.7 °F), temperature source Temporal, resp. rate 16, height 1.676 m (5' 6\"), weight 85.6 kg (188 lb 11.2 oz), SpO2 99%.    GENERAL APPEARANCE: In no acute distress; alert, interactive and cooperative.    MENTAL " STATE:  Oriented to person, place, and date. Recent and remote memory intact. Dysarthria. Normal attention span and concentration. Language testing was normal for comprehension, repetition, expression, and naming.    CRANIAL NERVES:   CN 2 Visual fields full to confrontation.   CN 3, 4, 6 Pupils round, 4 mm in diameter, equally reactive to light, no ptosis, EOMs intact, eyes normal alignment, normal pursuit and convergence, no nystagmus   CN 5 Facial sensation intact bilaterally   CN 7 Full and symmetric facial strength, nasolabial folds symmetric   CN 8 Hearing intact to conversation   CN 9/10 Symmetric palate elevation   CN 11 Strength 5/5 in bilateral shoulder shrug and neck turn   CN 12 Tongue midline with normal bulk and strength     MOTOR:   Normal muscle bulk and tone in both upper and lower extremities.   Fasciculations noted in anterior thighs bilaterally, increased with muscle activation, and in right bicep.     Right Left   Shoulder abduction 5 5   Elbow flexion 5 5   Elbow extension 5 5    5 5        Hip flexion 4 4   Knee flexion 5 5   Knee extension 5 5   Dorsiflexion 5 5   Plantarflexion 5 5     REFLEXES:    Right Left   Brachioradialis 2 2   Biceps 2 2   Triceps 2 2   Knee 3 3   Ankle 2 2     No clonus or other pathologic reflexes present.    SENSORY:   Intact to light touch in both upper extremities.  Decreased sensation to light touch symmetrically in lower extremities.    COORDINATION:  Finger-to-nose intact without dysmetria or overshoot in bilateral upper extremities (limited by pain in left upper extremity)  Heel-to-shin intact in bilateral lower extremities.    GAIT:   Not assessed.    Relevant Results  Scheduled medications  Scheduled Medications[5]  Continuous medications  Continuous Medications[6]  PRN medications  PRN Medications[7]    Results from last 72 hours   Lab Units 08/16/25  1137 08/15/25  0838 08/14/25  0753   WBC AUTO x10*3/uL 4.6 10.0 12.8*   HEMOGLOBIN g/dL 9.6* 10.7*  12.1*   PLATELETS AUTO x10*3/uL 87* 97* 112*   SODIUM mmol/L 131* 131* 132*   POTASSIUM mmol/L 4.8 4.4 4.4   CO2 mmol/L 21 26 22   BUN mg/dL 62* 52* 49*   CREATININE mg/dL 1.87* 1.66* 1.91*   MAGNESIUM mg/dL 2.93* 2.53* 2.45*   GLUCOSE mg/dL 234* 225* 178*      I have personally reviewed the following imaging results:   Imaging  XR chest 1 view  Result Date: 8/15/2025  Removal of the left-sided chest tube. Low lung volumes with bronchovascular crowding. Cardiac silhouette is moderately enlarged. Aorta is tortuous. Pulmonary vessels are congested. Multifocal alveolar opacities involving both lungs, left greater than right, slightly worsened since last exam. Atelectasis, pulmonary edema, fluid overload, versus less likely infiltrates. Small bilateral pleural effusion. No pneumothorax seen. Subcutaneous emphysema along the left chest wall. Bony thorax is unremarkable.   MACRO: None   Signed by: Sowmya Leonard 8/15/2025 12:50 PM Dictation workstation:   [id number]    XR chest 1 view  Result Date: 8/14/2025  Stable left-sided chest tube. Low lung volumes with bronchovascular crowding. Cardiac silhouette is moderately enlarged. Aorta is tortuous. Pulmonary vessels are congested. Multifocal alveolar opacities involving both lungs, slightly improved since last exam. Pulmonary edema, fluid overload, versus less likely infiltrates. Small bilateral pleural effusion. No pneumothorax seen. Subcutaneous emphysema along the left chest wall. Bony thorax is unremarkable.   MACRO: None   Signed by: Sowmya Leonard 8/14/2025 2:16 PM Dictation workstation:   [id number]    XR chest 1 view  Result Date: 8/13/2025  Right jugular catheter left-sided chest tube unchanged in position. Cardiac silhouette is moderately enlarged. Aorta is tortuous. Pulmonary vessels are congested. Multifocal alveolar opacities involving both lungs, slightly worsened since last exam. Pulmonary edema, fluid overload, versus less likely infiltrates. Small left-sided  pleural effusion. Subcutaneous emphysema along the left chest wall. No pneumothorax seen. Bony thorax is unremarkable.   MACRO: None   Signed by: Sowmya Leonard 8/13/2025 1:26 PM Dictation workstation:   [id number]    XR chest 1 view  Result Date: 8/13/2025  1. Interval surgical changes of the chest with a small volume of soft tissue gas in the left chest wall. 2. Mild diffuse interstitial pulmonary edema with left apical atelectasis. Trace left pleural effusion. No discrete pneumothorax. 3. Medical devices as above.   I personally reviewed the image(s)/study and resident interpretation as stated by Dr. Kathy Cordero MD. I agree with the findings as stated. This study was interpreted at [facility], [place], [place].   MACRO: None   Signed by: Sowmya Leonard 8/13/2025 7:37 AM Dictation workstation:   [id number]    Cardiology, Vascular, and Other Imaging  ECG 12 Lead  Result Date: 8/15/2025  Normal sinus rhythm Prolonged QT Abnormal ECG When compared with ECG of 04-AUG-2025 10:55, QT has lengthened Confirmed by Murali Harris ([id number]) on 8/15/2025 5:24:35 PM    Anesthesia Intraoperative Transesophageal Echocardiogram  Result Date: 8/12/2025  [facility] - Dept of Anesthesiology    [address] [place] [address]       Tel [phone] and Fax [phone] TRANSESOPHAGEAL ECHOCARDIOGRAM REPORT  Patient Name:       RAMIREZ Burnett Physician:    [id number] Isidra Dao MD Study Date:         8/12/2025           Ordering Provider:    [id number] ESTEFANI CARPIO MRN/PID:            [id number]            Fellow: Accession#:         [id number]        Nurse: Date of Birth/Age:  1950 / 75      Sonographer:                     years Gender assigned at  M                   Additional Staff: Birth: BSA / BMI:          m2 / kg/m2           Encounter#:           [id number] Blood Pressure:     /                   Department Location:  [facility] Type:    ANESTHESIA INTRAOPERATIVE LEYLA Diagnosis/ICD: Atherosclerotic heart disease of native coronary artery with                unspecified angina pectoris-I25.119 Indication:    cabg CPT Code:      LEYLA Complete-34507; Doppler Limited-42919; Color Doppler-44535 PHYSICIAN INTERPRETATION: Left Ventricle: The left ventricular systolic function is normal with a visually estimated ejection fraction of 55-60%. The left ventricular cavity size is normal. The left ventricular septal wall thickness is mildly increased. Left ventricular diastolic filling was not assessed. Left Atrium: The left atrial size is moderately dilated. There is no evidence of a patent foramen ovale. The left atrial appendage Doppler velocities are normal and there is no thrombus visualized in the left atrial appendage. Right Ventricle: The right ventricle is normal in size. There is normal right ventricular global systolic function. Right Atrium: The right atrium is mildly dilated. Aortic Valve: The aortic valve is trileaflet. There is no evidence of aortic valve stenosis. There is no evidence of aortic valve regurgitation. Mitral Valve: The mitral valve is normal in structure. There is no evidence of mitral valve stenosis. There is trace mitral valve regurgitation. Tricuspid Valve: The tricuspid valve is structurally normal. There is trace tricuspid regurgitation. Pulmonic Valve: The pulmonic valve is structurally normal. There is no indication of pulmonic valve regurgitation. Pericardium: There is no pericardial effusion noted. Aorta: The aortic root is normal. The descending aorta is classified as a Grade 2 [mild (focal or diffuse) intimal thickening of 2-3 mm] atherosclerosis. In comparison to the previous echocardiogram(s): Not performed on intraoperative study.   CONCLUSIONS:  1. The left ventricular systolic function is normal with a visually estimated ejection fraction of 55-60%.  2. There is normal right ventricular global systolic function.  3. The left atrial size is moderately dilated. POST PROCEDURE REPORT: Post-procedure images obtained and interpreted by Isidra Dao MD. s/p robot assisted off pump CABG x1 (LIMA to LAD): - Without inotropic support, biventricular function unchanged. - No new regional wall motion abnormalities or valvular pathology appreciated. - No pericardial or pleural effusions seen. - Discussed with surgeon.  QUANTITATIVE DATA SUMMARY:  LV SYSTOLIC FUNCTION:                      Normal Ranges: EF-Visual:      58 % LV EF Reported: 58 %  [id number] Isidra Dao MD Electronically signed on 8/12/2025 at 5:42:52 PM  ** Final **     Assessment/Plan   Duane Scott is a 75-year-old right-handed male with past medical history significant for coronary artery disease s/p multiple PCIs (most recently LAD, Mar 2025), history of cardiac arrest due to acute myocardial infarction, type 2 diabetes mellitus, hypertension, hyperlipidemia, stroke, CKD, and HFpEF admitted for non-ST elevation myocardial infarction. Neurology consulted for whole-body tremors.    Drug-induced fasciculations may occur with certain medications, including SSRIs, lithium, and corticosteroids though tremor is more common than fasciculations. Electrolyte disturbances, particularly acute hypomagnesemia, hypocalcemia (acute hypoparathyroidism or postoperative hypoparathyroidism), or hypokalemia can cause neuromuscular irritability and diffuse fasciculations, especially when severe (<1.2 mg/dL). Cholinergic toxin exposure leads to diffuse fasciculations as part of nicotinic toxicity. The hyperacute onset does not necessarily align with these as causes. No medications were administered in the time leading up to the event. Thyrotoxicosis can present with neuromuscular irritability,  "including muscle fasciculations, in the context of other hyperadrenergic symptoms. TSH 5.92 on 8/6. Again, this may not be compatible with the hyperacute onset. Pain seems the most likely precursor, given the report of dysuria prior to the event.    Impression: Generalized fasciculations, suspected metabolic    Recommendations  Recommend continuing to monitor for now given improving symptoms and exam   Correct metabolic derangements  Obtain repeat TSH with reflex  Obtain PTH     This patient will be seen, discussed and examined with the attending, Dr. Cruz, on 8/17/2025.    Anthony Westfall MD  Neurology Resident, PGY-2  General Neurology: [id number]    Senior addendum:   I saw and evaluated the patient with the carlos resident. I agree with the physical exam, assessment and plan above outlined in the excellent carlos resident note above and made edits where necessary. Note to be co-signed by attending.      Mr. Scott is a 76 y/o M w/ significant cardiac hx with recent NSTEMI s/p CABG post op day 4. Neurology consulted for \"whole body tremors\" after ambulation today - per primary team, he was ambulating back from the bathroom and felt weak all over and developed \"shaking/tremors\" of all extremities - he remained conscious and maintained alertness throughout this event and was able to communicate with them. He was able to ambulate back to the bed with assistance and these symptoms improved over time by time of neurology evaluation above. He was recently initiated on scopolamine which may have contributed to this transient event and was subsequently removed with significantly improved symptoms 10 minutes after removal of scopolamine patch. He was also noted to have recurrent urinary retention and a UA concerning for UTI today. Upon my evaluation, patient was asleep but per nursing, his muscle fasciculations were not obvious on her exam. Differentials for acute onset generalized fasciculations are likely a metabolic " etiology/derangement - given improved symptoms, recommend continued monitoring and correction of any metabolic abnormalities as able.     To be formally staffed in AM.     Lani Wilson MD   Neurology, PGY3  General Neurology [id number]             [1]   Past Medical History:  Diagnosis Date    Arthritis     Asthma     CAD (coronary artery disease)     s/p stent placement    Cardiac arrest 11/17/2023    Chronic kidney disease     Chronic obstructive pulmonary disease requiring drug therapy (Multi) 11/17/2023    Coronary artery disease     Diabetes mellitus, type 2 (Multi) 11/08/2018    Diastolic heart failure 11/17/2023    Gout     Hyperlipidemia     Migraine headache     Myocardial infarction (Multi)     Status post coronary artery stent placement 11/14/2023   [2]   Past Surgical History:  Procedure Laterality Date    BACK SURGERY  2012    CARDIAC CATHETERIZATION N/A 03/11/2025    Procedure: Left Heart Cath;  Surgeon: Jessika Spencer MD;  Location: [facility] Cardiac Cath Lab;  Service: Cardiovascular;  Laterality: N/A;  no pre auth required    CARDIAC CATHETERIZATION N/A 8/6/2025    Procedure: Left Heart Catheterization with Coronaries and Left Ventriculography;  Surgeon: Luis Eduardo Carrillo MD;  Location: [facility] Cardiac Cath Lab;  Service: Cardiovascular;  Laterality: N/A;    CARDIAC CATHETERIZATION N/A 8/6/2025    Procedure: PCI Angioplasty Additional Branch;  Surgeon: Luis Eduardo Carrillo MD;  Location: [facility] Cardiac Cath Lab;  Service: Cardiovascular;  Laterality: N/A;    HERNIA REPAIR  2015    HERNIA REPAIR  2013   [3]   Social History  Tobacco Use    Smoking status: Never     Passive exposure: Never    Smokeless tobacco: Never   Vaping Use    Vaping status: Never Used   Substance Use Topics    Alcohol use: Never    Drug use: Never   [4]   Medications Prior to Admission   Medication Sig Dispense Refill Last Dose/Taking    albuterol (Ventolin HFA) 90 mcg/actuation inhaler Inhale 1 puff every 4 hours if needed for wheezing or shortness  of breath. 18 g 1 Past Week    atorvastatin (Lipitor) 80 mg tablet Take 1 tablet (80 mg) by mouth once daily. 90 tablet 3 8/12/2025    carvedilol (Coreg) 25 mg tablet Take 0.5 tablets (12.5 mg) by mouth 2 times a day before meals. 90 tablet 3 8/12/2025    clopidogrel (Plavix) 75 mg tablet Take 1 tablet (75 mg) by mouth once daily. 90 tablet 3 8/12/2025    finasteride (Proscar) 5 mg tablet Take 1 tablet (5 mg) by mouth once daily. Do not crush, chew, or split. 30 tablet 11 8/12/2025    hydroCHLOROthiazide (HYDRODiuril) 25 mg tablet Take 1 tablet (25 mg) by mouth once daily. 90 tablet 3 8/12/2025    losartan (Cozaar) 100 mg tablet Take 1 tablet (100 mg) by mouth once daily. 90 tablet 3 8/12/2025    tamsulosin (Flomax) 0.4 mg 24 hr capsule Take 2 capsules (0.8 mg) by mouth once daily. 60 capsule 11 8/12/2025    [DISCONTINUED] aspirin 81 mg EC tablet Take 1 tablet (81 mg) by mouth once daily. 90 tablet 3 8/12/2025   [5] acetaminophen, 650 mg, oral, q6h  aspirin, 81 mg, oral, Daily  atorvastatin, 40 mg, oral, Nightly  clopidogrel, 75 mg, oral, Daily  colchicine, 0.6 mg, oral, BID  finasteride, 5 mg, oral, Daily  heparin (porcine), 5,000 Units, subcutaneous, q8h  insulin lispro, 0-10 Units, subcutaneous, TID AC  metoprolol tartrate, 12.5 mg, oral, BID  polyethylene glycol, 17 g, oral, BID  sennosides-docusate sodium, 2 tablet, oral, BID  tamsulosin, 0.4 mg, oral, Daily  [6]    [7] PRN medications: dextrose, dextrose, glucagon, glucagon, ipratropium-albuteroL, lidocaine, naloxone, ondansetron **OR** ondansetron, oxyCODONE, oxyCODONE, oxygen

## 2025-08-16 NOTE — SIGNIFICANT EVENT
Patient stiffened while walking and noted to have tremors involving UE and LE this evening. Brought back to bed. His UE stiffening improved however UE continued to have fasiculations for 30 mins. We removed the scopolamine patch. LE remained stiffened for around 45 mins, unable to perform exam due to rigidity however fasiculations could be visualized and still ongoing for now. Neuro was consulted, discussed with resident at bedside and they will update us with plan of care.      During the events above his HR was >110 however a lot of artifact. Discussed with cards fellow new EKG and tele from overnight and all through today. Sinus rhythm and atrial pauses noted. Ok to continue metop for now.    Patient through out alert and oriented x4. No syncope.  No chest pain or SOB. Was complaining of pain in his LE.   Reports no hx of seizures or tremors.,       Adán Back MD

## 2025-08-16 NOTE — PROGRESS NOTES
Duane Scott is a 75 y.o. male on day 10 of admission presenting with NSTEMI (non-ST elevated myocardial infarction) (Multi).    TCC received notification patient not medically ready His tele has had multiple pauses from overnight so we would like to monitor him for another day   Transport Canceled Facility informed.    ANA PAULA CarballoN-RN  Transitional Care Coordinator (TCC)  [phone] ext [phone]
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
## 2025-08-17 ENCOUNTER — APPOINTMENT (OUTPATIENT)
Dept: RADIOLOGY | Facility: HOSPITAL | Age: 75
DRG: 236 | End: 2025-08-17
Payer: MEDICARE

## 2025-08-17 LAB
ALBUMIN SERPL BCP-MCNC: 3.3 G/DL (ref 3.4–5)
ANION GAP SERPL CALC-SCNC: 12 MMOL/L (ref 10–20)
BUN SERPL-MCNC: 61 MG/DL (ref 6–23)
CALCIUM SERPL-MCNC: 8.9 MG/DL (ref 8.6–10.6)
CHLORIDE SERPL-SCNC: 100 MMOL/L (ref 98–107)
CO2 SERPL-SCNC: 28 MMOL/L (ref 21–32)
CREAT SERPL-MCNC: 1.73 MG/DL (ref 0.5–1.3)
EGFRCR SERPLBLD CKD-EPI 2021: 41 ML/MIN/1.73M*2
ERYTHROCYTE [DISTWIDTH] IN BLOOD BY AUTOMATED COUNT: 14.9 % (ref 11.5–14.5)
GLUCOSE BLD MANUAL STRIP-MCNC: 154 MG/DL (ref 74–99)
GLUCOSE BLD MANUAL STRIP-MCNC: 172 MG/DL (ref 74–99)
GLUCOSE BLD MANUAL STRIP-MCNC: 183 MG/DL (ref 74–99)
GLUCOSE BLD MANUAL STRIP-MCNC: 188 MG/DL (ref 74–99)
GLUCOSE SERPL-MCNC: 173 MG/DL (ref 74–99)
HCT VFR BLD AUTO: 31.2 % (ref 41–52)
HGB BLD-MCNC: 9.9 G/DL (ref 13.5–17.5)
MAGNESIUM SERPL-MCNC: 2.43 MG/DL (ref 1.6–2.4)
MCH RBC QN AUTO: 30.7 PG (ref 26–34)
MCHC RBC AUTO-ENTMCNC: 31.7 G/DL (ref 32–36)
MCV RBC AUTO: 97 FL (ref 80–100)
NRBC BLD-RTO: 0 /100 WBCS (ref 0–0)
PHOSPHATE SERPL-MCNC: 3.2 MG/DL (ref 2.5–4.9)
PLATELET # BLD AUTO: 109 X10*3/UL (ref 150–450)
POTASSIUM SERPL-SCNC: 4.4 MMOL/L (ref 3.5–5.3)
PTH-INTACT SERPL-MCNC: 35 PG/ML (ref 18.5–88)
RBC # BLD AUTO: 3.22 X10*6/UL (ref 4.5–5.9)
SODIUM SERPL-SCNC: 136 MMOL/L (ref 136–145)
TSH SERPL-ACNC: 3.07 MIU/L (ref 0.44–3.98)
WBC # BLD AUTO: 4.2 X10*3/UL (ref 4.4–11.3)

## 2025-08-17 PROCEDURE — 99233 SBSQ HOSP IP/OBS HIGH 50: CPT | Performed by: INTERNAL MEDICINE

## 2025-08-17 PROCEDURE — 80069 RENAL FUNCTION PANEL: CPT | Performed by: INTERNAL MEDICINE

## 2025-08-17 PROCEDURE — 2500000005 HC RX 250 GENERAL PHARMACY W/O HCPCS: Performed by: INTERNAL MEDICINE

## 2025-08-17 PROCEDURE — 2500000004 HC RX 250 GENERAL PHARMACY W/ HCPCS (ALT 636 FOR OP/ED): Performed by: PHYSICIAN ASSISTANT

## 2025-08-17 PROCEDURE — 83970 ASSAY OF PARATHORMONE: CPT | Performed by: INTERNAL MEDICINE

## 2025-08-17 PROCEDURE — 2500000004 HC RX 250 GENERAL PHARMACY W/ HCPCS (ALT 636 FOR OP/ED): Performed by: INTERNAL MEDICINE

## 2025-08-17 PROCEDURE — 36415 COLL VENOUS BLD VENIPUNCTURE: CPT | Performed by: INTERNAL MEDICINE

## 2025-08-17 PROCEDURE — 2500000001 HC RX 250 WO HCPCS SELF ADMINISTERED DRUGS (ALT 637 FOR MEDICARE OP): Performed by: INTERNAL MEDICINE

## 2025-08-17 PROCEDURE — 74018 RADEX ABDOMEN 1 VIEW: CPT

## 2025-08-17 PROCEDURE — 85027 COMPLETE CBC AUTOMATED: CPT | Performed by: INTERNAL MEDICINE

## 2025-08-17 PROCEDURE — 2500000004 HC RX 250 GENERAL PHARMACY W/ HCPCS (ALT 636 FOR OP/ED): Performed by: STUDENT IN AN ORGANIZED HEALTH CARE EDUCATION/TRAINING PROGRAM

## 2025-08-17 PROCEDURE — 82947 ASSAY GLUCOSE BLOOD QUANT: CPT

## 2025-08-17 PROCEDURE — 1100000001 HC PRIVATE ROOM DAILY

## 2025-08-17 PROCEDURE — 2500000002 HC RX 250 W HCPCS SELF ADMINISTERED DRUGS (ALT 637 FOR MEDICARE OP, ALT 636 FOR OP/ED): Performed by: INTERNAL MEDICINE

## 2025-08-17 PROCEDURE — 83735 ASSAY OF MAGNESIUM: CPT | Performed by: INTERNAL MEDICINE

## 2025-08-17 RX ORDER — LEVOFLOXACIN 5 MG/ML
750 INJECTION, SOLUTION INTRAVENOUS
Status: DISCONTINUED | OUTPATIENT
Start: 2025-08-17 | End: 2025-08-20 | Stop reason: HOSPADM

## 2025-08-17 RX ORDER — ONDANSETRON HYDROCHLORIDE 2 MG/ML
4 INJECTION, SOLUTION INTRAVENOUS EVERY 4 HOURS PRN
Status: DISCONTINUED | OUTPATIENT
Start: 2025-08-17 | End: 2025-08-20 | Stop reason: HOSPADM

## 2025-08-17 RX ORDER — LACTULOSE 10 G/15ML
20 SOLUTION ORAL ONCE
Status: COMPLETED | OUTPATIENT
Start: 2025-08-17 | End: 2025-08-17

## 2025-08-17 RX ORDER — BISACODYL 10 MG/1
10 SUPPOSITORY RECTAL ONCE
Status: COMPLETED | OUTPATIENT
Start: 2025-08-17 | End: 2025-08-17

## 2025-08-17 RX ORDER — SIMETHICONE 80 MG
80 TABLET,CHEWABLE ORAL 4 TIMES DAILY PRN
Status: DISCONTINUED | OUTPATIENT
Start: 2025-08-17 | End: 2025-08-20 | Stop reason: HOSPADM

## 2025-08-17 RX ORDER — ONDANSETRON 4 MG/1
4 TABLET, FILM COATED ORAL EVERY 4 HOURS PRN
Status: DISCONTINUED | OUTPATIENT
Start: 2025-08-17 | End: 2025-08-20 | Stop reason: HOSPADM

## 2025-08-17 RX ADMIN — TAMSULOSIN HYDROCHLORIDE 0.4 MG: 0.4 CAPSULE ORAL at 09:02

## 2025-08-17 RX ADMIN — POLYETHYLENE GLYCOL 3350 17 G: 17 POWDER, FOR SOLUTION ORAL at 20:38

## 2025-08-17 RX ADMIN — ACETAMINOPHEN 650 MG: 325 TABLET ORAL at 20:40

## 2025-08-17 RX ADMIN — INSULIN LISPRO 2 UNITS: 100 INJECTION, SOLUTION INTRAVENOUS; SUBCUTANEOUS at 09:01

## 2025-08-17 RX ADMIN — HEPARIN SODIUM 5000 UNITS: 5000 INJECTION INTRAVENOUS; SUBCUTANEOUS at 09:01

## 2025-08-17 RX ADMIN — METOPROLOL TARTRATE 12.5 MG: 25 TABLET, FILM COATED ORAL at 21:32

## 2025-08-17 RX ADMIN — ONDANSETRON HYDROCHLORIDE 4 MG: 4 TABLET, FILM COATED ORAL at 13:11

## 2025-08-17 RX ADMIN — INSULIN LISPRO 2 UNITS: 100 INJECTION, SOLUTION INTRAVENOUS; SUBCUTANEOUS at 14:05

## 2025-08-17 RX ADMIN — CLOPIDOGREL BISULFATE 75 MG: 75 TABLET, FILM COATED ORAL at 09:02

## 2025-08-17 RX ADMIN — METOPROLOL TARTRATE 12.5 MG: 25 TABLET, FILM COATED ORAL at 09:01

## 2025-08-17 RX ADMIN — ACETAMINOPHEN 650 MG: 325 TABLET ORAL at 06:15

## 2025-08-17 RX ADMIN — SENNOSIDES, DOCUSATE SODIUM 2 TABLET: 50; 8.6 TABLET, FILM COATED ORAL at 09:03

## 2025-08-17 RX ADMIN — LACTULOSE 20 G: 20 SOLUTION ORAL at 17:41

## 2025-08-17 RX ADMIN — OXYCODONE HYDROCHLORIDE 5 MG: 5 TABLET ORAL at 09:02

## 2025-08-17 RX ADMIN — LIDOCAINE 4% 1 PATCH: 40 PATCH TOPICAL at 21:44

## 2025-08-17 RX ADMIN — SENNOSIDES, DOCUSATE SODIUM 2 TABLET: 50; 8.6 TABLET, FILM COATED ORAL at 20:37

## 2025-08-17 RX ADMIN — ACETAMINOPHEN 650 MG: 325 TABLET ORAL at 14:05

## 2025-08-17 RX ADMIN — ACETAMINOPHEN 650 MG: 325 TABLET ORAL at 00:09

## 2025-08-17 RX ADMIN — OXYCODONE HYDROCHLORIDE 10 MG: 10 TABLET ORAL at 14:05

## 2025-08-17 RX ADMIN — FINASTERIDE 5 MG: 5 TABLET, FILM COATED ORAL at 09:02

## 2025-08-17 RX ADMIN — COLCHICINE 0.6 MG: 0.6 TABLET, FILM COATED ORAL at 20:38

## 2025-08-17 RX ADMIN — ONDANSETRON HYDROCHLORIDE 4 MG: 4 TABLET, FILM COATED ORAL at 09:09

## 2025-08-17 RX ADMIN — BISACODYL 10 MG: 10 SUPPOSITORY RECTAL at 17:42

## 2025-08-17 RX ADMIN — HEPARIN SODIUM 5000 UNITS: 5000 INJECTION INTRAVENOUS; SUBCUTANEOUS at 17:41

## 2025-08-17 RX ADMIN — ONDANSETRON 4 MG: 2 INJECTION INTRAMUSCULAR; INTRAVENOUS at 17:42

## 2025-08-17 RX ADMIN — HEPARIN SODIUM 5000 UNITS: 5000 INJECTION INTRAVENOUS; SUBCUTANEOUS at 01:31

## 2025-08-17 RX ADMIN — ATORVASTATIN CALCIUM 40 MG: 40 TABLET, FILM COATED ORAL at 20:38

## 2025-08-17 RX ADMIN — LEVOFLOXACIN 750 MG: 5 INJECTION, SOLUTION INTRAVENOUS at 09:01

## 2025-08-17 RX ADMIN — ASPIRIN 81 MG CHEWABLE TABLET 81 MG: 81 TABLET CHEWABLE at 09:01

## 2025-08-17 RX ADMIN — COLCHICINE 0.6 MG: 0.6 TABLET, FILM COATED ORAL at 09:02

## 2025-08-17 ASSESSMENT — PAIN - FUNCTIONAL ASSESSMENT
PAIN_FUNCTIONAL_ASSESSMENT: 0-10

## 2025-08-17 ASSESSMENT — PAIN SCALES - GENERAL
PAINLEVEL_OUTOF10: 0 - NO PAIN
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
## 2025-08-17 NOTE — SIGNIFICANT EVENT
GENERAL NEUROLOGY BRIEF UPDATE NOTE    Assessment & Plan  Duane Scott is a 75-year-old right-handed male with past medical history significant for coronary artery disease s/p multiple PCIs (most recently LAD, Mar 2025), history of cardiac arrest due to acute myocardial infarction, type 2 diabetes mellitus, hypertension, hyperlipidemia, stroke, CKD, and HFpEF admitted for non-ST elevation myocardial infarction. Neurology consulted for whole-body tremors.     Drug-induced fasciculations may occur with certain medications, including SSRIs, lithium, and corticosteroids though tremor is more common than fasciculations. Electrolyte disturbances, particularly acute hypomagnesemia, hypocalcemia (acute hypoparathyroidism or postoperative hypoparathyroidism), or hypokalemia can cause neuromuscular irritability and diffuse fasciculations, especially when severe (<1.2 mg/dL). Cholinergic toxin exposure leads to diffuse fasciculations as part of nicotinic toxicity. The hyperacute onset does not necessarily align with these as causes. No medications were administered in the time leading up to the event. Thyrotoxicosis can present with neuromuscular irritability, including muscle fasciculations, in the context of other hyperadrenergic symptoms. TSH elevated at 5.92 on 8/6, improved on 8/16. PTH normal at 35.0 on 8/17. Again, this may not be compatible with the hyperacute onset. Pain seems the most likely precursor, given the report of dysuria prior to the event.     Impression: Tremor-like movements, unspecified, with generalized fasciculations    Recommendations  Outpatient EMG/NCS if abnormal movements recur  Outpatient Neurology follow-up to monitor for movement recurrence  Neurology will sign-off; please reach out if there are additional questions    This patient was seen, discussed and examined with the attending, Dr. Cruz, who agrees with the management plan.    Anthony Westfall MD  Neurology Resident, PGY-2  General  Neurology: [id number]

## 2025-08-18 LAB
ALBUMIN SERPL BCP-MCNC: 3.4 G/DL (ref 3.4–5)
ANION GAP SERPL CALC-SCNC: 12 MMOL/L (ref 10–20)
BUN SERPL-MCNC: 50 MG/DL (ref 6–23)
CALCIUM SERPL-MCNC: 9 MG/DL (ref 8.6–10.6)
CHLORIDE SERPL-SCNC: 102 MMOL/L (ref 98–107)
CO2 SERPL-SCNC: 29 MMOL/L (ref 21–32)
CREAT SERPL-MCNC: 1.44 MG/DL (ref 0.5–1.3)
EGFRCR SERPLBLD CKD-EPI 2021: 51 ML/MIN/1.73M*2
ERYTHROCYTE [DISTWIDTH] IN BLOOD BY AUTOMATED COUNT: 14.7 % (ref 11.5–14.5)
GLUCOSE BLD MANUAL STRIP-MCNC: 138 MG/DL (ref 74–99)
GLUCOSE BLD MANUAL STRIP-MCNC: 171 MG/DL (ref 74–99)
GLUCOSE BLD MANUAL STRIP-MCNC: 204 MG/DL (ref 74–99)
GLUCOSE BLD MANUAL STRIP-MCNC: 215 MG/DL (ref 74–99)
GLUCOSE BLD MANUAL STRIP-MCNC: 224 MG/DL (ref 74–99)
GLUCOSE BLD MANUAL STRIP-MCNC: 245 MG/DL (ref 74–99)
GLUCOSE SERPL-MCNC: 127 MG/DL (ref 74–99)
HCT VFR BLD AUTO: 30.3 % (ref 41–52)
HGB BLD-MCNC: 9.5 G/DL (ref 13.5–17.5)
HOLD SPECIMEN: NORMAL
MAGNESIUM SERPL-MCNC: 2.26 MG/DL (ref 1.6–2.4)
MCH RBC QN AUTO: 29.5 PG (ref 26–34)
MCHC RBC AUTO-ENTMCNC: 31.4 G/DL (ref 32–36)
MCV RBC AUTO: 94 FL (ref 80–100)
NRBC BLD-RTO: 0 /100 WBCS (ref 0–0)
PHOSPHATE SERPL-MCNC: 3.6 MG/DL (ref 2.5–4.9)
PLATELET # BLD AUTO: 121 X10*3/UL (ref 150–450)
POTASSIUM SERPL-SCNC: 4.4 MMOL/L (ref 3.5–5.3)
RBC # BLD AUTO: 3.22 X10*6/UL (ref 4.5–5.9)
SODIUM SERPL-SCNC: 139 MMOL/L (ref 136–145)
WBC # BLD AUTO: 3.9 X10*3/UL (ref 4.4–11.3)

## 2025-08-18 PROCEDURE — 2500000004 HC RX 250 GENERAL PHARMACY W/ HCPCS (ALT 636 FOR OP/ED): Performed by: INTERNAL MEDICINE

## 2025-08-18 PROCEDURE — 97530 THERAPEUTIC ACTIVITIES: CPT | Mod: GP,CQ

## 2025-08-18 PROCEDURE — 80069 RENAL FUNCTION PANEL: CPT | Performed by: INTERNAL MEDICINE

## 2025-08-18 PROCEDURE — 1200000002 HC GENERAL ROOM WITH TELEMETRY DAILY

## 2025-08-18 PROCEDURE — 99233 SBSQ HOSP IP/OBS HIGH 50: CPT | Performed by: INTERNAL MEDICINE

## 2025-08-18 PROCEDURE — 2500000002 HC RX 250 W HCPCS SELF ADMINISTERED DRUGS (ALT 637 FOR MEDICARE OP, ALT 636 FOR OP/ED): Performed by: INTERNAL MEDICINE

## 2025-08-18 PROCEDURE — 2500000001 HC RX 250 WO HCPCS SELF ADMINISTERED DRUGS (ALT 637 FOR MEDICARE OP): Performed by: INTERNAL MEDICINE

## 2025-08-18 PROCEDURE — 97116 GAIT TRAINING THERAPY: CPT | Mod: GP,CQ

## 2025-08-18 PROCEDURE — 83735 ASSAY OF MAGNESIUM: CPT | Performed by: INTERNAL MEDICINE

## 2025-08-18 PROCEDURE — 82947 ASSAY GLUCOSE BLOOD QUANT: CPT

## 2025-08-18 PROCEDURE — 36415 COLL VENOUS BLD VENIPUNCTURE: CPT | Performed by: INTERNAL MEDICINE

## 2025-08-18 PROCEDURE — 85027 COMPLETE CBC AUTOMATED: CPT | Performed by: INTERNAL MEDICINE

## 2025-08-18 RX ORDER — METOPROLOL TARTRATE 25 MG/1
12.5 TABLET, FILM COATED ORAL 2 TIMES DAILY
Qty: 30 TABLET | Refills: 0 | OUTPATIENT
Start: 2025-08-18

## 2025-08-18 RX ADMIN — TAMSULOSIN HYDROCHLORIDE 0.4 MG: 0.4 CAPSULE ORAL at 09:25

## 2025-08-18 RX ADMIN — HEPARIN SODIUM 5000 UNITS: 5000 INJECTION INTRAVENOUS; SUBCUTANEOUS at 01:13

## 2025-08-18 RX ADMIN — OXYCODONE HYDROCHLORIDE 10 MG: 10 TABLET ORAL at 18:47

## 2025-08-18 RX ADMIN — METOPROLOL TARTRATE 12.5 MG: 25 TABLET, FILM COATED ORAL at 09:25

## 2025-08-18 RX ADMIN — METOPROLOL TARTRATE 12.5 MG: 25 TABLET, FILM COATED ORAL at 20:41

## 2025-08-18 RX ADMIN — INSULIN LISPRO 2 UNITS: 100 INJECTION, SOLUTION INTRAVENOUS; SUBCUTANEOUS at 18:49

## 2025-08-18 RX ADMIN — HEPARIN SODIUM 5000 UNITS: 5000 INJECTION INTRAVENOUS; SUBCUTANEOUS at 09:24

## 2025-08-18 RX ADMIN — ACETAMINOPHEN 650 MG: 325 TABLET ORAL at 12:22

## 2025-08-18 RX ADMIN — ASPIRIN 81 MG CHEWABLE TABLET 81 MG: 81 TABLET CHEWABLE at 09:24

## 2025-08-18 RX ADMIN — COLCHICINE 0.6 MG: 0.6 TABLET, FILM COATED ORAL at 09:25

## 2025-08-18 RX ADMIN — ACETAMINOPHEN 650 MG: 325 TABLET ORAL at 18:47

## 2025-08-18 RX ADMIN — OXYCODONE HYDROCHLORIDE 10 MG: 10 TABLET ORAL at 09:29

## 2025-08-18 RX ADMIN — SENNOSIDES, DOCUSATE SODIUM 2 TABLET: 50; 8.6 TABLET, FILM COATED ORAL at 20:42

## 2025-08-18 RX ADMIN — POLYETHYLENE GLYCOL 3350 17 G: 17 POWDER, FOR SOLUTION ORAL at 20:42

## 2025-08-18 RX ADMIN — ATORVASTATIN CALCIUM 40 MG: 40 TABLET, FILM COATED ORAL at 20:41

## 2025-08-18 RX ADMIN — ACETAMINOPHEN 650 MG: 325 TABLET ORAL at 00:05

## 2025-08-18 RX ADMIN — COLCHICINE 0.6 MG: 0.6 TABLET, FILM COATED ORAL at 20:41

## 2025-08-18 RX ADMIN — ACETAMINOPHEN 650 MG: 325 TABLET ORAL at 06:22

## 2025-08-18 RX ADMIN — INSULIN LISPRO 4 UNITS: 100 INJECTION, SOLUTION INTRAVENOUS; SUBCUTANEOUS at 12:22

## 2025-08-18 RX ADMIN — CLOPIDOGREL BISULFATE 75 MG: 75 TABLET, FILM COATED ORAL at 09:25

## 2025-08-18 RX ADMIN — OXYCODONE HYDROCHLORIDE 5 MG: 5 TABLET ORAL at 00:13

## 2025-08-18 RX ADMIN — FINASTERIDE 5 MG: 5 TABLET, FILM COATED ORAL at 09:25

## 2025-08-18 ASSESSMENT — COGNITIVE AND FUNCTIONAL STATUS - GENERAL
DRESSING REGULAR LOWER BODY CLOTHING: A LITTLE
DRESSING REGULAR UPPER BODY CLOTHING: A LITTLE
STANDING UP FROM CHAIR USING ARMS: A LITTLE
TURNING FROM BACK TO SIDE WHILE IN FLAT BAD: A LITTLE
CLIMB 3 TO 5 STEPS WITH RAILING: A LOT
WALKING IN HOSPITAL ROOM: A LITTLE
DAILY ACTIVITIY SCORE: 20
MOBILITY SCORE: 16
CLIMB 3 TO 5 STEPS WITH RAILING: A LOT
TURNING FROM BACK TO SIDE WHILE IN FLAT BAD: A LOT
MOVING TO AND FROM BED TO CHAIR: A LITTLE
MOBILITY SCORE: 17
MOVING FROM LYING ON BACK TO SITTING ON SIDE OF FLAT BED WITH BEDRAILS: A LITTLE
MOVING FROM LYING ON BACK TO SITTING ON SIDE OF FLAT BED WITH BEDRAILS: A LITTLE
STANDING UP FROM CHAIR USING ARMS: A LITTLE
WALKING IN HOSPITAL ROOM: A LITTLE
HELP NEEDED FOR BATHING: A LITTLE
MOVING TO AND FROM BED TO CHAIR: A LITTLE
TOILETING: A LITTLE

## 2025-08-18 ASSESSMENT — PAIN SCALES - GENERAL
PAINLEVEL_OUTOF10: 7
PAINLEVEL_OUTOF10: 7
PAINLEVEL_OUTOF10: 5 - MODERATE PAIN
PAINLEVEL_OUTOF10: 2
PAINLEVEL_OUTOF10: 3
PAINLEVEL_OUTOF10: 6
PAINLEVEL_OUTOF10: 0 - NO PAIN
PAINLEVEL_OUTOF10: 4

## 2025-08-18 ASSESSMENT — PAIN DESCRIPTION - LOCATION
LOCATION: INCISION
LOCATION: INCISION

## 2025-08-18 ASSESSMENT — PAIN - FUNCTIONAL ASSESSMENT
PAIN_FUNCTIONAL_ASSESSMENT: 0-10

## 2025-08-18 ASSESSMENT — PAIN DESCRIPTION - DESCRIPTORS
DESCRIPTORS: ACHING

## 2025-08-18 ASSESSMENT — PAIN DESCRIPTION - ORIENTATION
ORIENTATION: MID
ORIENTATION: LEFT

## 2025-08-18 NOTE — PROGRESS NOTES
Physical Therapy    Physical Therapy Treatment    Patient Name: Duane Scott  MRN: [id number]  Department: [place] [id number]  Room: [address] [place] [address]  Today's Date: 8/18/2025  Time Calculation  Start Time: 1039  Stop Time: 1103  Time Calculation (min): 24 min         Assessment/Plan   PT Assessment  PT Assessment Results: Decreased strength, Decreased endurance, Impaired balance, Decreased mobility, Pain, Decreased safety awareness  Rehab Prognosis: Good  Barriers to Discharge Home: Physical needs, Caregiver assistance  Caregiver Assistance: Caregiver assistance needed per identified barriers - however, level of patient's required assistance exceeds assistance available at home  Physical Needs: Stair navigation into home limited by function/safety, Ambulating household distances limited by function/safety, Stair navigation to access bath limited by function/safety, Intermittent mobility assistance needed, Intermittent ADL assistance needed, High falls risk due to function or environment  Evaluation/Treatment Tolerance: Patient tolerated treatment well  Medical Staff Made Aware: Yes  End of Session Communication: Bedside nurse  Assessment Comment: Pt progressing with transfers and ambulation. Tinetti=14/28. Pt remains appropriate for mod intensity therapy.  End of Session Patient Position: Up in chair, Alarm off, not on at start of session     PT Plan  Treatment/Interventions: Bed mobility, Transfer training, Gait training, Stair training, Balance training, Strengthening, Endurance training, Therapeutic exercise, Therapeutic activity, Home exercise program  PT Plan: Ongoing PT  PT Frequency for Current Admission: 4 times per week during this acute inpatient hospitalization  PT Discharge Recommendations: Moderate intensity level of continued care, Based on current functional status and rehab potential, patient is anticipated to tolerate and benefit from 5 or more days per week of skilled rehabilitative therapy after discharge from  this acute inpatient hospitalization  Equipment Recommended upon Discharge: Wheeled walker  PT Recommended Transfer Status: Assist x1, Assistive device  PT - OK to Discharge: Yes    PT Visit Info:  PT Received On: 08/18/25  Response to Previous Treatment: Patient with no complaints from previous session.     General Visit Information:   General  Prior to Session Communication: Bedside nurse  Patient Position Received: Up in chair, Alarm off, not on at start of session  General Comment: Pt up in chair, agreeable to therapy    Subjective   Precautions:  Precautions  Medical Precautions: Cardiac precautions, Fall precautions    Objective   Pain:  Pain Assessment  Pain Assessment: 0-10  0-10 (Numeric) Pain Score: 4  Pain Type: Surgical pain  Pain Location: Incision  Pain Interventions: Distraction, Emotional support (Pt medicated prior to therapy)  Cognition:  Cognition  Orientation Level: Oriented X4    Treatments:  Therapeutic Exercise  Therapeutic Exercise Performed: Yes  Therapeutic Exercise Activity 1: Seated AP, LAQs, hip flex 10x/LE    Balance/Neuromuscular Re-Education  Balance/Neuromuscular Re-Education Activity Performed: Yes  Balance/Neuromuscular Re-Education Activity 1: Tinetti assessment completed with 14/28 score    Ambulation/Gait Training  Ambulation/Gait Training Performed: Yes  Ambulation/Gait Training 1  Surface 1: Level tile  Device 1: Rolling walker  Assistance 1: Contact guard  Quality of Gait 1: Wide base of support, Diminished heel strike, Decreased step length, Forward flexed posture, Soft knee(s)  Comments/Distance (ft) 1: 150'x1  Transfers  Transfer: Yes  Transfer 1  Transfer From 1: Sit to, Stand to  Transfer to 1: Sit  Technique 1: Sit to stand, Stand to sit  Transfer Device 1: Walker  Transfer Level of Assistance 1: Contact guard  Trials/Comments 1: 7x throughout session    Outcome Measures:     [facility] Basic Mobility  Turning from your back to your side while in a flat bed without using  bedrails: A little  Moving from lying on your back to sitting on the side of a flat bed without using bedrails: A lot  Moving to and from bed to chair (including a wheelchair): A little  Standing up from a chair using your arms (e.g. wheelchair or bedside chair): A little  To walk in hospital room: A little  Climbing 3-5 steps with railing: A lot  Basic Mobility - Total Score: 16    Tinetti  Sitting Balance: Steady, safe  Arises: Able, uses arms to help  Attempts to Arise: Able to arise, one attempt  Immediate Standing Balance (First 5 Seconds): Steady without walker or other support  Standing Balance: Steady but wide stance, uses cane or other support  Nudged: Staggers, grabs, catches self  Eyes Closed: Unsteady  Turned 360 Degrees: Steadiness: Steady  Turned 360 Degrees: Continuity of Steps: Discontinuous steps  Sitting Down: Uses arms or not a smooth motion  Balance Score: 10  Initiation of Gait: No hesitancy  Step Height: R Swing Foot: Right foot does not clear floor completely with step  Step Length: R Swing Foot: Does not pass left stance foot with step  Step Height: L Swing Foot: Left foot does not clear floor completely with step  Step Length: L Swing Foot: Does not pass right stance foot with step  Step Symmetry: Right and left step appear equal  Step Continuity: Steps appear continuous  Path: Mild/moderate deviation or uses walking aid  Trunk: Marked sway or uses walking aid  Walking Time: Heels apart  Gait Score: 4  Total Score: 14    Education Documentation  Precautions, taught by Kim De La O PTA at 8/18/2025 11:35 AM.  Learner: Patient  Readiness: Acceptance  Method: Demonstration, Explanation  Response: Verbalizes Understanding, Needs Reinforcement  Comment: precautions, safe mobility, HEP    Body Mechanics, taught by Kim De La O PTA at 8/18/2025 11:35 AM.  Learner: Patient  Readiness: Acceptance  Method: Demonstration, Explanation  Response: Verbalizes Understanding, Needs  Reinforcement  Comment: precautions, safe mobility, HEP    Home Exercise Program, taught by Kim De La O PTA at 8/18/2025 11:35 AM.  Learner: Patient  Readiness: Acceptance  Method: Demonstration, Explanation  Response: Verbalizes Understanding, Needs Reinforcement  Comment: precautions, safe mobility, HEP    Mobility Training, taught by Kim De La O PTA at 8/18/2025 11:35 AM.  Learner: Patient  Readiness: Acceptance  Method: Demonstration, Explanation  Response: Verbalizes Understanding, Needs Reinforcement  Comment: precautions, safe mobility, HEP    Education Comments  No comments found.        OP EDUCATION:       Encounter Problems       Encounter Problems (Active)       Balance       Patient to demo static standing with unilateral UE support, performing single UE task with SBA, no sway or LOB x 2 mins for functional carryover  (Progressing)       Start:  08/13/25    Expected End:  08/27/25            Pt will complete TUG in </=14 seconds with LRAD (Progressing)       Start:  08/13/25    Expected End:  08/27/25               Mobility       STG - Patient will ambulate >/= 100 ft Sami with LRAD and no acute LOB (Progressing)       Start:  08/13/25    Expected End:  08/27/25            Patient to ascend/descend >/= 2 stairs without HR and SBA to demo ability to safely traverse [facility] and within home (Progressing)       Start:  08/13/25    Expected End:  08/27/25            Pt. will tolerate >/= 20 minutes of OOB mobility without seated rest break and VSS to demo improved activity tolerance/endurance.  (Progressing)       Start:  08/13/25    Expected End:  08/27/25               PT Transfers       STG - Patient will perform bed mobility IND (Progressing)       Start:  08/13/25    Expected End:  08/27/25            STG - Patient will transfer sit to and from stand Sami with LRAD (Progressing)       Start:  08/13/25    Expected End:  08/27/25                 ELIANA Robbins

## 2025-08-18 NOTE — CARE PLAN
The patient's goals for the shift include remain hds    The clinical goals for the shift include Patient will remain HDS throughout shift      Problem: Pain - Adult  Goal: Verbalizes/displays adequate comfort level or baseline comfort level  Outcome: Progressing     Problem: Discharge Planning  Goal: Discharge to home or other facility with appropriate resources  Outcome: Progressing     Problem: Chronic Conditions and Co-morbidities  Goal: Patient's chronic conditions and co-morbidity symptoms are monitored and maintained or improved  Outcome: Progressing     Problem: Nutrition  Goal: Nutrient intake appropriate for maintaining nutritional needs  Outcome: Progressing     Problem: Fall/Injury  Goal: Not fall by end of shift  Outcome: Progressing  Goal: Be free from injury by end of the shift  Outcome: Progressing  Goal: Verbalize understanding of personal risk factors for fall in the hospital  Outcome: Progressing  Goal: Verbalize understanding of risk factor reduction measures to prevent injury from fall in the home  Outcome: Progressing  Goal: Use assistive devices by end of the shift  Outcome: Progressing  Goal: Pace activities to prevent fatigue by end of the shift  Outcome: Progressing     Problem: Heart Failure  Goal: Improved gas exchange this shift  Outcome: Progressing  Goal: Improved urinary output this shift  Outcome: Progressing  Goal: Reduction in peripheral edema within 24 hours  Outcome: Progressing  Goal: Report improvement of dyspnea/breathlessness this shift  Outcome: Progressing  Goal: Weight from fluid excess reduced over 2-3 days, then stabilize  Outcome: Progressing  Goal: Increase self care and/or family involvement in 24 hours  Outcome: Progressing     Problem: Skin  Goal: Decreased wound size/increased tissue granulation at next dressing change  Outcome: Progressing  Goal: Participates in plan/prevention/treatment measures  Outcome: Progressing  Goal: Prevent/manage excess moisture  Outcome:  Progressing  Goal: Prevent/minimize sheer/friction injuries  Outcome: Progressing  Goal: Promote/optimize nutrition  Outcome: Progressing  Goal: Promote skin healing  Outcome: Progressing

## 2025-08-18 NOTE — PROGRESS NOTES
Per MD patient is ready for discharge. Pt has a confirmed 1:30  time by [facility]. Facility was notified, patient and nurse.  Lindsey York RN  Notified by MD patient is no longer medically ready for discharge. Transport was cancelled.
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
## 2025-08-19 ENCOUNTER — APPOINTMENT (OUTPATIENT)
Dept: RADIOLOGY | Facility: HOSPITAL | Age: 75
DRG: 236 | End: 2025-08-19
Payer: MEDICARE

## 2025-08-19 LAB
ALBUMIN SERPL BCP-MCNC: 3.4 G/DL (ref 3.4–5)
ALBUMIN SERPL BCP-MCNC: 3.4 G/DL (ref 3.4–5)
ALP SERPL-CCNC: 495 U/L (ref 33–136)
ALT SERPL W P-5'-P-CCNC: 107 U/L (ref 10–52)
ANION GAP SERPL CALC-SCNC: 12 MMOL/L (ref 10–20)
AST SERPL W P-5'-P-CCNC: 238 U/L (ref 9–39)
BILIRUB DIRECT SERPL-MCNC: 0.5 MG/DL (ref 0–0.3)
BILIRUB SERPL-MCNC: 1.3 MG/DL (ref 0–1.2)
BUN SERPL-MCNC: 45 MG/DL (ref 6–23)
CALCIUM SERPL-MCNC: 9.1 MG/DL (ref 8.6–10.6)
CHLORIDE SERPL-SCNC: 101 MMOL/L (ref 98–107)
CO2 SERPL-SCNC: 29 MMOL/L (ref 21–32)
CREAT SERPL-MCNC: 1.33 MG/DL (ref 0.5–1.3)
EGFRCR SERPLBLD CKD-EPI 2021: 56 ML/MIN/1.73M*2
ERYTHROCYTE [DISTWIDTH] IN BLOOD BY AUTOMATED COUNT: 14.5 % (ref 11.5–14.5)
GLUCOSE BLD MANUAL STRIP-MCNC: 106 MG/DL (ref 74–99)
GLUCOSE BLD MANUAL STRIP-MCNC: 129 MG/DL (ref 74–99)
GLUCOSE BLD MANUAL STRIP-MCNC: 147 MG/DL (ref 74–99)
GLUCOSE BLD MANUAL STRIP-MCNC: 261 MG/DL (ref 74–99)
GLUCOSE SERPL-MCNC: 165 MG/DL (ref 74–99)
HCT VFR BLD AUTO: 30.5 % (ref 41–52)
HGB BLD-MCNC: 9.7 G/DL (ref 13.5–17.5)
IRON SATN MFR SERPL: 27 % (ref 25–45)
IRON SERPL-MCNC: 55 UG/DL (ref 35–150)
MAGNESIUM SERPL-MCNC: 2.08 MG/DL (ref 1.6–2.4)
MCH RBC QN AUTO: 30 PG (ref 26–34)
MCHC RBC AUTO-ENTMCNC: 31.8 G/DL (ref 32–36)
MCV RBC AUTO: 94 FL (ref 80–100)
NRBC BLD-RTO: 0 /100 WBCS (ref 0–0)
PHOSPHATE SERPL-MCNC: 3.6 MG/DL (ref 2.5–4.9)
PLATELET # BLD AUTO: 123 X10*3/UL (ref 150–450)
POTASSIUM SERPL-SCNC: 4.5 MMOL/L (ref 3.5–5.3)
PROT SERPL-MCNC: 6.7 G/DL (ref 6.4–8.2)
RBC # BLD AUTO: 3.23 X10*6/UL (ref 4.5–5.9)
SODIUM SERPL-SCNC: 137 MMOL/L (ref 136–145)
TIBC SERPL-MCNC: 206 UG/DL (ref 240–445)
UIBC SERPL-MCNC: 151 UG/DL (ref 110–370)
VIT B12 SERPL-MCNC: 966 PG/ML (ref 211–911)
WBC # BLD AUTO: 3.5 X10*3/UL (ref 4.4–11.3)

## 2025-08-19 PROCEDURE — 2500000002 HC RX 250 W HCPCS SELF ADMINISTERED DRUGS (ALT 637 FOR MEDICARE OP, ALT 636 FOR OP/ED): Performed by: INTERNAL MEDICINE

## 2025-08-19 PROCEDURE — 82565 ASSAY OF CREATININE: CPT | Performed by: INTERNAL MEDICINE

## 2025-08-19 PROCEDURE — 71045 X-RAY EXAM CHEST 1 VIEW: CPT

## 2025-08-19 PROCEDURE — 2500000001 HC RX 250 WO HCPCS SELF ADMINISTERED DRUGS (ALT 637 FOR MEDICARE OP): Performed by: STUDENT IN AN ORGANIZED HEALTH CARE EDUCATION/TRAINING PROGRAM

## 2025-08-19 PROCEDURE — 83735 ASSAY OF MAGNESIUM: CPT | Performed by: INTERNAL MEDICINE

## 2025-08-19 PROCEDURE — 2500000002 HC RX 250 W HCPCS SELF ADMINISTERED DRUGS (ALT 637 FOR MEDICARE OP, ALT 636 FOR OP/ED): Performed by: STUDENT IN AN ORGANIZED HEALTH CARE EDUCATION/TRAINING PROGRAM

## 2025-08-19 PROCEDURE — 2500000001 HC RX 250 WO HCPCS SELF ADMINISTERED DRUGS (ALT 637 FOR MEDICARE OP): Performed by: INTERNAL MEDICINE

## 2025-08-19 PROCEDURE — 84075 ASSAY ALKALINE PHOSPHATASE: CPT | Performed by: STUDENT IN AN ORGANIZED HEALTH CARE EDUCATION/TRAINING PROGRAM

## 2025-08-19 PROCEDURE — 83540 ASSAY OF IRON: CPT | Performed by: STUDENT IN AN ORGANIZED HEALTH CARE EDUCATION/TRAINING PROGRAM

## 2025-08-19 PROCEDURE — 36415 COLL VENOUS BLD VENIPUNCTURE: CPT | Performed by: INTERNAL MEDICINE

## 2025-08-19 PROCEDURE — 2500000004 HC RX 250 GENERAL PHARMACY W/ HCPCS (ALT 636 FOR OP/ED): Performed by: STUDENT IN AN ORGANIZED HEALTH CARE EDUCATION/TRAINING PROGRAM

## 2025-08-19 PROCEDURE — 1200000002 HC GENERAL ROOM WITH TELEMETRY DAILY

## 2025-08-19 PROCEDURE — 51702 INSERT TEMP BLADDER CATH: CPT

## 2025-08-19 PROCEDURE — 74018 RADEX ABDOMEN 1 VIEW: CPT

## 2025-08-19 PROCEDURE — 2500000005 HC RX 250 GENERAL PHARMACY W/O HCPCS: Performed by: STUDENT IN AN ORGANIZED HEALTH CARE EDUCATION/TRAINING PROGRAM

## 2025-08-19 PROCEDURE — 36415 COLL VENOUS BLD VENIPUNCTURE: CPT | Performed by: STUDENT IN AN ORGANIZED HEALTH CARE EDUCATION/TRAINING PROGRAM

## 2025-08-19 PROCEDURE — 82607 VITAMIN B-12: CPT | Performed by: STUDENT IN AN ORGANIZED HEALTH CARE EDUCATION/TRAINING PROGRAM

## 2025-08-19 PROCEDURE — 85027 COMPLETE CBC AUTOMATED: CPT | Performed by: INTERNAL MEDICINE

## 2025-08-19 PROCEDURE — 2500000004 HC RX 250 GENERAL PHARMACY W/ HCPCS (ALT 636 FOR OP/ED): Performed by: PHYSICIAN ASSISTANT

## 2025-08-19 PROCEDURE — 2500000004 HC RX 250 GENERAL PHARMACY W/ HCPCS (ALT 636 FOR OP/ED): Performed by: INTERNAL MEDICINE

## 2025-08-19 PROCEDURE — 99233 SBSQ HOSP IP/OBS HIGH 50: CPT | Performed by: STUDENT IN AN ORGANIZED HEALTH CARE EDUCATION/TRAINING PROGRAM

## 2025-08-19 PROCEDURE — 82947 ASSAY GLUCOSE BLOOD QUANT: CPT

## 2025-08-19 RX ORDER — ATORVASTATIN CALCIUM 40 MG/1
40 TABLET, FILM COATED ORAL NIGHTLY
Start: 2025-08-19

## 2025-08-19 RX ORDER — INSULIN LISPRO 100 [IU]/ML
0-15 INJECTION, SOLUTION INTRAVENOUS; SUBCUTANEOUS
Status: DISCONTINUED | OUTPATIENT
Start: 2025-08-19 | End: 2025-08-20 | Stop reason: HOSPADM

## 2025-08-19 RX ORDER — MULTIVIT-MIN/IRON FUM/FOLIC AC 7.5 MG-4
1 TABLET ORAL DAILY
Start: 2025-08-20

## 2025-08-19 RX ORDER — MULTIVIT-MIN/IRON FUM/FOLIC AC 7.5 MG-4
1 TABLET ORAL DAILY
Status: DISCONTINUED | OUTPATIENT
Start: 2025-08-20 | End: 2025-08-20 | Stop reason: HOSPADM

## 2025-08-19 RX ORDER — ATORVASTATIN CALCIUM 80 MG/1
80 TABLET, FILM COATED ORAL NIGHTLY
Status: DISCONTINUED | OUTPATIENT
Start: 2025-08-19 | End: 2025-08-19

## 2025-08-19 RX ORDER — TAMSULOSIN HYDROCHLORIDE 0.4 MG/1
0.8 CAPSULE ORAL NIGHTLY
Status: DISCONTINUED | OUTPATIENT
Start: 2025-08-19 | End: 2025-08-20 | Stop reason: HOSPADM

## 2025-08-19 RX ORDER — ATORVASTATIN CALCIUM 40 MG/1
40 TABLET, FILM COATED ORAL NIGHTLY
Status: DISCONTINUED | OUTPATIENT
Start: 2025-08-19 | End: 2025-08-20 | Stop reason: HOSPADM

## 2025-08-19 RX ORDER — LOSARTAN POTASSIUM 25 MG/1
25 TABLET ORAL DAILY
Start: 2025-08-20

## 2025-08-19 RX ORDER — POLYETHYLENE GLYCOL 3350 17 G/17G
17 POWDER, FOR SOLUTION ORAL 2 TIMES DAILY
Start: 2025-08-19 | End: 2025-09-18

## 2025-08-19 RX ORDER — INSULIN LISPRO 100 [IU]/ML
4 INJECTION, SOLUTION INTRAVENOUS; SUBCUTANEOUS ONCE
Status: DISCONTINUED | OUTPATIENT
Start: 2025-08-19 | End: 2025-08-19

## 2025-08-19 RX ORDER — INSULIN LISPRO 100 [IU]/ML
0-15 INJECTION, SOLUTION INTRAVENOUS; SUBCUTANEOUS
Start: 2025-08-19

## 2025-08-19 RX ORDER — METOPROLOL TARTRATE 25 MG/1
12.5 TABLET, FILM COATED ORAL 2 TIMES DAILY
Start: 2025-08-19

## 2025-08-19 RX ORDER — LIDOCAINE HYDROCHLORIDE 20 MG/ML
1 JELLY TOPICAL ONCE
Status: COMPLETED | OUTPATIENT
Start: 2025-08-19 | End: 2025-08-19

## 2025-08-19 RX ORDER — AMOXICILLIN 250 MG
2 CAPSULE ORAL 2 TIMES DAILY
Start: 2025-08-19 | End: 2025-09-18

## 2025-08-19 RX ORDER — LOSARTAN POTASSIUM 25 MG/1
25 TABLET ORAL DAILY
Status: DISCONTINUED | OUTPATIENT
Start: 2025-08-19 | End: 2025-08-20 | Stop reason: HOSPADM

## 2025-08-19 RX ORDER — COLCHICINE 0.6 MG/1
0.6 TABLET ORAL 2 TIMES DAILY
Qty: 60 TABLET | Refills: 0 | Status: SHIPPED | OUTPATIENT
Start: 2025-08-19

## 2025-08-19 RX ADMIN — HEPARIN SODIUM 5000 UNITS: 5000 INJECTION INTRAVENOUS; SUBCUTANEOUS at 09:53

## 2025-08-19 RX ADMIN — ACETAMINOPHEN 650 MG: 325 TABLET ORAL at 21:36

## 2025-08-19 RX ADMIN — INSULIN LISPRO 9 UNITS: 100 INJECTION, SOLUTION INTRAVENOUS; SUBCUTANEOUS at 12:54

## 2025-08-19 RX ADMIN — FINASTERIDE 5 MG: 5 TABLET, FILM COATED ORAL at 09:53

## 2025-08-19 RX ADMIN — TAMSULOSIN HYDROCHLORIDE 0.8 MG: 0.4 CAPSULE ORAL at 21:36

## 2025-08-19 RX ADMIN — LEVOFLOXACIN 750 MG: 5 INJECTION, SOLUTION INTRAVENOUS at 09:53

## 2025-08-19 RX ADMIN — LIDOCAINE HYDROCHLORIDE 1 APPLICATION: 20 JELLY TOPICAL at 16:00

## 2025-08-19 RX ADMIN — POLYETHYLENE GLYCOL 3350 17 G: 17 POWDER, FOR SOLUTION ORAL at 21:36

## 2025-08-19 RX ADMIN — CLOPIDOGREL BISULFATE 75 MG: 75 TABLET, FILM COATED ORAL at 09:53

## 2025-08-19 RX ADMIN — TAMSULOSIN HYDROCHLORIDE 0.4 MG: 0.4 CAPSULE ORAL at 09:53

## 2025-08-19 RX ADMIN — ACETAMINOPHEN 650 MG: 325 TABLET ORAL at 16:33

## 2025-08-19 RX ADMIN — OXYCODONE HYDROCHLORIDE 5 MG: 5 TABLET ORAL at 01:28

## 2025-08-19 RX ADMIN — METOPROLOL TARTRATE 12.5 MG: 25 TABLET, FILM COATED ORAL at 09:53

## 2025-08-19 RX ADMIN — ONDANSETRON 4 MG: 2 INJECTION INTRAMUSCULAR; INTRAVENOUS at 11:13

## 2025-08-19 RX ADMIN — ATORVASTATIN CALCIUM 40 MG: 40 TABLET, FILM COATED ORAL at 21:36

## 2025-08-19 RX ADMIN — ASPIRIN 81 MG CHEWABLE TABLET 81 MG: 81 TABLET CHEWABLE at 09:53

## 2025-08-19 RX ADMIN — LOSARTAN POTASSIUM 25 MG: 25 TABLET, FILM COATED ORAL at 11:52

## 2025-08-19 RX ADMIN — ACETAMINOPHEN 650 MG: 325 TABLET ORAL at 09:58

## 2025-08-19 RX ADMIN — ONDANSETRON 4 MG: 2 INJECTION INTRAMUSCULAR; INTRAVENOUS at 05:48

## 2025-08-19 RX ADMIN — ACETAMINOPHEN 650 MG: 325 TABLET ORAL at 01:28

## 2025-08-19 RX ADMIN — POLYETHYLENE GLYCOL 3350 17 G: 17 POWDER, FOR SOLUTION ORAL at 09:53

## 2025-08-19 RX ADMIN — OXYCODONE HYDROCHLORIDE 5 MG: 5 TABLET ORAL at 14:48

## 2025-08-19 RX ADMIN — COLCHICINE 0.6 MG: 0.6 TABLET, FILM COATED ORAL at 09:53

## 2025-08-19 RX ADMIN — HEPARIN SODIUM 5000 UNITS: 5000 INJECTION INTRAVENOUS; SUBCUTANEOUS at 01:28

## 2025-08-19 RX ADMIN — METOPROLOL TARTRATE 12.5 MG: 25 TABLET, FILM COATED ORAL at 21:36

## 2025-08-19 RX ADMIN — COLCHICINE 0.6 MG: 0.6 TABLET, FILM COATED ORAL at 21:36

## 2025-08-19 RX ADMIN — HEPARIN SODIUM 5000 UNITS: 5000 INJECTION INTRAVENOUS; SUBCUTANEOUS at 16:33

## 2025-08-19 RX ADMIN — SENNOSIDES, DOCUSATE SODIUM 2 TABLET: 50; 8.6 TABLET, FILM COATED ORAL at 21:36

## 2025-08-19 RX ADMIN — OXYCODONE HYDROCHLORIDE 10 MG: 10 TABLET ORAL at 21:48

## 2025-08-19 RX ADMIN — SENNOSIDES, DOCUSATE SODIUM 2 TABLET: 50; 8.6 TABLET, FILM COATED ORAL at 09:53

## 2025-08-19 ASSESSMENT — PAIN - FUNCTIONAL ASSESSMENT
PAIN_FUNCTIONAL_ASSESSMENT: 0-10

## 2025-08-19 ASSESSMENT — COGNITIVE AND FUNCTIONAL STATUS - GENERAL
DRESSING REGULAR LOWER BODY CLOTHING: A LITTLE
MOVING TO AND FROM BED TO CHAIR: A LITTLE
MOBILITY SCORE: 18
TOILETING: A LITTLE
PERSONAL GROOMING: A LITTLE
WALKING IN HOSPITAL ROOM: A LITTLE
MOVING FROM LYING ON BACK TO SITTING ON SIDE OF FLAT BED WITH BEDRAILS: A LITTLE
HELP NEEDED FOR BATHING: A LITTLE
CLIMB 3 TO 5 STEPS WITH RAILING: A LITTLE
DRESSING REGULAR UPPER BODY CLOTHING: A LITTLE
STANDING UP FROM CHAIR USING ARMS: A LITTLE
TURNING FROM BACK TO SIDE WHILE IN FLAT BAD: A LITTLE
DAILY ACTIVITIY SCORE: 19

## 2025-08-19 ASSESSMENT — PAIN DESCRIPTION - DESCRIPTORS
DESCRIPTORS: DISCOMFORT
DESCRIPTORS: ACHING
DESCRIPTORS: DISCOMFORT

## 2025-08-19 ASSESSMENT — PAIN DESCRIPTION - LOCATION: LOCATION: INCISION

## 2025-08-19 ASSESSMENT — PAIN SCALES - GENERAL
PAINLEVEL_OUTOF10: 5 - MODERATE PAIN
PAINLEVEL_OUTOF10: 7
PAINLEVEL_OUTOF10: 4
PAINLEVEL_OUTOF10: 0 - NO PAIN

## 2025-08-19 ASSESSMENT — PAIN DESCRIPTION - ORIENTATION: ORIENTATION: LEFT

## 2025-08-19 NOTE — CARE PLAN
The patient's goals for the shift include remain hds    The clinical goals for the shift include Pt will remain HDS this shift    Problem: Pain - Adult  Goal: Verbalizes/displays adequate comfort level or baseline comfort level  Outcome: Progressing     Problem: Discharge Planning  Goal: Discharge to home or other facility with appropriate resources  Outcome: Progressing     Problem: Chronic Conditions and Co-morbidities  Goal: Patient's chronic conditions and co-morbidity symptoms are monitored and maintained or improved  Outcome: Progressing     Problem: Nutrition  Goal: Nutrient intake appropriate for maintaining nutritional needs  Outcome: Progressing     Problem: Fall/Injury  Goal: Not fall by end of shift  Outcome: Progressing  Goal: Be free from injury by end of the shift  Outcome: Progressing  Goal: Verbalize understanding of personal risk factors for fall in the hospital  Outcome: Progressing  Goal: Verbalize understanding of risk factor reduction measures to prevent injury from fall in the home  Outcome: Progressing  Goal: Use assistive devices by end of the shift  Outcome: Progressing  Goal: Pace activities to prevent fatigue by end of the shift  Outcome: Progressing

## 2025-08-19 NOTE — CARE PLAN
Problem: Pain - Adult  Goal: Verbalizes/displays adequate comfort level or baseline comfort level  Outcome: Progressing     Problem: Fall/Injury  Goal: Not fall by end of shift  Outcome: Progressing     Problem: Fall/Injury  Goal: Be free from injury by end of the shift  Outcome: Progressing     Problem: Fall/Injury  Goal: Verbalize understanding of personal risk factors for fall in the hospital  Outcome: Progressing     Problem: Fall/Injury  Goal: Verbalize understanding of risk factor reduction measures to prevent injury from fall in the home  Outcome: Progressing     Problem: Fall/Injury  Goal: Use assistive devices by end of the shift  Outcome: Progressing     Problem: Fall/Injury  Goal: Pace activities to prevent fatigue by end of the shift  Outcome: Progressing
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
## 2025-08-20 VITALS
TEMPERATURE: 98.2 F | WEIGHT: 171.6 LBS | SYSTOLIC BLOOD PRESSURE: 109 MMHG | DIASTOLIC BLOOD PRESSURE: 62 MMHG | RESPIRATION RATE: 18 BRPM | HEART RATE: 73 BPM | HEIGHT: 66 IN | BODY MASS INDEX: 27.58 KG/M2 | OXYGEN SATURATION: 99 %

## 2025-08-20 LAB
ALBUMIN SERPL BCP-MCNC: 3.3 G/DL (ref 3.4–5)
ALP SERPL-CCNC: 412 U/L (ref 33–136)
ALT SERPL W P-5'-P-CCNC: 106 U/L (ref 10–52)
ANION GAP SERPL CALC-SCNC: 12 MMOL/L (ref 10–20)
AST SERPL W P-5'-P-CCNC: 129 U/L (ref 9–39)
BACTERIA UR CULT: ABNORMAL
BILIRUB DIRECT SERPL-MCNC: 0.3 MG/DL (ref 0–0.3)
BILIRUB SERPL-MCNC: 0.9 MG/DL (ref 0–1.2)
BUN SERPL-MCNC: 36 MG/DL (ref 6–23)
CALCIUM SERPL-MCNC: 9 MG/DL (ref 8.6–10.6)
CHLORIDE SERPL-SCNC: 102 MMOL/L (ref 98–107)
CO2 SERPL-SCNC: 29 MMOL/L (ref 21–32)
CREAT SERPL-MCNC: 1.37 MG/DL (ref 0.5–1.3)
EGFRCR SERPLBLD CKD-EPI 2021: 54 ML/MIN/1.73M*2
ERYTHROCYTE [DISTWIDTH] IN BLOOD BY AUTOMATED COUNT: 14.6 % (ref 11.5–14.5)
GLUCOSE BLD MANUAL STRIP-MCNC: 166 MG/DL (ref 74–99)
GLUCOSE BLD MANUAL STRIP-MCNC: 189 MG/DL (ref 74–99)
GLUCOSE BLD MANUAL STRIP-MCNC: 277 MG/DL (ref 74–99)
GLUCOSE SERPL-MCNC: 169 MG/DL (ref 74–99)
HCT VFR BLD AUTO: 30 % (ref 41–52)
HGB BLD-MCNC: 9.9 G/DL (ref 13.5–17.5)
MAGNESIUM SERPL-MCNC: 1.93 MG/DL (ref 1.6–2.4)
MCH RBC QN AUTO: 30.4 PG (ref 26–34)
MCHC RBC AUTO-ENTMCNC: 33 G/DL (ref 32–36)
MCV RBC AUTO: 92 FL (ref 80–100)
NRBC BLD-RTO: 0 /100 WBCS (ref 0–0)
PHOSPHATE SERPL-MCNC: 3.8 MG/DL (ref 2.5–4.9)
PLATELET # BLD AUTO: 139 X10*3/UL (ref 150–450)
POTASSIUM SERPL-SCNC: 4.6 MMOL/L (ref 3.5–5.3)
PROT SERPL-MCNC: 6.5 G/DL (ref 6.4–8.2)
RBC # BLD AUTO: 3.26 X10*6/UL (ref 4.5–5.9)
SODIUM SERPL-SCNC: 138 MMOL/L (ref 136–145)
WBC # BLD AUTO: 4 X10*3/UL (ref 4.4–11.3)

## 2025-08-20 PROCEDURE — 2500000001 HC RX 250 WO HCPCS SELF ADMINISTERED DRUGS (ALT 637 FOR MEDICARE OP): Performed by: INTERNAL MEDICINE

## 2025-08-20 PROCEDURE — 97530 THERAPEUTIC ACTIVITIES: CPT | Mod: GP,CQ

## 2025-08-20 PROCEDURE — 99233 SBSQ HOSP IP/OBS HIGH 50: CPT | Performed by: STUDENT IN AN ORGANIZED HEALTH CARE EDUCATION/TRAINING PROGRAM

## 2025-08-20 PROCEDURE — 2500000004 HC RX 250 GENERAL PHARMACY W/ HCPCS (ALT 636 FOR OP/ED): Performed by: STUDENT IN AN ORGANIZED HEALTH CARE EDUCATION/TRAINING PROGRAM

## 2025-08-20 PROCEDURE — 2500000004 HC RX 250 GENERAL PHARMACY W/ HCPCS (ALT 636 FOR OP/ED): Performed by: INTERNAL MEDICINE

## 2025-08-20 PROCEDURE — 83735 ASSAY OF MAGNESIUM: CPT | Performed by: INTERNAL MEDICINE

## 2025-08-20 PROCEDURE — 2500000004 HC RX 250 GENERAL PHARMACY W/ HCPCS (ALT 636 FOR OP/ED): Performed by: PHYSICIAN ASSISTANT

## 2025-08-20 PROCEDURE — 2500000002 HC RX 250 W HCPCS SELF ADMINISTERED DRUGS (ALT 637 FOR MEDICARE OP, ALT 636 FOR OP/ED): Performed by: STUDENT IN AN ORGANIZED HEALTH CARE EDUCATION/TRAINING PROGRAM

## 2025-08-20 PROCEDURE — 82248 BILIRUBIN DIRECT: CPT | Performed by: STUDENT IN AN ORGANIZED HEALTH CARE EDUCATION/TRAINING PROGRAM

## 2025-08-20 PROCEDURE — 84100 ASSAY OF PHOSPHORUS: CPT | Performed by: INTERNAL MEDICINE

## 2025-08-20 PROCEDURE — 80048 BASIC METABOLIC PNL TOTAL CA: CPT | Performed by: INTERNAL MEDICINE

## 2025-08-20 PROCEDURE — 97116 GAIT TRAINING THERAPY: CPT | Mod: GP,CQ

## 2025-08-20 PROCEDURE — 82947 ASSAY GLUCOSE BLOOD QUANT: CPT

## 2025-08-20 PROCEDURE — 85027 COMPLETE CBC AUTOMATED: CPT | Performed by: INTERNAL MEDICINE

## 2025-08-20 PROCEDURE — 36415 COLL VENOUS BLD VENIPUNCTURE: CPT | Performed by: INTERNAL MEDICINE

## 2025-08-20 PROCEDURE — 2500000001 HC RX 250 WO HCPCS SELF ADMINISTERED DRUGS (ALT 637 FOR MEDICARE OP): Performed by: STUDENT IN AN ORGANIZED HEALTH CARE EDUCATION/TRAINING PROGRAM

## 2025-08-20 RX ORDER — FORMOTEROL FUMARATE 20 UG/2ML
20 SOLUTION RESPIRATORY (INHALATION)
Status: DISCONTINUED | OUTPATIENT
Start: 2025-08-20 | End: 2025-08-20 | Stop reason: HOSPADM

## 2025-08-20 RX ORDER — PANTOPRAZOLE SODIUM 40 MG/1
40 TABLET, DELAYED RELEASE ORAL
Status: DISCONTINUED | OUTPATIENT
Start: 2025-08-20 | End: 2025-08-20 | Stop reason: HOSPADM

## 2025-08-20 RX ORDER — BISACODYL 10 MG/1
10 SUPPOSITORY RECTAL ONCE
Status: COMPLETED | OUTPATIENT
Start: 2025-08-20 | End: 2025-08-20

## 2025-08-20 RX ORDER — LANOLIN ALCOHOL/MO/W.PET/CERES
400 CREAM (GRAM) TOPICAL ONCE
Status: COMPLETED | OUTPATIENT
Start: 2025-08-20 | End: 2025-08-20

## 2025-08-20 RX ORDER — PANTOPRAZOLE SODIUM 40 MG/1
40 TABLET, DELAYED RELEASE ORAL DAILY
Start: 2025-08-20 | End: 2025-09-19

## 2025-08-20 RX ORDER — LEVOFLOXACIN 750 MG/1
750 TABLET, FILM COATED ORAL EVERY OTHER DAY
Start: 2025-08-20 | End: 2025-08-25

## 2025-08-20 RX ORDER — UMECLIDINIUM BROMIDE AND VILANTEROL TRIFENATATE 62.5; 25 UG/1; UG/1
1 POWDER RESPIRATORY (INHALATION) DAILY
Start: 2025-08-20

## 2025-08-20 RX ORDER — OXYCODONE HYDROCHLORIDE 5 MG/1
2.5-5 TABLET ORAL EVERY 4 HOURS PRN
Qty: 5 TABLET | Refills: 0 | Status: SHIPPED | OUTPATIENT
Start: 2025-08-20 | End: 2025-08-20

## 2025-08-20 RX ORDER — OXYCODONE HYDROCHLORIDE 5 MG/1
2.5 TABLET ORAL EVERY 4 HOURS PRN
Refills: 0 | Status: DISCONTINUED | OUTPATIENT
Start: 2025-08-20 | End: 2025-08-20 | Stop reason: HOSPADM

## 2025-08-20 RX ORDER — OXYCODONE HYDROCHLORIDE 5 MG/1
2.5-5 TABLET ORAL EVERY 4 HOURS PRN
Qty: 5 TABLET | Refills: 0 | Status: SHIPPED | OUTPATIENT
Start: 2025-08-20

## 2025-08-20 RX ORDER — OXYCODONE HYDROCHLORIDE 5 MG/1
5 TABLET ORAL EVERY 4 HOURS PRN
Refills: 0 | Status: DISCONTINUED | OUTPATIENT
Start: 2025-08-20 | End: 2025-08-20 | Stop reason: HOSPADM

## 2025-08-20 RX ORDER — ONDANSETRON 4 MG/1
4 TABLET, FILM COATED ORAL EVERY 8 HOURS PRN
Start: 2025-08-20

## 2025-08-20 RX ADMIN — ASPIRIN 81 MG CHEWABLE TABLET 81 MG: 81 TABLET CHEWABLE at 08:52

## 2025-08-20 RX ADMIN — ACETAMINOPHEN 650 MG: 325 TABLET ORAL at 14:50

## 2025-08-20 RX ADMIN — HEPARIN SODIUM 5000 UNITS: 5000 INJECTION INTRAVENOUS; SUBCUTANEOUS at 00:52

## 2025-08-20 RX ADMIN — OXYCODONE HYDROCHLORIDE 5 MG: 5 TABLET ORAL at 07:25

## 2025-08-20 RX ADMIN — INSULIN LISPRO 3 UNITS: 100 INJECTION, SOLUTION INTRAVENOUS; SUBCUTANEOUS at 12:13

## 2025-08-20 RX ADMIN — ACETAMINOPHEN 650 MG: 325 TABLET ORAL at 03:03

## 2025-08-20 RX ADMIN — CLOPIDOGREL BISULFATE 75 MG: 75 TABLET, FILM COATED ORAL at 08:52

## 2025-08-20 RX ADMIN — FINASTERIDE 5 MG: 5 TABLET, FILM COATED ORAL at 08:52

## 2025-08-20 RX ADMIN — PANTOPRAZOLE SODIUM 40 MG: 40 TABLET, DELAYED RELEASE ORAL at 12:58

## 2025-08-20 RX ADMIN — POLYETHYLENE GLYCOL 3350 17 G: 17 POWDER, FOR SOLUTION ORAL at 08:52

## 2025-08-20 RX ADMIN — METOPROLOL TARTRATE 12.5 MG: 25 TABLET, FILM COATED ORAL at 08:52

## 2025-08-20 RX ADMIN — BISACODYL 10 MG: 10 SUPPOSITORY RECTAL at 08:52

## 2025-08-20 RX ADMIN — Medication 1 TABLET: at 08:52

## 2025-08-20 RX ADMIN — HEPARIN SODIUM 5000 UNITS: 5000 INJECTION INTRAVENOUS; SUBCUTANEOUS at 08:52

## 2025-08-20 RX ADMIN — COLCHICINE 0.6 MG: 0.6 TABLET, FILM COATED ORAL at 08:52

## 2025-08-20 RX ADMIN — ACETAMINOPHEN 650 MG: 325 TABLET ORAL at 08:52

## 2025-08-20 RX ADMIN — MAGNESIUM OXIDE TAB 400 MG (241.3 MG ELEMENTAL MG) 1 TABLET: 400 (241.3 MG) TAB at 12:13

## 2025-08-20 RX ADMIN — LOSARTAN POTASSIUM 25 MG: 25 TABLET, FILM COATED ORAL at 08:52

## 2025-08-20 RX ADMIN — SENNOSIDES, DOCUSATE SODIUM 2 TABLET: 50; 8.6 TABLET, FILM COATED ORAL at 08:52

## 2025-08-20 RX ADMIN — ONDANSETRON 4 MG: 2 INJECTION INTRAMUSCULAR; INTRAVENOUS at 00:50

## 2025-08-20 RX ADMIN — INSULIN LISPRO 3 UNITS: 100 INJECTION, SOLUTION INTRAVENOUS; SUBCUTANEOUS at 08:52

## 2025-08-20 RX ADMIN — ONDANSETRON 4 MG: 2 INJECTION INTRAMUSCULAR; INTRAVENOUS at 08:59

## 2025-08-20 ASSESSMENT — COGNITIVE AND FUNCTIONAL STATUS - GENERAL
DRESSING REGULAR UPPER BODY CLOTHING: A LITTLE
DRESSING REGULAR LOWER BODY CLOTHING: A LITTLE
TURNING FROM BACK TO SIDE WHILE IN FLAT BAD: A LOT
DAILY ACTIVITIY SCORE: 19
STANDING UP FROM CHAIR USING ARMS: A LITTLE
TURNING FROM BACK TO SIDE WHILE IN FLAT BAD: A LITTLE
HELP NEEDED FOR BATHING: A LITTLE
MOBILITY SCORE: 16
MOVING FROM LYING ON BACK TO SITTING ON SIDE OF FLAT BED WITH BEDRAILS: A LITTLE
TOILETING: A LITTLE
WALKING IN HOSPITAL ROOM: A LITTLE
CLIMB 3 TO 5 STEPS WITH RAILING: A LOT
STANDING UP FROM CHAIR USING ARMS: A LITTLE
MOVING FROM LYING ON BACK TO SITTING ON SIDE OF FLAT BED WITH BEDRAILS: A LITTLE
WALKING IN HOSPITAL ROOM: A LITTLE
MOVING TO AND FROM BED TO CHAIR: A LITTLE
MOBILITY SCORE: 18
PERSONAL GROOMING: A LITTLE
MOVING TO AND FROM BED TO CHAIR: A LITTLE
CLIMB 3 TO 5 STEPS WITH RAILING: A LITTLE

## 2025-08-20 ASSESSMENT — PAIN SCALES - GENERAL
PAINLEVEL_OUTOF10: 3
PAINLEVEL_OUTOF10: 6
PAINLEVEL_OUTOF10: 3

## 2025-08-20 ASSESSMENT — PAIN DESCRIPTION - LOCATION: LOCATION: INCISION

## 2025-08-20 ASSESSMENT — PAIN - FUNCTIONAL ASSESSMENT
PAIN_FUNCTIONAL_ASSESSMENT: 0-10

## 2025-08-20 ASSESSMENT — PAIN DESCRIPTION - DESCRIPTORS: DESCRIPTORS: DISCOMFORT

## 2025-08-20 NOTE — CARE PLAN
The patient's goals for the shift include remain hds    The clinical goals for the shift include pt will remain HDS throughout this shift      Problem: Pain - Adult  Goal: Verbalizes/displays adequate comfort level or baseline comfort level  Outcome: Progressing     Problem: Discharge Planning  Goal: Discharge to home or other facility with appropriate resources  Outcome: Progressing     Problem: Chronic Conditions and Co-morbidities  Goal: Patient's chronic conditions and co-morbidity symptoms are monitored and maintained or improved  Outcome: Progressing     Problem: Nutrition  Goal: Nutrient intake appropriate for maintaining nutritional needs  Outcome: Progressing     Problem: Fall/Injury  Goal: Not fall by end of shift  Outcome: Progressing  Goal: Be free from injury by end of the shift  Outcome: Progressing  Goal: Verbalize understanding of personal risk factors for fall in the hospital  Outcome: Progressing  Goal: Verbalize understanding of risk factor reduction measures to prevent injury from fall in the home  Outcome: Progressing  Goal: Use assistive devices by end of the shift  Outcome: Progressing  Goal: Pace activities to prevent fatigue by end of the shift  Outcome: Progressing     Problem: Heart Failure  Goal: Improved gas exchange this shift  Outcome: Progressing  Goal: Improved urinary output this shift  Outcome: Progressing  Goal: Reduction in peripheral edema within 24 hours  Outcome: Progressing  Goal: Report improvement of dyspnea/breathlessness this shift  Outcome: Progressing  Goal: Weight from fluid excess reduced over 2-3 days, then stabilize  Outcome: Progressing  Goal: Increase self care and/or family involvement in 24 hours  Outcome: Progressing     Problem: Skin  Goal: Decreased wound size/increased tissue granulation at next dressing change  Outcome: Progressing  Goal: Participates in plan/prevention/treatment measures  Outcome: Progressing  Goal: Prevent/manage excess moisture  Outcome:  Progressing  Goal: Prevent/minimize sheer/friction injuries  Outcome: Progressing  Goal: Promote/optimize nutrition  Outcome: Progressing  Goal: Promote skin healing  Outcome: Progressing     Problem: Diabetes  Goal: Achieve decreasing blood glucose levels by end of shift  Outcome: Progressing  Goal: Increase stability of blood glucose readings by end of shift  Outcome: Progressing  Goal: Decrease in ketones present in urine by end of shift  Outcome: Progressing  Goal: Maintain electrolyte levels within acceptable range throughout shift  Outcome: Progressing  Goal: Maintain glucose levels >70mg/dl to <250mg/dl throughout shift  Outcome: Progressing  Goal: No changes in neurological exam by end of shift  Outcome: Progressing  Goal: Learn about and adhere to nutrition recommendations by end of shift  Outcome: Progressing  Goal: Vital signs within normal range for age by end of shift  Outcome: Progressing  Goal: Increase self care and/or family involovement by end of shift  Outcome: Progressing  Goal: Receive DSME education by end of shift  Outcome: Progressing     Problem: Knowledge Deficit  Goal: Patient/family/caregiver demonstrates understanding of disease process, treatment plan, medications, and discharge instructions  Outcome: Met     Problem: Mechanical Ventilation  Goal: Patient Will Maintain Patent Airway  Outcome: Met  Goal: Oral health is maintained or improved  Outcome: Met  Goal: Tracheostomy will be managed safely  Outcome: Met  Goal: ET tube will be managed safely  Outcome: Met  Goal: Ability to express needs and understand communication  Outcome: Met  Goal: Mobility/activity is maintained at optimum level for patient  Outcome: Met     Problem: Pain  Goal: Takes deep breaths with improved pain control throughout the shift  Outcome: Progressing  Goal: Turns in bed with improved pain control throughout the shift  Outcome: Progressing  Goal: Walks with improved pain control throughout the shift  Outcome:  Progressing  Goal: Performs ADL's with improved pain control throughout shift  Outcome: Progressing  Goal: Participates in PT with improved pain control throughout the shift  Outcome: Progressing  Goal: Free from opioid side effects throughout the shift  Outcome: Progressing  Goal: Free from acute confusion related to pain meds throughout the shift  Outcome: Progressing

## 2025-08-20 NOTE — PROGRESS NOTES
Patient has a confirmed  time of 4:30 to [facility] by  [facility]. Patient , his wife, MD, nurse, facility and  were notified.  Lindsey York RN
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
## 2025-08-20 NOTE — CARE PLAN
Problem: Pain - Adult  Goal: Verbalizes/displays adequate comfort level or baseline comfort level  Outcome: Progressing     Problem: Discharge Planning  Goal: Discharge to home or other facility with appropriate resources  Outcome: Progressing     Problem: Chronic Conditions and Co-morbidities  Goal: Patient's chronic conditions and co-morbidity symptoms are monitored and maintained or improved  Outcome: Progressing     Problem: Nutrition  Goal: Nutrient intake appropriate for maintaining nutritional needs  Outcome: Progressing     Problem: Fall/Injury  Goal: Not fall by end of shift  Outcome: Progressing  Goal: Be free from injury by end of the shift  Outcome: Progressing  Goal: Verbalize understanding of personal risk factors for fall in the hospital  Outcome: Progressing  Goal: Verbalize understanding of risk factor reduction measures to prevent injury from fall in the home  Outcome: Progressing  Goal: Use assistive devices by end of the shift  Outcome: Progressing  Goal: Pace activities to prevent fatigue by end of the shift  Outcome: Progressing     Problem: Heart Failure  Goal: Improved gas exchange this shift  Outcome: Progressing  Goal: Improved urinary output this shift  Outcome: Progressing  Goal: Reduction in peripheral edema within 24 hours  Outcome: Progressing  Goal: Report improvement of dyspnea/breathlessness this shift  Outcome: Progressing  Goal: Weight from fluid excess reduced over 2-3 days, then stabilize  Outcome: Progressing  Goal: Increase self care and/or family involvement in 24 hours  Outcome: Progressing     Problem: Skin  Goal: Decreased wound size/increased tissue granulation at next dressing change  Outcome: Progressing  Goal: Participates in plan/prevention/treatment measures  Outcome: Progressing  Goal: Prevent/manage excess moisture  Outcome: Progressing  Goal: Prevent/minimize sheer/friction injuries  Outcome: Progressing  Goal: Promote/optimize nutrition  Outcome:  Looks like it was generic albuterol, can you call pharmacy and ask if they can substitute for whatever insurance covers or he can call his insurance and ask what his alternative is.    Progressing  Goal: Promote skin healing  Outcome: Progressing     Problem: Diabetes  Goal: Achieve decreasing blood glucose levels by end of shift  Outcome: Progressing  Goal: Increase stability of blood glucose readings by end of shift  Outcome: Progressing  Goal: Decrease in ketones present in urine by end of shift  Outcome: Progressing  Goal: Maintain electrolyte levels within acceptable range throughout shift  Outcome: Progressing  Goal: Maintain glucose levels >70mg/dl to <250mg/dl throughout shift  Outcome: Progressing  Goal: No changes in neurological exam by end of shift  Outcome: Progressing  Goal: Learn about and adhere to nutrition recommendations by end of shift  Outcome: Progressing  Goal: Vital signs within normal range for age by end of shift  Outcome: Progressing  Goal: Increase self care and/or family involovement by end of shift  Outcome: Progressing  Goal: Receive DSME education by end of shift  Outcome: Progressing     Problem: Pain  Goal: Takes deep breaths with improved pain control throughout the shift  Outcome: Progressing  Goal: Turns in bed with improved pain control throughout the shift  Outcome: Progressing  Goal: Walks with improved pain control throughout the shift  Outcome: Progressing  Goal: Performs ADL's with improved pain control throughout shift  Outcome: Progressing  Goal: Participates in PT with improved pain control throughout the shift  Outcome: Progressing  Goal: Free from opioid side effects throughout the shift  Outcome: Progressing  Goal: Free from acute confusion related to pain meds throughout the shift  Outcome: Progressing

## 2025-08-20 NOTE — DISCHARGE INSTRUCTIONS
Don't forget to “Keep Your Move in the Tube!!”     Please refer to the “Move in the Tube” handout.  -- Load bearing activities can be completed if you are “staying in the tube.” If you are attempting load bearing activities, let pain be your guide with when trying an activity “out of the tube”. If an activity hurts or is uncomfortable go back to doing it while you stay “in the tube”. There is no time limit to “stay in the tube”.     -- Non-load bearing activities, which are your activities of daily living, can be completed with your arms “out of the tube” as long as you remain pain free. Some activities of daily living examples are dressing, personal care, showering, washing hair, and toilet hygiene.     Don't forget to KEEP YOUR MOVE IN THE TUBE and think of a T. Raji dinosaur!    Your Epicardial pacing wires were cut, this is safe if you require MRI in the future however please instruct the performing Radiologist you have epicardial pacemaker wires.    Maintain a log of your weight and blood pressure. If you find yourself gaining or losing >/= 5lbs in a day your diuretic may need adjusting. Please contact the cardiology office or cardiac surgery office for advice.                                    **IMPORTANT**  Prevention of Infective Endocarditis (Heart Infections) After Cardiac Surgery:    Infective endocarditis occurs when bacteria enters the bloodstream and then attaches to the heart. Patients with a new heart valve, repaired heart valve, or graft used to repair/replace their aorta are at higher risk for developing this because of the prosthetic (man-made) medical material in their heart. Endocarditis is rare but when you undergo certain medical or dental procedures, as listed below, it can give bacteria an opportunity to enter the blood and cause this type of infection. To prevent this, preventative antibiotics taken before these procedures are required.     Antibiotics are always required PRIOR to the  following:  - Dental work with gingival, periapical, or other gingival perforation (such as tooth extractions, dental abscess drainage, and dental cleanings)  - Respiratory procedures with incisions or biopsies   - Cardiac or vascular procedures to place or replace prosthetic material (such as heart valves, stents, etc.)    Antibiotics are required in the following situations ONLY if an infection is already present:  - Skin or soft tissue procedures with infected tissue  - Gastrointestinal procedures with GI infections  - Urinary or genital procedure with acute genitourinary infections    Please notify your dentist/primary physician/cardiologist PRIOR to these types of procedures to obtain the proper antibiotics and prevent a serious infection in your heart. When in doubt, always ask!   Additionally, good oral hygiene (routine brushing, flossing, and dental care) and prompt treatment of other infections (such as UTIs and skin infections) are equally as important to prevent endocarditis!!          Additional Post-Operative Instructions:  - Remember to use your Incentive spirometer 10x/hr while awake. Remember to cough and deep breath.  - No NSAIDs (common over-the-counter NSAIDs are ibuprofen/Motrin/Advil, naproxen/Naprosyn/Aleve) for 3 months after cardiac surgery; if NSAIDs needed after 3 months, clear use with cardiologist before starting.       Your home medications may have changed after surgery. Carefully compare this list with your prescription bottles at home and set aside any medications you are told to not take so you do not confuse them. Do not dispose of any medications until your follow-up, since your doctors may restart some at your follow-up appointments. It is important to bring a complete, current list of your medications to any medical appointments or hospitalizations.    For any questions about your discharge, please call the cardiac surgery office at [phone]

## 2025-08-20 NOTE — PROGRESS NOTES
Physical Therapy    Physical Therapy Treatment    Patient Name: Duane Scott  MRN: [id number]  Department: [place] [address]  Room: [address] [place] [address]  Today's Date: 8/20/2025  Time Calculation  Start Time: 1255  Stop Time: 1318  Time Calculation (min): 23 min         Assessment/Plan   PT Assessment  PT Assessment Results: Decreased strength, Decreased endurance, Impaired balance, Decreased mobility, Pain, Decreased safety awareness  Rehab Prognosis: Good  Barriers to Discharge Home: Physical needs, Caregiver assistance  Caregiver Assistance: Caregiver assistance needed per identified barriers - however, level of patient's required assistance exceeds assistance available at home  Physical Needs: Stair navigation into home limited by function/safety, Ambulating household distances limited by function/safety, Stair navigation to access bath limited by function/safety, Intermittent mobility assistance needed, Intermittent ADL assistance needed, High falls risk due to function or environment  Evaluation/Treatment Tolerance: Patient tolerated treatment well  Medical Staff Made Aware: Yes  End of Session Communication: Bedside nurse  Assessment Comment: pt progressing well with therapy, Tinetti remains <19, pt remains appropriate for mod intensity therapy  End of Session Patient Position: Up in chair, Alarm off, not on at start of session     PT Plan  Treatment/Interventions: Bed mobility, Transfer training, Gait training, Stair training, Balance training, Strengthening, Endurance training, Therapeutic exercise, Therapeutic activity, Home exercise program  PT Plan: Ongoing PT  PT Frequency for Current Admission: 4 times per week during this acute inpatient hospitalization  PT Discharge Recommendations: Moderate intensity level of continued care, Based on current functional status and rehab potential, patient is anticipated to tolerate and benefit from 5 or more days per week of skilled rehabilitative therapy after discharge from this  acute inpatient hospitalization  Equipment Recommended upon Discharge: Wheeled walker  PT Recommended Transfer Status: Assist x1, Assistive device  PT - OK to Discharge: Yes    PT Visit Info:  PT Received On: 08/20/25  Response to Previous Treatment: Patient with no complaints from previous session.     General Visit Information:   General  Prior to Session Communication: Bedside nurse  Patient Position Received: Up in chair, Alarm off, not on at start of session  General Comment: Pt up in chair, pleasant and agreeable to therapy  Per handoff with RN, pt is appropriate for therapy, vitals are stable and pain is controlled. Other concerns prior to tx are: none    Subjective   Precautions:  Precautions  Medical Precautions: Cardiac precautions, Fall precautions    Objective   Pain:  Pain Assessment  Pain Assessment: 0-10  0-10 (Numeric) Pain Score: 3  Pain Type: Surgical pain  Pain Location: Incision  Pain Interventions: Ambulation/increased activity, Distraction, Emotional support  Cognition:  Cognition  Overall Cognitive Status: Within Functional Limits  Orientation Level: Oriented X4    Treatments:  Therapeutic Exercise  Therapeutic Exercise Performed: Yes  Therapeutic Exercise Activity 1: Seated B LE AP, LAQs, hip flex 10x/LE, IS 10x    Balance/Neuromuscular Re-Education  Balance/Neuromuscular Re-Education Activity Performed: Yes  Balance/Neuromuscular Re-Education Activity 1: Tinetti assessment completed with 16/28 score    Ambulation/Gait Training  Ambulation/Gait Training Performed: Yes  Ambulation/Gait Training 1  Surface 1: Level tile  Device 1: Rolling walker  Assistance 1: Contact guard  Quality of Gait 1: Wide base of support, Forward flexed posture (severely flexed posture, slow gait speed)  Comments/Distance (ft) 1: 150'x1  Transfers  Transfer: Yes  Transfer 1  Transfer From 1: Sit to, Stand to  Transfer to 1: Sit  Technique 1: Sit to stand, Stand to sit  Transfer Device 1: Walker  Transfer Level of  Assistance 1: Contact guard  Trials/Comments 1: increased time to complete    Outcome Measures:     [facility] Basic Mobility  Turning from your back to your side while in a flat bed without using bedrails: A little  Moving from lying on your back to sitting on the side of a flat bed without using bedrails: A lot  Moving to and from bed to chair (including a wheelchair): A little  Standing up from a chair using your arms (e.g. wheelchair or bedside chair): A little  To walk in hospital room: A little  Climbing 3-5 steps with railing: A lot  Basic Mobility - Total Score: 16    Tinetti  Sitting Balance: Steady, safe  Arises: Able, uses arms to help  Attempts to Arise: Able to arise, one attempt  Immediate Standing Balance (First 5 Seconds): Steady without walker or other support  Standing Balance: Steady but wide stance, uses cane or other support  Nudged: Staggers, grabs, catches self  Eyes Closed: Unsteady  Turned 360 Degrees: Steadiness: Steady  Turned 360 Degrees: Continuity of Steps: Discontinuous steps  Sitting Down: Uses arms or not a smooth motion  Balance Score: 10  Initiation of Gait: No hesitancy  Step Height: R Swing Foot: Right foot complete clears floor  Step Length: R Swing Foot: Does not pass left stance foot with step  Step Height: L Swing Foot: Left foot complete clears floor  Step Length: L Swing Foot: Does not pass right stance foot with step  Step Symmetry: Right and left step appear equal  Step Continuity: Steps appear continuous  Path: Mild/moderate deviation or uses walking aid  Trunk: Marked sway or uses walking aid  Walking Time: Heels apart  Gait Score: 6  Total Score: 16    Education Documentation  Precautions, taught by Kim De La O PTA at 8/20/2025  1:40 PM.  Learner: Patient  Readiness: Acceptance  Method: Explanation, Demonstration  Response: Verbalizes Understanding, Demonstrated Understanding  Comment: HEP, precautions, safe mobility    Body Mechanics, taught by Kim De La O PTA at  8/20/2025  1:40 PM.  Learner: Patient  Readiness: Acceptance  Method: Explanation, Demonstration  Response: Verbalizes Understanding, Demonstrated Understanding  Comment: HEP, precautions, safe mobility    Home Exercise Program, taught by Kim De La O PTA at 8/20/2025  1:40 PM.  Learner: Patient  Readiness: Acceptance  Method: Explanation, Demonstration  Response: Verbalizes Understanding, Demonstrated Understanding  Comment: HEP, precautions, safe mobility    Mobility Training, taught by Kim De La O PTA at 8/20/2025  1:40 PM.  Learner: Patient  Readiness: Acceptance  Method: Explanation, Demonstration  Response: Verbalizes Understanding, Demonstrated Understanding  Comment: HEP, precautions, safe mobility    Education Comments  No comments found.        OP EDUCATION:       Encounter Problems       Encounter Problems (Active)       Balance       Patient to demo static standing with unilateral UE support, performing single UE task with SBA, no sway or LOB x 2 mins for functional carryover  (Progressing)       Start:  08/13/25    Expected End:  08/27/25            Pt will complete TUG in </=14 seconds with LRAD (Progressing)       Start:  08/13/25    Expected End:  08/27/25               Mobility       STG - Patient will ambulate >/= 100 ft Sami with LRAD and no acute LOB (Progressing)       Start:  08/13/25    Expected End:  08/27/25            Patient to ascend/descend >/= 2 stairs without HR and SBA to demo ability to safely traverse [facility] and within home (Progressing)       Start:  08/13/25    Expected End:  08/27/25            Pt. will tolerate >/= 20 minutes of OOB mobility without seated rest break and VSS to demo improved activity tolerance/endurance.  (Progressing)       Start:  08/13/25    Expected End:  08/27/25               PT Transfers       STG - Patient will perform bed mobility IND (Progressing)       Start:  08/13/25    Expected End:  08/27/25            STG - Patient will transfer sit to and  from stand Sami with LRAD (Progressing)       Start:  08/13/25    Expected End:  08/27/25                 ELIANA Robbins
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
## 2025-08-21 ENCOUNTER — TELEPHONE (OUTPATIENT)
Dept: PRIMARY CARE | Facility: CLINIC | Age: 75
End: 2025-08-21
Payer: MEDICARE

## 2025-08-21 ENCOUNTER — APPOINTMENT (OUTPATIENT)
Dept: CARDIAC SURGERY | Facility: HOSPITAL | Age: 75
End: 2025-08-21
Payer: MEDICARE

## 2025-08-21 DIAGNOSIS — Z95.1 S/P CABG (CORONARY ARTERY BYPASS GRAFT): ICD-10-CM

## 2025-08-21 LAB
ATRIAL RATE: 122 BPM
P AXIS: 9 DEGREES
PR INTERVAL: 184 MS
Q ONSET: 221 MS
QRS COUNT: 18 BEATS
QRS DURATION: 98 MS
QT INTERVAL: 336 MS
QTC CALCULATION(BAZETT): 463 MS
QTC FREDERICIA: 416 MS
R AXIS: -23 DEGREES
T AXIS: 61 DEGREES
T OFFSET: 389 MS
VENTRICULAR RATE: 114 BPM

## 2025-08-21 ASSESSMENT — ENCOUNTER SYMPTOMS: VOMITING: 1

## 2025-08-22 DIAGNOSIS — I21.4 NSTEMI (NON-ST ELEVATED MYOCARDIAL INFARCTION) (MULTI): ICD-10-CM

## 2025-08-23 LAB
ATRIAL RATE: 87 BPM
ATRIAL RATE: 93 BPM
P AXIS: 16 DEGREES
P OFFSET: 199 MS
P ONSET: 144 MS
PR INTERVAL: 152 MS
Q ONSET: 219 MS
Q ONSET: 220 MS
QRS COUNT: 15 BEATS
QRS COUNT: 17 BEATS
QRS DURATION: 100 MS
QRS DURATION: 98 MS
QT INTERVAL: 376 MS
QT INTERVAL: 382 MS
QTC CALCULATION(BAZETT): 459 MS
QTC CALCULATION(BAZETT): 492 MS
QTC FREDERICIA: 432 MS
QTC FREDERICIA: 450 MS
R AXIS: -21 DEGREES
R AXIS: -33 DEGREES
T AXIS: 28 DEGREES
T AXIS: 39 DEGREES
T OFFSET: 407 MS
T OFFSET: 411 MS
VENTRICULAR RATE: 103 BPM
VENTRICULAR RATE: 87 BPM

## 2025-09-02 ENCOUNTER — APPOINTMENT (OUTPATIENT)
Dept: CARDIAC SURGERY | Facility: CLINIC | Age: 75
End: 2025-09-02
Payer: MEDICARE

## 2025-09-08 ENCOUNTER — APPOINTMENT (OUTPATIENT)
Dept: UROLOGY | Facility: CLINIC | Age: 75
End: 2025-09-08
Payer: MEDICARE

## 2026-01-20 ENCOUNTER — APPOINTMENT (OUTPATIENT)
Dept: OPHTHALMOLOGY | Facility: CLINIC | Age: 76
End: 2026-01-20
Payer: MEDICARE

## (undated) DEVICE — KIT, NAMIC STANDARD LEFT HEART, CUSTOM, LWMC

## (undated) DEVICE — COVER, CART, 45 X 27 X 48 IN, CLEAR

## (undated) DEVICE — SUTURE, PROLENE, 5-0, 36 IN, RB1, DA, BLUE

## (undated) DEVICE — COVER, EQUIPMENT, ZERO GRAVITY

## (undated) DEVICE — SYRINGE, 20 CC, LUER LOCK, MONOJECT, W/O CAP, LF

## (undated) DEVICE — PAD, ELECTRODE DEFIB PADPRO ADULT STRL W/ADAPTER

## (undated) DEVICE — CLIP, LIGATING, W/ADHESIVE, WIDE SLOT, SMALL, TITANIUM

## (undated) DEVICE — COVER, TABLE, 44 X 75 IN, DISPOSABLE, LF, STERILE

## (undated) DEVICE — SUTURE, PROLENE, 6-0, 30 IN, C-1, CV-11, BLUE

## (undated) DEVICE — CLIPPER, SURGICAL BLADE ASSEMBLY, GENERAL PURPOSE, SINGLE USE

## (undated) DEVICE — CATHETER, INIFINITI, 5FR TG 4.0

## (undated) DEVICE — CANNULA, ARTERIOTOMY, BULB TIP, 2 MM X 3.2 CM

## (undated) DEVICE — SEAL, UNIVERSAL, 5-12MM

## (undated) DEVICE — COVER, MAYO STAND, W/PAD, 23 IN, DISPOSABLE, PLASTIC, LF, STERILE

## (undated) DEVICE — SUTURE, PROLENE, 7-0, 30 IN, BV1, DA, BLUE

## (undated) DEVICE — SUTURE, MONOCRYL, 3-0, 18 IN, PS2, UNDYED

## (undated) DEVICE — DRESSING, MEPILEX, BORDER, SACRUM, 8.7 X 9.8 IN

## (undated) DEVICE — TUBING SET, BIFURCATED, SMOKE EVAC, FILTERED, F/AIRSEAL

## (undated) DEVICE — SUTURE, VICRYL 0, TAPER POINT, CT-1 VIOLET 27 INCH

## (undated) DEVICE — APPLIER, CLIP MED-LG XI

## (undated) DEVICE — DRAPE, SHEET, UTILITY, NON ABSORBENT, 18 X 26 IN, LF

## (undated) DEVICE — CATHETER, DRAINAGE, NASOGASTRIC, DOUBLE LUMEN, FUNNEL END, SUMP, SALEM, 18 FR, 48 IN, PVC, STERILE

## (undated) DEVICE — Device

## (undated) DEVICE — DRESSING, ADHESIVE, ISLAND, TELFA, 2 X 3.75 IN, LF

## (undated) DEVICE — COLLECTION UNIT, DRAINAGE, THORACIC, SINGLE TUBE, DRY SUCTION, ATS COMPATIBLE, OASIS 3600, LF

## (undated) DEVICE — PACK, ANGIO P2, CUSTOM, LAKE

## (undated) DEVICE — CAUTERY SPATULA, PERMANENT

## (undated) DEVICE — GEL, ULTRASOUND, AQUASONIC 100, 20 GM, STERILE

## (undated) DEVICE — SUTURE, VICRYL, 2-0, 27 IN, CT-1, VIOLET

## (undated) DEVICE — CLIP, SPRING, BULLDOG, FOGARTY, SOFT JAW, 6 MM, LF

## (undated) DEVICE — SCISSORS, MONOPOLAR, CURVED, 8MM

## (undated) DEVICE — CLIP, LIGATING, W/ADHESIVE PAD, MEDIUM, TITANIUM

## (undated) DEVICE — APPLIER, CLIP, SMALL XI

## (undated) DEVICE — KIT, MANIFOLD NAMIC, STANDARD, LHK,PRM,3V,ON,DT,PS,CMSQ,S10

## (undated) DEVICE — DRAIN, CHANNEL, BLAKE, HUBLESS, ROUND, 28FR

## (undated) DEVICE — INFLATION KIT, ADVANTAGE, ENCORE 26 (1/BOX)

## (undated) DEVICE — GOWN, ASTOUND, XL

## (undated) DEVICE — GUIDEWIRE, J TIP, 3 MM, 0.035 IN X 180 CM, PTFE

## (undated) DEVICE — CATHETER, BALLOON, NC EUPHORA NONCOMPLIANT 3.0 X 8 X 142CM

## (undated) DEVICE — DRESSING, MEPILEX, BORDER, HEEL, 8.7 X 9.1 IN

## (undated) DEVICE — SHEATH, PINNACLE, W/.038 GW 10CM, 5FR INTRODUCER, 2.5 CM DIALATOR

## (undated) DEVICE — LUBRICANT, ELECTROLUBE, F/ELECTRODE TIPS

## (undated) DEVICE — KNIFE, OPHTHALMIC, SLIT, 22.5 DEG

## (undated) DEVICE — SURGISLEEVE, WOUND PROTECTOR, SMALL 2.5-6CM

## (undated) DEVICE — SYRINGE, LUER LOCK, 12ML

## (undated) DEVICE — MANIFOLD, 4 PORT NEPTUNE STANDARD

## (undated) DEVICE — CLEANER, ELECTROSURGICAL, TIP, 5 X 5 CM, LF

## (undated) DEVICE — ELECTRODE, QUICK-COMBO, EDGE SYSTEM, REDI PACK

## (undated) DEVICE — CATHETER, ANGIO, IMPULSE, PIG 145, 5 FR X 110 CM (5 PK)

## (undated) DEVICE — DRESSING, ISLAND, TELFA, 4 X 5 IN

## (undated) DEVICE — CATHETER, DIAG, EXPO, 5FR 110CM, PIG

## (undated) DEVICE — GUIDEWIRE, J TIP, 3 MM, 0.035 IN X 260 CM, PTFE

## (undated) DEVICE — SHUNT, INTRACORONARY, 1.75 MM, CLEARVIEW

## (undated) DEVICE — DRAPE, COLUMN, DAVINCI XI

## (undated) DEVICE — APPLICATOR, CHLORAPREP, W/ORANGE TINT, 26ML

## (undated) DEVICE — CARE KIT, LAPAROSCOPIC, ADVANCED

## (undated) DEVICE — GUIDEWIRE, RUN THROUGH WIRE, 180CM

## (undated) DEVICE — CATHETER, VISTA BRITE TIP GUIDE, 6FR 0.070 XB 3 100CM

## (undated) DEVICE — CATHETER, ANGIO, IMPULSE, FL4, 5 FR X 100 CM

## (undated) DEVICE — GRASPER, MICRO, BIPOLAR, DAVINCI XI

## (undated) DEVICE — COVER, TIP HOT SHEARS ENDOWRIST

## (undated) DEVICE — INTRODUCER SHEATH, GLIDESHEATH, 6FR 25CM

## (undated) DEVICE — DRAPE, FLUID WARMER

## (undated) DEVICE — DRAPE, INCISE, ANTIMICROBIAL, IOBAN 2, STERI DRAPE, 23 X 33 IN, DISPOSABLE, STERILE

## (undated) DEVICE — DRAPE, ARM XI

## (undated) DEVICE — CANNULA, CARDIAC SUMP

## (undated) DEVICE — SUTURE, PROLENE, 5-0, 36 IN, C-1, CV-11, BLUE

## (undated) DEVICE — DEVICE KIT, COR-KNOT MICRO

## (undated) DEVICE — CATHETER, ANGIO, IMPULSE, AR2, 5 FR X 100 CM

## (undated) DEVICE — DRAPE, SHEET, THYROID, W/ARMBOARD COVER, 100 X 121 IN, DISPOSABLE, LF, STERILE

## (undated) DEVICE — TIP,  ELECTRODE COATED INSULATED, EXTENDED, LF

## (undated) DEVICE — TRAY, SURESTEP, URINE METER, 14FR, SILICONE

## (undated) DEVICE — CATHETER, BALLOON DILATION, EUPHORA SEMICOMPLIANT 2.50  X 12 MM X 142CM

## (undated) DEVICE — ELECTRODE, ELECTROSURGICAL, BLADE EXT 4 INCH, INSULATED

## (undated) DEVICE — COVER, TABLE, UHC

## (undated) DEVICE — ARM PROTECTORS, FOAM

## (undated) DEVICE — SUTURE, ETHIBOND, XTRA, 30 IN, 0, CT-1, GREEN

## (undated) DEVICE — TOWEL, OR XRAY, RFID DETECT, WHITE, 17X26, STERILE

## (undated) DEVICE — SHEET, SPLIT, CARDIOVASCULAR TIBURON

## (undated) DEVICE — MARKER, SKIN, DUAL TIP INK W/9 LABEL AND REMOVABLE TIME OUT SLEEVE

## (undated) DEVICE — SYSTEM, OCTOPUS NUVO TISSUE STABILIZER

## (undated) DEVICE — KIT, BLOWER / MISTER II